# Patient Record
Sex: FEMALE | Race: WHITE | Employment: FULL TIME | URBAN - METROPOLITAN AREA
[De-identification: names, ages, dates, MRNs, and addresses within clinical notes are randomized per-mention and may not be internally consistent; named-entity substitution may affect disease eponyms.]

---

## 2017-01-13 RX ORDER — LEVOTHYROXINE SODIUM 50 MCG
TABLET ORAL
Qty: 90 TABLET | Refills: 0 | Status: SHIPPED | OUTPATIENT
Start: 2017-01-13 | End: 2017-04-18 | Stop reason: SDUPTHER

## 2017-01-13 RX ORDER — VALACYCLOVIR HYDROCHLORIDE 1 G/1
TABLET, FILM COATED ORAL
Qty: 90 TABLET | Refills: 0 | Status: SHIPPED | OUTPATIENT
Start: 2017-01-13 | End: 2017-03-27 | Stop reason: SDUPTHER

## 2017-01-13 RX ORDER — ZOLPIDEM TARTRATE 5 MG/1
TABLET ORAL
Qty: 90 TABLET | Refills: 0 | Status: SHIPPED | OUTPATIENT
Start: 2017-01-13 | End: 2017-04-09 | Stop reason: SDUPTHER

## 2017-01-16 ENCOUNTER — TELEPHONE (OUTPATIENT)
Dept: INTERNAL MEDICINE CLINIC | Age: 67
End: 2017-01-16

## 2017-04-10 RX ORDER — ZOLPIDEM TARTRATE 5 MG/1
TABLET ORAL
Qty: 90 TABLET | Refills: 0 | Status: SHIPPED | OUTPATIENT
Start: 2017-04-10 | End: 2017-07-08 | Stop reason: SDUPTHER

## 2017-04-17 ENCOUNTER — TELEPHONE (OUTPATIENT)
Dept: INTERNAL MEDICINE CLINIC | Age: 67
End: 2017-04-17

## 2017-04-17 DIAGNOSIS — E66.9 OBESITY, CLASS II, BMI 35-39.9: ICD-10-CM

## 2017-04-17 DIAGNOSIS — E03.9 ACQUIRED HYPOTHYROIDISM: Primary | ICD-10-CM

## 2017-04-17 DIAGNOSIS — E78.5 HYPERLIPIDEMIA, UNSPECIFIED HYPERLIPIDEMIA TYPE: ICD-10-CM

## 2017-04-18 RX ORDER — LEVOTHYROXINE SODIUM 0.05 MG/1
TABLET ORAL
Qty: 90 TABLET | Refills: 0 | Status: SHIPPED | OUTPATIENT
Start: 2017-04-18 | End: 2017-07-16 | Stop reason: SDUPTHER

## 2017-04-19 ENCOUNTER — TELEPHONE (OUTPATIENT)
Dept: INTERNAL MEDICINE CLINIC | Age: 67
End: 2017-04-19

## 2017-04-19 DIAGNOSIS — E03.9 ACQUIRED HYPOTHYROIDISM: ICD-10-CM

## 2017-04-19 DIAGNOSIS — E66.9 OBESITY, CLASS II, BMI 35-39.9: ICD-10-CM

## 2017-04-19 DIAGNOSIS — E78.5 HYPERLIPIDEMIA, UNSPECIFIED HYPERLIPIDEMIA TYPE: ICD-10-CM

## 2017-04-19 LAB
ANION GAP SERPL CALCULATED.3IONS-SCNC: 15 MMOL/L (ref 3–16)
BUN BLDV-MCNC: 14 MG/DL (ref 7–20)
CALCIUM SERPL-MCNC: 9.5 MG/DL (ref 8.3–10.6)
CHLORIDE BLD-SCNC: 103 MMOL/L (ref 99–110)
CHOLESTEROL, TOTAL: 192 MG/DL (ref 0–199)
CO2: 27 MMOL/L (ref 21–32)
CREAT SERPL-MCNC: 0.6 MG/DL (ref 0.6–1.2)
GFR AFRICAN AMERICAN: >60
GFR NON-AFRICAN AMERICAN: >60
GLUCOSE BLD-MCNC: 93 MG/DL (ref 70–99)
HDLC SERPL-MCNC: 46 MG/DL (ref 40–60)
LDL CHOLESTEROL CALCULATED: 119 MG/DL
POTASSIUM SERPL-SCNC: 4.5 MMOL/L (ref 3.5–5.1)
SODIUM BLD-SCNC: 145 MMOL/L (ref 136–145)
TRIGL SERPL-MCNC: 136 MG/DL (ref 0–150)
TSH REFLEX: 1.89 UIU/ML (ref 0.27–4.2)
VLDLC SERPL CALC-MCNC: 27 MG/DL

## 2017-04-20 LAB — VITAMIN D 25-HYDROXY: >120 NG/ML

## 2017-04-21 ENCOUNTER — OFFICE VISIT (OUTPATIENT)
Dept: INTERNAL MEDICINE CLINIC | Age: 67
End: 2017-04-21

## 2017-04-21 VITALS
SYSTOLIC BLOOD PRESSURE: 130 MMHG | DIASTOLIC BLOOD PRESSURE: 74 MMHG | OXYGEN SATURATION: 97 % | WEIGHT: 176 LBS | HEIGHT: 62 IN | BODY MASS INDEX: 32.39 KG/M2 | HEART RATE: 71 BPM

## 2017-04-21 DIAGNOSIS — E03.9 ACQUIRED HYPOTHYROIDISM: ICD-10-CM

## 2017-04-21 DIAGNOSIS — G47.00 INSOMNIA, UNSPECIFIED TYPE: Primary | ICD-10-CM

## 2017-04-21 DIAGNOSIS — E67.3 HYPERVITAMINOSIS D: ICD-10-CM

## 2017-04-21 DIAGNOSIS — E66.9 OBESITY, CLASS II, BMI 35-39.9: ICD-10-CM

## 2017-04-21 DIAGNOSIS — Z13.31 POSITIVE DEPRESSION SCREENING: ICD-10-CM

## 2017-04-21 DIAGNOSIS — F32.A DEPRESSION, UNSPECIFIED DEPRESSION TYPE: ICD-10-CM

## 2017-04-21 PROCEDURE — 99214 OFFICE O/P EST MOD 30 MIN: CPT | Performed by: INTERNAL MEDICINE

## 2017-04-21 PROCEDURE — G8431 POS CLIN DEPRES SCRN F/U DOC: HCPCS | Performed by: INTERNAL MEDICINE

## 2017-04-21 RX ORDER — BUPROPION HYDROCHLORIDE 150 MG/1
TABLET ORAL
Qty: 30 TABLET | Refills: 0 | Status: SHIPPED | OUTPATIENT
Start: 2017-04-21 | End: 2017-04-25 | Stop reason: SDUPTHER

## 2017-04-21 ASSESSMENT — PATIENT HEALTH QUESTIONNAIRE - PHQ9
6. FEELING BAD ABOUT YOURSELF - OR THAT YOU ARE A FAILURE OR HAVE LET YOURSELF OR YOUR FAMILY DOWN: 2
5. POOR APPETITE OR OVEREATING: 3
10. IF YOU CHECKED OFF ANY PROBLEMS, HOW DIFFICULT HAVE THESE PROBLEMS MADE IT FOR YOU TO DO YOUR WORK, TAKE CARE OF THINGS AT HOME, OR GET ALONG WITH OTHER PEOPLE: 0
8. MOVING OR SPEAKING SO SLOWLY THAT OTHER PEOPLE COULD HAVE NOTICED. OR THE OPPOSITE, BEING SO FIGETY OR RESTLESS THAT YOU HAVE BEEN MOVING AROUND A LOT MORE THAN USUAL: 3
4. FEELING TIRED OR HAVING LITTLE ENERGY: 3
3. TROUBLE FALLING OR STAYING ASLEEP: 1
2. FEELING DOWN, DEPRESSED OR HOPELESS: 1
1. LITTLE INTEREST OR PLEASURE IN DOING THINGS: 3
7. TROUBLE CONCENTRATING ON THINGS, SUCH AS READING THE NEWSPAPER OR WATCHING TELEVISION: 2
9. THOUGHTS THAT YOU WOULD BE BETTER OFF DEAD, OR OF HURTING YOURSELF: 0
SUM OF ALL RESPONSES TO PHQ9 QUESTIONS 1 & 2: 4
SUM OF ALL RESPONSES TO PHQ QUESTIONS 1-9: 18

## 2017-04-25 RX ORDER — BUPROPION HYDROCHLORIDE 300 MG/1
TABLET ORAL
Qty: 30 TABLET | Refills: 5 | Status: SHIPPED | OUTPATIENT
Start: 2017-04-25 | End: 2017-07-25 | Stop reason: SDUPTHER

## 2017-06-23 RX ORDER — VALACYCLOVIR HYDROCHLORIDE 1 G/1
TABLET, FILM COATED ORAL
Qty: 90 TABLET | Refills: 0 | Status: SHIPPED | OUTPATIENT
Start: 2017-06-23 | End: 2017-10-03 | Stop reason: SDUPTHER

## 2017-07-10 RX ORDER — ZOLPIDEM TARTRATE 5 MG/1
TABLET ORAL
Qty: 90 TABLET | Refills: 0 | Status: SHIPPED | OUTPATIENT
Start: 2017-07-10 | End: 2017-10-09 | Stop reason: SDUPTHER

## 2017-07-19 ENCOUNTER — TELEPHONE (OUTPATIENT)
Dept: INTERNAL MEDICINE CLINIC | Age: 67
End: 2017-07-19

## 2017-07-25 ENCOUNTER — OFFICE VISIT (OUTPATIENT)
Dept: INTERNAL MEDICINE CLINIC | Age: 67
End: 2017-07-25

## 2017-07-25 VITALS
WEIGHT: 172 LBS | SYSTOLIC BLOOD PRESSURE: 121 MMHG | DIASTOLIC BLOOD PRESSURE: 61 MMHG | OXYGEN SATURATION: 98 % | BODY MASS INDEX: 31.46 KG/M2 | HEART RATE: 70 BPM

## 2017-07-25 DIAGNOSIS — F41.1 GAD (GENERALIZED ANXIETY DISORDER): Primary | ICD-10-CM

## 2017-07-25 DIAGNOSIS — F32.A DEPRESSION, UNSPECIFIED DEPRESSION TYPE: ICD-10-CM

## 2017-07-25 DIAGNOSIS — E66.9 OBESITY, CLASS I, BMI 30-34.9: ICD-10-CM

## 2017-07-25 DIAGNOSIS — Z78.0 POSTMENOPAUSAL: ICD-10-CM

## 2017-07-25 PROCEDURE — 99213 OFFICE O/P EST LOW 20 MIN: CPT | Performed by: INTERNAL MEDICINE

## 2017-07-25 RX ORDER — BUPROPION HYDROCHLORIDE 300 MG/1
TABLET ORAL
Qty: 90 TABLET | Refills: 3 | Status: SHIPPED | OUTPATIENT
Start: 2017-07-25 | End: 2017-08-09 | Stop reason: SDUPTHER

## 2017-07-31 ENCOUNTER — PATIENT MESSAGE (OUTPATIENT)
Dept: INTERNAL MEDICINE CLINIC | Age: 67
End: 2017-07-31

## 2017-08-02 ENCOUNTER — PATIENT MESSAGE (OUTPATIENT)
Dept: INTERNAL MEDICINE CLINIC | Age: 67
End: 2017-08-02

## 2017-08-08 ENCOUNTER — PATIENT MESSAGE (OUTPATIENT)
Dept: INTERNAL MEDICINE CLINIC | Age: 67
End: 2017-08-08

## 2017-08-09 ENCOUNTER — TELEPHONE (OUTPATIENT)
Dept: INTERNAL MEDICINE CLINIC | Age: 67
End: 2017-08-09

## 2017-08-09 RX ORDER — BUPROPION HYDROCHLORIDE 300 MG/1
TABLET ORAL
Qty: 15 TABLET | Refills: 0 | Status: SHIPPED | OUTPATIENT
Start: 2017-08-09 | End: 2017-10-30

## 2017-08-10 ENCOUNTER — HOSPITAL ENCOUNTER (OUTPATIENT)
Dept: MAMMOGRAPHY | Age: 67
Discharge: HOME | End: 2017-08-10
Attending: INTERNAL MEDICINE | Admitting: INTERNAL MEDICINE

## 2017-08-10 DIAGNOSIS — Z78.0 ASYMPTOMATIC MENOPAUSAL STATE: ICD-10-CM

## 2017-08-10 DIAGNOSIS — Z78.0 POST-MENOPAUSAL: ICD-10-CM

## 2017-08-14 RX ORDER — SIMVASTATIN 20 MG
TABLET ORAL
Qty: 90 TABLET | Refills: 1 | Status: SHIPPED | OUTPATIENT
Start: 2017-08-14 | End: 2018-01-26 | Stop reason: SDUPTHER

## 2017-09-21 ENCOUNTER — NURSE ONLY (OUTPATIENT)
Dept: INTERNAL MEDICINE CLINIC | Age: 67
End: 2017-09-21

## 2017-09-21 DIAGNOSIS — Z23 NEED FOR INFLUENZA VACCINATION: Primary | ICD-10-CM

## 2017-09-21 PROCEDURE — 90471 IMMUNIZATION ADMIN: CPT | Performed by: INTERNAL MEDICINE

## 2017-09-21 PROCEDURE — 90662 IIV NO PRSV INCREASED AG IM: CPT | Performed by: INTERNAL MEDICINE

## 2017-10-03 RX ORDER — VALACYCLOVIR HYDROCHLORIDE 1 G/1
TABLET, FILM COATED ORAL
Qty: 90 TABLET | Refills: 0 | Status: SHIPPED | OUTPATIENT
Start: 2017-10-03 | End: 2017-12-05 | Stop reason: SDUPTHER

## 2017-10-09 RX ORDER — ZOLPIDEM TARTRATE 5 MG/1
TABLET ORAL
Qty: 90 TABLET | Refills: 0 | Status: SHIPPED | OUTPATIENT
Start: 2017-10-09 | End: 2018-01-03 | Stop reason: SDUPTHER

## 2017-10-09 NOTE — TELEPHONE ENCOUNTER
Last visit: 7/25/2017  Last filled: 7/10/2017 for 90 days   Next visit: 1/26/2018  OARRS:  04/09/2017

## 2017-10-20 RX ORDER — LEVOTHYROXINE SODIUM 50 MCG
TABLET ORAL
Qty: 90 TABLET | Refills: 0 | Status: SHIPPED | OUTPATIENT
Start: 2017-10-20 | End: 2018-01-08 | Stop reason: SDUPTHER

## 2017-10-31 RX ORDER — BUPROPION HYDROCHLORIDE 300 MG/1
TABLET ORAL
Qty: 30 TABLET | Refills: 5 | Status: SHIPPED | OUTPATIENT
Start: 2017-10-31 | End: 2018-01-26 | Stop reason: SDUPTHER

## 2017-11-01 ENCOUNTER — NURSE ONLY (OUTPATIENT)
Dept: INTERNAL MEDICINE CLINIC | Age: 67
End: 2017-11-01

## 2017-11-01 PROCEDURE — 90471 IMMUNIZATION ADMIN: CPT | Performed by: INTERNAL MEDICINE

## 2017-11-01 PROCEDURE — 90732 PPSV23 VACC 2 YRS+ SUBQ/IM: CPT | Performed by: INTERNAL MEDICINE

## 2017-11-07 ENCOUNTER — OFFICE VISIT (OUTPATIENT)
Dept: INTERNAL MEDICINE CLINIC | Age: 67
End: 2017-11-07

## 2017-11-07 VITALS
DIASTOLIC BLOOD PRESSURE: 78 MMHG | TEMPERATURE: 98.5 F | OXYGEN SATURATION: 97 % | SYSTOLIC BLOOD PRESSURE: 140 MMHG | HEART RATE: 70 BPM

## 2017-11-07 DIAGNOSIS — R39.15 URINARY URGENCY: Primary | ICD-10-CM

## 2017-11-07 DIAGNOSIS — L28.2 PRURITIC RASH: ICD-10-CM

## 2017-11-07 DIAGNOSIS — Z86.19 H/O HERPES GENITALIS: ICD-10-CM

## 2017-11-07 LAB
BILIRUBIN, POC: NORMAL
BLOOD URINE, POC: NORMAL
CLARITY, POC: NORMAL
COLOR, POC: NORMAL
GLUCOSE URINE, POC: NORMAL
KETONES, POC: NORMAL
LEUKOCYTE EST, POC: NORMAL
NITRITE, POC: NORMAL
PH, POC: 6
PROTEIN, POC: NORMAL
SPECIFIC GRAVITY, POC: 1.01
UROBILINOGEN, POC: 0.2

## 2017-11-07 PROCEDURE — 99214 OFFICE O/P EST MOD 30 MIN: CPT | Performed by: INTERNAL MEDICINE

## 2017-11-07 PROCEDURE — 81002 URINALYSIS NONAUTO W/O SCOPE: CPT | Performed by: INTERNAL MEDICINE

## 2017-11-07 NOTE — PATIENT INSTRUCTIONS
Increase valtrex to twice daily for 3 days. Push water. Using should be nearly clear when you urinate. Avoid necklaces and new skin care products for now.

## 2017-11-07 NOTE — PROGRESS NOTES
TAKE 1 TABLET BY MOUTH DAILY 30 tablet 5    SYNTHROID 50 MCG tablet TAKE ONE TABLET BY MOUTH DAILY 90 tablet 0    zolpidem (AMBIEN) 5 MG tablet TAKE ONE TABLET BY MOUTH ONE TIME A DAY 90 tablet 0    valACYclovir (VALTREX) 1 g tablet TAKE 1 TABLET BY MOUTH DAILY 90 tablet 0    simvastatin (ZOCOR) 20 MG tablet TAKE ONE TABLET BY MOUTH NIGHTLY 90 tablet 1    Fexofenadine HCl (ALLEGRA PO) Take by mouth      triamcinolone (NASACORT ALLERGY 24HR) 55 MCG/ACT nasal inhaler 2 SPRAYS IN EACH NOSTRIL one time a day 1 Inhaler 2    conjugated estrogens (PREMARIN) 0.625 MG/GM vaginal cream Place 0.5 g vaginally Twice a Week 1 Tube 5    bisacodyl (DULCOLAX) 5 MG EC tablet Take 10 mg by mouth daily as needed for Constipation.  Omega-3 Fatty Acids (FISH OIL BURP-LESS) 1200 MG CAPS Take 2 tablets by mouth daily.  ibuprofen (ADVIL;MOTRIN) 600 MG tablet Take 600 mg by mouth every 6 hours as needed for Pain. OBJECTIVE:   Vitals:    11/07/17 0955   BP: (!) 140/78   Pulse:    Temp:    SpO2:      Physical Exam   Cardiovascular: Normal rate and regular rhythm. Pulmonary/Chest: Breath sounds normal.   Abdominal: There is no tenderness. No flank pain   Skin: Rash (few scattered 3-4 mm erythematous papules on chest) noted.        Results for POC orders placed in visit on 11/07/17   POCT Urinalysis no Micro   Result Value Ref Range    Color, UA      Clarity, UA      Glucose, UA POC neg     Bilirubin, UA neg     Ketones, UA neg     Spec Grav, UA 1.015     Blood, UA POC trace     pH, UA 6.0     Protein, UA POC neg     Urobilinogen, UA 0.2     Leukocytes, UA neg     Nitrite, UA neg

## 2017-11-08 RX ORDER — CONJUGATED ESTROGENS 0.62 MG/G
CREAM VAGINAL
Qty: 30 G | Refills: 5 | Status: ON HOLD | OUTPATIENT
Start: 2017-11-08 | End: 2022-09-12 | Stop reason: HOSPADM

## 2017-11-09 LAB — URINE CULTURE, ROUTINE: NORMAL

## 2017-11-13 ENCOUNTER — OFFICE VISIT (OUTPATIENT)
Dept: GYNECOLOGY | Age: 67
End: 2017-11-13

## 2017-11-13 VITALS
OXYGEN SATURATION: 96 % | DIASTOLIC BLOOD PRESSURE: 60 MMHG | WEIGHT: 184 LBS | HEART RATE: 71 BPM | SYSTOLIC BLOOD PRESSURE: 122 MMHG | BODY MASS INDEX: 33.65 KG/M2

## 2017-11-13 DIAGNOSIS — N94.9 VAGINAL BURNING: Primary | ICD-10-CM

## 2017-11-13 DIAGNOSIS — Z76.89 ENCOUNTER TO ESTABLISH CARE: ICD-10-CM

## 2017-11-13 DIAGNOSIS — N89.8 VAGINAL DISCHARGE: ICD-10-CM

## 2017-11-13 DIAGNOSIS — Z12.4 CERVICAL CANCER SCREENING: ICD-10-CM

## 2017-11-13 LAB
BACTERIA WET PREP: NORMAL
CLUE CELLS: NORMAL
EPITHELIAL CELLS WET PREP: NORMAL
RBC WET PREP: NORMAL
SOURCE WET PREP: NORMAL
TRICHOMONAS PREP: NORMAL
WBC WET PREP: NORMAL
YEAST WET PREP: NORMAL

## 2017-11-13 PROCEDURE — 99203 OFFICE O/P NEW LOW 30 MIN: CPT | Performed by: NURSE PRACTITIONER

## 2017-11-13 RX ORDER — FLUCONAZOLE 150 MG/1
150 TABLET ORAL
Qty: 2 TABLET | Refills: 0 | Status: SHIPPED | OUTPATIENT
Start: 2017-11-13 | End: 2017-11-17

## 2017-11-13 NOTE — PROGRESS NOTES
[facility]- Gynecology  Progress Note  Margarita Starr CNP       Sergio Mazariegos   YOB: 1950    Date of Visit:  11/13/2017    Chief Complaint Vaginal burning and urgency    Allergies   Allergen Reactions    Latex Rash    Penicillins Rash     Outpatient Prescriptions Marked as Taking for the 11/13/17 encounter (Office Visit) with Dm Espitia CNP   Medication Sig Dispense Refill    Multiple Vitamins-Minerals (MULTIVITAMIN WOMEN PO) Take by mouth daily      fluconazole (DIFLUCAN) 150 MG tablet Take 1 tablet by mouth every 72 hours for 2 doses 2 tablet 0    PREMARIN 0.625 MG/GM vaginal cream PLACE 0.5G VAGINALLY TWICE A WEEK . 30 g 5    buPROPion (WELLBUTRIN XL) 300 MG extended release tablet TAKE 1 TABLET BY MOUTH DAILY 30 tablet 5    SYNTHROID 50 MCG tablet TAKE ONE TABLET BY MOUTH DAILY 90 tablet 0    zolpidem (AMBIEN) 5 MG tablet TAKE ONE TABLET BY MOUTH ONE TIME A DAY 90 tablet 0    valACYclovir (VALTREX) 1 g tablet TAKE 1 TABLET BY MOUTH DAILY 90 tablet 0    simvastatin (ZOCOR) 20 MG tablet TAKE ONE TABLET BY MOUTH NIGHTLY 90 tablet 1    Fexofenadine HCl (ALLEGRA PO) Take by mouth      triamcinolone (NASACORT ALLERGY 24HR) 55 MCG/ACT nasal inhaler 2 SPRAYS IN EACH NOSTRIL one time a day 1 Inhaler 2    bisacodyl (DULCOLAX) 5 MG EC tablet Take 10 mg by mouth daily as needed for Constipation.  Omega-3 Fatty Acids (FISH OIL BURP-LESS) 1200 MG CAPS Take 2 tablets by mouth daily.  ibuprofen (ADVIL;MOTRIN) 600 MG tablet Take 600 mg by mouth every 6 hours as needed for Pain. Vitals:    11/13/17 0803   BP: 122/60   Pulse: 71   SpO2: 96%   Weight: 184 lb (83.5 kg)     Body mass index is 33.65 kg/m².      Wt Readings from Last 3 Encounters:   11/13/17 184 lb (83.5 kg)   07/25/17 172 lb (78 kg)   04/21/17 176 lb (79.8 kg)     BP Readings from Last 3 Encounters:   11/13/17 122/60   11/07/17 (!) 140/78   07/25/17 121/61      DONATO Hill is a 78year old female presenting today as a new patient for vaginal burning and urinary urgency for 1 month. She was seen by her PCP 1 week ago and was evaluated for UTI- culture was negative. Patient reports that she has H/O HSV and will often have similar symptoms prior to an outbreak- she increased her Valtrex for 3 days; no relief in symptoms and denies outbreak. Patient also reports urinary incontinence- combination of urge and stress that has been a chronic issue that has been worsening over the past month. She denies dysuria, frequency, hematuria, fever, nausea/vomiting, diarrhea, constipation and vaginal symptoms. Symptoms have been stable with time. Sexually active: No.  Relevant medical history: none. Treatment to date: none. Patient is not currently sexually active; denies concerns for STDs. Reports that due to vaginal dryness/ atrophy she had been using Premarin cream twice weekly. She reports that symptoms had improved and she stopped using it about 1 month ago prior to symptom onset. She reports that she has started using it again in the past 2 weeks without resolution. Review of Systems  As documented in HPI    Physical Assessment    Physical Exam   Constitutional: She is oriented to person, place, and time. She appears well-nourished. No distress. Cardiovascular: Normal rate and regular rhythm. Exam reveals no friction rub. No murmur heard. Pulmonary/Chest: Effort normal and breath sounds normal. No respiratory distress. She has no wheezes. Abdominal: Soft. Bowel sounds are normal. She exhibits no distension. Genitourinary: Pelvic exam was performed with patient supine. There is no rash, tenderness, lesion or injury on the right labia. There is no rash, tenderness, lesion or injury on the left labia. No erythema, tenderness or bleeding in the vagina. No foreign body in the vagina. No signs of injury around the vagina. Vaginal discharge (small amount of thick, white, adherent ) found.    Genitourinary Comments: normal external genitalia, vulva, vagina, urethral meatus normal and bladder not palpable. Neurological: She is alert and oriented to person, place, and time. Skin: Skin is warm and dry. No rash noted. No erythema. Psychiatric: She has a normal mood and affect. Her behavior is normal.   Vitals reviewed. Preventive Care and Risk Factor Assessment:  Hx abnormal PAP: yes - several years ago. Self-breast exams: yes  Hx of abnormal mammogram: no  Colonoscopy: yes- 8/2014 normal  FH of breast cancer: no  FH of GYN cancer: no   Previous DEXA scan: yes- 8/10/2017, normal  Exercise: walking  Social History   Substance Use Topics    Smoking status: Never Smoker    Smokeless tobacco: Never Used      Comment: Never smoked, never will!  Alcohol use No      History   Sexual Activity    Sexual activity: Not Currently    Partners: Male     Hx of STD: yes - HSV     Health Maintenance   Topic Date Due    Breast cancer screen  03/31/2018    Lipid screen  04/19/2022    DTaP/Tdap/Td vaccine (2 - Td) 02/14/2024    Colon cancer screen colonoscopy  08/08/2024    Zostavax vaccine  Completed    DEXA (modify frequency per FRAX score)  Completed    Flu vaccine  Completed    Pneumococcal low/med risk  Completed    Hepatitis C screen  Completed        Assessment/Plan:    1. Encounter to establish care  [facility] was [facility] today to establish care for c/o vaginal burning. 2. Vaginal burning  Patient with a 2 month h/o vaginal burning that started after stopping premarin but has not improved with restarting. Patient does have H/O HSV without recent outbreak- did try increasing Valtrex for 3 days without resolution of symptoms- no lesions visualized on exam. There is a small amount of thick, white, adherent discharge noted on exam. Consistent with yeast. Cultures pending to rule out concurrent BV.    - Wet prep, genital  - Culture, Genital    3.  Vaginal discharge  Small amount of thick, white, adherent discharge noted on exam. Consistent with yeast. Empirical treatment started. - fluconazole (DIFLUCAN) 150 MG tablet; Take 1 tablet by mouth every 72 hours for 2 doses  Dispense: 2 tablet; Refill: 0    4. Cervical cancer screening  Patient has not had a PAP in several years due to hysterectomy. Reports having h/o an abnormal PAP prior to hysterectomy. PAP today with co HPV test.    - PAP SMEAR  - Human papillomavirus (HPV) DNA probe thin prep high risk     Return in about 1 year (around 11/13/2018), or if symptoms worsen or fail to improve.

## 2017-11-13 NOTE — PATIENT INSTRUCTIONS
Patient Education        Bacterial Vaginosis: Care Instructions  Your Care Instructions    Bacterial vaginosis is a type of vaginal infection. It is caused by excess growth of certain bacteria that are normally found in the vagina. Symptoms can include itching, swelling, pain when you urinate or have sex, and a gray or yellow discharge with a \"fishy\" odor. It is not considered an infection that is spread through sexual contact. Although symptoms can be annoying and uncomfortable, bacterial vaginosis does not usually cause other health problems. However, if you have it while you are pregnant, it can cause complications. While the infection may go away on its own, most doctors use antibiotics to treat it. You may have been prescribed pills or vaginal cream. With treatment, bacterial vaginosis usually clears up in 5 to 7 days. Follow-up care is a key part of your treatment and safety. Be sure to make and go to all appointments, and call your doctor if you are having problems. It's also a good idea to know your test results and keep a list of the medicines you take. How can you care for yourself at home? · Take your antibiotics as directed. Do not stop taking them just because you feel better. You need to take the full course of antibiotics. · Do not eat or drink anything that contains alcohol if you are taking metronidazole (Flagyl). · Keep using your medicine if you start your period. Use pads instead of tampons while using a vaginal cream or suppository. Tampons can absorb the medicine. · Wear loose cotton clothing. Do not wear nylon and other materials that hold body heat and moisture close to the skin. · Do not scratch. Relieve itching with a cold pack or a cool bath. · Do not wash your vaginal area more than once a day. Use plain water or a mild, unscented soap. Do not douche. When should you call for help?   Watch closely for changes in your health, and be sure to contact your doctor if:  · You have unexpected vaginal bleeding. · You have a fever. · You have new or increased pain in your vagina or pelvis. · You are not getting better after 1 week. · Your symptoms return after you finish the course of your medicine. Where can you learn more? Go to https://chpedavid.healthPIQUR [employer]rapeutics. org and sign in to your [employer] account. Enter E634 in the [employer] box to learn more about \"Bacterial Vaginosis: Care Instructions. \"     If you do not have an account, please click on the \"Sign Up Now\" link. Current as of: October 13, 2016  Content Version: 11.3  © 9971-6041 [employer]. Care instructions adapted under license by [facility] ([facility]). If you have questions about a medical condition or this instruction, always ask your healthcare professional. [address] any warranty or liability for your use of this information. Patient Education        Vaginal Yeast Infection: Care Instructions  Your Care Instructions  A vaginal yeast infection is caused by too many yeast cells in the vagina. This is common in women of all ages. Itching, vaginal discharge and irritation, and other symptoms can bother you. But yeast infections don't often cause other health problems. Some medicines can increase your risk of getting a yeast infection. These include antibiotics, birth control pills, hormones, and steroids. You may also be more likely to get a yeast infection if you are pregnant, have diabetes, douche, or wear tight clothes. With treatment, most yeast infections get better in 2 to 3 days. Follow-up care is a key part of your treatment and safety. Be sure to make and go to all appointments, and call your doctor if you are having problems. It's also a good idea to know your test results and keep a list of the medicines you take. How can you care for yourself at home? · Take your medicines exactly as prescribed.  Call your doctor if you think you are having a problem with your medicine. · Ask your doctor about over-the-counter (OTC) medicines for yeast infections. They may cost less than prescription medicines. If you use an OTC treatment, read and follow all instructions on the label. · Do not use tampons while using a vaginal cream or suppository. The tampons can absorb the medicine. Use pads instead. · Wear loose cotton clothing. Do not wear nylon or other fabric that holds body heat and moisture close to the skin. · Try sleeping without underwear. · Do not scratch. Relieve itching with a cold pack or a cool bath. · Do not wash your vaginal area more than once a day. Use plain water or a mild, unscented soap. Air-dry the vaginal area. · Change out of wet swimsuits after swimming. · Do not have sex until you have finished your treatment. · Do not douche. When should you call for help? Call your doctor now or seek immediate medical care if:  · You have unexpected vaginal bleeding. · You have new or increased pain in your vagina or pelvis. Watch closely for changes in your health, and be sure to contact your doctor if:  · You have a fever. · You are not getting better after 2 days. · Your symptoms come back after you finish your medicines. Where can you learn more? Go to https://f4samuraipeMarkkiteb.Scyron. org and sign in to your [employer] account. Enter L842 in the [facility] box to learn more about \"Vaginal Yeast Infection: Care Instructions. \"     If you do not have an account, please click on the \"Sign Up Now\" link. Current as of: October 13, 2016  Content Version: 11.3  © 8649-8276 [employer]. Care instructions adapted under license by [address] If you have questions about a medical condition or this instruction, always ask your healthcare professional. [place] [address] any warranty or liability for your use of this information.

## 2017-11-15 ENCOUNTER — PATIENT MESSAGE (OUTPATIENT)
Dept: GYNECOLOGY | Age: 67
End: 2017-11-15

## 2017-11-15 LAB
GENITAL CULTURE, ROUTINE: NORMAL
HPV COMMENT: ABNORMAL
HPV TYPE 16: NOT DETECTED
HPV TYPE 18: NOT DETECTED
HPVOH (OTHER TYPES): DETECTED

## 2017-11-16 NOTE — TELEPHONE ENCOUNTER
From: Fatuma Steward CNP  To: Mitchell Brown  Sent: 11/15/2017 9:49 AM EST  Subject: lab results    Barrera Bonalicia,    The genital culture is negative as well. Did symptoms improve after taking the Diflucan? Thank you.     Gudelia Gregorio CNP

## 2017-12-05 RX ORDER — VALACYCLOVIR HYDROCHLORIDE 1 G/1
1000 TABLET, FILM COATED ORAL DAILY
Qty: 90 TABLET | Refills: 0 | Status: SHIPPED | OUTPATIENT
Start: 2017-12-05 | End: 2018-07-26

## 2017-12-05 RX ORDER — VALACYCLOVIR HYDROCHLORIDE 1 G/1
1000 TABLET, FILM COATED ORAL DAILY
Qty: 90 TABLET | Refills: 0 | Status: SHIPPED | OUTPATIENT
Start: 2017-12-05 | End: 2017-12-05 | Stop reason: SDUPTHER

## 2017-12-22 ENCOUNTER — TELEPHONE (OUTPATIENT)
Dept: GYNECOLOGY | Age: 67
End: 2017-12-22

## 2017-12-22 NOTE — TELEPHONE ENCOUNTER
Called patient left message to change appt to [facility] office/or reschedule appointment another day.

## 2018-01-02 ENCOUNTER — PATIENT MESSAGE (OUTPATIENT)
Dept: INTERNAL MEDICINE CLINIC | Age: 68
End: 2018-01-02

## 2018-01-02 DIAGNOSIS — F51.04 CHRONIC INSOMNIA: Primary | ICD-10-CM

## 2018-01-03 RX ORDER — ZOLPIDEM TARTRATE 5 MG/1
TABLET ORAL
Qty: 90 TABLET | Refills: 0 | Status: SHIPPED | OUTPATIENT
Start: 2018-01-03 | End: 2018-04-05 | Stop reason: SDUPTHER

## 2018-01-08 RX ORDER — LEVOTHYROXINE SODIUM 0.05 MG/1
50 TABLET ORAL DAILY
Qty: 90 TABLET | Refills: 0 | Status: SHIPPED | OUTPATIENT
Start: 2018-01-08 | End: 2018-04-05 | Stop reason: SDUPTHER

## 2018-01-08 NOTE — TELEPHONE ENCOUNTER
From: Lalo Benito  Sent: 1/6/2018 2:18 PM EST  Subject: Medication Renewal Request    Grace Sanchez would like a refill of the following medications:  SYNTHROID 50 MCG tablet Shahla Moody MD]    Preferred pharmacy: Other - [facility]    Comment:  Hello - My new pharmacy is [facility]. Thank you for your assistance.  Sophia Zimmer

## 2018-01-24 LAB — GLUCOSE FASTING: 102 MG/DL

## 2018-01-25 PROBLEM — F32.5 MAJOR DEPRESSIVE DISORDER IN REMISSION (HCC): Status: ACTIVE | Noted: 2017-04-21

## 2018-01-26 ENCOUNTER — OFFICE VISIT (OUTPATIENT)
Dept: INTERNAL MEDICINE CLINIC | Age: 68
End: 2018-01-26

## 2018-01-26 VITALS
DIASTOLIC BLOOD PRESSURE: 64 MMHG | BODY MASS INDEX: 33.86 KG/M2 | SYSTOLIC BLOOD PRESSURE: 120 MMHG | HEART RATE: 68 BPM | HEIGHT: 62 IN | WEIGHT: 184 LBS

## 2018-01-26 DIAGNOSIS — E03.9 ACQUIRED HYPOTHYROIDISM: Primary | ICD-10-CM

## 2018-01-26 DIAGNOSIS — R73.9 HYPERGLYCEMIA: ICD-10-CM

## 2018-01-26 DIAGNOSIS — G47.00 INSOMNIA, UNSPECIFIED TYPE: ICD-10-CM

## 2018-01-26 DIAGNOSIS — F33.40 RECURRENT MAJOR DEPRESSIVE DISORDER, IN REMISSION (HCC): ICD-10-CM

## 2018-01-26 PROCEDURE — 99214 OFFICE O/P EST MOD 30 MIN: CPT | Performed by: INTERNAL MEDICINE

## 2018-01-26 RX ORDER — SIMVASTATIN 20 MG
20 TABLET ORAL NIGHTLY
Qty: 90 TABLET | Refills: 1 | Status: SHIPPED | OUTPATIENT
Start: 2018-01-26 | End: 2018-07-27 | Stop reason: SDUPTHER

## 2018-01-26 RX ORDER — MELATONIN
1 DAILY
Qty: 30 TABLET | Refills: 11 | COMMUNITY
Start: 2018-01-26

## 2018-01-26 RX ORDER — BUPROPION HYDROCHLORIDE 300 MG/1
300 TABLET ORAL EVERY MORNING
Qty: 30 TABLET | Refills: 5 | Status: SHIPPED | OUTPATIENT
Start: 2018-01-26 | End: 2018-07-23 | Stop reason: SDUPTHER

## 2018-01-26 NOTE — PROGRESS NOTES
Diagnosis Date    Environmental allergies     Heartburn     Herpes simplex type 2 infection     Hyperlipidemia     Hypothyroid     Dx about 15 years ago, but htne was off med for  long time    Insomnia     Menopause     approx age 37     Prior to Visit Medications    Medication Sig Taking? Authorizing Provider   Probiotic Product (PROBIOTIC FORMULA PO) Take by mouth Yes Historical Provider, MD   MILK THISTLE PO Take by mouth Yes Historical Provider, MD   simvastatin (ZOCOR) 20 MG tablet Take 1 tablet by mouth nightly Yes Sari Mcdonnell MD   Cholecalciferol (VITAMIN D3) 1000 units TABS Take 1 tablet by mouth daily Yes Sari Mcdonnell MD   levothyroxine (SYNTHROID) 50 MCG tablet Take 1 tablet by mouth daily Yes Sari Mcdonnell MD   zolpidem (AMBIEN) 5 MG tablet TAKE ONE TABLET BY MOUTH ONE TIME A DAY. Yes Sari Mcdonnell MD   valACYclovir (VALTREX) 1 g tablet Take 1 tablet by mouth daily Yes Sari Mcdonnell MD   Multiple Vitamins-Minerals (MULTIVITAMIN WOMEN PO) Take by mouth daily Yes Historical Provider, MD   PREMARIN 0.625 MG/GM vaginal cream PLACE 0.5G VAGINALLY TWICE A WEEK . Yes Sari Mcdonnell MD   buPROPion (WELLBUTRIN XL) 300 MG extended release tablet TAKE 1 TABLET BY MOUTH DAILY Yes Sari Mcdonnell MD   Fexofenadine HCl (ALLEGRA PO) Take by mouth Yes Historical Provider, MD   triamcinolone (NASACORT ALLERGY 24HR) 55 MCG/ACT nasal inhaler 2 SPRAYS IN EACH NOSTRIL one time a day Yes Sari Mcdonnell MD   bisacodyl (DULCOLAX) 5 MG EC tablet Take 10 mg by mouth daily as needed for Constipation. Yes Historical Provider, MD   Omega-3 Fatty Acids (FISH OIL BURP-LESS) 1200 MG CAPS Take 2 tablets by mouth daily. Yes Historical Provider, MD   ibuprofen (ADVIL;MOTRIN) 600 MG tablet Take 600 mg by mouth every 6 hours as needed for Pain.  Yes Historical Provider, MD   mometasone (NASONEX) 50 MCG/ACT nasal spray USE 2 SPRAYS BY NASAL ROUTE DAILY  Sari Mcdonnell MD       OBJECTIVE:  BP Readings from Last 3 Encounters:   01/26/18 120/64

## 2018-01-26 NOTE — TELEPHONE ENCOUNTER
From: Enzo Curry  Sent: 1/26/2018 1:55 PM EST  Subject: Medication Renewal Request    Jyotsna Mateusfabian. Kye Arellano would like a refill of the following medications:  buPROPion (WELLBUTRIN XL) 300 MG extended release tablet Selena Vega MD]    Preferred pharmacy: [facility] [address] [place] [address] [phone] - F [phone]    Comment:  I'm sorry, I forgot to ask Dr. Lianet Rendon to send this one to [facility]. Thanks for your help!  Nataliya Prado

## 2018-02-14 ENCOUNTER — TELEPHONE (OUTPATIENT)
Dept: GYNECOLOGY | Age: 68
End: 2018-02-14

## 2018-02-14 NOTE — TELEPHONE ENCOUNTER
I would at least tell her she needs to get a pap in one year. That is my most conservative recommendation. Make sure you tell her this.

## 2018-02-14 NOTE — TELEPHONE ENCOUNTER
called patient in regards to her cancelling her repeat pap for 2/8/17 to see if I could get it rescheduled, and patient adamantly refused to schedule the repeat pap feels she doesn't need this, had hpv 15 years ago, will schedule when she feels she needs to after seeing Dr. Isabell Lin.

## 2018-03-15 RX ORDER — VALACYCLOVIR HYDROCHLORIDE 1 G/1
TABLET, FILM COATED ORAL
Qty: 90 TABLET | Refills: 0 | Status: SHIPPED | OUTPATIENT
Start: 2018-03-15 | End: 2018-06-04 | Stop reason: SDUPTHER

## 2018-04-04 ENCOUNTER — PATIENT MESSAGE (OUTPATIENT)
Dept: INTERNAL MEDICINE CLINIC | Age: 68
End: 2018-04-04

## 2018-04-04 DIAGNOSIS — F51.04 CHRONIC INSOMNIA: ICD-10-CM

## 2018-04-05 RX ORDER — ZOLPIDEM TARTRATE 5 MG/1
TABLET ORAL
Qty: 90 TABLET | Refills: 0 | Status: SHIPPED | OUTPATIENT
Start: 2018-04-05 | End: 2018-06-28 | Stop reason: SDUPTHER

## 2018-04-05 RX ORDER — LEVOTHYROXINE SODIUM 0.05 MG/1
50 TABLET ORAL DAILY
Qty: 90 TABLET | Refills: 0 | Status: SHIPPED | OUTPATIENT
Start: 2018-04-05 | End: 2018-07-13 | Stop reason: SDUPTHER

## 2018-04-09 ENCOUNTER — TELEPHONE (OUTPATIENT)
Dept: ORTHOPEDIC SURGERY | Age: 68
End: 2018-04-09

## 2018-04-10 ENCOUNTER — TELEPHONE (OUTPATIENT)
Dept: INTERNAL MEDICINE CLINIC | Age: 68
End: 2018-04-10

## 2018-04-11 ENCOUNTER — TELEPHONE (OUTPATIENT)
Dept: INTERNAL MEDICINE CLINIC | Age: 68
End: 2018-04-11

## 2018-06-28 DIAGNOSIS — F51.04 CHRONIC INSOMNIA: ICD-10-CM

## 2018-06-28 RX ORDER — ZOLPIDEM TARTRATE 5 MG/1
TABLET ORAL
Qty: 90 TABLET | Refills: 0 | Status: SHIPPED | OUTPATIENT
Start: 2018-06-28 | End: 2018-09-26

## 2018-07-16 RX ORDER — LEVOTHYROXINE SODIUM 0.05 MG/1
TABLET ORAL
Qty: 90 TABLET | Refills: 0 | Status: SHIPPED | OUTPATIENT
Start: 2018-07-16 | End: 2018-09-14 | Stop reason: SDUPTHER

## 2018-07-24 RX ORDER — BUPROPION HYDROCHLORIDE 300 MG/1
TABLET ORAL
Qty: 90 TABLET | Refills: 1 | Status: SHIPPED | OUTPATIENT
Start: 2018-07-24 | End: 2019-01-01 | Stop reason: SDUPTHER

## 2018-07-26 NOTE — PROGRESS NOTES
[address] Internal Medicine  Patient Encounter   Aaron Alicea MD    2018    Lee Novoarles  :1950    Assessment/Plan:   Recurrent major depressive disorder, in remission [facility])  Doing well, discussed possible decrease her taper off but due to lifelong intermittent symptoms, we've decided to have her continue. GERD. Recent increase in symptoms. Discussed style measures to control. Advised Zantac before dinner nightly and then consider increasing to twice a day if still with symptoms. Nexium. If unable to control with ranitidine    Insomnia, unspecified type  Choirs nightly medication, discussed alternatives to the Ambien. We'll do a trial of trazodone  mg. Discussed risks and benefits of the medication, including most common side effects. Obesity, Class I, BMI 30-34.9  Weight to slightly increased. Discussed lifestyle again. Herpes simplex type 2 infection  Chronic suppressive therapy, still with some outbreaks but none in 3 months. Starts to break through. Again, let me know and we can change to Famvir    Hyperglycemia  Very mild, asymptomatic. A1c still in normal range. Discussed lifestyle. Discussed medications with patient who voiced understanding of their use and indications. All questions answered. Return in about 6 months (around 2019). Medical assistant triage:  Chief Complaint   Patient presents with    6 Month Follow-Up     mammogram scheduled for tomorrow. HPI:   Patient presents for follow-up of   1. Recurrent major depressive disorder, in remission ([facility] [address]    2. Insomnia, unspecified type    3. Obesity, Class I, BMI 30-34.9    4. Herpes simplex type 2 infection    5. Need for prophylactic vaccination and inoculation against varicella      Mammogram schedule.d Recent issue with acid reflux. Takes nexium and will work for a few days. No trouble swallowing now but did have. Coffee and spicy trigger but still drinking.      Insomnia: Never tried trazodone. Wakes up several times nightly to urinate because has increased water intake. Sleeps about 6 hours. Has hard time getting to sleep earlier. Uses the ambien nightly. Depression: Feels like depression is well controlled. Sometimes wonders if still needs but not wanting to \"rock the boat\" due to a lifetime of symptoms. HSV-2, on chronic suppression. Still has breakthrough outbreaks about 3 months ago. Wonders if immune. Hyperglycemia: Still exercising regularly. Gained a little again. Denies polyuria or polydipsia. ROS   As per HPI. Past Medical History:   Diagnosis Date    Environmental allergies     Heartburn     Herpes simplex type 2 infection     Hyperlipidemia     Hypothyroid     Dx about 15 years ago, but htne was off med for  long time    Insomnia     Menopause     approx age 37     Prior to Visit Medications    Medication Sig Taking? Authorizing Provider   simvastatin (ZOCOR) 20 MG tablet Take 1 tablet by mouth nightly Yes Dhiraj Jane MD   traZODone (DESYREL) 50 MG tablet Take 1-2 tablets by mouth nightly as needed for Sleep Yes Dhiraj Jane MD   zoster recombinant adjuvanted vaccine (SHINGRIX) 50 MCG SUSR injection 50 MCG IM then repeat 2-6 months.  Yes Dhiraj Jane MD   buPROPion (WELLBUTRIN XL) 300 MG extended release tablet TAKE 1 TABLET EVERY MORNING Yes Dhiraj Jane MD   levothyroxine (SYNTHROID) 50 MCG tablet TAKE 1 TABLET DAILY Yes Dhiraj Jane MD   zolpidem (AMBIEN) 5 MG tablet TAKE 1 TABLET ONCE DAILY Yes Dhiraj Jane MD   valACYclovir (VALTREX) 1 g tablet TAKE 1 TABLET DAILY Yes Dhiraj Jane MD   Probiotic Product (PROBIOTIC FORMULA PO) Take by mouth Yes Historical Provider, MD   MILK THISTLE PO Take by mouth Yes Historical Provider, MD   Cholecalciferol (VITAMIN D3) 1000 units TABS Take 1 tablet by mouth daily Yes Dhiraj Jane MD   Multiple Vitamins-Minerals (MULTIVITAMIN WOMEN PO) Take by mouth daily Yes Historical Provider, MD   PREMARIN 0.625 MG/GM vaginal cream PLACE 0.5G VAGINALLY TWICE A WEEK . Yes Nirali Fierro MD   Fexofenadine HCl (ALLEGRA PO) Take by mouth Yes Historical Provider, MD   triamcinolone (NASACORT ALLERGY 24HR) 55 MCG/ACT nasal inhaler 2 SPRAYS IN EACH NOSTRIL one time a day Yes Nirali Fierro MD   bisacodyl (DULCOLAX) 5 MG EC tablet Take 10 mg by mouth daily as needed for Constipation. Yes Historical Provider, MD   Omega-3 Fatty Acids (FISH OIL BURP-LESS) 1200 MG CAPS Take 2 tablets by mouth daily. Yes Historical Provider, MD   ibuprofen (ADVIL;MOTRIN) 600 MG tablet Take 600 mg by mouth every 6 hours as needed for Pain. Yes Historical Provider, MD   mometasone (NASONEX) 50 MCG/ACT nasal spray USE 2 SPRAYS BY NASAL ROUTE DAILY  Nirali Fierro MD       OBJECTIVE:  BP Readings from Last 3 Encounters:   07/27/18 118/72   01/26/18 120/64   11/13/17 122/60      Wt Readings from Last 3 Encounters:   07/27/18 186 lb (84.4 kg)   01/26/18 184 lb (83.5 kg)   11/13/17 184 lb (83.5 kg)     Vitals:    07/27/18 0758   BP: 118/72   Pulse: 72   SpO2: 98%   Weight: 186 lb (84.4 kg)     Body mass index is 34.02 kg/m². Physical Exam   Constitutional: No distress. Cardiovascular: Normal rate and regular rhythm. Pulmonary/Chest: Effort normal and breath sounds normal.   Musculoskeletal: She exhibits no edema. Psychiatric: She has a normal mood and affect.

## 2018-07-27 ENCOUNTER — OFFICE VISIT (OUTPATIENT)
Dept: INTERNAL MEDICINE CLINIC | Age: 68
End: 2018-07-27

## 2018-07-27 VITALS
DIASTOLIC BLOOD PRESSURE: 72 MMHG | BODY MASS INDEX: 34.02 KG/M2 | OXYGEN SATURATION: 98 % | HEART RATE: 72 BPM | SYSTOLIC BLOOD PRESSURE: 118 MMHG | WEIGHT: 186 LBS

## 2018-07-27 DIAGNOSIS — F33.40 RECURRENT MAJOR DEPRESSIVE DISORDER, IN REMISSION (HCC): Primary | ICD-10-CM

## 2018-07-27 DIAGNOSIS — Z23 NEED FOR PROPHYLACTIC VACCINATION AND INOCULATION AGAINST VARICELLA: ICD-10-CM

## 2018-07-27 DIAGNOSIS — K21.9 GASTROESOPHAGEAL REFLUX DISEASE, ESOPHAGITIS PRESENCE NOT SPECIFIED: ICD-10-CM

## 2018-07-27 DIAGNOSIS — G47.00 INSOMNIA, UNSPECIFIED TYPE: ICD-10-CM

## 2018-07-27 DIAGNOSIS — E66.9 OBESITY, CLASS I, BMI 30-34.9: ICD-10-CM

## 2018-07-27 DIAGNOSIS — B00.9 HERPES SIMPLEX TYPE 2 INFECTION: ICD-10-CM

## 2018-07-27 PROCEDURE — 99214 OFFICE O/P EST MOD 30 MIN: CPT | Performed by: INTERNAL MEDICINE

## 2018-07-27 PROCEDURE — 3288F FALL RISK ASSESSMENT DOCD: CPT | Performed by: INTERNAL MEDICINE

## 2018-07-27 RX ORDER — SIMVASTATIN 20 MG
20 TABLET ORAL NIGHTLY
Qty: 90 TABLET | Refills: 1 | Status: SHIPPED | OUTPATIENT
Start: 2018-07-27 | End: 2019-05-28

## 2018-07-27 RX ORDER — TRAZODONE HYDROCHLORIDE 50 MG/1
50-100 TABLET ORAL NIGHTLY PRN
Qty: 60 TABLET | Refills: 2 | Status: SHIPPED | OUTPATIENT
Start: 2018-07-27 | End: 2018-12-29 | Stop reason: SDUPTHER

## 2018-07-28 ENCOUNTER — HOSPITAL ENCOUNTER (OUTPATIENT)
Dept: WOMENS IMAGING | Age: 68
Discharge: HOME | End: 2018-07-28
Payer: COMMERCIAL

## 2018-07-28 DIAGNOSIS — Z12.39 BREAST CANCER SCREENING: ICD-10-CM

## 2018-07-28 PROCEDURE — 77067 SCR MAMMO BI INCL CAD: CPT

## 2018-08-29 RX ORDER — VALACYCLOVIR HYDROCHLORIDE 1 G/1
TABLET, FILM COATED ORAL
Qty: 90 TABLET | Refills: 1 | Status: SHIPPED | OUTPATIENT
Start: 2018-08-29 | End: 2018-12-29 | Stop reason: SDUPTHER

## 2018-09-17 RX ORDER — LEVOTHYROXINE SODIUM 0.05 MG/1
TABLET ORAL
Qty: 90 TABLET | Refills: 0 | Status: SHIPPED | OUTPATIENT
Start: 2018-09-17 | End: 2018-12-29 | Stop reason: SDUPTHER

## 2018-10-22 NOTE — PROGRESS NOTES
you have a living will and a durable power of  for healthcare, please bring in a copy. As part of our patient safety program to minimize surgical site infections, we ask you to do the following:    · Please notify your surgeon if you develop any illness between         now and the  day of your surgery. · This includes a cough, cold, fever, sore throat, nausea,         or vomiting, and diarrhea, etc.  ·  Please notify your surgeon if you experience dizziness, shortness         of breath or blurred vision between now and the time of your surgery. You may shower the night before surgery or the morning of   your surgery with an antibacterial soap. You will need to bring a photo ID and insurance card    [facility] has an onsite pharmacy, would you like to utilize our pharmacy     If you will be staying overnight and use a C-pap machine, please bring   your C-pap to hospital     Our goal is to provide you with excellent care, therefore, visitors will be limited to two(2) in the room at a time so that we may focus on providing this care for you. Please contact pre-admission testing if you have any further questions. [facility] phone number:  [phone]  Please note these are generalized instructions for all surgical cases, you may be provided with more specific instructions according to your surgery.

## 2018-10-24 ENCOUNTER — ANESTHESIA EVENT (OUTPATIENT)
Dept: ENDOSCOPY | Age: 68
End: 2018-10-24
Payer: COMMERCIAL

## 2018-10-25 ENCOUNTER — HOSPITAL ENCOUNTER (OUTPATIENT)
Age: 68
Setting detail: OUTPATIENT SURGERY
Discharge: HOME | End: 2018-10-25
Attending: INTERNAL MEDICINE | Admitting: INTERNAL MEDICINE
Payer: COMMERCIAL

## 2018-10-25 ENCOUNTER — ANESTHESIA (OUTPATIENT)
Dept: ENDOSCOPY | Age: 68
End: 2018-10-25
Payer: COMMERCIAL

## 2018-10-25 VITALS
WEIGHT: 189.5 LBS | RESPIRATION RATE: 18 BRPM | BODY MASS INDEX: 34.87 KG/M2 | HEIGHT: 62 IN | HEART RATE: 70 BPM | DIASTOLIC BLOOD PRESSURE: 114 MMHG | TEMPERATURE: 97.4 F | OXYGEN SATURATION: 100 % | SYSTOLIC BLOOD PRESSURE: 154 MMHG

## 2018-10-25 VITALS — OXYGEN SATURATION: 98 % | SYSTOLIC BLOOD PRESSURE: 158 MMHG | DIASTOLIC BLOOD PRESSURE: 83 MMHG

## 2018-10-25 DIAGNOSIS — K21.9 GASTROESOPHAGEAL REFLUX DISEASE, ESOPHAGITIS PRESENCE NOT SPECIFIED: ICD-10-CM

## 2018-10-25 DIAGNOSIS — R13.10 DYSPHAGIA, UNSPECIFIED TYPE: ICD-10-CM

## 2018-10-25 PROBLEM — K21.00 GASTROESOPHAGEAL REFLUX DISEASE WITH ESOPHAGITIS: Status: ACTIVE | Noted: 2018-07-27

## 2018-10-25 PROCEDURE — 3700000000 HC ANESTHESIA ATTENDED CARE: Performed by: INTERNAL MEDICINE

## 2018-10-25 PROCEDURE — 6360000002 HC RX W HCPCS: Performed by: NURSE ANESTHETIST, CERTIFIED REGISTERED

## 2018-10-25 PROCEDURE — 88305 TISSUE EXAM BY PATHOLOGIST: CPT

## 2018-10-25 PROCEDURE — 2709999900 HC NON-CHARGEABLE SUPPLY: Performed by: INTERNAL MEDICINE

## 2018-10-25 PROCEDURE — 2580000003 HC RX 258: Performed by: ANESTHESIOLOGY

## 2018-10-25 PROCEDURE — 3700000001 HC ADD 15 MINUTES (ANESTHESIA): Performed by: INTERNAL MEDICINE

## 2018-10-25 PROCEDURE — 7100000011 HC PHASE II RECOVERY - ADDTL 15 MIN: Performed by: INTERNAL MEDICINE

## 2018-10-25 PROCEDURE — 7100000010 HC PHASE II RECOVERY - FIRST 15 MIN: Performed by: INTERNAL MEDICINE

## 2018-10-25 PROCEDURE — 3609012400 HC EGD TRANSORAL BIOPSY SINGLE/MULTIPLE: Performed by: INTERNAL MEDICINE

## 2018-10-25 PROCEDURE — 2500000003 HC RX 250 WO HCPCS: Performed by: NURSE ANESTHETIST, CERTIFIED REGISTERED

## 2018-10-25 PROCEDURE — 3609015300 HC ESOPHAGEAL DILATION MALONEY: Performed by: INTERNAL MEDICINE

## 2018-10-25 RX ORDER — PROPOFOL 10 MG/ML
INJECTION, EMULSION INTRAVENOUS PRN
Status: DISCONTINUED | OUTPATIENT
Start: 2018-10-25 | End: 2018-10-25 | Stop reason: SDUPTHER

## 2018-10-25 RX ORDER — SODIUM CHLORIDE 0.9 % (FLUSH) 0.9 %
10 SYRINGE (ML) INJECTION EVERY 12 HOURS SCHEDULED
Status: DISCONTINUED | OUTPATIENT
Start: 2018-10-25 | End: 2018-10-25 | Stop reason: HOSPADM

## 2018-10-25 RX ORDER — LIDOCAINE HYDROCHLORIDE 20 MG/ML
INJECTION, SOLUTION INFILTRATION; PERINEURAL PRN
Status: DISCONTINUED | OUTPATIENT
Start: 2018-10-25 | End: 2018-10-25 | Stop reason: SDUPTHER

## 2018-10-25 RX ORDER — PROMETHAZINE HYDROCHLORIDE 25 MG/ML
6.25 INJECTION, SOLUTION INTRAMUSCULAR; INTRAVENOUS
Status: DISCONTINUED | OUTPATIENT
Start: 2018-10-25 | End: 2018-10-25 | Stop reason: HOSPADM

## 2018-10-25 RX ORDER — SODIUM CHLORIDE 0.9 % (FLUSH) 0.9 %
10 SYRINGE (ML) INJECTION PRN
Status: DISCONTINUED | OUTPATIENT
Start: 2018-10-25 | End: 2018-10-25 | Stop reason: HOSPADM

## 2018-10-25 RX ORDER — LABETALOL HYDROCHLORIDE 5 MG/ML
5 INJECTION, SOLUTION INTRAVENOUS EVERY 10 MIN PRN
Status: DISCONTINUED | OUTPATIENT
Start: 2018-10-25 | End: 2018-10-25 | Stop reason: HOSPADM

## 2018-10-25 RX ORDER — SODIUM CHLORIDE 9 MG/ML
INJECTION, SOLUTION INTRAVENOUS CONTINUOUS
Status: DISCONTINUED | OUTPATIENT
Start: 2018-10-25 | End: 2018-10-25 | Stop reason: HOSPADM

## 2018-10-25 RX ORDER — OMEPRAZOLE 20 MG/1
20 CAPSULE, DELAYED RELEASE ORAL DAILY
COMMUNITY
End: 2019-08-27 | Stop reason: SDUPTHER

## 2018-10-25 RX ORDER — ONDANSETRON 2 MG/ML
4 INJECTION INTRAMUSCULAR; INTRAVENOUS
Status: DISCONTINUED | OUTPATIENT
Start: 2018-10-25 | End: 2018-10-25 | Stop reason: HOSPADM

## 2018-10-25 RX ADMIN — LIDOCAINE HYDROCHLORIDE 50 MG: 20 INJECTION, SOLUTION INFILTRATION; PERINEURAL at 11:19

## 2018-10-25 RX ADMIN — PROPOFOL 20 MG: 10 INJECTION, EMULSION INTRAVENOUS at 11:23

## 2018-10-25 RX ADMIN — PROPOFOL 40 MG: 10 INJECTION, EMULSION INTRAVENOUS at 11:21

## 2018-10-25 RX ADMIN — LIDOCAINE HYDROCHLORIDE 20 MG: 20 INJECTION, SOLUTION INFILTRATION; PERINEURAL at 11:21

## 2018-10-25 RX ADMIN — PROPOFOL 100 MG: 10 INJECTION, EMULSION INTRAVENOUS at 11:19

## 2018-10-25 RX ADMIN — LIDOCAINE HYDROCHLORIDE 10 MG: 20 INJECTION, SOLUTION INFILTRATION; PERINEURAL at 11:23

## 2018-10-25 RX ADMIN — SODIUM CHLORIDE: 0.9 INJECTION, SOLUTION INTRAVENOUS at 10:33

## 2018-10-25 ASSESSMENT — PAIN SCALES - GENERAL
PAINLEVEL_OUTOF10: 0

## 2018-10-25 ASSESSMENT — PAIN - FUNCTIONAL ASSESSMENT: PAIN_FUNCTIONAL_ASSESSMENT: 0-10

## 2018-10-25 NOTE — ANESTHESIA PRE PROCEDURE
[facility] Department of Anesthesiology  Pre-Anesthesia Evaluation/Consultation       Name:  Ezio Ca  : 1950  Age:  80 y.o. MRN:  [id number]  Date: 10/25/2018           Surgeon: Surgeon(s):  Eder Nathan MD    Procedure: Procedure(s):  EGD DIAGNOSTIC ONLY     Allergies   Allergen Reactions    Latex Rash    Penicillins Rash     Patient Active Problem List   Diagnosis    Environmental allergies    Mixed hyperlipidemia    Heartburn    Acquired hypothyroidism    Insomnia    ALBER (generalized anxiety disorder)    Herpes simplex type 2 infection    Obesity, Class I, BMI 30-34.9    Vaginal atrophy    Major depressive disorder in remission ([facility] [address]    Hypervitaminosis D    Gastroesophageal reflux disease     Past Medical History:   Diagnosis Date    Environmental allergies     Heartburn     Herpes simplex type 2 infection     Hyperlipidemia     Hypothyroid     Dx about 15 years ago, but htne was off med for  long time    Insomnia     Menopause     approx age 37     Past Surgical History:   Procedure Laterality Date    HYSTERECTOMY, TOTAL ABDOMINAL  1990    fibroid     Social History   Substance Use Topics    Smoking status: Never Smoker    Smokeless tobacco: Never Used      Comment: Never smoked, never will!  Alcohol use No     Medications  No current facility-administered medications on file prior to encounter.       Current Outpatient Prescriptions on File Prior to Encounter   Medication Sig Dispense Refill    levothyroxine (SYNTHROID) 50 MCG tablet TAKE 1 TABLET DAILY 90 tablet 0    valACYclovir (VALTREX) 1 g tablet TAKE 1 TABLET DAILY 90 tablet 1    simvastatin (ZOCOR) 20 MG tablet Take 1 tablet by mouth nightly (Patient taking differently: Take 10 mg by mouth nightly ) 90 tablet 1    traZODone (DESYREL) 50 MG tablet Take 1-2 tablets by mouth nightly as needed for Sleep 60 tablet 2    buPROPion (WELLBUTRIN XL) 300 MG extended release tablet TAKE 1 TABLET EVERY MORNING 90 tablet 1    Cholecalciferol (VITAMIN D3) 1000 units TABS Take 1 tablet by mouth daily 30 tablet 11    PREMARIN 0.625 MG/GM vaginal cream PLACE 0.5G VAGINALLY TWICE A WEEK . 30 g 5    Fexofenadine HCl (ALLEGRA PO) Take by mouth      triamcinolone (NASACORT ALLERGY 24HR) 55 MCG/ACT nasal inhaler 2 SPRAYS IN EACH NOSTRIL one time a day 1 Inhaler 2    bisacodyl (DULCOLAX) 5 MG EC tablet Take 10 mg by mouth daily as needed for Constipation.  Omega-3 Fatty Acids (FISH OIL BURP-LESS) 1200 MG CAPS Take 2 tablets by mouth daily.  ibuprofen (ADVIL;MOTRIN) 600 MG tablet Take 600 mg by mouth every 6 hours as needed for Pain.  zoster recombinant adjuvanted vaccine (SHINGRIX) 50 MCG SUSR injection 50 MCG IM then repeat 2-6 months.  0.5 mL 1    [DISCONTINUED] mometasone (NASONEX) 50 MCG/ACT nasal spray USE 2 SPRAYS BY NASAL ROUTE DAILY 3 each 1     Current Facility-Administered Medications   Medication Dose Route Frequency Provider Last Rate Last Dose    0.9 % sodium chloride infusion   Intravenous Continuous Torin Hernandez MD        sodium chloride flush 0.9 % injection 10 mL  10 mL Intravenous 2 times per day Torin Hernandez MD        sodium chloride flush 0.9 % injection 10 mL  10 mL Intravenous PRN Torin Hernandez MD         Vital Signs (Current)   Vitals:    10/25/18 1017   BP: (!) 145/67   Pulse: 73   Resp: 18   Temp: 97.9 °F (36.6 °C)   SpO2: 99%     Vital Signs Statistics (for past 48 hrs)     Temp  Av.9 °F (36.6 °C)  Min: 97.9 °F (36.6 °C)   Min taken time: 10/25/18 1017  Max: 97.9 °F (36.6 °C)   Max taken time: 10/25/18 1017  Pulse  Av  Min: 73   Min taken time: 10/25/18 1017  Max: 73   Max taken time: 10/25/18 1017  Resp  Av  Min: 18   Min taken time: 10/25/18 1017  Max: 18   Max taken time: 10/25/18 1017  BP  Min: 145/67   Min taken time: 10/25/18 1017  Max: 145/67   Max taken time: 10/25/18 1017  SpO2  Av %  Min: Pulmonary:Negative Pulmonary ROS and normal exam                               Cardiovascular:  Exercise tolerance: good (>4 METS),           Rhythm: regular  Rate: normal           Beta Blocker:  Not on Beta Blocker         Neuro/Psych:   (+) psychiatric history:            GI/Hepatic/Renal:   (+) GERD:,           Endo/Other:    (+) hypothyroidism::., .                 Abdominal:   (+) obese,         Vascular: negative vascular ROS. Anesthesia Plan      MAC     ASA 2       Induction: intravenous. Anesthetic plan and risks discussed with patient. Plan discussed with CRNA. This pre-anesthesia assessment may be used as a history and physical.    DOS STAFF ADDENDUM:    Pt seen and examined, chart reviewed (including anesthesia, drug and allergy history). No interval changes to history and physical examination. Anesthetic plan, risks, benefits, alternatives, and personnel involved discussed with patient. Patient verbalized an understanding and agrees to proceed.       Diana Brown MD  October 25, 2018  10:24 AM

## 2018-10-25 NOTE — H&P
Pain.      zoster recombinant adjuvanted vaccine (SHINGRIX) 50 MCG SUSR injection 50 MCG IM then repeat 2-6 months. 0.5 mL 1    [DISCONTINUED] mometasone (NASONEX) 50 MCG/ACT nasal spray USE 2 SPRAYS BY NASAL ROUTE DAILY 3 each 1     Allergies:  Latex and Penicillins  Social History     Social History    Marital status:      Spouse name: ,  works    Number of children: 4    Years of education: N/A     Occupational History          Social History Main Topics    Smoking status: Never Smoker    Smokeless tobacco: Never Used      Comment: Never smoked, never will!  Alcohol use No    Drug use: Unknown    Sexual activity: Not Currently     Partners: Male     Other Topics Concern    Not on file     Social History Narrative    Exercise:    2-3x/week, recumbent bike or walking for 20-30 min     Family History   Problem Relation Age of Onset    Lung Cancer Mother 54        g    Lung Cancer Father 76        heavy smoker    Depression Father     Lung Cancer Other     High Cholesterol Brother     Diabetes Brother     Anxiety Disorder Brother          PHYSICAL EXAM:      BP (!) 145/67   Pulse 73   Temp 97.9 °F (36.6 °C) (Temporal)   Resp 18   Ht 5' 2\" (1.575 m)   Wt 189 lb 8 oz (86 kg)   SpO2 99%   BMI 34.66 kg/m²  I        Heart:normal    Lungs: normal    Abdomen: normal      ASA Grade:  See anesthesia note      ASSESSMENT AND PLAN:    1. Procedure options, risks and benefits reviewed with patient and expresses understanding.

## 2018-10-25 NOTE — PROCEDURES
[facility]  Endoscopy Note    Patient: Alvino Pappas  : 1950  Acct#: [de-identified]    Procedure: Esophagogastroduodenoscopy with biopsy, esophageal dilation    Date:  10/25/2018     Surgeon:  Ewa Yoder MD    Referring Physician:  Padmini Narvaez    Preoperative Diagnosis:  dysphagia    Postoperative Diagnosis:  Esophagitis and gastritis and esophageal dilation with a 50 Bah[place]ian bougie. Anesthesia: see anesthesia note. Indications: This is a 80y.o. year old female who presents today with dysphagia. Description of Procedure:  Informed consent was obtained from the patient after explanation of indications, benefits and possible risks and complications of the procedure. The patient was then taken to the endoscopy suite, placed in the left lateral decubitus position and the above IV sedation was administrered. The Olympus videoendoscope was placed in the patient's mouth and under direct visualization passed into the esophagus. Visualization of the esophagus demonstrated esophagitis. The distal esophagus was biopsied. The esophagus was dilated with a 50 [place]ian bougie. The scope was then advanced into the stomach. Visualization of the gastric body and antrum demonstrated gastritis. The antrum was biopsied. .  A retroflexed exam of the gastric cardia and fundus demonstrated normal..  The pylorus was patent and the scope was advanced into the duodenum. Visualization of the duodenal bulb demonstrated normal..  The second portion of the duodenum demonstrated normal..    The scope was then withdrawn back into the stomach, it was decompressed, and the scope was completely withdrawn. The patient tolerated the procedure well and was taken to the post anesthesia care unit in good condition. Estimated Blood loss:  Minimal.    Impression: Esophagitis and stricture dilated with a 50 [place]ian bougie. Gastritis. Recommendations:Continue PPI. Await pathology.     Ewa Yoder MD  [place]

## 2018-11-21 ENCOUNTER — NURSE ONLY (OUTPATIENT)
Dept: INTERNAL MEDICINE CLINIC | Age: 68
End: 2018-11-21
Payer: COMMERCIAL

## 2018-11-21 DIAGNOSIS — Z23 NEED FOR VACCINATION FOR H FLU TYPE B: Primary | ICD-10-CM

## 2018-11-21 PROCEDURE — 90662 IIV NO PRSV INCREASED AG IM: CPT | Performed by: INTERNAL MEDICINE

## 2018-11-21 PROCEDURE — G0008 ADMIN INFLUENZA VIRUS VAC: HCPCS | Performed by: INTERNAL MEDICINE

## 2018-11-21 NOTE — PROGRESS NOTES
Vaccine Information Sheet, \"Influenza - Inactivated\"  given to [employer], or parent/legal guardian of  [employer] and verbalized understanding. Patient responses:    Have you ever had a reaction to a flu vaccine? No  Are you able to eat eggs without adverse effects? Yes  Do you have any current illness? No  Have you ever had [place] Syndrome? No    Flu vaccine given per order. Please see immunization tab.

## 2019-01-01 RX ORDER — BUPROPION HYDROCHLORIDE 300 MG/1
TABLET ORAL
Qty: 90 TABLET | Refills: 1 | Status: SHIPPED | OUTPATIENT
Start: 2019-01-01 | End: 2019-07-09 | Stop reason: SDUPTHER

## 2019-01-01 RX ORDER — LEVOTHYROXINE SODIUM 0.05 MG/1
TABLET ORAL
Qty: 90 TABLET | Refills: 0 | Status: SHIPPED | OUTPATIENT
Start: 2019-01-01 | End: 2019-03-25 | Stop reason: SDUPTHER

## 2019-01-01 RX ORDER — TRAZODONE HYDROCHLORIDE 50 MG/1
TABLET ORAL
Qty: 60 TABLET | Refills: 2 | Status: SHIPPED | OUTPATIENT
Start: 2019-01-01 | End: 2019-05-11 | Stop reason: SDUPTHER

## 2019-01-01 RX ORDER — VALACYCLOVIR HYDROCHLORIDE 1 G/1
TABLET, FILM COATED ORAL
Qty: 90 TABLET | Refills: 1 | Status: SHIPPED | OUTPATIENT
Start: 2019-01-01 | End: 2019-06-29 | Stop reason: SDUPTHER

## 2019-01-30 ENCOUNTER — OFFICE VISIT (OUTPATIENT)
Dept: INTERNAL MEDICINE CLINIC | Age: 69
End: 2019-01-30
Payer: COMMERCIAL

## 2019-01-30 VITALS
HEART RATE: 84 BPM | SYSTOLIC BLOOD PRESSURE: 124 MMHG | WEIGHT: 195 LBS | DIASTOLIC BLOOD PRESSURE: 76 MMHG | OXYGEN SATURATION: 95 % | BODY MASS INDEX: 35.67 KG/M2

## 2019-01-30 DIAGNOSIS — E78.2 MIXED HYPERLIPIDEMIA: ICD-10-CM

## 2019-01-30 DIAGNOSIS — E03.9 ACQUIRED HYPOTHYROIDISM: ICD-10-CM

## 2019-01-30 DIAGNOSIS — K21.00 GASTROESOPHAGEAL REFLUX DISEASE WITH ESOPHAGITIS: ICD-10-CM

## 2019-01-30 DIAGNOSIS — G47.00 INSOMNIA, UNSPECIFIED TYPE: Primary | ICD-10-CM

## 2019-01-30 DIAGNOSIS — Z23 NEED FOR PROPHYLACTIC VACCINATION AND INOCULATION AGAINST VARICELLA: ICD-10-CM

## 2019-01-30 DIAGNOSIS — R73.03 PREDIABETES: ICD-10-CM

## 2019-01-30 DIAGNOSIS — F33.40 RECURRENT MAJOR DEPRESSIVE DISORDER, IN REMISSION (HCC): ICD-10-CM

## 2019-01-30 PROCEDURE — 99214 OFFICE O/P EST MOD 30 MIN: CPT | Performed by: INTERNAL MEDICINE

## 2019-01-30 ASSESSMENT — ENCOUNTER SYMPTOMS
CHEST TIGHTNESS: 0
SHORTNESS OF BREATH: 0

## 2019-03-25 ENCOUNTER — TELEPHONE (OUTPATIENT)
Dept: INTERNAL MEDICINE CLINIC | Age: 69
End: 2019-03-25

## 2019-03-25 RX ORDER — LEVOTHYROXINE SODIUM 0.05 MG/1
TABLET ORAL
Qty: 90 TABLET | Refills: 0 | Status: SHIPPED | OUTPATIENT
Start: 2019-03-25 | End: 2019-06-29 | Stop reason: SDUPTHER

## 2019-05-13 RX ORDER — TRAZODONE HYDROCHLORIDE 50 MG/1
TABLET ORAL
Qty: 60 TABLET | Refills: 0 | Status: SHIPPED | OUTPATIENT
Start: 2019-05-13 | End: 2019-06-03 | Stop reason: SDUPTHER

## 2019-05-27 NOTE — PROGRESS NOTES
[facility] Internal Medicine  Preoperative Consultation      2019    Vashti Neri  :  1950    Chief Complaint   Patient presents with    Pre-op Exam     Patient is scheduled for cataract surgery on L eye 2019 and R eye 2019 at [facility] w/ Dr. Phoenix Tian [phone]        HPI:  This patient presents to the office today for a preoperative consultation at the request of Dr. Daniel Alvarez to assess stability of patient's medical condition(s) listed below. She will be having cataract surgery left eye  and then right eye 19. Progressive deterioration in vision. Feels blurry all of the time. Bilateral cataracts. Prediabetes: has gained 14 # in last 18 month. No increase in thirst or urination. Allergies: allegra alternating with allegra D every other day to offset cost. Had used nasacort but backed off when dry hacking cough started a few months ago. No dyspnea. Review of Systems   Constitutional: Negative for chills and fever. Respiratory: Positive for cough (dry hacking cough for several months, start like a tickle). Negative for shortness of breath. Cardiovascular: Negative for chest pain and palpitations. Skin: Negative for rash. Hematological: Does not bruise/bleed easily. Known anesthesia problems: None   Bleeding risk: No recent or remote history of abnormal bleeding  Personal or FH of DVT/PE: No    Recent steroid use: no  Exercise tolerance: 2 flights stairs with groceries or laundry without difficulty.     Past Medical History:   Diagnosis Date    Environmental allergies     Heartburn     Herpes simplex type 2 infection     Hyperlipidemia     Hypothyroid     Dx about 15 years ago, but htne was off med for  long time    Insomnia     Menopause     approx age 37     Past Surgical History:   Procedure Laterality Date    ESOPHAGEAL DILATATION N/A 10/25/2018    ESOPHAGEAL DILATION Sharri Marquez performed by Romie Nageotte, MD at [facility], TOTAL ABDOMINAL  1990    fibroid    UPPER GASTROINTESTINAL ENDOSCOPY N/A 10/25/2018    EGD BIOPSY performed by Cathy Huntley MD at [facility]     Family History   Problem Relation Age of Onset    Lung Cancer Mother 54        g    Lung Cancer Father 76        heavy smoker    Depression Father     Lung Cancer Other     High Cholesterol Brother     Diabetes Brother     Anxiety Disorder Brother      Social History     Tobacco Use    Smoking status: Never Smoker    Smokeless tobacco: Never Used    Tobacco comment: Never smoked, never will! Substance Use Topics    Alcohol use: No    Drug use: Not on file     Allergies   Allergen Reactions    Latex Rash    Penicillins Rash       Prior to Visit Medications    Medication Sig Taking? Authorizing Provider   simvastatin (ZOCOR) 20 MG tablet Take 1 tablet by mouth nightly Yes Jose Adame MD   Fexofenadine-Pseudoephedrine (ALLEGRA-D PO) Take by mouth every other day Alternates with allegra Yes Historical Provider, MD   traZODone (DESYREL) 50 MG tablet TAKE 1 TO 2 TABLETS NIGHTLYAS NEEDED FOR SLEEP Yes Jose Adame MD   levothyroxine (SYNTHROID) 50 MCG tablet TAKE 1 TABLET DAILY Yes Jose Adame MD   valACYclovir (VALTREX) 1 g tablet TAKE 1 TABLET DAILY Yes Jose Adame MD   buPROPion (WELLBUTRIN XL) 300 MG extended release tablet TAKE 1 TABLET EVERY MORNING Yes Jose Adame MD   omeprazole (PRILOSEC) 20 MG delayed release capsule Take 20 mg by mouth daily Yes Historical Provider, MD   Cholecalciferol (VITAMIN D3) 1000 units TABS Take 1 tablet by mouth daily Yes Jose Adame MD   PREMARIN 0.625 MG/GM vaginal cream PLACE 0.5G VAGINALLY TWICE A WEEK . Yes Jose Adame MD   Fexofenadine HCl (ALLEGRA PO) Take by mouth Yes Historical Provider, MD   bisacodyl (DULCOLAX) 5 MG EC tablet Take 10 mg by mouth daily as needed for Constipation.  Yes Historical Provider, MD   Omega-3 Fatty Acids (FISH OIL BURP-LESS) 1200 MG CAPS Take 1 tablet by mouth daily  Yes Historical Provider, MD   triamcinolone (NASACORT ALLERGY 24HR) 55 MCG/ACT nasal inhaler 2 SPRAYS IN EACH NOSTRIL one time a day  Jorge Kaminski MD   mometasone (NASONEX) 50 MCG/ACT nasal spray USE 2 SPRAYS BY NASAL ROUTE DAILY  Jorge Kaminski MD   ibuprofen (ADVIL;MOTRIN) 600 MG tablet Take 600 mg by mouth every 6 hours as needed for Pain. Historical Provider, MD       Physical Exam:  /74   Pulse 74   Wt 197 lb (89.4 kg)   SpO2 98%   BMI 36.03 kg/m²    Constitutional: Patient is oriented to person, place, and time. HEENT:   Normocephalic and atraumatic. SHEBA bilaterally. Extraocular movement are normal.    Oropharynx normal.   No  loose teeth. Neck: Supple. No mass or thyromegaly present. Cardiovascular: Normal rate, regular rhythm, normal heart sounds and intact distal pulses. No murmur. No carotid bruit. No edema. Pulmonary/Chest: No respiratory distress. Breath sounds normal with no wheezes, crackles or rhonchi. Abdominal: Soft, non-tender. There is no organomegaly. Neurological:  Cranial nerves grossly intact. Skin: No rash or erythema noted. Psychiatric: She has a normal mood and affect. Speech and behavior are normal.    EKG Interpretation:  Not indicated. Lab Review: A1c and blood sugar pending. ASSESSMENT/PLAN:   Cataract of both eyes, unspecified cataract type  Medically stable. Check blood sugar and A1c to be sure has not progressed to diabetes. Prediabetes  Difficulty with lifestyle modifications. Continue to discuss and encourage. Labs today to assure blood sugars not increased to point that could contribute to visual disturbance. -     Hemoglobin A1C; Future  -     Basic Metabolic Panel; Future    Cough, allergies and postnasal drainage. Continue allegra and resume nasacort. Instructed to contact office if not improving. Return in about 3 months (around 8/28/2019).

## 2019-05-28 ENCOUNTER — OFFICE VISIT (OUTPATIENT)
Dept: INTERNAL MEDICINE CLINIC | Age: 69
End: 2019-05-28
Payer: COMMERCIAL

## 2019-05-28 VITALS
OXYGEN SATURATION: 98 % | HEART RATE: 74 BPM | DIASTOLIC BLOOD PRESSURE: 74 MMHG | WEIGHT: 197 LBS | BODY MASS INDEX: 36.03 KG/M2 | SYSTOLIC BLOOD PRESSURE: 130 MMHG

## 2019-05-28 DIAGNOSIS — R05.9 COUGH: ICD-10-CM

## 2019-05-28 DIAGNOSIS — H26.9 CATARACT OF BOTH EYES, UNSPECIFIED CATARACT TYPE: Primary | ICD-10-CM

## 2019-05-28 DIAGNOSIS — R73.03 PREDIABETES: ICD-10-CM

## 2019-05-28 PROCEDURE — 99214 OFFICE O/P EST MOD 30 MIN: CPT | Performed by: INTERNAL MEDICINE

## 2019-05-28 RX ORDER — SIMVASTATIN 20 MG
20 TABLET ORAL NIGHTLY
Qty: 90 TABLET | Refills: 1 | Status: SHIPPED | OUTPATIENT
Start: 2019-05-28 | End: 2019-05-29 | Stop reason: SDUPTHER

## 2019-05-28 ASSESSMENT — ENCOUNTER SYMPTOMS
COUGH: 1
SHORTNESS OF BREATH: 0

## 2019-05-29 ENCOUNTER — PATIENT MESSAGE (OUTPATIENT)
Dept: INTERNAL MEDICINE CLINIC | Age: 69
End: 2019-05-29

## 2019-05-29 RX ORDER — SIMVASTATIN 20 MG
20 TABLET ORAL NIGHTLY
Qty: 90 TABLET | Refills: 1 | Status: SHIPPED | OUTPATIENT
Start: 2019-05-29 | End: 2020-01-06

## 2019-05-29 NOTE — TELEPHONE ENCOUNTER
From: Angeline Mazariegos  To: Danielle Garzon MD  Sent: 5/29/2019 1:28 PM EDT  Subject: Non-Urgent Medical Question    Hello . .. When I visited on 5/28/19, my prescription renewal for Simvastatin was sent to [facility], rather than [facility]. I received a notification from [facility] to  my Rx. Is there a way you could re-route the renewal to [facility]? Thanks for any assistance.  Robin Mosqueda

## 2019-06-03 RX ORDER — TRAZODONE HYDROCHLORIDE 50 MG/1
TABLET ORAL
Qty: 60 TABLET | Refills: 5 | Status: SHIPPED | OUTPATIENT
Start: 2019-06-03 | End: 2019-08-27

## 2019-06-04 NOTE — PROGRESS NOTES
Braulio Mandy    Age 80 y.o.    female    1950    MRN [id number]    Date___________   Arrival Time_____________  OR Time____________Duration____     Procedure(s):  PHACOEMULSIFICATION OF CATARACT LEFT EYE WITH INTRAOCULAR LENS IMPLANT    Surgeon  ________________________________  [facility]   [place]       Phone [phone] (home) [phone] (work)      [address]  Cell Work  ______________________________________________________________________________________________________________________________________________________________________________________________________________________________________________________________________________________________________________________________________________________________    PCP__________________________Phone__________________________________      H&P__________________Bringing      Chart              Epic    DOS           Called_______  EKG__________________Bringing      Chart              Epic    DOS           Called_______  LAB__________________ Bringing      Chart              Epic    DOS           Called_______  [facility] _______Bringing      Chart              Epic    DOS           Called_______      Cardiologist________________________ Phone___________________________      ? Catholic concerns / Waiver on Chart            [employer]________________  ? Pre-op Instructions Given [place]          _________________________________  ? Directions to Surgery Center                          _________________________________  ? Transportation Home_______________      _________________________________  ?  Crutches/Walker__________________        _________________________________      ________Pre-op Orders   _______Transcribed    _______Wt.  ________Pharmacy          _______SCD  ______VTE     ______Beta Blocker  ________Consent

## 2019-06-07 NOTE — PROGRESS NOTES
Obstructive Sleep Apnea (LISSA) Screening     Patient:  German Lincoln    YOB: 1950      Medical Record #:  [id number]                     Date:  6/7/2019     1. Are you a loud and/or regular snorer? []  Yes       [x] No    2. Have you been observed to gasp or stop breathing during sleep? []  Yes       [x] No    3. Do you feel tired or groggy upon awakening or do you awaken with a headache?           []  Yes       [] No    4. Are you often tired or fatigued during the wake time hours? []  Yes       [] No    5. Do you fall asleep sitting, reading, watching TV or driving? []  Yes       [] No    6. Do you often have problems with memory or concentration? []  Yes       [] No    **If patient's score is ? 3 they are considered high risk for LISSA. Notify the anesthesiologist of the high risk and document in focus note. Note:  If the patient's BMI is more than 35 kg m¯² , has neck circumference > 40 cm, and/or high blood pressure the risk is greater (© American Sleep Apnea Association, 2006).

## 2019-06-11 ENCOUNTER — ANESTHESIA EVENT (OUTPATIENT)
Dept: OPERATING ROOM | Age: 69
End: 2019-06-11
Payer: COMMERCIAL

## 2019-06-11 ASSESSMENT — LIFESTYLE VARIABLES: SMOKING_STATUS: 0

## 2019-06-11 NOTE — ANESTHESIA PRE PROCEDURE
Department of Anesthesiology  Preprocedure Note       Name:  Marylene Nettle   Age:  80 y.o.  :  1950                                          MRN:  [id number]         Date:  2019      Surgeon: Mirna Land):  Ra El MD    Procedure: PHACOEMULSIFICATION OF CATARACT LEFT EYE WITH INTRAOCULAR LENS IMPLANT (Left Eye)    Medications prior to admission:   Prior to Admission medications    Medication Sig Start Date End Date Taking? Authorizing Provider   traZODone (DESYREL) 50 MG tablet TAKE 1 TO 2 TABLETS NIGHTLYAS NEEDED FOR SLEEP 6/3/19  Yes Lyssa Rodriguez MD   simvastatin (ZOCOR) 20 MG tablet Take 1 tablet by mouth nightly 19  Yes Lyssa Rodriguez MD   levothyroxine (SYNTHROID) 50 MCG tablet TAKE 1 TABLET DAILY 3/25/19  Yes Lyssa Rodriguez MD   valACYclovir (VALTREX) 1 g tablet TAKE 1 TABLET DAILY 19  Yes Lyssa Rodriguez MD   buPROPion (WELLBUTRIN XL) 300 MG extended release tablet TAKE 1 TABLET EVERY MORNING 19  Yes Lyssa Rodriguez MD   omeprazole (PRILOSEC) 20 MG delayed release capsule Take 20 mg by mouth daily   Yes Historical Provider, MD   Cholecalciferol (VITAMIN D3) 1000 units TABS Take 1 tablet by mouth daily 18  Yes Lyssa Rodriguez MD   Fexofenadine HCl (ALLEGRA PO) Take by mouth daily Alternates every other day with Allegra D   Yes Historical Provider, MD   bisacodyl (DULCOLAX) 5 MG EC tablet Take 10 mg by mouth daily as needed for Constipation. Yes Historical Provider, MD   Omega-3 Fatty Acids (FISH OIL BURP-LESS) 1200 MG CAPS Take 1 tablet by mouth daily    Yes Historical Provider, MD   ibuprofen (ADVIL;MOTRIN) 600 MG tablet Take 600 mg by mouth every 6 hours as needed for Pain.    Yes Historical Provider, MD   Fexofenadine-Pseudoephedrine (ALLEGRA-D PO) Take by mouth every other day Alternates with allegra    Historical Provider, MD   PREMARIN 0.625 MG/GM vaginal cream PLACE 0.5G VAGINALLY TWICE A WEEK . 17   Lyssa Rodriguez MD   triamcinolone (NASACORT ALLERGY 24HR) 55 MCG/ACT nasal inhaler 2 SPRAYS IN EACH NOSTRIL one time a day 10/31/16   Alecia Obrien MD   mometasone (NASONEX) 50 MCG/ACT nasal spray USE 2 SPRAYS BY NASAL ROUTE DAILY 4/6/16 1/30/19  Alecia Obrien MD       Current medications:    Current Facility-Administered Medications   Medication Dose Route Frequency Provider Last Rate Last Dose    bupivacaine 0.75%, phenylephrine 10%, tropicamide 1%, cyclopentolate 1%, moxifloxacin 0.5%, ketorolac 0.5% in lidocaine 2% jelly ophthalmic solution  0.3 mL Left Eye Q10 Min Shirin Wick MD   0.3 mL at 06/12/19 0850    sodium chloride flush 0.9 % injection 10 mL  10 mL Intravenous 2 times per day Jackson Herron MD        sodium chloride flush 0.9 % injection 10 mL  10 mL Intravenous PRN Jackson Herron MD        famotidine (PEPCID) injection 20 mg  20 mg Intravenous Once Jackson Herron MD        lactated ringers infusion   Intravenous Continuous Jackson Herron MD           Allergies:     Allergies   Allergen Reactions    Latex Rash    Penicillins Rash       Problem List:    Patient Active Problem List   Diagnosis Code    Environmental allergies Z91.09    Mixed hyperlipidemia E78.2    Acquired hypothyroidism E03.9    Insomnia G47.00    ALBER (generalized anxiety disorder) F41.1    Herpes simplex type 2 infection B00.9    Obesity, Class I, BMI 30-34.9 E66.9    Vaginal atrophy N95.2    Major depressive disorder in remission ([facility] [address] F32.5    Hypervitaminosis D E67.3    Gastroesophageal reflux disease with esophagitis K21.0    Prediabetes R73.03       Past Medical History:        Diagnosis Date    Acid reflux     Environmental allergies     Herpes simplex type 2 infection     Hyperlipidemia     Hypothyroid     Dx about 15 years ago, but htne was off med for  long time    Insomnia     Menopause     approx age 37       Past Surgical History:        Procedure Laterality Date    ESOPHAGEAL DILATATION N/A 10/25/2018    ESOPHAGEAL DILATION MARTA performed by Jere March MD at [address] [facility], [facility]    fibroid    UPPER GASTROINTESTINAL ENDOSCOPY N/A 10/25/2018    EGD BIOPSY performed by Jere March MD at [address] [place] [address] History:    Social History     Tobacco Use    Smoking status: Never Smoker    Smokeless tobacco: Never Used    Tobacco comment: Never smoked, never will! Substance Use Topics    Alcohol use: No                                Counseling given: Not Answered  Comment: Never smoked, never will! Vital Signs (Current):   Vitals:    06/07/19 1254 06/12/19 0845   BP:  (!) 152/69   Pulse:  71   Resp:  16   Temp:  97.1 °F (36.2 °C)   TempSrc:  Temporal   SpO2:  97%   Weight: 190 lb (86.2 kg) 190 lb (86.2 kg)   Height: 5' 2\" (1.575 m) 5' 2\" (1.575 m)                                              BP Readings from Last 3 Encounters:   06/12/19 (!) 152/69   05/28/19 130/74   01/30/19 124/76       NPO Status:  [place]+, SEE MAR                                                                               BMI:   Wt Readings from Last 3 Encounters:   06/12/19 190 lb (86.2 kg)   05/28/19 197 lb (89.4 kg)   01/30/19 195 lb (88.5 kg)     Body mass index is 34.75 kg/m².     CBC:   Lab Results   Component Value Date    WBC 6.3 04/01/2016    RBC 4.47 04/01/2016    HGB 14.3 04/01/2016    HCT 42.9 04/01/2016    MCV 95.9 04/01/2016    RDW 14.1 04/01/2016     04/01/2016       CMP:   Lab Results   Component Value Date     05/28/2019    K 4.2 05/28/2019     05/28/2019    CO2 24 05/28/2019    BUN 13 05/28/2019    CREATININE 0.9 05/28/2019    GFRAA >60 05/28/2019    AGRATIO 1.7 10/19/2016    LABGLOM >60 05/28/2019    GLUCOSE 134 05/28/2019    PROT 6.9 10/19/2016    CALCIUM 9.6 05/28/2019    BILITOT 0.4 10/19/2016    ALKPHOS 57 10/19/2016    AST 16 10/19/2016    ALT 17 10/19/2016       POC Tests: No results for input(s): POCGLU, POCNA, POCK, POCCL, POCBUN, POCHEMO, POCHCT in the last 72 hours.    Coags: No results found for: PROTIME, INR, APTT    HCG (If Applicable): No results found for: PREGTESTUR, PREGSERUM, HCG, HCGQUANT     ABGs: No results found for: PHART, PO2ART, PDD6WZC, UYK3VAQ, BEART, Y8LVSHTO     Type & Screen (If Applicable):  No results found for: [facility]    Anesthesia Evaluation  Patient summary reviewed no history of anesthetic complications:   Airway: Mallampati: II  TM distance: <3 FB   Neck ROM: full  Mouth opening: > = 3 FB Dental:          Pulmonary: breath sounds clear to auscultation      (-) COPD, asthma, sleep apnea and not a current smoker          Patient did not smoke on day of surgery. Cardiovascular:    (+) hyperlipidemia    (-) pacemaker, hypertension, past MI, CAD, CABG/stent, dysrhythmias,  angina and  CHF      Rhythm: regular  Rate: normal           Beta Blocker:  Not on Beta Blocker         Neuro/Psych:   (+) depression/anxiety    (-) seizures, TIA and CVA           GI/Hepatic/Renal:   (+) GERD:,      (-) liver disease and no renal disease       Endo/Other:    (+) hypothyroidism::., no malignancy/cancer. (-) diabetes mellitus, arthritis, no malignancy/cancer               Abdominal:   (+) obese,     Abdomen: soft. Vascular: negative vascular ROS. Anesthesia Plan      TIVA     ASA 2     (BLK)  Induction: intravenous. MIPS: Prophylactic antiemetics administered. Anesthetic plan and risks discussed with patient. Plan discussed with CRNA. This pre-anesthesia assessment may be used as a history and physical.    DOS STAFF ADDENDUM:    Pt seen and examined, chart reviewed (including anesthesia, drug and allergy history). No interval changes to history and physical examination. Anesthetic plan, risks, benefits, alternatives, and personnel involved discussed with patient. Patient verbalized an understanding and agrees to proceed.       Tasneem Banerjee MD  June 12, 2019  8:58 KIRT Choe MD   6/12/2019

## 2019-06-12 ENCOUNTER — ANESTHESIA (OUTPATIENT)
Dept: OPERATING ROOM | Age: 69
End: 2019-06-12
Payer: COMMERCIAL

## 2019-06-12 ENCOUNTER — HOSPITAL ENCOUNTER (OUTPATIENT)
Age: 69
Setting detail: OUTPATIENT SURGERY
Discharge: HOME | End: 2019-06-12
Attending: OPHTHALMOLOGY | Admitting: OPHTHALMOLOGY
Payer: COMMERCIAL

## 2019-06-12 VITALS
RESPIRATION RATE: 14 BRPM | SYSTOLIC BLOOD PRESSURE: 158 MMHG | HEART RATE: 56 BPM | TEMPERATURE: 97.1 F | OXYGEN SATURATION: 100 % | BODY MASS INDEX: 34.96 KG/M2 | HEIGHT: 62 IN | WEIGHT: 190 LBS | DIASTOLIC BLOOD PRESSURE: 90 MMHG

## 2019-06-12 VITALS — DIASTOLIC BLOOD PRESSURE: 87 MMHG | SYSTOLIC BLOOD PRESSURE: 172 MMHG | OXYGEN SATURATION: 100 %

## 2019-06-12 PROCEDURE — 2500000003 HC RX 250 WO HCPCS: Performed by: NURSE ANESTHETIST, CERTIFIED REGISTERED

## 2019-06-12 PROCEDURE — 3700000001 HC ADD 15 MINUTES (ANESTHESIA): Performed by: OPHTHALMOLOGY

## 2019-06-12 PROCEDURE — V2632 POST CHMBR INTRAOCULAR LENS: HCPCS | Performed by: OPHTHALMOLOGY

## 2019-06-12 PROCEDURE — 7100000011 HC PHASE II RECOVERY - ADDTL 15 MIN: Performed by: OPHTHALMOLOGY

## 2019-06-12 PROCEDURE — 2580000003 HC RX 258: Performed by: OPHTHALMOLOGY

## 2019-06-12 PROCEDURE — 6360000002 HC RX W HCPCS: Performed by: NURSE ANESTHETIST, CERTIFIED REGISTERED

## 2019-06-12 PROCEDURE — 2709999900 HC NON-CHARGEABLE SUPPLY: Performed by: OPHTHALMOLOGY

## 2019-06-12 PROCEDURE — 6370000000 HC RX 637 (ALT 250 FOR IP): Performed by: OPHTHALMOLOGY

## 2019-06-12 PROCEDURE — 2580000003 HC RX 258: Performed by: ANESTHESIOLOGY

## 2019-06-12 PROCEDURE — 3700000000 HC ANESTHESIA ATTENDED CARE: Performed by: OPHTHALMOLOGY

## 2019-06-12 PROCEDURE — 3600000013 HC SURGERY LEVEL 3 ADDTL 15MIN: Performed by: OPHTHALMOLOGY

## 2019-06-12 PROCEDURE — 6360000002 HC RX W HCPCS: Performed by: OPHTHALMOLOGY

## 2019-06-12 PROCEDURE — 2500000003 HC RX 250 WO HCPCS: Performed by: OPHTHALMOLOGY

## 2019-06-12 PROCEDURE — 3600000003 HC SURGERY LEVEL 3 BASE: Performed by: OPHTHALMOLOGY

## 2019-06-12 PROCEDURE — 2500000003 HC RX 250 WO HCPCS: Performed by: ANESTHESIOLOGY

## 2019-06-12 PROCEDURE — 7100000010 HC PHASE II RECOVERY - FIRST 15 MIN: Performed by: OPHTHALMOLOGY

## 2019-06-12 DEVICE — ACRYSOF(R) IQ ASPHERIC IOL SP ACRYLIC FOLDABLELENS WULTRASERT(TM) DELIVERY SYSTEM UV WBLUE LIGHT FILTER. 13.0MM LENGTH 6.0MM ANTERIORASYMMETRIC BICONVEX OPTIC PLANAR HAPTICS.
Type: IMPLANTABLE DEVICE | Site: ANTERIOR CHAMBER | Status: FUNCTIONAL
Brand: ACRYSOF ULTRASERT

## 2019-06-12 RX ORDER — SODIUM CHLORIDE 9 MG/ML
INJECTION, SOLUTION INTRAVENOUS CONTINUOUS
Status: DISCONTINUED | OUTPATIENT
Start: 2019-06-12 | End: 2019-06-12 | Stop reason: ALTCHOICE

## 2019-06-12 RX ORDER — PROPOFOL 10 MG/ML
INJECTION, EMULSION INTRAVENOUS PRN
Status: DISCONTINUED | OUTPATIENT
Start: 2019-06-12 | End: 2019-06-12 | Stop reason: SDUPTHER

## 2019-06-12 RX ORDER — MORPHINE SULFATE 2 MG/ML
1 INJECTION, SOLUTION INTRAMUSCULAR; INTRAVENOUS EVERY 5 MIN PRN
Status: DISCONTINUED | OUTPATIENT
Start: 2019-06-12 | End: 2019-06-12 | Stop reason: HOSPADM

## 2019-06-12 RX ORDER — MEPERIDINE HYDROCHLORIDE 50 MG/ML
12.5 INJECTION INTRAMUSCULAR; INTRAVENOUS; SUBCUTANEOUS EVERY 5 MIN PRN
Status: DISCONTINUED | OUTPATIENT
Start: 2019-06-12 | End: 2019-06-12 | Stop reason: HOSPADM

## 2019-06-12 RX ORDER — FENTANYL CITRATE 50 UG/ML
50 INJECTION, SOLUTION INTRAMUSCULAR; INTRAVENOUS EVERY 5 MIN PRN
Status: DISCONTINUED | OUTPATIENT
Start: 2019-06-12 | End: 2019-06-12 | Stop reason: HOSPADM

## 2019-06-12 RX ORDER — LIDOCAINE HYDROCHLORIDE 20 MG/ML
INJECTION, SOLUTION INFILTRATION; PERINEURAL PRN
Status: DISCONTINUED | OUTPATIENT
Start: 2019-06-12 | End: 2019-06-12 | Stop reason: SDUPTHER

## 2019-06-12 RX ORDER — MOXIFLOXACIN 5 MG/ML
SOLUTION/ DROPS OPHTHALMIC PRN
Status: DISCONTINUED | OUTPATIENT
Start: 2019-06-12 | End: 2019-06-12 | Stop reason: ALTCHOICE

## 2019-06-12 RX ORDER — ONDANSETRON 2 MG/ML
4 INJECTION INTRAMUSCULAR; INTRAVENOUS
Status: DISCONTINUED | OUTPATIENT
Start: 2019-06-12 | End: 2019-06-12 | Stop reason: HOSPADM

## 2019-06-12 RX ORDER — SODIUM CHLORIDE 0.9 % (FLUSH) 0.9 %
10 SYRINGE (ML) INJECTION EVERY 12 HOURS SCHEDULED
Status: DISCONTINUED | OUTPATIENT
Start: 2019-06-12 | End: 2019-06-12 | Stop reason: HOSPADM

## 2019-06-12 RX ORDER — SODIUM CHLORIDE 0.9 % (FLUSH) 0.9 %
10 SYRINGE (ML) INJECTION PRN
Status: DISCONTINUED | OUTPATIENT
Start: 2019-06-12 | End: 2019-06-12 | Stop reason: HOSPADM

## 2019-06-12 RX ORDER — SODIUM CHLORIDE, SODIUM LACTATE, POTASSIUM CHLORIDE, CALCIUM CHLORIDE 600; 310; 30; 20 MG/100ML; MG/100ML; MG/100ML; MG/100ML
INJECTION, SOLUTION INTRAVENOUS CONTINUOUS
Status: DISCONTINUED | OUTPATIENT
Start: 2019-06-12 | End: 2019-06-12 | Stop reason: HOSPADM

## 2019-06-12 RX ORDER — OXYCODONE HYDROCHLORIDE AND ACETAMINOPHEN 5; 325 MG/1; MG/1
2 TABLET ORAL PRN
Status: DISCONTINUED | OUTPATIENT
Start: 2019-06-12 | End: 2019-06-12 | Stop reason: HOSPADM

## 2019-06-12 RX ORDER — TIMOLOL MALEATE 5 MG/ML
SOLUTION/ DROPS OPHTHALMIC PRN
Status: DISCONTINUED | OUTPATIENT
Start: 2019-06-12 | End: 2019-06-12 | Stop reason: ALTCHOICE

## 2019-06-12 RX ORDER — MAGNESIUM HYDROXIDE 1200 MG/15ML
LIQUID ORAL PRN
Status: DISCONTINUED | OUTPATIENT
Start: 2019-06-12 | End: 2019-06-12 | Stop reason: ALTCHOICE

## 2019-06-12 RX ORDER — MORPHINE SULFATE 2 MG/ML
2 INJECTION, SOLUTION INTRAMUSCULAR; INTRAVENOUS EVERY 5 MIN PRN
Status: DISCONTINUED | OUTPATIENT
Start: 2019-06-12 | End: 2019-06-12 | Stop reason: HOSPADM

## 2019-06-12 RX ORDER — OXYCODONE HYDROCHLORIDE AND ACETAMINOPHEN 5; 325 MG/1; MG/1
1 TABLET ORAL PRN
Status: DISCONTINUED | OUTPATIENT
Start: 2019-06-12 | End: 2019-06-12 | Stop reason: HOSPADM

## 2019-06-12 RX ORDER — FENTANYL CITRATE 50 UG/ML
25 INJECTION, SOLUTION INTRAMUSCULAR; INTRAVENOUS EVERY 5 MIN PRN
Status: DISCONTINUED | OUTPATIENT
Start: 2019-06-12 | End: 2019-06-12 | Stop reason: HOSPADM

## 2019-06-12 RX ORDER — TETRACAINE HYDROCHLORIDE 5 MG/ML
SOLUTION OPHTHALMIC PRN
Status: DISCONTINUED | OUTPATIENT
Start: 2019-06-12 | End: 2019-06-12 | Stop reason: ALTCHOICE

## 2019-06-12 RX ADMIN — Medication 0.3 ML: at 08:50

## 2019-06-12 RX ADMIN — FAMOTIDINE 20 MG: 10 INJECTION, SOLUTION INTRAVENOUS at 09:23

## 2019-06-12 RX ADMIN — LIDOCAINE HYDROCHLORIDE 40 MG: 20 INJECTION, SOLUTION INFILTRATION; PERINEURAL at 09:56

## 2019-06-12 RX ADMIN — Medication 0.3 ML: at 09:00

## 2019-06-12 RX ADMIN — Medication 0.3 ML: at 09:10

## 2019-06-12 RX ADMIN — SODIUM CHLORIDE, POTASSIUM CHLORIDE, SODIUM LACTATE AND CALCIUM CHLORIDE: 600; 310; 30; 20 INJECTION, SOLUTION INTRAVENOUS at 09:56

## 2019-06-12 RX ADMIN — PROPOFOL 100 MG: 10 INJECTION, EMULSION INTRAVENOUS at 09:56

## 2019-06-12 ASSESSMENT — PULMONARY FUNCTION TESTS
PIF_VALUE: 1
PIF_VALUE: 0
PIF_VALUE: 1
PIF_VALUE: 1
PIF_VALUE: 0
PIF_VALUE: 1

## 2019-06-12 ASSESSMENT — PAIN DESCRIPTION - DESCRIPTORS: DESCRIPTORS: DULL

## 2019-06-12 ASSESSMENT — PAIN SCALES - GENERAL: PAINLEVEL_OUTOF10: 0

## 2019-06-12 ASSESSMENT — PAIN - FUNCTIONAL ASSESSMENT: PAIN_FUNCTIONAL_ASSESSMENT: 0-10

## 2019-06-12 NOTE — ANESTHESIA POSTPROCEDURE EVALUATION
Department of Anesthesiology  Postprocedure Note    Patient: Ceasar Coffey  MRN: [id number]  YOB: 1950  Date of evaluation: 6/12/2019  Time:  12:00 PM     Procedure Summary     Date:  06/12/19 Room / Location:  [facility] OR 03 / [facility] OR    Anesthesia Start:  8253 Anesthesia Stop:  9892    Procedure:  PHACOEMULSIFICATION OF CATARACT LEFT EYE WITH INTRAOCULAR LENS IMPLANT (Left Eye) Diagnosis:       Age-related nuclear cataract of left eye      (Age-related nuclear cataract of left eye [H25.12])    Surgeon:  Dolly Maloney MD Responsible Provider:  Pricilla Edwards MD    Anesthesia Type:  TIVA ASA Status:  2          Anesthesia Type: TIVA    Evelyn Phase I: Evelyn Score: 10    Evelyn Phase II: Evelyn Score: 10    Last vitals: Reviewed and per EMR flowsheets.    Vitals:    06/07/19 1254 06/12/19 0845 06/12/19 1033   BP:  (!) 152/69 (!) 158/90   Pulse:  71 56   Resp:  16 14   Temp:  97.1 °F (36.2 °C)    TempSrc:  Temporal    SpO2:  97% 100%   Weight: 190 lb (86.2 kg) 190 lb (86.2 kg)    Height: 5' 2\" (1.575 m) 5' 2\" (1.575 m)        Anesthesia Post Evaluation    Patient location during evaluation: bedside  Patient participation: complete - patient participated  Level of consciousness: awake and alert  Airway patency: patent  Nausea & Vomiting: no nausea  Complications: no  Cardiovascular status: hemodynamically stable  Respiratory status: acceptable  Hydration status: euvolemic

## 2019-06-12 NOTE — H&P
I have examined the patient and reviewed the history and physical and find no relevant changes. I have reviewed with the patient and/or family the risks, benefits, and alternatives to the procedure and they have agreed to proceed.     Loren Farrell.

## 2019-06-12 NOTE — PROGRESS NOTES
Discharge instructions reviewed with patient and responsible adult. Discharge instructions signed and copy given with no additional questions. Patient to be discharged home with belongings. Eye card given.

## 2019-06-12 NOTE — OP NOTE
[facility]                                OPERATIVE REPORT    PATIENT NAME: Kelli Pimentel                :        1950  MED REC NO:   [id number]                          ROOM:  ACCOUNT NO:   [de-identified]                           ADMIT DATE: 2019  PROVIDER:     Luiz Olguin MD    DATE OF PROCEDURE:  2019    PREOPERATIVE DIAGNOSIS:  Cataract, left eye. POSTOPERATIVE DIAGNOSIS:  Cataract, left eye. OPERATION:  Phacoemulsification of the cataract of the left eye with a  posterior chamber lens implant. ANESTHESIA:  General / Monitored Anesthesia Care / Retrobulbar. A 2 mL  retrobulbar block and 10 mL modified Brooke block using a 1:1 mixture  of 0.75% Marcaine, 4% Xylocaine with epinephrine, and hyaluronidase. SURGICAL INDICATIONS:  The patient has had a painless progressive loss  of vision due to the cataractous degeneration of the lens and for that  reason, surgery is indicated. The patient is having visual problems  with current lifestyle. A new eyeglasses prescription was unable to  adequately improve functional vision. DETAILS OF PROCEDURE:  The patient was taken to the operating room and  was prepped and draped in the usual manner after obtaining satisfactory  local anesthesia. Attention was turned towards the operative eye and a lid speculum was  inserted. A 2.5 mm keratome was used to make a limbal incision at 180  degrees. Viscoat was then placed in the eye to fill the anterior  chamber and a side port incision was made with a Superblade through the  clear cornea. The incision was located about 2 oclock to the left of  the original incision. A bent needle was then used to make a cut in the  anterior capsule and start a capsulorrhexis tear. This was then grasped  with Utrata forceps and a 360 degree tear was completed.   Delta  dissection was then done with BSS to separate the capsule and the  cataract. The cataract was seen to be spinning easily within the  capsular bag and at this point, the phacoemulsification hand piece was  called for. Using a divide and conquer technique, the phacoemulsifier cut the lens  into four pieces approximately following the pattern of a cross. The  grooves were cut deeply and then the lens was cracked along these axes. The lens quarters were then rotated into the mid pupillary space and  phacoemulsified. The IA hand piece removed all the cortical material.   A Mariano Wong was used to polish the posterior capsule. Viscoat and  Provisc were used to fill the capsular bag. The incision was opened  slightly with a crescent knife and an Juancho acrylic foldable lens of the  appropriate power was called for. The lens was loaded into the injector  and injected into the eye. The lead haptic was injected into the  capsular bag with the trailing haptic left in the pupillary space. Using a Sinskey hook, the lens was gently nudged into the capsular bag  and rotated into position. After noting that the alignment and  centration was adequate, the IA hand piece was called for and used to  remove all the viscoelastic material.  Miostat was then injected through  the side port incision to bring the pupil down. The wound was checked  to make sure it was water tight. At this time, 2-3 drops of timolol and  2-3 drops of Vigamox were placed on the eye. The eye was taped closed,  patched and shielded. The patient went to the recovery room in good  condition. ADDENDUM:  The phaco time was 10.20 CDE with 200 mL of fluid. The  patient had a near-clear, no-stitch surgery, axis 180. Tolerated the  procedure well and went to the recovery room in good condition.         Anna Baldwin MD    D: 06/12/2019 10:46:14       T: 06/12/2019 12:32:19     [email or web address]  Job#: [id number]     Doc#: [id number]    CC:

## 2019-06-12 NOTE — BRIEF OP NOTE
Brief Postoperative Note  ______________________________________________________________    Patient: Alicia Gaitan  YOB: 1950  MRN: [id number]  Date of Procedure: 6/12/2019    Pre-Op Diagnosis: Age-related nuclear cataract of left eye [H25.12]    Post-Op Diagnosis: Same       Procedure(s):  PHACOEMULSIFICATION OF CATARACT LEFT EYE WITH INTRAOCULAR LENS IMPLANT    Anesthesia: TIVA    Surgeon(s):  Courtney Ayala MD    Assistant: none    Estimated Blood Loss (mL): less than 50     Complications: None    Specimens:   * No specimens in log *    Implants:  Implant Name Type Inv.  Item Serial No.  Lot No. LRB No. Used   LENS IOL [id number] [facility] - [id number] Eye LENS IOL [id number] [facility] [id number] SAVANNA  Left 1         Drains: * No LDAs found *    Findings: none    Francis Acosta MD  Date: 6/12/2019  Time: 10:39 AM

## 2019-06-18 NOTE — PROGRESS NOTES
Verlin Kaska    Age 80 y.o.    female    1950    MRN [id number]    Date___________   Arrival Time_____________  OR Time____________Duration____     Procedure(s):  PHACOEMULSIFICATION OF CATARACT RIGHT EYE WITH INTRAOCULAR LENS IMPLANT    Surgeon  ________________________________  [facility]   [facility]   [place]       Phone [phone] (home) [phone] (work)      [address]  Cell Work  ______________________________________________________________________________________________________________________________________________________________________________________________________________________________________________________________________________________________________________________________________________________________    PCP__________________________Phone__________________________________      H&P__________________Bringing      Chart              Epic    DOS           Called_______  EKG__________________Bringing      Chart              Epic    DOS           Called_______  LAB__________________ Bringing      Chart              Epic    DOS           Called_______  CardiacClearance _______Bringing      Chart              Epic    DOS           Called_______      Cardiologist________________________ Phone___________________________      ? Cheondoism concerns / Waiver on Chart            PAT Communications________________  ? Pre-op Instructions Given [place]          _________________________________  ? Directions to Surgery Center                          _________________________________  ? Transportation Home_______________      _________________________________  ?  Crutches/Walker__________________        _________________________________      ________Pre-op Orders   _______Transcribed    _______Wt.  ________Pharmacy          _______SCD  ______VTE     ______Beta Blocker  ________Consent

## 2019-06-21 NOTE — PROGRESS NOTES
Obstructive Sleep Apnea (LISSA) Screening     Patient:  Lianet Rehman    YOB: 1950      Medical Record #:  [id number]                     Date:  6/21/2019     1. Are you a loud and/or regular snorer? []  Yes       [x] No    2. Have you been observed to gasp or stop breathing during sleep? []  Yes       [x] No    3. Do you feel tired or groggy upon awakening or do you awaken with a headache?           []  Yes       [] No    4. Are you often tired or fatigued during the wake time hours? []  Yes       [] No    5. Do you fall asleep sitting, reading, watching TV or driving? []  Yes       [] No    6. Do you often have problems with memory or concentration? []  Yes       [] No    **If patient's score is ? 3 they are considered high risk for LISSA. Notify the anesthesiologist of the high risk and document in focus note. Note:  If the patient's BMI is more than 35 kg m¯² , has neck circumference > 40 cm, and/or high blood pressure the risk is greater (© American Sleep Apnea Association, 2006).

## 2019-06-25 ENCOUNTER — ANESTHESIA EVENT (OUTPATIENT)
Dept: OPERATING ROOM | Age: 69
End: 2019-06-25
Payer: COMMERCIAL

## 2019-06-26 ENCOUNTER — HOSPITAL ENCOUNTER (OUTPATIENT)
Age: 69
Setting detail: OUTPATIENT SURGERY
Discharge: HOME | End: 2019-06-26
Attending: OPHTHALMOLOGY | Admitting: OPHTHALMOLOGY
Payer: COMMERCIAL

## 2019-06-26 ENCOUNTER — ANESTHESIA (OUTPATIENT)
Dept: OPERATING ROOM | Age: 69
End: 2019-06-26
Payer: COMMERCIAL

## 2019-06-26 VITALS
RESPIRATION RATE: 16 BRPM | SYSTOLIC BLOOD PRESSURE: 130 MMHG | OXYGEN SATURATION: 100 % | TEMPERATURE: 96.8 F | BODY MASS INDEX: 34.96 KG/M2 | WEIGHT: 190 LBS | HEIGHT: 62 IN | DIASTOLIC BLOOD PRESSURE: 80 MMHG | HEART RATE: 66 BPM

## 2019-06-26 VITALS — DIASTOLIC BLOOD PRESSURE: 82 MMHG | SYSTOLIC BLOOD PRESSURE: 140 MMHG | OXYGEN SATURATION: 100 %

## 2019-06-26 PROCEDURE — V2632 POST CHMBR INTRAOCULAR LENS: HCPCS | Performed by: OPHTHALMOLOGY

## 2019-06-26 PROCEDURE — 3600000013 HC SURGERY LEVEL 3 ADDTL 15MIN: Performed by: OPHTHALMOLOGY

## 2019-06-26 PROCEDURE — 3600000003 HC SURGERY LEVEL 3 BASE: Performed by: OPHTHALMOLOGY

## 2019-06-26 PROCEDURE — 2580000003 HC RX 258: Performed by: ANESTHESIOLOGY

## 2019-06-26 PROCEDURE — 7100000010 HC PHASE II RECOVERY - FIRST 15 MIN: Performed by: OPHTHALMOLOGY

## 2019-06-26 PROCEDURE — 3700000001 HC ADD 15 MINUTES (ANESTHESIA): Performed by: OPHTHALMOLOGY

## 2019-06-26 PROCEDURE — 6360000002 HC RX W HCPCS: Performed by: NURSE ANESTHETIST, CERTIFIED REGISTERED

## 2019-06-26 PROCEDURE — 2580000003 HC RX 258: Performed by: OPHTHALMOLOGY

## 2019-06-26 PROCEDURE — 2709999900 HC NON-CHARGEABLE SUPPLY: Performed by: OPHTHALMOLOGY

## 2019-06-26 PROCEDURE — 7100000011 HC PHASE II RECOVERY - ADDTL 15 MIN: Performed by: OPHTHALMOLOGY

## 2019-06-26 PROCEDURE — 2500000003 HC RX 250 WO HCPCS: Performed by: NURSE ANESTHETIST, CERTIFIED REGISTERED

## 2019-06-26 PROCEDURE — 3700000000 HC ANESTHESIA ATTENDED CARE: Performed by: OPHTHALMOLOGY

## 2019-06-26 PROCEDURE — 6370000000 HC RX 637 (ALT 250 FOR IP): Performed by: OPHTHALMOLOGY

## 2019-06-26 DEVICE — ACRYSOF(R) IQ ASPHERIC IOL SP ACRYLIC FOLDABLELENS WULTRASERT(TM) DELIVERY SYSTEM UV WBLUE LIGHT FILTER. 13.0MM LENGTH 6.0MM ANTERIORASYMMETRIC BICONVEX OPTIC PLANAR HAPTICS.
Type: IMPLANTABLE DEVICE | Site: EYE | Status: FUNCTIONAL
Brand: ACRYSOF ULTRASERT

## 2019-06-26 RX ORDER — SODIUM CHLORIDE 0.9 % (FLUSH) 0.9 %
10 SYRINGE (ML) INJECTION EVERY 12 HOURS SCHEDULED
Status: DISCONTINUED | OUTPATIENT
Start: 2019-06-26 | End: 2019-06-26 | Stop reason: HOSPADM

## 2019-06-26 RX ORDER — MAGNESIUM HYDROXIDE 1200 MG/15ML
LIQUID ORAL PRN
Status: DISCONTINUED | OUTPATIENT
Start: 2019-06-26 | End: 2019-06-26 | Stop reason: ALTCHOICE

## 2019-06-26 RX ORDER — SODIUM CHLORIDE, SODIUM LACTATE, POTASSIUM CHLORIDE, CALCIUM CHLORIDE 600; 310; 30; 20 MG/100ML; MG/100ML; MG/100ML; MG/100ML
INJECTION, SOLUTION INTRAVENOUS CONTINUOUS
Status: DISCONTINUED | OUTPATIENT
Start: 2019-06-26 | End: 2019-06-26 | Stop reason: HOSPADM

## 2019-06-26 RX ORDER — LIDOCAINE HYDROCHLORIDE 20 MG/ML
INJECTION, SOLUTION INFILTRATION; PERINEURAL PRN
Status: DISCONTINUED | OUTPATIENT
Start: 2019-06-26 | End: 2019-06-26 | Stop reason: SDUPTHER

## 2019-06-26 RX ORDER — SODIUM CHLORIDE 0.9 % (FLUSH) 0.9 %
10 SYRINGE (ML) INJECTION PRN
Status: DISCONTINUED | OUTPATIENT
Start: 2019-06-26 | End: 2019-06-26 | Stop reason: HOSPADM

## 2019-06-26 RX ORDER — PROPOFOL 10 MG/ML
INJECTION, EMULSION INTRAVENOUS PRN
Status: DISCONTINUED | OUTPATIENT
Start: 2019-06-26 | End: 2019-06-26 | Stop reason: SDUPTHER

## 2019-06-26 RX ORDER — LIDOCAINE HYDROCHLORIDE 10 MG/ML
0.3 INJECTION, SOLUTION EPIDURAL; INFILTRATION; INTRACAUDAL; PERINEURAL
Status: DISCONTINUED | OUTPATIENT
Start: 2019-06-26 | End: 2019-06-26 | Stop reason: HOSPADM

## 2019-06-26 RX ADMIN — Medication 0.3 ML: at 09:22

## 2019-06-26 RX ADMIN — PROPOFOL 100 MG: 10 INJECTION, EMULSION INTRAVENOUS at 10:25

## 2019-06-26 RX ADMIN — Medication 0.3 ML: at 09:32

## 2019-06-26 RX ADMIN — Medication 0.3 ML: at 09:11

## 2019-06-26 RX ADMIN — SODIUM CHLORIDE, POTASSIUM CHLORIDE, SODIUM LACTATE AND CALCIUM CHLORIDE: 600; 310; 30; 20 INJECTION, SOLUTION INTRAVENOUS at 09:26

## 2019-06-26 RX ADMIN — LIDOCAINE HYDROCHLORIDE 40 MG: 20 INJECTION, SOLUTION INFILTRATION; PERINEURAL at 10:25

## 2019-06-26 ASSESSMENT — PULMONARY FUNCTION TESTS
PIF_VALUE: 1
PIF_VALUE: 0

## 2019-06-26 ASSESSMENT — PAIN - FUNCTIONAL ASSESSMENT: PAIN_FUNCTIONAL_ASSESSMENT: 0-10

## 2019-06-26 NOTE — OP NOTE
BSS to separate the capsule and the  cataract. The cataract was seen to be spinning easily within the  capsular bag and at this point, the phacoemulsification hand piece was  called for. Using a divide and conquer technique, the phacoemulsifier cut the lens  into four pieces approximately following the pattern of a cross. The  grooves were cut deeply and then the lens was cracked along these axes. The lens quarters were then rotated into the mid pupillary space and  phacoemulsified. The IA hand piece removed all the cortical material.   A Kofi Tyler was used to polish the posterior capsule. Viscoat and  Provisc were used to fill the capsular bag. The incision was opened  slightly with a crescent knife and an Juancho acrylic foldable lens of the  appropriate power was called for. The lens was loaded into the injector  and injected into the eye. The lead haptic was injected into the  capsular bag with the trailing haptic left in the pupillary space. Using a Sinskey hook, the lens was gently nudged into the capsular bag  and rotated into position. After noting that the alignment and  centration was adequate, the IA hand piece was called for and used to  remove all the viscoelastic material.  Miostat was then injected through  the side port incision to bring the pupil down. The wound was checked  to make sure it was water tight. At this time, 2-3 drops of timolol and  2-3 drops of Vigamox were placed on the eye. The eye was taped closed,  patched and shielded. The patient went to the recovery room in good  condition. ADDENDUM:  The phaco time was 6.73 CDE with 200 mL of fluid. The  patient had a near-clear, no-stitch surgery, axis 180. Tolerated the  procedure well and went to the recovery room in good condition.         Harper Johnson MD    D: 06/26/2019 30:15:26       T: 06/26/2019 13:39:54     [email or web address]  Job#: [id number]     Doc#: [id number]    CC:

## 2019-06-26 NOTE — ANESTHESIA POSTPROCEDURE EVALUATION
Department of Anesthesiology  Postprocedure Note    Patient: Rodrigo Perea  MRN: [id number]  YOB: 1950  Date of evaluation: 6/26/2019  Time:  11:52 AM     Procedure Summary     Date:  06/26/19 Room / Location:  [facility] OR 03 / [facility] OR    Anesthesia Start:  1025 Anesthesia Stop:  6258    Procedure:  PHACOEMULSIFICATION OF CATARACT RIGHT EYE WITH INTRAOCULAR LENS IMPLANT (Right Eye) Diagnosis:       Age-related nuclear cataract of right eye      (Age-related nuclear cataract of right eye [H25.11])    Surgeon:  Lucia Bone MD Responsible Provider:  Rickie Horne MD    Anesthesia Type:  general ASA Status:  3          Anesthesia Type: general    Evelyn Phase I: Evelyn Score: 10    Evelyn Phase II: Evelyn Score: 10    Last vitals: Reviewed and per EMR flowsheets.        Anesthesia Post Evaluation    Patient location during evaluation: PACU  Patient participation: complete - patient participated  Level of consciousness: awake and alert  Pain score: 0  Airway patency: patent  Nausea & Vomiting: no nausea and no vomiting  Complications: no  Cardiovascular status: blood pressure returned to baseline  Respiratory status: acceptable  Hydration status: stable

## 2019-06-26 NOTE — ANESTHESIA PRE PROCEDURE
Department of Anesthesiology  Preprocedure Note       Name:  Mary Lou Roca   Age:  80 y.o.  :  1950                                          MRN:  [id number]         Date:  2019      Surgeon: Lavonne Tinsley):  Shirin Wick MD    Procedure: PHACOEMULSIFICATION OF CATARACT RIGHT EYE WITH INTRAOCULAR LENS IMPLANT (Right Eye)    Medications prior to admission:   Prior to Admission medications    Medication Sig Start Date End Date Taking? Authorizing Provider   traZODone (DESYREL) 50 MG tablet TAKE 1 TO 2 TABLETS NIGHTLYAS NEEDED FOR SLEEP 6/3/19  Yes Alecia Obrien MD   simvastatin (ZOCOR) 20 MG tablet Take 1 tablet by mouth nightly 19  Yes Alecia Obrien MD   Fexofenadine-Pseudoephedrine (ALLEGRA-D PO) Take by mouth every other day Alternates with allegra   Yes Historical Provider, MD   levothyroxine (SYNTHROID) 50 MCG tablet TAKE 1 TABLET DAILY 3/25/19  Yes Alecia Obrien MD   valACYclovir (VALTREX) 1 g tablet TAKE 1 TABLET DAILY 19  Yes Alecia Obrien MD   buPROPion (WELLBUTRIN XL) 300 MG extended release tablet TAKE 1 TABLET EVERY MORNING 19  Yes Alecia Obrien MD   omeprazole (PRILOSEC) 20 MG delayed release capsule Take 20 mg by mouth daily   Yes Historical Provider, MD   Cholecalciferol (VITAMIN D3) 1000 units TABS Take 1 tablet by mouth daily 18  Yes Alecia Obrien MD   PREMARIN 0.625 MG/GM vaginal cream PLACE 0.5G VAGINALLY TWICE A WEEK . 17  Yes Alecia Obrien MD   Fexofenadine HCl (ALLEGRA PO) Take by mouth daily Alternates every other day with Allegra D   Yes Historical Provider, MD   triamcinolone (NASACORT ALLERGY 24HR) 55 MCG/ACT nasal inhaler 2 SPRAYS IN EACH NOSTRIL one time a day 10/31/16  Yes Alecia Obrien MD   bisacodyl (DULCOLAX) 5 MG EC tablet Take 10 mg by mouth daily as needed for Constipation.    Yes Historical Provider, MD   Omega-3 Fatty Acids (FISH OIL BURP-LESS) 1200 MG CAPS Take 1 tablet by mouth daily    Yes Historical Provider, MD   mometasone (NASONEX) 50 MCG/ACT nasal spray USE 2 SPRAYS BY NASAL ROUTE DAILY 4/6/16 1/30/19  Kamlesh Forman MD   ibuprofen (ADVIL;MOTRIN) 600 MG tablet Take 600 mg by mouth every 6 hours as needed for Pain. Historical Provider, MD       Current medications:    Current Facility-Administered Medications   Medication Dose Route Frequency Provider Last Rate Last Dose    lactated ringers infusion   Intravenous Continuous ELIEL Lott  mL/hr at 06/26/19 0926      sodium chloride flush 0.9 % injection 10 mL  10 mL Intravenous 2 times per day ELIEL Lott MD        sodium chloride flush 0.9 % injection 10 mL  10 mL Intravenous PRN ELIEL Lott MD        lidocaine PF 1 % injection 0.3 mL  0.3 mL Intradermal Once PRN ELIEL Lott MD        OPHTHALMIC SOLUTION COMPOUND ophtahlmic solution                Allergies:     Allergies   Allergen Reactions    Latex Rash    Penicillins Rash       Problem List:    Patient Active Problem List   Diagnosis Code    Environmental allergies Z91.09    Mixed hyperlipidemia E78.2    Acquired hypothyroidism E03.9    Insomnia G47.00    ALBER (generalized anxiety disorder) F41.1    Herpes simplex type 2 infection B00.9    Obesity, Class I, BMI 30-34.9 E66.9    Vaginal atrophy N95.2    Major depressive disorder in remission ([facility] [address] F32.5    Hypervitaminosis D E67.3    Gastroesophageal reflux disease with esophagitis K21.0    Prediabetes R73.03       Past Medical History:        Diagnosis Date    Acid reflux     Environmental allergies     Herpes simplex type 2 infection     Hyperlipidemia     Hypothyroid     Dx about 15 years ago, but htne was off med for  long time    Insomnia     Menopause     approx age 37       Past Surgical History:        Procedure Laterality Date    ESOPHAGEAL DILATATION N/A 10/25/2018    ESOPHAGEAL DILATION MARTA performed by Mando Bhatt MD at [address] CATARACT EXTRACTION Left 6/12/2019    PHACOEMULSIFICATION OF CATARACT LEFT EYE WITH INTRAOCULAR LENS IMPLANT performed by Bi Shane MD at [address] N/A 10/25/2018    EGD BIOPSY performed by Mj Hernandez MD at [address] [facility] History:    Social History     Tobacco Use    Smoking status: Never Smoker    Smokeless tobacco: Never Used    Tobacco comment: Never smoked, never will! Substance Use Topics    Alcohol use: No                                Counseling given: Not Answered  Comment: Never smoked, never will! Vital Signs (Current):   Vitals:    06/21/19 0930 06/26/19 0912   BP:  (!) 163/69   Pulse:  69   Resp:  16   Temp:  98.1 °F (36.7 °C)   TempSrc:  Temporal   SpO2:  99%   Weight: 190 lb (86.2 kg) 190 lb (86.2 kg)   Height: 5' 2\" (1.575 m) 5' 2\" (1.575 m)                                              BP Readings from Last 3 Encounters:   06/26/19 (!) 163/69   06/12/19 (!) 172/87   06/12/19 (!) 158/90       NPO Status: Time of last liquid consumption: 2345                        Time of last solid consumption: 2200                        Date of last liquid consumption: 06/25/19                        Date of last solid food consumption: 06/25/19    BMI:   Wt Readings from Last 3 Encounters:   06/26/19 190 lb (86.2 kg)   06/12/19 190 lb (86.2 kg)   05/28/19 197 lb (89.4 kg)     Body mass index is 34.75 kg/m².     CBC:   Lab Results   Component Value Date    WBC 6.3 04/01/2016    RBC 4.47 04/01/2016    HGB 14.3 04/01/2016    HCT 42.9 04/01/2016    MCV 95.9 04/01/2016    RDW 14.1 04/01/2016     04/01/2016       CMP:   Lab Results   Component Value Date     05/28/2019    K 4.2 05/28/2019     05/28/2019    CO2 24 05/28/2019    BUN 13 05/28/2019    CREATININE 0.9 05/28/2019    GFRAA >60 05/28/2019    AGRATIO 1.7 10/19/2016    LABGLOM >60 05/28/2019    GLUCOSE 134 05/28/2019    PROT 6.9 10/19/2016    CALCIUM 9.6 05/28/2019 BILITOT 0.4 10/19/2016    ALKPHOS 57 10/19/2016    AST 16 10/19/2016    ALT 17 10/19/2016       POC Tests: No results for input(s): POCGLU, POCNA, POCK, POCCL, POCBUN, POCHEMO, POCHCT in the last 72 hours. Coags: No results found for: PROTIME, INR, APTT    HCG (If Applicable): No results found for: PREGTESTUR, PREGSERUM, HCG, HCGQUANT     ABGs: No results found for: PHART, PO2ART, NAD3UEP, DMW2YFP, BEART, W8MQDWEN     Type & Screen (If Applicable):  No results found for: LABABO, [address]    Anesthesia Evaluation  Patient summary reviewed and Nursing notes reviewed  Airway: Mallampati: III  TM distance: <3 FB   Neck ROM: full  Mouth opening: > = 3 FB Dental: normal exam         Pulmonary:Negative Pulmonary ROS and normal exam  breath sounds clear to auscultation                             Cardiovascular:    (+) hyperlipidemia        Rhythm: regular  Rate: normal                    Neuro/Psych:   Negative Neuro/Psych ROS              GI/Hepatic/Renal:   (+) GERD: well controlled, morbid obesity          Endo/Other:    (+) hypothyroidism::., .                 Abdominal:   (+) obese,         Vascular: negative vascular ROS. Anesthesia Plan      general     ASA 3       Induction: intravenous. MIPS: Postoperative opioids intended and Prophylactic antiemetics administered. Anesthetic plan and risks discussed with patient. Plan discussed with CRNA.                   Crissy Bonds MD   6/26/2019

## 2019-07-01 RX ORDER — SIMVASTATIN 20 MG
TABLET ORAL
Qty: 90 TABLET | Refills: 1 | OUTPATIENT
Start: 2019-07-01

## 2019-07-02 RX ORDER — VALACYCLOVIR HYDROCHLORIDE 1 G/1
TABLET, FILM COATED ORAL
Qty: 90 TABLET | Refills: 1 | Status: SHIPPED | OUTPATIENT
Start: 2019-07-02 | End: 2019-08-19 | Stop reason: SDUPTHER

## 2019-07-02 RX ORDER — LEVOTHYROXINE SODIUM 0.05 MG/1
TABLET ORAL
Qty: 90 TABLET | Refills: 1 | Status: SHIPPED | OUTPATIENT
Start: 2019-07-02 | End: 2019-07-08

## 2019-07-08 ENCOUNTER — PATIENT MESSAGE (OUTPATIENT)
Dept: INTERNAL MEDICINE CLINIC | Age: 69
End: 2019-07-08

## 2019-07-08 RX ORDER — LEVOTHYROXINE SODIUM 0.05 MG/1
TABLET ORAL
Qty: 90 TABLET | Refills: 0 | Status: SHIPPED | OUTPATIENT
Start: 2019-07-08 | End: 2019-08-27 | Stop reason: SDUPTHER

## 2019-07-08 NOTE — TELEPHONE ENCOUNTER
From: Hans Mazariegos  To: Jessica Ca MD  Sent: 7/8/2019 12:40 PM EDT  Subject: Prescription Question    The letter I received today, is dated July 1. I'm not sure why they sent a letter rather than contact me by text or email. The 3 refills you sent on July 2 & May 29 should be fine. The only 1 I requested yesterday (online) is levothyroxin. If they need a refill order, you will probably hear from them today. Thank you, Charlie Swan.  ----- Message -----  From: Tyler Mooney  Sent: 7/8/2019 12:08 PM EDT  To: Hans Mazariegos  Subject: RE: Prescription Question  Dr. Fausto Krishna sent in for valtrex and levothyroxine on 7/2/2019. Did they say they didn't get those scripts ? Your last refill for simvastatin was sent 5/29/2019 with 1 refill.     ----- Message -----   From: Marcin Junior   Sent: 7/8/2019 12:00 PM EDT   To: Jessica Ca MD  Subject: Prescription Question    Hi Dr. Darren Osler,    [facility] needs refill orders for Simvastatin, valacyclovir and levothyroxin. I know Dr. Fausto Juárez sent refills during my last visit with her, but I think it's possible they were not sent to [address] [place] [address] . .. ? ? ? When [facility] contacts you for these refills, please work with them. I am just about out of levothyroxin & I just requested a refill. Thank you & have a great day.  Marbella Verma

## 2019-07-09 RX ORDER — BUPROPION HYDROCHLORIDE 300 MG/1
TABLET ORAL
Qty: 90 TABLET | Refills: 1 | Status: SHIPPED | OUTPATIENT
Start: 2019-07-09 | End: 2019-12-31

## 2019-08-12 RX ORDER — VALACYCLOVIR HYDROCHLORIDE 1 G/1
TABLET, FILM COATED ORAL
Qty: 90 TABLET | Refills: 1 | OUTPATIENT
Start: 2019-08-12

## 2019-08-19 RX ORDER — VALACYCLOVIR HYDROCHLORIDE 1 G/1
TABLET, FILM COATED ORAL
Qty: 90 TABLET | Refills: 1 | Status: SHIPPED | OUTPATIENT
Start: 2019-08-19 | End: 2020-02-03

## 2019-08-23 ENCOUNTER — PATIENT MESSAGE (OUTPATIENT)
Dept: INTERNAL MEDICINE CLINIC | Age: 69
End: 2019-08-23

## 2019-08-23 DIAGNOSIS — R73.03 PREDIABETES: Primary | ICD-10-CM

## 2019-08-23 DIAGNOSIS — E78.2 MIXED HYPERLIPIDEMIA: ICD-10-CM

## 2019-08-23 DIAGNOSIS — E03.9 ACQUIRED HYPOTHYROIDISM: ICD-10-CM

## 2019-08-26 ASSESSMENT — ENCOUNTER SYMPTOMS
SHORTNESS OF BREATH: 0
CHEST TIGHTNESS: 0

## 2019-08-27 ENCOUNTER — OFFICE VISIT (OUTPATIENT)
Dept: INTERNAL MEDICINE CLINIC | Age: 69
End: 2019-08-27
Payer: COMMERCIAL

## 2019-08-27 VITALS
WEIGHT: 191 LBS | BODY MASS INDEX: 34.93 KG/M2 | DIASTOLIC BLOOD PRESSURE: 74 MMHG | OXYGEN SATURATION: 97 % | SYSTOLIC BLOOD PRESSURE: 116 MMHG | HEART RATE: 72 BPM

## 2019-08-27 DIAGNOSIS — E78.2 MIXED HYPERLIPIDEMIA: ICD-10-CM

## 2019-08-27 DIAGNOSIS — F33.40 RECURRENT MAJOR DEPRESSIVE DISORDER, IN REMISSION (HCC): ICD-10-CM

## 2019-08-27 DIAGNOSIS — R05.9 COUGH: ICD-10-CM

## 2019-08-27 DIAGNOSIS — R73.03 PREDIABETES: Primary | ICD-10-CM

## 2019-08-27 DIAGNOSIS — G47.00 INSOMNIA, UNSPECIFIED TYPE: ICD-10-CM

## 2019-08-27 PROCEDURE — 3288F FALL RISK ASSESSMENT DOCD: CPT | Performed by: INTERNAL MEDICINE

## 2019-08-27 PROCEDURE — 99214 OFFICE O/P EST MOD 30 MIN: CPT | Performed by: INTERNAL MEDICINE

## 2019-08-27 RX ORDER — OMEPRAZOLE 40 MG/1
40 CAPSULE, DELAYED RELEASE ORAL DAILY
Qty: 90 CAPSULE | Refills: 1 | Status: SHIPPED | OUTPATIENT
Start: 2019-08-27 | End: 2020-03-24

## 2019-08-27 RX ORDER — LEVOTHYROXINE SODIUM 0.05 MG/1
TABLET ORAL
Qty: 90 TABLET | Refills: 0 | Status: SHIPPED | OUTPATIENT
Start: 2019-08-27 | End: 2020-01-06

## 2019-08-27 RX ORDER — TRAZODONE HYDROCHLORIDE 50 MG/1
50 TABLET ORAL NIGHTLY
Qty: 90 TABLET | Refills: 1
Start: 2019-08-27 | End: 2020-05-13

## 2019-09-17 ENCOUNTER — HOSPITAL ENCOUNTER (OUTPATIENT)
Dept: WOMENS IMAGING | Age: 69
Discharge: HOME | End: 2019-09-17
Payer: COMMERCIAL

## 2019-09-17 DIAGNOSIS — Z12.31 VISIT FOR SCREENING MAMMOGRAM: ICD-10-CM

## 2019-09-17 PROCEDURE — 77067 SCR MAMMO BI INCL CAD: CPT

## 2019-12-03 ENCOUNTER — TELEPHONE (OUTPATIENT)
Dept: INTERNAL MEDICINE CLINIC | Age: 69
End: 2019-12-03

## 2019-12-04 ENCOUNTER — NURSE ONLY (OUTPATIENT)
Dept: INTERNAL MEDICINE CLINIC | Age: 69
End: 2019-12-04
Payer: COMMERCIAL

## 2019-12-04 DIAGNOSIS — Z23 NEED FOR INFLUENZA VACCINATION: Primary | ICD-10-CM

## 2019-12-04 PROCEDURE — 90653 IIV ADJUVANT VACCINE IM: CPT | Performed by: INTERNAL MEDICINE

## 2019-12-04 PROCEDURE — G0008 ADMIN INFLUENZA VIRUS VAC: HCPCS | Performed by: INTERNAL MEDICINE

## 2019-12-31 RX ORDER — BUPROPION HYDROCHLORIDE 300 MG/1
TABLET ORAL
Qty: 90 TABLET | Refills: 1 | Status: SHIPPED | OUTPATIENT
Start: 2019-12-31 | End: 2020-06-22

## 2020-01-06 RX ORDER — LEVOTHYROXINE SODIUM 0.05 MG/1
TABLET ORAL
Qty: 90 TABLET | Refills: 1 | Status: SHIPPED | OUTPATIENT
Start: 2020-01-06 | End: 2020-06-24

## 2020-01-06 RX ORDER — SIMVASTATIN 20 MG
TABLET ORAL
Qty: 90 TABLET | Refills: 1 | Status: SHIPPED | OUTPATIENT
Start: 2020-01-06 | End: 2020-06-24

## 2020-02-03 RX ORDER — VALACYCLOVIR HYDROCHLORIDE 1 G/1
TABLET, FILM COATED ORAL
Qty: 90 TABLET | Refills: 1 | Status: SHIPPED | OUTPATIENT
Start: 2020-02-03 | End: 2020-06-24

## 2020-02-05 ENCOUNTER — HOSPITAL ENCOUNTER (OUTPATIENT)
Dept: OPERATING ROOM | Age: 70
Setting detail: OUTPATIENT SURGERY
Discharge: HOME | End: 2020-02-05
Payer: COMMERCIAL

## 2020-02-05 VITALS
DIASTOLIC BLOOD PRESSURE: 93 MMHG | SYSTOLIC BLOOD PRESSURE: 178 MMHG | HEART RATE: 65 BPM | OXYGEN SATURATION: 95 % | RESPIRATION RATE: 12 BRPM

## 2020-02-05 PROCEDURE — 66821 AFTER CATARACT LASER SURGERY: CPT

## 2020-02-05 PROCEDURE — 6370000000 HC RX 637 (ALT 250 FOR IP): Performed by: OPHTHALMOLOGY

## 2020-02-05 PROCEDURE — 2500000003 HC RX 250 WO HCPCS: Performed by: OPHTHALMOLOGY

## 2020-02-05 RX ORDER — PROPARACAINE HYDROCHLORIDE 5 MG/ML
1 SOLUTION/ DROPS OPHTHALMIC ONCE
Status: COMPLETED | OUTPATIENT
Start: 2020-02-05 | End: 2020-02-05

## 2020-02-05 RX ORDER — PHENYLEPHRINE HCL 2.5 %
1 DROPS OPHTHALMIC (EYE) EVERY 5 MIN PRN
Status: COMPLETED | OUTPATIENT
Start: 2020-02-05 | End: 2020-02-05

## 2020-02-05 RX ORDER — PREDNISOLONE ACETATE 10 MG/ML
1 SUSPENSION/ DROPS OPHTHALMIC ONCE
Status: COMPLETED | OUTPATIENT
Start: 2020-02-05 | End: 2020-02-05

## 2020-02-05 RX ORDER — TROPICAMIDE 10 MG/ML
1 SOLUTION/ DROPS OPHTHALMIC EVERY 5 MIN PRN
Status: COMPLETED | OUTPATIENT
Start: 2020-02-05 | End: 2020-02-05

## 2020-02-05 RX ADMIN — PHENYLEPHRINE HYDROCHLORIDE 1 DROP: 25 SOLUTION/ DROPS OPHTHALMIC at 14:57

## 2020-02-05 RX ADMIN — APRACLONIDINE HYDROCHLORIDE 1 DROP: 10 SOLUTION/ DROPS OPHTHALMIC at 15:16

## 2020-02-05 RX ADMIN — TROPICAMIDE 1 DROP: 10 SOLUTION/ DROPS OPHTHALMIC at 14:52

## 2020-02-05 RX ADMIN — APRACLONIDINE HYDROCHLORIDE 1 DROP: 10 SOLUTION/ DROPS OPHTHALMIC at 14:53

## 2020-02-05 RX ADMIN — PROPARACAINE HYDROCHLORIDE 1 DROP: 5 SOLUTION/ DROPS OPHTHALMIC at 15:13

## 2020-02-05 RX ADMIN — PHENYLEPHRINE HYDROCHLORIDE 1 DROP: 25 SOLUTION/ DROPS OPHTHALMIC at 14:52

## 2020-02-05 RX ADMIN — PREDNISOLONE ACETATE 1 DROP: 10 SUSPENSION/ DROPS OPHTHALMIC at 15:16

## 2020-02-05 RX ADMIN — TROPICAMIDE 1 DROP: 10 SOLUTION/ DROPS OPHTHALMIC at 14:57

## 2020-02-05 ASSESSMENT — PAIN - FUNCTIONAL ASSESSMENT: PAIN_FUNCTIONAL_ASSESSMENT: 0-10

## 2020-02-12 ENCOUNTER — HOSPITAL ENCOUNTER (OUTPATIENT)
Dept: OPERATING ROOM | Age: 70
Setting detail: OUTPATIENT SURGERY
Discharge: HOME | End: 2020-02-12
Payer: COMMERCIAL

## 2020-02-12 VITALS — DIASTOLIC BLOOD PRESSURE: 88 MMHG | HEART RATE: 71 BPM | SYSTOLIC BLOOD PRESSURE: 165 MMHG

## 2020-02-12 PROCEDURE — 66821 AFTER CATARACT LASER SURGERY: CPT

## 2020-02-12 PROCEDURE — 2500000003 HC RX 250 WO HCPCS: Performed by: OPHTHALMOLOGY

## 2020-02-12 PROCEDURE — 6370000000 HC RX 637 (ALT 250 FOR IP): Performed by: OPHTHALMOLOGY

## 2020-02-12 RX ORDER — PROPARACAINE HYDROCHLORIDE 5 MG/ML
1 SOLUTION/ DROPS OPHTHALMIC ONCE
Status: COMPLETED | OUTPATIENT
Start: 2020-02-12 | End: 2020-02-12

## 2020-02-12 RX ORDER — PHENYLEPHRINE HCL 2.5 %
1 DROPS OPHTHALMIC (EYE) EVERY 5 MIN PRN
Status: COMPLETED | OUTPATIENT
Start: 2020-02-12 | End: 2020-02-12

## 2020-02-12 RX ORDER — PREDNISOLONE ACETATE 10 MG/ML
1 SUSPENSION/ DROPS OPHTHALMIC ONCE
Status: COMPLETED | OUTPATIENT
Start: 2020-02-12 | End: 2020-02-12

## 2020-02-12 RX ORDER — TROPICAMIDE 10 MG/ML
1 SOLUTION/ DROPS OPHTHALMIC EVERY 5 MIN PRN
Status: COMPLETED | OUTPATIENT
Start: 2020-02-12 | End: 2020-02-12

## 2020-02-12 RX ADMIN — PROPARACAINE HYDROCHLORIDE 1 DROP: 5 SOLUTION/ DROPS OPHTHALMIC at 15:57

## 2020-02-12 RX ADMIN — PREDNISOLONE ACETATE 1 DROP: 10 SUSPENSION/ DROPS OPHTHALMIC at 15:59

## 2020-02-12 RX ADMIN — APRACLONIDINE HYDROCHLORIDE 1 DROP: 10 SOLUTION/ DROPS OPHTHALMIC at 14:50

## 2020-02-12 RX ADMIN — APRACLONIDINE HYDROCHLORIDE 1 DROP: 10 SOLUTION/ DROPS OPHTHALMIC at 15:58

## 2020-02-12 RX ADMIN — TROPICAMIDE 1 DROP: 10 SOLUTION/ DROPS OPHTHALMIC at 14:56

## 2020-02-12 RX ADMIN — PHENYLEPHRINE HYDROCHLORIDE 1 DROP: 25 SOLUTION/ DROPS OPHTHALMIC at 14:50

## 2020-02-12 RX ADMIN — PHENYLEPHRINE HYDROCHLORIDE 1 DROP: 25 SOLUTION/ DROPS OPHTHALMIC at 14:56

## 2020-02-12 RX ADMIN — TROPICAMIDE 1 DROP: 10 SOLUTION/ DROPS OPHTHALMIC at 14:50

## 2020-02-13 NOTE — OP NOTE
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 Chico Caballero M.D. 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 Chico Caballero M.D.

## 2020-02-28 ENCOUNTER — OFFICE VISIT (OUTPATIENT)
Dept: INTERNAL MEDICINE CLINIC | Age: 70
End: 2020-02-28
Payer: COMMERCIAL

## 2020-02-28 VITALS
HEIGHT: 60 IN | HEART RATE: 74 BPM | SYSTOLIC BLOOD PRESSURE: 162 MMHG | DIASTOLIC BLOOD PRESSURE: 88 MMHG | BODY MASS INDEX: 37.61 KG/M2 | OXYGEN SATURATION: 98 %

## 2020-02-28 PROCEDURE — 99213 OFFICE O/P EST LOW 20 MIN: CPT | Performed by: INTERNAL MEDICINE

## 2020-02-28 PROCEDURE — G0438 PPPS, INITIAL VISIT: HCPCS | Performed by: INTERNAL MEDICINE

## 2020-02-28 RX ORDER — FLUOXETINE 10 MG/1
10 CAPSULE ORAL DAILY
Qty: 30 CAPSULE | Refills: 1 | Status: SHIPPED | OUTPATIENT
Start: 2020-02-28 | End: 2020-04-24

## 2020-02-28 ASSESSMENT — PATIENT HEALTH QUESTIONNAIRE - PHQ9
SUM OF ALL RESPONSES TO PHQ QUESTIONS 1-9: 0
SUM OF ALL RESPONSES TO PHQ QUESTIONS 1-9: 0

## 2020-02-28 ASSESSMENT — LIFESTYLE VARIABLES: HOW OFTEN DO YOU HAVE A DRINK CONTAINING ALCOHOL: 0

## 2020-02-28 NOTE — PROGRESS NOTES
[facility]  Internal Medicine Progress Note  Hayden Uribe MD  2020    Leandro Mazariegos  :1950      HPI:   77 y.o. female here today for Medicare AWV. Patient also has the following new concern to discuss. Left thumb bothering for quite a while. 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 Has stopped practicing/playing and it has helped. Urinary incontinence, that she thinks might be related to weight. Has both stress incontinence and urge incontinence, especially at night. Does Kegels. Mood not good. More irritable and grumpy. Doesn't even like to drive any more because sun in eyes, traffic lights. Still taking wellbutrin  mg. Doesn't like change- both work and [employer] have changed. Review of Systems As per HPI. Prior to Visit Medications    Medication Sig Taking? Authorizing Provider   Loratadine (CLARITIN PO) Take by mouth Yes Historical Provider, MD   FLUoxetine (PROZAC) 10 MG capsule Take 1 capsule by mouth daily Yes Hayden Uribe MD   valACYclovir (VALTREX) 1 g tablet TAKE 1 TABLET DAILY Yes Hayden Uribe MD   simvastatin (ZOCOR) 20 MG tablet TAKE 1 TABLET NIGHTLY Yes Hayden Uribe MD   levothyroxine (SYNTHROID) 50 MCG tablet TAKE 1 TABLET DAILY Yes Hayden Uribe MD   buPROPion (WELLBUTRIN XL) 300 MG extended release tablet TAKE 1 TABLET EVERY MORNING Yes Hayden Uribe MD   omeprazole (PRILOSEC) 40 MG delayed release capsule Take 1 capsule by mouth daily Yes Hayden Uribe MD   traZODone (DESYREL) 50 MG tablet Take 1 tablet by mouth nightly Yes Hayden Uribe MD   Cholecalciferol (VITAMIN D3) 1000 units TABS Take 1 tablet by mouth daily Yes Hayden Uribe MD   PREMARIN 0.625 MG/GM vaginal cream PLACE 0.5G VAGINALLY TWICE A WEEK .  Yes Hayden Uribe MD   triamcinolone (NASACORT ALLERGY 24HR) 55 MCG/ACT nasal inhaler 2 SPRAYS IN EACH NOSTRIL one time a day Yes Hayden Uribe MD   bisacodyl (DULCOLAX) 5 MG EC tablet Take 10 mg by mouth daily as needed for Constipation. Yes Historical Provider, MD   Omega-3 Fatty Acids (FISH OIL BURP-LESS) 1200 MG CAPS Take 1 tablet by mouth daily  Yes Historical Provider, MD   ibuprofen (ADVIL;MOTRIN) 600 MG tablet Take 600 mg by mouth every 6 hours as needed for Pain. Yes Historical Provider, MD   mometasone (NASONEX) 50 MCG/ACT nasal spray USE 2 SPRAYS BY NASAL ROUTE DAILY  Chela Avila MD     Social History     Tobacco Use   Smoking Status Never Smoker   Smokeless Tobacco Never Used   Tobacco Comment    Never smoked, never will! OBJECTIVE:  Wt Readings from Last 3 Encounters:   08/27/19 191 lb (86.6 kg)   06/26/19 190 lb (86.2 kg)   06/12/19 190 lb (86.2 kg)     BP Readings from Last 3 Encounters:   02/28/20 (!) 162/88   02/12/20 (!) 165/88   02/05/20 (!) 178/93     Vitals:    02/28/20 0812   BP: (!) 162/88   Pulse: 74   SpO2: 98%   Weight: Comment: pt refused   Height: 4' 11.75\" (1.518 m)     Body mass index is 37.61 kg/m². Physical Exam  Constitutional:       Appearance: Normal appearance. Neck:      Vascular: No carotid bruit. Cardiovascular:      Rate and Rhythm: Normal rate and regular rhythm. Pulmonary:      Effort: Pulmonary effort is normal.      Breath sounds: Normal breath sounds. Musculoskeletal:      Right lower leg: No edema. Left lower leg: No edema. Comments: Tender in thenar eminence and at 1st carpal-metacarpal joint   Psychiatric:      Comments: irritable         ASSESSMENT/PLAN:   See separate note from today for Medicare AWV. Recurrent major depressive disorder, in remission [facility])  More symptomatic recently. Continue Wellbutrin  mg. Add fluoxetine 10 mg daily. Discussed risks and benefits of the medication, including most common side effects. Severe obesity (BMI 35.0-35.9 with comorbidity) (HCC)  Weight stable. Discussed lifestyle measures to help with weight loss    Urinary incontinence, mixed  Symptoms mild at this time. Discussed weight loss.   Also provided her information so she can consider pelvic floor rehab. She is uncertain if she will pursue this. -     Amb External Referral To Physical Therapy    Elevated blood pressure reading in office without diagnosis of hypertension  DASH diet. Work on activity. Will recheck in approximately a month. 2 other recently elevated readings were done at the time she was having procedure. Other orders  -     FLUoxetine (PROZAC) 10 MG capsule; Take 1 capsule by mouth daily    Discussed medications with patient who voiced understanding of their use and indications. All questions answered. Return in about 1 month (around 3/28/2020) for 15 min, depression, HTN. This note was generated completely or in part utilizing Dragon dictation speech recognition software. Occasionally, words are mistranscribed and despite editing, the text may contain inaccuracies due to incorrect word recognition.   If further clarification is needed please contact the office at [phone]

## 2020-02-28 NOTE — PATIENT INSTRUCTIONS
Patient Education        DASH Diet: Care Instructions  Your Care Instructions    The DASH diet is an eating plan that can help lower your blood pressure. DASH stands for Dietary Approaches to Stop Hypertension. Hypertension is high blood pressure. The DASH diet focuses on eating foods that are high in calcium, potassium, and magnesium. These nutrients can lower blood pressure. The foods that are highest in these nutrients are fruits, vegetables, low-fat dairy products, nuts, seeds, and legumes. But taking calcium, potassium, and magnesium supplements instead of eating foods that are high in those nutrients does not have the same effect. The DASH diet also includes whole grains, fish, and poultry. The DASH diet is one of several lifestyle changes your doctor may recommend to lower your high blood pressure. Your doctor may also want you to decrease the amount of sodium in your diet. Lowering sodium while following the DASH diet can lower blood pressure even further than just the DASH diet alone. Follow-up care is a key part of your treatment and safety. Be sure to make and go to all appointments, and call your doctor if you are having problems. It's also a good idea to know your test results and keep a list of the medicines you take. How can you care for yourself at home? Following the DASH diet  · Eat 4 to 5 servings of fruit each day. A serving is 1 medium-sized piece of fruit, ½ cup chopped or canned fruit, 1/4 cup dried fruit, or 4 ounces (½ cup) of fruit juice. Choose fruit more often than fruit juice. · Eat 4 to 5 servings of vegetables each day. A serving is 1 cup of lettuce or raw leafy vegetables, ½ cup of chopped or cooked vegetables, or 4 ounces (½ cup) of vegetable juice. Choose vegetables more often than vegetable juice. · Get 2 to 3 servings of low-fat and fat-free dairy each day. A serving is 8 ounces of milk, 1 cup of yogurt, or 1 ½ ounces of cheese. · Eat 6 to 8 servings of grains each day.  A serving is 1 slice of bread, 1 ounce of dry cereal, or ½ cup of cooked rice, pasta, or cooked cereal. Try to choose whole-grain products as much as possible. · Limit lean meat, poultry, and fish to 2 servings each day. A serving is 3 ounces, about the size of a deck of cards. · Eat 4 to 5 servings of nuts, seeds, and legumes (cooked dried beans, lentils, and split peas) each week. A serving is 1/3 cup of nuts, 2 tablespoons of seeds, or ½ cup of cooked beans or peas. · Limit fats and oils to 2 to 3 servings each day. A serving is 1 teaspoon of vegetable oil or 2 tablespoons of salad dressing. · Limit sweets and added sugars to 5 servings or less a week. A serving is 1 tablespoon jelly or jam, ½ cup sorbet, or 1 cup of lemonade. · Eat less than 2,300 milligrams (mg) of sodium a day. If you limit your sodium to 1,500 mg a day, you can lower your blood pressure even more. Tips for success  · Start small. Do not try to make dramatic changes to your diet all at once. You might feel that you are missing out on your favorite foods and then be more likely to not follow the plan. Make small changes, and stick with them. Once those changes become habit, add a few more changes. · Try some of the following:  ? Make it a goal to eat a fruit or vegetable at every meal and at snacks. This will make it easy to get the recommended amount of fruits and vegetables each day. ? Try yogurt topped with fruit and nuts for a snack or healthy dessert. ? Add lettuce, tomato, cucumber, and onion to sandwiches. ? Combine a ready-made pizza crust with low-fat mozzarella cheese and lots of vegetable toppings. Try using tomatoes, squash, spinach, broccoli, carrots, cauliflower, and onions. ? Have a variety of cut-up vegetables with a low-fat dip as an appetizer instead of chips and dip. ? Sprinkle sunflower seeds or chopped almonds over salads. Or try adding chopped walnuts or almonds to cooked vegetables.   ? Try some vegetarian meals using beans and peas. Add garbanzo or kidney beans to salads. Make burritos and tacos with mashed darling beans or black beans. Where can you learn more? Go to https://binta.[employer]VIS[employer]. org and sign in to your [employer] account. Enter R309 in the [place] box to learn more about \"DASH Diet: Care Instructions. \"     If you do not have an account, please click on the \"Sign Up Now\" link. Current as of: April 9, 2019  Content Version: 12.3  © 7589-6661 [email or web address]. Care instructions adapted under license by [facility] ([facility]). If you have questions about a medical condition or this instruction, always ask your healthcare professional. [address] any warranty or liability for your use of this information. Personalized Preventive Plan for Kathy Cardoza - 2/28/2020  Medicare offers a range of preventive health benefits. Some of the tests and screenings are paid in full while other may be subject to a deductible, co-insurance, and/or copay. Some of these benefits include a comprehensive review of your medical history including lifestyle, illnesses that may run in your family, and various assessments and screenings as appropriate. After reviewing your medical record and screening and assessments performed today your provider may have ordered immunizations, labs, imaging, and/or referrals for you. A list of these orders (if applicable) as well as your Preventive Care list are included within your After Visit Summary for your review. Other Preventive Recommendations:    · A preventive eye exam performed by an eye specialist is recommended every 1-2 years to screen for glaucoma; cataracts, macular degeneration, and other eye disorders. · A preventive dental visit is recommended every 6 months. · Try to get at least 150 minutes of exercise per week or 10,000 steps per day on a pedometer .   · Order or download the FREE \"Exercise & Physical

## 2020-03-24 RX ORDER — OMEPRAZOLE 40 MG/1
CAPSULE, DELAYED RELEASE ORAL
Qty: 90 CAPSULE | Refills: 0 | Status: SHIPPED | OUTPATIENT
Start: 2020-03-24 | End: 2020-06-24

## 2020-04-24 RX ORDER — FLUOXETINE 10 MG/1
10 CAPSULE ORAL DAILY
Qty: 30 CAPSULE | Refills: 0 | Status: SHIPPED | OUTPATIENT
Start: 2020-04-24 | End: 2020-05-08 | Stop reason: SDUPTHER

## 2020-04-24 NOTE — TELEPHONE ENCOUNTER
Last appointment: 2/28/2020  Next appointment: 5/8/2020  Last refill: 02/28/2020 # 30 with one refill

## 2020-05-07 SDOH — ECONOMIC STABILITY: INCOME INSECURITY: HOW HARD IS IT FOR YOU TO PAY FOR THE VERY BASICS LIKE FOOD, HOUSING, MEDICAL CARE, AND HEATING?: NOT HARD AT ALL

## 2020-05-07 SDOH — ECONOMIC STABILITY: FOOD INSECURITY: WITHIN THE PAST 12 MONTHS, YOU WORRIED THAT YOUR FOOD WOULD RUN OUT BEFORE YOU GOT MONEY TO BUY MORE.: NEVER TRUE

## 2020-05-07 SDOH — ECONOMIC STABILITY: FOOD INSECURITY: WITHIN THE PAST 12 MONTHS, THE FOOD YOU BOUGHT JUST DIDN'T LAST AND YOU DIDN'T HAVE MONEY TO GET MORE.: NEVER TRUE

## 2020-05-08 ENCOUNTER — VIRTUAL VISIT (OUTPATIENT)
Dept: INTERNAL MEDICINE CLINIC | Age: 70
End: 2020-05-08
Payer: COMMERCIAL

## 2020-05-08 PROCEDURE — 99213 OFFICE O/P EST LOW 20 MIN: CPT | Performed by: INTERNAL MEDICINE

## 2020-05-08 RX ORDER — FLUOXETINE 10 MG/1
10 CAPSULE ORAL DAILY
Qty: 90 CAPSULE | Refills: 1 | Status: SHIPPED | OUTPATIENT
Start: 2020-05-08 | End: 2020-11-22

## 2020-05-08 NOTE — PROGRESS NOTES
2020    TELEHEALTH EVALUATION -- Audio/Visual (During COZXV-07 public health emergency)    HPI:    Bryanna Frazier (:  1950) has requested an audio/video evaluation for the following concern(s):   Follow-up (depression)     Depression: added fluoxetine to WB XL end of February. Since then, Shelter-in place due to COVID 19 pandemic. Doing really well. Feels more upbeat. Think might be partly related to being outside. She acknowledges she is in bbetter spirits but having hard time attributing to starting the fluoxetine. Blood pressure was a little high at last appointment. Discussed DASH/lifestyle modifications. Does not have a cuff at home. Not feeling anxious and that tends to relate. Pain in left thumb is improving. 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 No pain shooting up the arm at all now. Also hurts to [employer]. Wears brace at night. Review of Systems    Prior to Visit Medications    Medication Sig Taking? Authorizing Provider   FLUoxetine (PROZAC) 10 MG capsule Take 1 capsule by mouth daily Yes Yen Kam MD   omeprazole (PRILOSEC) 40 MG delayed release capsule TAKE 1 CAPSULE DAILY  Yen Kam MD   Loratadine (CLARITIN PO) Take by mouth  Historical Provider, MD   valACYclovir (VALTREX) 1 g tablet TAKE 1 TABLET DAILY  Yen Kam MD   simvastatin (ZOCOR) 20 MG tablet TAKE 1 TABLET NIGHTLY  Yen Kam MD   levothyroxine (SYNTHROID) 50 MCG tablet TAKE 1 TABLET DAILY  Yen Kam MD   buPROPion (WELLBUTRIN XL) 300 MG extended release tablet TAKE 1 TABLET EVERY MORNING  Yen Kam MD   traZODone (DESYREL) 50 MG tablet Take 1 tablet by mouth nightly  Yen Kam MD   Cholecalciferol (VITAMIN D3) 1000 units TABS Take 1 tablet by mouth daily  Yen Kam MD   PREMARIN 0.625 MG/GM vaginal cream PLACE 0.5G VAGINALLY TWICE A WEEK .   Yen Kam MD   triamcinolone (NASACORT ALLERGY 24HR) 55 MCG/ACT nasal inhaler 2 SPRAYS IN EACH NOSTRIL one time a day  Yen Kam MD

## 2020-05-13 RX ORDER — TRAZODONE HYDROCHLORIDE 50 MG/1
TABLET ORAL
Qty: 60 TABLET | Refills: 5 | Status: SHIPPED | OUTPATIENT
Start: 2020-05-13 | End: 2021-03-14

## 2020-06-22 RX ORDER — BUPROPION HYDROCHLORIDE 300 MG/1
TABLET ORAL
Qty: 90 TABLET | Refills: 1 | Status: SHIPPED | OUTPATIENT
Start: 2020-06-22 | End: 2020-12-17

## 2020-06-22 NOTE — TELEPHONE ENCOUNTER
Last appointment: 2/28/2020  Next appointment: 11/11/2020  Last refill: 12/31/2019 # 90 with one refill

## 2020-06-24 RX ORDER — VALACYCLOVIR HYDROCHLORIDE 1 G/1
TABLET, FILM COATED ORAL
Qty: 90 TABLET | Refills: 1 | Status: SHIPPED | OUTPATIENT
Start: 2020-06-24 | End: 2020-11-22

## 2020-06-24 RX ORDER — OMEPRAZOLE 40 MG/1
CAPSULE, DELAYED RELEASE ORAL
Qty: 90 CAPSULE | Refills: 0 | Status: SHIPPED | OUTPATIENT
Start: 2020-06-24 | End: 2020-08-16

## 2020-06-24 RX ORDER — SIMVASTATIN 20 MG
TABLET ORAL
Qty: 90 TABLET | Refills: 1 | Status: SHIPPED | OUTPATIENT
Start: 2020-06-24 | End: 2020-11-22

## 2020-06-24 RX ORDER — LEVOTHYROXINE SODIUM 0.05 MG/1
TABLET ORAL
Qty: 90 TABLET | Refills: 1 | Status: SHIPPED | OUTPATIENT
Start: 2020-06-24 | End: 2020-11-22

## 2020-08-16 RX ORDER — OMEPRAZOLE 40 MG/1
CAPSULE, DELAYED RELEASE ORAL
Qty: 90 CAPSULE | Refills: 0 | Status: SHIPPED | OUTPATIENT
Start: 2020-08-16 | End: 2020-11-23

## 2020-11-09 ENCOUNTER — TELEPHONE (OUTPATIENT)
Dept: INTERNAL MEDICINE CLINIC | Age: 70
End: 2020-11-09

## 2020-11-09 DIAGNOSIS — R73.03 PREDIABETES: ICD-10-CM

## 2020-11-09 DIAGNOSIS — E78.2 MIXED HYPERLIPIDEMIA: ICD-10-CM

## 2020-11-09 DIAGNOSIS — E03.9 ACQUIRED HYPOTHYROIDISM: ICD-10-CM

## 2020-11-09 LAB
ANION GAP SERPL CALCULATED.3IONS-SCNC: 12 MMOL/L (ref 3–16)
BUN BLDV-MCNC: 11 MG/DL (ref 7–20)
CALCIUM SERPL-MCNC: 9.1 MG/DL (ref 8.3–10.6)
CHLORIDE BLD-SCNC: 100 MMOL/L (ref 99–110)
CHOLESTEROL, TOTAL: 185 MG/DL (ref 0–199)
CO2: 27 MMOL/L (ref 21–32)
CREAT SERPL-MCNC: 0.7 MG/DL (ref 0.6–1.2)
GFR AFRICAN AMERICAN: >60
GFR NON-AFRICAN AMERICAN: >60
GLUCOSE BLD-MCNC: 109 MG/DL (ref 70–99)
HDLC SERPL-MCNC: 39 MG/DL (ref 40–60)
LDL CHOLESTEROL CALCULATED: 109 MG/DL
POTASSIUM SERPL-SCNC: 4.6 MMOL/L (ref 3.5–5.1)
SODIUM BLD-SCNC: 139 MMOL/L (ref 136–145)
TRIGL SERPL-MCNC: 187 MG/DL (ref 0–150)
TSH REFLEX: 2.12 UIU/ML (ref 0.27–4.2)
VLDLC SERPL CALC-MCNC: 37 MG/DL

## 2020-11-09 NOTE — TELEPHONE ENCOUNTER
Spoke with [facility] lab and they daniel 2 SST and a Lavender on patient,  They are requesting blood work orders.   Thank you

## 2020-11-09 NOTE — TELEPHONE ENCOUNTER
Left pt message to call back. Im not sure I understand original message. Is she needing labs ordered for upcoming appointment? Please clarify when she returns call.

## 2020-11-09 NOTE — TELEPHONE ENCOUNTER
----- Message from [facility] sent at 11/9/2020 10:11 AM EST -----  Subject: Message to Provider    QUESTIONS  Information for Provider? Pt had lab work done at [facility] lab on   [address] with no orders but wanted to let you know for upcoming appt.   ---------------------------------------------------------------------------  --------------  [address]  What is the best way for the office to contact you? OK to leave message on   voicemail  Preferred Call Back Phone Number? [phone]  ---------------------------------------------------------------------------  --------------  SCRIPT ANSWERS  Relationship to Patient?  Self

## 2020-11-09 NOTE — TELEPHONE ENCOUNTER
Yes, she is needing lab orders for the upcoming appt. She has had the Lab Dra[facility] at the [facility] office. They are waiting on the orders.

## 2020-11-10 LAB
ESTIMATED AVERAGE GLUCOSE: 122.6 MG/DL
HBA1C MFR BLD: 5.9 %

## 2020-11-11 ENCOUNTER — OFFICE VISIT (OUTPATIENT)
Dept: INTERNAL MEDICINE CLINIC | Age: 70
End: 2020-11-11
Payer: COMMERCIAL

## 2020-11-11 VITALS
SYSTOLIC BLOOD PRESSURE: 130 MMHG | TEMPERATURE: 97.6 F | WEIGHT: 198.4 LBS | DIASTOLIC BLOOD PRESSURE: 72 MMHG | RESPIRATION RATE: 14 BRPM | OXYGEN SATURATION: 97 % | HEART RATE: 72 BPM | BODY MASS INDEX: 38.95 KG/M2 | HEIGHT: 60 IN

## 2020-11-11 PROCEDURE — G0008 ADMIN INFLUENZA VIRUS VAC: HCPCS | Performed by: INTERNAL MEDICINE

## 2020-11-11 PROCEDURE — 90694 VACC AIIV4 NO PRSRV 0.5ML IM: CPT | Performed by: INTERNAL MEDICINE

## 2020-11-11 PROCEDURE — 99214 OFFICE O/P EST MOD 30 MIN: CPT | Performed by: INTERNAL MEDICINE

## 2020-11-11 RX ORDER — ATORVASTATIN CALCIUM 20 MG/1
20 TABLET, FILM COATED ORAL DAILY
Qty: 90 TABLET | Refills: 1 | Status: SHIPPED | OUTPATIENT
Start: 2020-11-11 | End: 2021-04-13

## 2020-11-11 NOTE — PROGRESS NOTES
[facility]  Internal Medicine Progress Note  Yanni Weiss MD  2020    Jenna Mazariegos  :1950         ASSESSMENT/PLAN:   Mixed hyperlipidemia  Would like to get LDL lower. Double up on simvastatin 20 mg (total 40) until gone. Change to atorvastatin 20 mg for next prescription. ALBER (generalized anxiety disorder)  Recurrent major depressive disorder, in remission (HCC)  Stable, no change to medication. Acquired hypothyroidism  Stable, continue same medication    Prediabetes  Asymptomatic. Continue lifestyle modification. Work on activity    [address] [place] Dillon MD, Sleep Medicine, [facility]    Ptosis of right eyelid  Subtle, without change in pupillary reflex. Also complains of general sluggishness although this is not a new complaint. Concerned about hospital myasthenia gravis. We will see if lab can add on the acetylcholine receptor antibody  -     ACETYLCHOLINE RECEPTOR    Hair thinning  Mild, crown area. Thyroid level normal.  Advised patient try Rogaine  Other orders  -     INFLUENZA, QUADV, ADJUVANTED, 65 YRS =, IM, PF, PREFILL SYR, 0.5ML (FLUAD)      Discussed medications with patient who voiced understanding of their use and indications. All questions answered. Return in about 6 months (around 2021). This note was generated completely or in part utilizing Dragon dictation speech recognition software. Occasionally, words are mistranscribed and despite editing, the text may contain inaccuracies due to incorrect word recognition. If further clarification is needed please contact the office at [phone] assistant triage:   Chief Complaint   Patient presents with    Follow-up       HPI:   72 y.o. female here today for evaluation of the following medical conditions/concerns:    Can't keep eyes open. Sometimes feels eye closing while working . Sometime awake but other times feels like going to sleep.  Falling asleep at stoplights. Sleeps 5-6 hours nightly. Just doesn't go to early enough. Also has trouble falling asleep some nights. Trazodone helps, with no am grogginess. Doesn't think snore. Thyroid-no problems with medication. Had labs done on Monday. Hyperlipidemia-no problems with medicine. Taking vitamin D thousand daily. No myalgias. Prediabetes-tries to stay active. Doing a lot of yard work. Other days just active in the home. Does not track steps  Other concern is hair thinning in back, on crown. Hard to cover area now. Review of Systems As per HPI. Prior to Visit Medications    Medication Sig Taking? Authorizing Provider   omeprazole (PRILOSEC) 40 MG delayed release capsule TAKE 1 CAPSULE DAILY Yes Emerita Jerry MD   levothyroxine (SYNTHROID) 50 MCG tablet TAKE 1 TABLET DAILY Yes Emerita Jerry MD   simvastatin (ZOCOR) 20 MG tablet TAKE 1 TABLET NIGHTLY Yes Emerita Jerry MD   valACYclovir (VALTREX) 1 g tablet TAKE 1 TABLET DAILY Yes Emerita Jerry MD   buPROPion (WELLBUTRIN XL) 300 MG extended release tablet TAKE 1 Ratna Heckle Yes Emerita Jerry MD   traZODone (DESYREL) 50 MG tablet TAKE 1 TO 2 TABLETS NIGHTLYAS NEEDED FOR SLEEP Yes Emerita Jerry MD   FLUoxetine (PROZAC) 10 MG capsule Take 1 capsule by mouth daily Yes Emerita Jerry MD   Loratadine (CLARITIN PO) Take by mouth Yes Historical Provider, MD   Cholecalciferol (VITAMIN D3) 1000 units TABS Take 1 tablet by mouth daily Yes Emerita Jerry MD   PREMARIN 0.625 MG/GM vaginal cream PLACE 0.5G VAGINALLY TWICE A WEEK . Yes Emerita Jerry MD   triamcinolone (NASACORT ALLERGY 24HR) 55 MCG/ACT nasal inhaler 2 SPRAYS IN EACH NOSTRIL one time a day Yes Emerita Jerry MD   bisacodyl (DULCOLAX) 5 MG EC tablet Take 10 mg by mouth daily as needed for Constipation.  Yes Historical Provider, MD   Omega-3 Fatty Acids (FISH OIL BURP-LESS) 1200 MG CAPS Take 1 tablet by mouth daily  Yes Historical Provider, MD   ibuprofen (ADVIL;MOTRIN) 600 MG tablet Take 600 mg by mouth every 6 hours as needed for Pain. Yes Historical Provider, MD   mometasone (NASONEX) 50 MCG/ACT nasal spray USE 2 SPRAYS BY NASAL ROUTE DAILY  Rodrigo He MD     Social History     Tobacco Use   Smoking Status Never Smoker   Smokeless Tobacco Never Used   Tobacco Comment    Never smoked, never will! OBJECTIVE:  Wt Readings from Last 3 Encounters:   11/11/20 198 lb 6.4 oz (90 kg)   08/27/19 191 lb (86.6 kg)   06/26/19 190 lb (86.2 kg)     BP Readings from Last 3 Encounters:   11/11/20 130/72   02/28/20 (!) 162/88   02/12/20 (!) 165/88     Vitals:    11/11/20 0757   BP: 130/72   Pulse: 72   Resp: 14   Temp: 97.6 °F (36.4 °C)   TempSrc: Temporal   SpO2: 97%   Weight: 198 lb 6.4 oz (90 kg)   Height: 4' 11.75\" (1.518 m)     Body mass index is 39.07 kg/m². Physical Exam  Constitutional:       General: She is not in acute distress. Eyes:      Pupils: Pupils are equal, round, and reactive to light. Comments: Subtle ptosis of right eyelid   Cardiovascular:      Rate and Rhythm: Normal rate and regular rhythm. Heart sounds: Normal heart sounds. Pulmonary:      Effort: Pulmonary effort is normal.      Breath sounds: Normal breath sounds. Musculoskeletal:      Right lower leg: No edema. Left lower leg: No edema. Lymphadenopathy:      Cervical: No cervical adenopathy. Skin:     Comments: Mild generalized thinning of hair.   No bald patches   Psychiatric:         Behavior: Behavior normal.           The 10-year ASCVD risk score (Jasmeet Tafoya, et al., 2013) is: 9.3%    Values used to calculate the score:      Age: 72 years      Sex: Female      Is Non- : No      Diabetic: No      Tobacco smoker: No      Systolic Blood Pressure: 883 mmHg      Is BP treated: No      HDL Cholesterol: 39 mg/dL      Total Cholesterol: 185 mg/dL

## 2020-11-11 NOTE — PROGRESS NOTES
Vaccine Information Sheet, \"Influenza - Inactivated\"  given to [employer], or parent/legal guardian of  [employer] and verbalized understanding. Patient responses:    Have you ever had a reaction to a flu vaccine? No  Do you have any current illness? No  Have you ever had Guillian Froid Syndrome? No  Do you have a serious allergy to any of the follow: Neomycin, Polymyxin, Thimerosal, eggs or egg products? No    Flu vaccine given per order. Please see immunization tab. Risks and benefits explained. Current VIS given.       Immunizations Administered     Name Date Dose Route    Influenza, Quadv, adjuvanted, 65 yrs +, IM, PF (Fluad) 11/11/2020 0.5 mL Intramuscular    Site: Deltoid- Left    Lot: [id number]    NDC: 95092-719-46

## 2020-11-12 LAB
ACETYLCHOLINE BINDING ANTIBODY: 0.1 NMOL/L (ref 0–0.4)
ACETYLCHOLINE BLOCKING AB: 0 % (ref 0–26)

## 2020-11-22 RX ORDER — LEVOTHYROXINE SODIUM 0.05 MG/1
TABLET ORAL
Qty: 90 TABLET | Refills: 1 | Status: SHIPPED | OUTPATIENT
Start: 2020-11-22 | End: 2021-04-13

## 2020-11-22 RX ORDER — SIMVASTATIN 20 MG
TABLET ORAL
Qty: 90 TABLET | Refills: 1 | Status: SHIPPED | OUTPATIENT
Start: 2020-11-22 | End: 2022-07-28 | Stop reason: CLARIF

## 2020-11-22 RX ORDER — VALACYCLOVIR HYDROCHLORIDE 1 G/1
TABLET, FILM COATED ORAL
Qty: 90 TABLET | Refills: 1 | Status: SHIPPED | OUTPATIENT
Start: 2020-11-22 | End: 2021-05-07 | Stop reason: SDUPTHER

## 2020-11-22 RX ORDER — FLUOXETINE 10 MG/1
CAPSULE ORAL
Qty: 90 CAPSULE | Refills: 1 | Status: SHIPPED | OUTPATIENT
Start: 2020-11-22 | End: 2021-04-13

## 2020-11-23 RX ORDER — OMEPRAZOLE 40 MG/1
CAPSULE, DELAYED RELEASE ORAL
Qty: 90 CAPSULE | Refills: 1 | Status: SHIPPED | OUTPATIENT
Start: 2020-11-23 | End: 2021-04-13

## 2020-12-05 ENCOUNTER — HOSPITAL ENCOUNTER (OUTPATIENT)
Dept: WOMENS IMAGING | Age: 70
Discharge: HOME | End: 2020-12-05
Payer: COMMERCIAL

## 2020-12-05 PROCEDURE — 77063 BREAST TOMOSYNTHESIS BI: CPT

## 2020-12-09 ENCOUNTER — OFFICE VISIT (OUTPATIENT)
Dept: PRIMARY CARE CLINIC | Age: 70
End: 2020-12-09
Payer: COMMERCIAL

## 2020-12-09 PROCEDURE — 99211 OFF/OP EST MAY X REQ PHY/QHP: CPT | Performed by: NURSE PRACTITIONER

## 2020-12-09 NOTE — PROGRESS NOTES
Candi Roman received a viral test for COVID-19. They were educated on isolation and quarantine as appropriate. For any symptoms, they were directed to seek care from their PCP, given contact information to establish with a doctor, directed to an urgent care or the emergency room.

## 2020-12-10 LAB — SARS-COV-2, NAA: NOT DETECTED

## 2020-12-17 RX ORDER — BUPROPION HYDROCHLORIDE 300 MG/1
TABLET ORAL
Qty: 90 TABLET | Refills: 1 | Status: SHIPPED | OUTPATIENT
Start: 2020-12-17 | End: 2021-06-04

## 2021-03-14 RX ORDER — TRAZODONE HYDROCHLORIDE 50 MG/1
TABLET ORAL
Qty: 60 TABLET | Refills: 5 | Status: SHIPPED | OUTPATIENT
Start: 2021-03-14 | End: 2021-09-24

## 2021-04-13 ENCOUNTER — PATIENT MESSAGE (OUTPATIENT)
Dept: INTERNAL MEDICINE CLINIC | Age: 71
End: 2021-04-13

## 2021-04-13 RX ORDER — CYCLOSPORINE 0.5 MG/ML
1 EMULSION OPHTHALMIC 2 TIMES DAILY
COMMUNITY
End: 2021-04-15

## 2021-04-13 RX ORDER — FLUOXETINE 10 MG/1
CAPSULE ORAL
Qty: 90 CAPSULE | Refills: 1 | Status: SHIPPED | OUTPATIENT
Start: 2021-04-13 | End: 2021-10-03

## 2021-04-13 RX ORDER — LEVOTHYROXINE SODIUM 0.05 MG/1
TABLET ORAL
Qty: 90 TABLET | Refills: 1 | Status: SHIPPED | OUTPATIENT
Start: 2021-04-13 | End: 2021-10-03

## 2021-04-13 RX ORDER — ATORVASTATIN CALCIUM 20 MG/1
TABLET, FILM COATED ORAL
Qty: 90 TABLET | Refills: 1 | Status: SHIPPED | OUTPATIENT
Start: 2021-04-13 | End: 2021-10-03

## 2021-04-13 RX ORDER — OMEPRAZOLE 40 MG/1
CAPSULE, DELAYED RELEASE ORAL
Qty: 90 CAPSULE | Refills: 1 | Status: SHIPPED | OUTPATIENT
Start: 2021-04-13 | End: 2021-10-03

## 2021-04-13 NOTE — TELEPHONE ENCOUNTER
From: Latonia Mazariegos  To: Lizbet Lucas MD  Sent: 4/13/2021 1:12 PM EDT  Subject: Prescription Question    Hi Dr. Alfonzo Boucher,  I have an appointment with you on May 7th. Before then, most of my routine Rxs will need renewals. I have one Rx (Restasis) prescribed by Dr. Caroline Walker, my long-time ophthalmologist, who recently retired. I am in process of scheduling an appointment /[facility]. My supply of Restasis will run out in about a week. To tide me over, would you be willing to prescribe Restasis or another Rx to treat my Dry Eye issues? There is usually cost-savings, if a 90-day supply is prescribed. If doing this would put you outside your comfort zone, I fully understand if you would rather not do it. I hope you & your staff & family are doing and staying well. I am happy to report, I am fully vaccinated! See you in a few weeks.

## 2021-04-15 RX ORDER — CYCLOSPORINE 0.5 MG/ML
1 EMULSION OPHTHALMIC 2 TIMES DAILY
Qty: 90 VIAL | Refills: 0 | Status: SHIPPED | OUTPATIENT
Start: 2021-04-15 | End: 2021-09-08

## 2021-04-30 ENCOUNTER — PATIENT MESSAGE (OUTPATIENT)
Dept: INTERNAL MEDICINE CLINIC | Age: 71
End: 2021-04-30

## 2021-04-30 DIAGNOSIS — E03.9 ACQUIRED HYPOTHYROIDISM: ICD-10-CM

## 2021-04-30 DIAGNOSIS — E67.3 HYPERVITAMINOSIS D: ICD-10-CM

## 2021-04-30 DIAGNOSIS — R73.03 PREDIABETES: Primary | ICD-10-CM

## 2021-04-30 DIAGNOSIS — K21.00 GASTROESOPHAGEAL REFLUX DISEASE WITH ESOPHAGITIS, UNSPECIFIED WHETHER HEMORRHAGE: ICD-10-CM

## 2021-05-03 NOTE — TELEPHONE ENCOUNTER
From: Jessika Mazariegos  To: Chelsey Smallwood MD  Sent: 4/30/2021 11:36 PM EDT  Subject: Non-Urgent Medical Question    Hello . .. do I need to have labs before my appointment on May 7th? Thank you!

## 2021-05-07 ENCOUNTER — OFFICE VISIT (OUTPATIENT)
Dept: INTERNAL MEDICINE CLINIC | Age: 71
End: 2021-05-07
Payer: COMMERCIAL

## 2021-05-07 VITALS
HEART RATE: 68 BPM | BODY MASS INDEX: 40.07 KG/M2 | DIASTOLIC BLOOD PRESSURE: 70 MMHG | OXYGEN SATURATION: 98 % | SYSTOLIC BLOOD PRESSURE: 138 MMHG | HEIGHT: 59 IN

## 2021-05-07 DIAGNOSIS — G47.00 INSOMNIA, UNSPECIFIED TYPE: Primary | ICD-10-CM

## 2021-05-07 DIAGNOSIS — E78.2 MIXED HYPERLIPIDEMIA: ICD-10-CM

## 2021-05-07 DIAGNOSIS — R20.0 NUMBNESS AND TINGLING IN BOTH HANDS: ICD-10-CM

## 2021-05-07 DIAGNOSIS — B00.9 HERPES SIMPLEX TYPE 2 INFECTION: ICD-10-CM

## 2021-05-07 DIAGNOSIS — E66.01 CLASS 2 SEVERE OBESITY WITH SERIOUS COMORBIDITY AND BODY MASS INDEX (BMI) OF 39.0 TO 39.9 IN ADULT, UNSPECIFIED OBESITY TYPE (HCC): ICD-10-CM

## 2021-05-07 DIAGNOSIS — F33.40 RECURRENT MAJOR DEPRESSIVE DISORDER, IN REMISSION (HCC): ICD-10-CM

## 2021-05-07 DIAGNOSIS — R20.2 NUMBNESS AND TINGLING IN BOTH HANDS: ICD-10-CM

## 2021-05-07 PROBLEM — G56.03 BILATERAL CARPAL TUNNEL SYNDROME: Status: ACTIVE | Noted: 2021-05-07

## 2021-05-07 PROCEDURE — 99214 OFFICE O/P EST MOD 30 MIN: CPT | Performed by: INTERNAL MEDICINE

## 2021-05-07 RX ORDER — VALACYCLOVIR HYDROCHLORIDE 1 G/1
TABLET, FILM COATED ORAL
Qty: 90 TABLET | Refills: 1 | Status: SHIPPED | OUTPATIENT
Start: 2021-05-07 | End: 2022-01-23

## 2021-05-07 RX ORDER — AZELASTINE 1 MG/ML
2 SPRAY, METERED NASAL 2 TIMES DAILY
Qty: 2 BOTTLE | Refills: 5 | Status: SHIPPED | OUTPATIENT
Start: 2021-05-07 | End: 2022-05-27

## 2021-05-07 RX ORDER — LANOLIN ALCOHOL/MO/W.PET/CERES
1000 CREAM (GRAM) TOPICAL DAILY
Qty: 30 TABLET | Refills: 3 | COMMUNITY
Start: 2021-05-07

## 2021-05-07 NOTE — ASSESSMENT & PLAN NOTE
Still problematic. A lot of daytime sleepiness but feels confirdent no sleep apnea and not snoring. Review sleep hygiene. Encourage increase trazodone from 1/12 to 2 tabs. Also will address hand tingling with EMG, as I am highly suspicious for carpal tunnel. Alleviating this pain should help with sleep as well.

## 2021-05-07 NOTE — ASSESSMENT & PLAN NOTE
Suspect carpal tunnel. Highly symptomatic, and disrupting sleep.   EMG and then hand referral if positive

## 2021-05-07 NOTE — PROGRESS NOTES
2021  Petra Mazariegos (:  1950)       ASSESSMENT/PLAN:   Insomnia, unspecified type  Assessment & Plan:  Still problematic. A lot of daytime sleepiness but feels confirdent no sleep apnea and not snoring. Review sleep hygiene. Encourage increase trazodone from 1/12 to 2 tabs. Also will address hand tingling with EMG, as I am highly suspicious for carpal tunnel. Alleviating this pain should help with sleep as well. Numbness and tingling in both hands  Assessment & Plan:  Suspect carpal tunnel. Highly symptomatic, and disrupting sleep. EMG and then hand referral if positive   Class 2 severe obesity with serious comorbidity and body mass index (BMI) of 39.0 to 39.9 in adult, unspecified obesity type (HCC)  Recurrent major depressive disorder, in remission [facility])  Assessment & Plan:  Currently in remission. Continue Wellbutrin xl and fluoxetine. Mixed hyperlipidemia  Assessment & Plan:  Good control with simvastatin. Continue at same dose. Herpes simplex type 2 infection  Assessment & Plan: On chronic suppressive therapy, with 1 mild breakthrough episode in 6 months. Continue same. Return in about 3 months (around 2021) for AWV/chronic. SUBJECTIVE/OBJECTIVE:  72 y.o. female established patient here for:   Chief Complaint   Patient presents with    Follow-up     Same 2 concerns hands numb and tingly every night. Happens when driving. bladder leakage. Thinks that being hard to keep eyes open is only happening on the days that she doesn't take the Claritin D and instead uses plain claritin. Having a lot of nasal congestion that keeps awake at night. Using the nasacort daily. Year round symptoms. Allergic to cat.not wiling to keep out of bedroom. \"saved my life\". Sleeps only about 4 1/2 hours. On side. Feels certain she is not snoring.      Review of Systems  Outpatient Medications Marked as Taking for the 21 encounter (Office Visit) with Irwin Faust MD Medication Sig Dispense Refill    azelastine (ASTELIN) 0.1 % nasal spray 2 sprays by Nasal route 2 times daily Use in each nostril as directed 2 Bottle 5    vitamin B-12 (CYANOCOBALAMIN) 1000 MCG tablet Take 1 tablet by mouth daily 30 tablet 3    valACYclovir (VALTREX) 1 g tablet TAKE 1 TABLET DAILY 90 tablet 1    cycloSPORINE (RESTASIS) 0.05 % ophthalmic emulsion Place 1 drop into both eyes 2 times daily 90 vial 0    omeprazole (PRILOSEC) 40 MG delayed release capsule TAKE 1 CAPSULE DAILY 90 capsule 1    atorvastatin (LIPITOR) 20 MG tablet TAKE 1 TABLET DAILY 90 tablet 1    levothyroxine (SYNTHROID) 50 MCG tablet TAKE 1 TABLET DAILY 90 tablet 1    FLUoxetine (PROZAC) 10 MG capsule TAKE 1 CAPSULE DAILY 90 capsule 1    traZODone (DESYREL) 50 MG tablet TAKE 1 TO 2 TABLETS NIGHTLYAS NEEDED FOR SLEEP 60 tablet 5    buPROPion (WELLBUTRIN XL) 300 MG extended release tablet TAKE 1 TABLET EVERY MORNING 90 tablet 1    simvastatin (ZOCOR) 20 MG tablet TAKE 1 TABLET NIGHTLY 90 tablet 1    Loratadine (CLARITIN PO) Take by mouth      Cholecalciferol (VITAMIN D3) 1000 units TABS Take 1 tablet by mouth daily 30 tablet 11    PREMARIN 0.625 MG/GM vaginal cream PLACE 0.5G VAGINALLY TWICE A WEEK . 30 g 5    triamcinolone (NASACORT ALLERGY 24HR) 55 MCG/ACT nasal inhaler 2 SPRAYS IN EACH NOSTRIL one time a day 1 Inhaler 2    bisacodyl (DULCOLAX) 5 MG EC tablet Take 10 mg by mouth daily as needed for Constipation.  Omega-3 Fatty Acids (FISH OIL BURP-LESS) 1200 MG CAPS Take 1 tablet by mouth daily       ibuprofen (ADVIL;MOTRIN) 600 MG tablet Take 600 mg by mouth every 6 hours as needed for Pain. Physical Exam  Constitutional:       General: She is not in acute distress. Eyes:      Pupils: Pupils are equal, round, and reactive to light. Comments: Subtle ptosis of right eyelid   Cardiovascular:      Rate and Rhythm: Normal rate and regular rhythm. Heart sounds: Normal heart sounds.    Pulmonary:

## 2021-06-04 RX ORDER — BUPROPION HYDROCHLORIDE 300 MG/1
TABLET ORAL
Qty: 90 TABLET | Refills: 1 | Status: SHIPPED | OUTPATIENT
Start: 2021-06-04 | End: 2021-12-06

## 2021-09-07 ENCOUNTER — TELEPHONE (OUTPATIENT)
Dept: INTERNAL MEDICINE CLINIC | Age: 71
End: 2021-09-07

## 2021-09-07 NOTE — TELEPHONE ENCOUNTER
Patient called stating that she hs been feeling UTI zain for the last two weeks. Cloudy urine, no odor, no back pain, her urethra is the only thing hurting some sharp pains. Urgency to urinate she feel like she cannot control the bladder. She states that she took something for urinary pain relief it eased the pain for a short time.

## 2021-09-08 ENCOUNTER — OFFICE VISIT (OUTPATIENT)
Dept: INTERNAL MEDICINE CLINIC | Age: 71
End: 2021-09-08
Payer: COMMERCIAL

## 2021-09-08 VITALS
TEMPERATURE: 98.2 F | DIASTOLIC BLOOD PRESSURE: 66 MMHG | HEART RATE: 79 BPM | SYSTOLIC BLOOD PRESSURE: 132 MMHG | OXYGEN SATURATION: 98 %

## 2021-09-08 DIAGNOSIS — N30.90 CYSTITIS: ICD-10-CM

## 2021-09-08 DIAGNOSIS — R30.0 DYSURIA: Primary | ICD-10-CM

## 2021-09-08 LAB
APPEARANCE FLUID: NORMAL
BILIRUBIN, POC: NORMAL
BLOOD URINE, POC: NORMAL
CLARITY, POC: NORMAL
COLOR, POC: YELLOW
GLUCOSE URINE, POC: NEGATIVE
KETONES, POC: NEGATIVE
LEUKOCYTE EST, POC: NORMAL
NITRITE, POC: NEGATIVE
PH, POC: 5
PROTEIN, POC: NORMAL
SPECIFIC GRAVITY, POC: 1.02
UROBILINOGEN, POC: NORMAL

## 2021-09-08 PROCEDURE — 90694 VACC AIIV4 NO PRSRV 0.5ML IM: CPT | Performed by: INTERNAL MEDICINE

## 2021-09-08 PROCEDURE — 81002 URINALYSIS NONAUTO W/O SCOPE: CPT | Performed by: INTERNAL MEDICINE

## 2021-09-08 PROCEDURE — 99213 OFFICE O/P EST LOW 20 MIN: CPT | Performed by: INTERNAL MEDICINE

## 2021-09-08 PROCEDURE — G0008 ADMIN INFLUENZA VIRUS VAC: HCPCS | Performed by: INTERNAL MEDICINE

## 2021-09-08 RX ORDER — SULFAMETHOXAZOLE AND TRIMETHOPRIM 800; 160 MG/1; MG/1
1 TABLET ORAL 2 TIMES DAILY
Qty: 6 TABLET | Refills: 0 | Status: SHIPPED | OUTPATIENT
Start: 2021-09-08 | End: 2021-09-08

## 2021-09-08 RX ORDER — SULFAMETHOXAZOLE AND TRIMETHOPRIM 800; 160 MG/1; MG/1
1 TABLET ORAL 2 TIMES DAILY
Qty: 6 TABLET | Refills: 0 | Status: SHIPPED | OUTPATIENT
Start: 2021-09-08 | End: 2021-09-11

## 2021-09-08 SDOH — ECONOMIC STABILITY: FOOD INSECURITY: WITHIN THE PAST 12 MONTHS, THE FOOD YOU BOUGHT JUST DIDN'T LAST AND YOU DIDN'T HAVE MONEY TO GET MORE.: NEVER TRUE

## 2021-09-08 SDOH — ECONOMIC STABILITY: FOOD INSECURITY: WITHIN THE PAST 12 MONTHS, YOU WORRIED THAT YOUR FOOD WOULD RUN OUT BEFORE YOU GOT MONEY TO BUY MORE.: NEVER TRUE

## 2021-09-08 ASSESSMENT — SOCIAL DETERMINANTS OF HEALTH (SDOH): HOW HARD IS IT FOR YOU TO PAY FOR THE VERY BASICS LIKE FOOD, HOUSING, MEDICAL CARE, AND HEATING?: NOT HARD AT ALL

## 2021-09-08 NOTE — PROGRESS NOTES
9/8/2021  Patrica Mazariegos    Vitals:    09/08/21 1146   BP: 132/66   Pulse: 79   Temp: 98.2 °F (36.8 °C)   SpO2: 98%       ASSESSMENT/PLAN:   Cystitis  bactrim ds x 3 days, Push fluids. Urine culture sent. Patient was advised to call if symptoms do not respond to treatment within 1-2 days, or sooner if condition worsens. Chief Complaint   Patient presents with    Urinary Tract Infection       72 y.o. female who complains of several week(s) history of dysuria, frequency, urgency, cloudy urine and urinary incontinence. She denies abdominal/flank pain, nausea/vomiting and vaginal symptoms. Has felt like low grade temp. Symptoms have been worsening with time. Sexually active: No.    Relevant medical history: none. Treatment to date: urostat/pyridium, increased fluids.     Outpatient Medications Marked as Taking for the 9/8/21 encounter (Office Visit) with Curt Hurt MD   Medication Sig Dispense Refill    sulfamethoxazole-trimethoprim (BACTRIM DS) 800-160 MG per tablet Take 1 tablet by mouth 2 times daily for 3 days 6 tablet 0    buPROPion (WELLBUTRIN XL) 300 MG extended release tablet TAKE 1 TABLET EVERY MORNING 90 tablet 1    azelastine (ASTELIN) 0.1 % nasal spray 2 sprays by Nasal route 2 times daily Use in each nostril as directed 2 Bottle 5    vitamin B-12 (CYANOCOBALAMIN) 1000 MCG tablet Take 1 tablet by mouth daily 30 tablet 3    valACYclovir (VALTREX) 1 g tablet TAKE 1 TABLET DAILY 90 tablet 1    omeprazole (PRILOSEC) 40 MG delayed release capsule TAKE 1 CAPSULE DAILY 90 capsule 1    atorvastatin (LIPITOR) 20 MG tablet TAKE 1 TABLET DAILY 90 tablet 1    levothyroxine (SYNTHROID) 50 MCG tablet TAKE 1 TABLET DAILY 90 tablet 1    FLUoxetine (PROZAC) 10 MG capsule TAKE 1 CAPSULE DAILY 90 capsule 1    traZODone (DESYREL) 50 MG tablet TAKE 1 TO 2 TABLETS NIGHTLYAS NEEDED FOR SLEEP 60 tablet 5    simvastatin (ZOCOR) 20 MG tablet TAKE 1 TABLET NIGHTLY 90 tablet 1    Loratadine (CLARITIN PO) Take by mouth      Cholecalciferol (VITAMIN D3) 1000 units TABS Take 1 tablet by mouth daily 30 tablet 11    PREMARIN 0.625 MG/GM vaginal cream PLACE 0.5G VAGINALLY TWICE A WEEK . 30 g 5    triamcinolone (NASACORT ALLERGY 24HR) 55 MCG/ACT nasal inhaler 2 SPRAYS IN EACH NOSTRIL one time a day 1 Inhaler 2    bisacodyl (DULCOLAX) 5 MG EC tablet Take 10 mg by mouth daily as needed for Constipation.  Omega-3 Fatty Acids (FISH OIL BURP-LESS) 1200 MG CAPS Take 1 tablet by mouth daily       ibuprofen (ADVIL;MOTRIN) 600 MG tablet Take 600 mg by mouth every 6 hours as needed for Pain. OBJECTIVE:   Physical Exam  Constitutional:       General: She is not in acute distress. Abdominal:      Tenderness: There is no abdominal tenderness (mild suprapubic). There is no right CVA tenderness or left CVA tenderness. Neurological:      Mental Status: She is alert.          Results for POC orders placed in visit on 09/08/21   POCT Urinalysis no Micro   Result Value Ref Range    Color, UA Yellow     Clarity, UA Hazy     Glucose, UA POC Negative     Bilirubin, UA Small     Ketones, UA Negative     Spec Grav, UA 1.025     Blood, UA POC Trace     pH, UA 5     Protein, UA POC Trace     Urobilinogen, UA Normal     Leukocytes, UA Moderate     Nitrite, UA Negative     Appearance, Fluid Slightly Cloudy Clear, Slightly Cloudy          --Rodrigo He MD

## 2021-09-08 NOTE — PATIENT INSTRUCTIONS
Patient Education        Urinary Tract Infection (UTI) in Women: Care Instructions  Overview     A urinary tract infection, or UTI, is a general term for an infection anywhere between the kidneys and the urethra (where urine comes out). Most UTIs are bladder infections. They often cause pain or burning when you urinate. UTIs are caused by bacteria and can be cured with antibiotics. Be sure to complete your treatment so that the infection does not get worse. Follow-up care is a key part of your treatment and safety. Be sure to make and go to all appointments, and call your doctor if you are having problems. It's also a good idea to know your test results and keep a list of the medicines you take. How can you care for yourself at home? · Take your antibiotics as directed. Do not stop taking them just because you feel better. You need to take the full course of antibiotics. · Drink extra water and other fluids for the next day or two. This will help make the urine less concentrated and help wash out the bacteria that are causing the infection. (If you have kidney, heart, or liver disease and have to limit fluids, talk with your doctor before you increase the amount of fluids you drink.)  · Avoid drinks that are carbonated or have caffeine. They can irritate the bladder. · Urinate often. Try to empty your bladder each time. · To relieve pain, take a hot bath or lay a heating pad set on low over your lower belly or genital area. Never go to sleep with a heating pad in place. To prevent UTIs  · Drink plenty of water each day. This helps you urinate often, which clears bacteria from your system. (If you have kidney, heart, or liver disease and have to limit fluids, talk with your doctor before you increase the amount of fluids you drink.)  · Urinate when you need to. · If you are sexually active, urinate right after you have sex. · Change sanitary pads often.   · Avoid douches, bubble baths, feminine hygiene sprays, and other feminine hygiene products that have deodorants. · After going to the bathroom, wipe from front to back. When should you call for help? Call your doctor now or seek immediate medical care if:    · Symptoms such as fever, chills, nausea, or vomiting get worse or appear for the first time.     · You have new pain in your back just below your rib cage. This is called flank pain.     · There is new blood or pus in your urine.     · You have any problems with your antibiotic medicine. Watch closely for changes in your health, and be sure to contact your doctor if:    · You are not getting better after taking an antibiotic for 2 days.     · Your symptoms go away but then come back. Where can you learn more? Go to [email or web address] and sign in to your [employer] account. Enter Q446 in the [employer] box to learn more about \"Urinary Tract Infection (UTI) in Women: Care Instructions. \"     If you do not have an account, please click on the \"Sign Up Now\" link. Current as of: February 10, 2021               Content Version: 12.9  © 8648-8841 [employer], Inc[employer]. Care instructions adapted under license by [facility] ([facility]). If you have questions about a medical condition or this instruction, always ask your healthcare professional. [address] any warranty or liability for your use of this information.

## 2021-09-09 LAB — URINE CULTURE, ROUTINE: NORMAL

## 2021-09-24 RX ORDER — TRAZODONE HYDROCHLORIDE 50 MG/1
TABLET ORAL
Qty: 60 TABLET | Refills: 5 | Status: SHIPPED | OUTPATIENT
Start: 2021-09-24 | End: 2022-03-08

## 2021-10-03 RX ORDER — LEVOTHYROXINE SODIUM 0.05 MG/1
TABLET ORAL
Qty: 90 TABLET | Refills: 1 | Status: SHIPPED | OUTPATIENT
Start: 2021-10-03 | End: 2022-01-23

## 2021-10-03 RX ORDER — OMEPRAZOLE 40 MG/1
CAPSULE, DELAYED RELEASE ORAL
Qty: 90 CAPSULE | Refills: 1 | Status: SHIPPED | OUTPATIENT
Start: 2021-10-03 | End: 2022-04-05

## 2021-10-03 RX ORDER — ATORVASTATIN CALCIUM 20 MG/1
TABLET, FILM COATED ORAL
Qty: 90 TABLET | Refills: 1 | Status: SHIPPED | OUTPATIENT
Start: 2021-10-03 | End: 2022-04-25

## 2021-10-03 RX ORDER — FLUOXETINE 10 MG/1
CAPSULE ORAL
Qty: 90 CAPSULE | Refills: 1 | Status: SHIPPED | OUTPATIENT
Start: 2021-10-03 | End: 2021-12-13 | Stop reason: SDUPTHER

## 2021-12-01 ENCOUNTER — TELEPHONE (OUTPATIENT)
Dept: GYNECOLOGY | Age: 71
End: 2021-12-01

## 2021-12-06 RX ORDER — BUPROPION HYDROCHLORIDE 300 MG/1
TABLET ORAL
Qty: 90 TABLET | Refills: 1 | Status: SHIPPED | OUTPATIENT
Start: 2021-12-06 | End: 2022-05-19

## 2021-12-09 ENCOUNTER — OFFICE VISIT (OUTPATIENT)
Dept: GYNECOLOGY | Age: 71
End: 2021-12-09
Payer: COMMERCIAL

## 2021-12-09 VITALS
RESPIRATION RATE: 17 BRPM | HEART RATE: 69 BPM | DIASTOLIC BLOOD PRESSURE: 72 MMHG | HEIGHT: 62 IN | BODY MASS INDEX: 36.29 KG/M2 | OXYGEN SATURATION: 97 % | SYSTOLIC BLOOD PRESSURE: 138 MMHG

## 2021-12-09 DIAGNOSIS — Z01.419 WELL WOMAN EXAM WITH ROUTINE GYNECOLOGICAL EXAM: Primary | ICD-10-CM

## 2021-12-09 DIAGNOSIS — N39.46 MIXED INCONTINENCE URGE AND STRESS: ICD-10-CM

## 2021-12-09 DIAGNOSIS — R14.0 ABDOMINAL DISTENSION: ICD-10-CM

## 2021-12-09 PROCEDURE — G0101 CA SCREEN;PELVIC/BREAST EXAM: HCPCS | Performed by: OBSTETRICS & GYNECOLOGY

## 2021-12-11 ENCOUNTER — HOSPITAL ENCOUNTER (OUTPATIENT)
Dept: WOMENS IMAGING | Age: 71
Discharge: HOME | End: 2021-12-11
Payer: COMMERCIAL

## 2021-12-11 VITALS — HEIGHT: 61 IN | BODY MASS INDEX: 37.76 KG/M2 | WEIGHT: 200 LBS

## 2021-12-11 DIAGNOSIS — Z12.31 ENCOUNTER FOR SCREENING MAMMOGRAM FOR MALIGNANT NEOPLASM OF BREAST: ICD-10-CM

## 2021-12-11 LAB
ORGANISM: ABNORMAL
URINE CULTURE, ROUTINE: ABNORMAL

## 2021-12-11 PROCEDURE — 77067 SCR MAMMO BI INCL CAD: CPT

## 2021-12-11 ASSESSMENT — ENCOUNTER SYMPTOMS
ABDOMINAL DISTENTION: 1
RESPIRATORY NEGATIVE: 1
EYES NEGATIVE: 1

## 2021-12-11 NOTE — PROGRESS NOTES
Subjective:      Patient ID: Edith Espinosa is a 80 y.o. female. Patient is here for annual. Patient with urinary incontinence. Patient with stress and urge incontinence. Gynecologic Exam        Review of Systems   Constitutional: Negative. HENT: Negative. Eyes: Negative. Respiratory: Negative. Cardiovascular: Negative. Gastrointestinal: Positive for abdominal distention. Genitourinary: Positive for difficulty urinating. Musculoskeletal: Negative. Skin: Negative. Neurological: Negative. Psychiatric/Behavioral: Negative.       Date of Birth 1950  Past Medical History:   Diagnosis Date    Abnormal Pap smear of cervix     Acid reflux     Bacterial vaginosis     Dysmenorrhea     Environmental allergies     Fibrocystic breast     Fibroid     Herpes simplex type 2 infection     Hyperlipidemia     Hypothyroid     Dx about 15 years ago, but htne was off med for  long time    Insomnia     Menopause     approx age 37    Menopause ovarian failure     Menorrhagia     Urinary incontinence     Urogenital trichomoniasis      Past Surgical History:   Procedure Laterality Date    BREAST BIOPSY      CAPSULOTOMY Bilateral 02/2020    Yag    CATARACT REMOVAL WITH IMPLANT Right 06/26/2019    DILATION AND CURETTAGE OF UTERUS      ESOPHAGEAL DILATATION N/A 10/25/2018    ESOPHAGEAL DILATION LOZANO performed by Shruti Bustillos MD at [address] [place] [address]    fibroid-still with ovaries    INTRACAPSULAR CATARACT EXTRACTION Left 06/12/2019    PHACOEMULSIFICATION OF CATARACT LEFT EYE WITH INTRAOCULAR LENS IMPLANT performed by Jimmy Prince MD at [address] [place] [address] Right 06/26/2019    PHACOEMULSIFICATION OF CATARACT RIGHT EYE WITH INTRAOCULAR LENS IMPLANT performed by Jimmy Prince MD at [place] [address] ENDOSCOPY N/A 10/25/2018    EGD BIOPSY performed by Shruti Bustillos MD at [facility] OIL BURP-LESS) 1200 MG CAPS Take 1 tablet by mouth daily       ibuprofen (ADVIL;MOTRIN) 600 MG tablet Take 600 mg by mouth every 6 hours as needed for Pain. Family History   Problem Relation Age of Onset    Lung Cancer Mother 54        g    Lung Cancer Father 76        heavy smoker    Depression Father     Lung Cancer Other     High Cholesterol Brother     Diabetes Brother     Anxiety Disorder Brother      /72 (Site: Right Upper Arm, Position: Sitting, Cuff Size: Large Adult)   Pulse 69   Resp 17   Ht 5' 2\" (1.575 m)   SpO2 97%   BMI 36.29 kg/m²       Objective:   Physical Exam  Constitutional:       General: She is not in acute distress. Appearance: Normal appearance. She is well-developed and normal weight. She is not diaphoretic. HENT:      Head: Normocephalic and atraumatic. Nose: Nose normal.      Mouth/Throat:      Mouth: Mucous membranes are moist.      Pharynx: Oropharynx is clear. Eyes:      Pupils: Pupils are equal, round, and reactive to light. Neck:      Thyroid: No thyromegaly. Cardiovascular:      Rate and Rhythm: Normal rate and regular rhythm. Heart sounds: Normal heart sounds. No murmur heard. No friction rub. No gallop. Pulmonary:      Effort: Pulmonary effort is normal. No respiratory distress. Breath sounds: Normal breath sounds. No wheezing or rales. Chest:   Breasts:      Right: Normal. No swelling, bleeding, inverted nipple, mass, nipple discharge, skin change or tenderness. Left: Normal. No swelling, bleeding, inverted nipple, mass, nipple discharge, skin change or tenderness. Abdominal:      General: Abdomen is flat. Bowel sounds are normal. There is distension. Palpations: Abdomen is soft. There is no hepatomegaly or mass. Tenderness: There is no abdominal tenderness. There is no guarding or rebound. Hernia: No hernia is present. There is no hernia in the left inguinal area.    Genitourinary:     General: Normal vulva. Exam position: Lithotomy position. Pubic Area: No rash. Labia:         Right: No rash, tenderness, lesion or injury. Left: No rash, tenderness, lesion or injury. Urethra: No prolapse, urethral pain, urethral swelling or urethral lesion. Vagina: Normal. No signs of injury and foreign body. No vaginal discharge, erythema, tenderness or bleeding. Cervix: No cervical motion tenderness, discharge, friability, lesion, erythema, cervical bleeding or eversion. Uterus: Not deviated, not enlarged, not fixed, not tender and no uterine prolapse. Adnexa:         Right: No mass, tenderness or fullness. Left: No mass, tenderness or fullness. Rectum: Normal. Guaiac result negative. No mass, tenderness, anal fissure, external hemorrhoid or internal hemorrhoid. Normal anal tone. Comments: Normal urethral meatus, nl urethra, nl bladder. Musculoskeletal:         General: No tenderness. Normal range of motion. Cervical back: Normal range of motion and neck supple. No rigidity. Lymphadenopathy:      Cervical: No cervical adenopathy. Lower Body: No right inguinal adenopathy. No left inguinal adenopathy. Skin:     General: Skin is warm and dry. Findings: No erythema or rash. Neurological:      General: No focal deficit present. Mental Status: She is alert and oriented to person, place, and time. Deep Tendon Reflexes: Reflexes are normal and symmetric. Psychiatric:         Mood and Affect: Mood normal.         Behavior: Behavior normal.         Thought Content: Thought content normal.         Judgment: Judgment normal.         Assessment:      1. Annual  2. Menopause  3. Abdominal distension  3. Mixed incontinence      Plan:      1. Pap, calcium, exercise, mammogram, hemocult negative  2. Stable  3. CT scan abd/pv with contrast  4.  UC, try myrbetriq. Urge worse than stress.  Referral to Dr. Lindsay Zee Tyesha Hunt MD

## 2021-12-13 ENCOUNTER — OFFICE VISIT (OUTPATIENT)
Dept: INTERNAL MEDICINE CLINIC | Age: 71
End: 2021-12-13
Payer: COMMERCIAL

## 2021-12-13 VITALS
DIASTOLIC BLOOD PRESSURE: 72 MMHG | HEART RATE: 63 BPM | WEIGHT: 194.8 LBS | OXYGEN SATURATION: 98 % | SYSTOLIC BLOOD PRESSURE: 134 MMHG | BODY MASS INDEX: 36.81 KG/M2

## 2021-12-13 DIAGNOSIS — G47.10 HYPERSOMNIA: ICD-10-CM

## 2021-12-13 DIAGNOSIS — E78.2 MIXED HYPERLIPIDEMIA: ICD-10-CM

## 2021-12-13 DIAGNOSIS — E03.9 ACQUIRED HYPOTHYROIDISM: ICD-10-CM

## 2021-12-13 DIAGNOSIS — E66.01 CLASS 2 SEVERE OBESITY WITH SERIOUS COMORBIDITY AND BODY MASS INDEX (BMI) OF 36.0 TO 36.9 IN ADULT, UNSPECIFIED OBESITY TYPE (HCC): ICD-10-CM

## 2021-12-13 DIAGNOSIS — Z00.00 MEDICARE ANNUAL WELLNESS VISIT, SUBSEQUENT: Primary | ICD-10-CM

## 2021-12-13 DIAGNOSIS — R73.03 PREDIABETES: ICD-10-CM

## 2021-12-13 DIAGNOSIS — F33.40 RECURRENT MAJOR DEPRESSIVE DISORDER, IN REMISSION (HCC): ICD-10-CM

## 2021-12-13 DIAGNOSIS — R13.10 DYSPHAGIA, UNSPECIFIED TYPE: ICD-10-CM

## 2021-12-13 DIAGNOSIS — N30.00 ACUTE CYSTITIS WITHOUT HEMATURIA: ICD-10-CM

## 2021-12-13 PROCEDURE — 99214 OFFICE O/P EST MOD 30 MIN: CPT | Performed by: INTERNAL MEDICINE

## 2021-12-13 PROCEDURE — G0439 PPPS, SUBSEQ VISIT: HCPCS | Performed by: INTERNAL MEDICINE

## 2021-12-13 RX ORDER — NITROFURANTOIN 25; 75 MG/1; MG/1
100 CAPSULE ORAL 2 TIMES DAILY
Qty: 10 CAPSULE | Refills: 0 | Status: SHIPPED | OUTPATIENT
Start: 2021-12-13 | End: 2021-12-18

## 2021-12-13 RX ORDER — FLUOXETINE 10 MG/1
20 CAPSULE ORAL DAILY
Qty: 180 CAPSULE | Refills: 1
Start: 2021-12-13 | End: 2022-04-25

## 2021-12-13 ASSESSMENT — PATIENT HEALTH QUESTIONNAIRE - PHQ9
SUM OF ALL RESPONSES TO PHQ QUESTIONS 1-9: 12
5. POOR APPETITE OR OVEREATING: 1
SUM OF ALL RESPONSES TO PHQ QUESTIONS 1-9: 12
4. FEELING TIRED OR HAVING LITTLE ENERGY: 3
10. IF YOU CHECKED OFF ANY PROBLEMS, HOW DIFFICULT HAVE THESE PROBLEMS MADE IT FOR YOU TO DO YOUR WORK, TAKE CARE OF THINGS AT HOME, OR GET ALONG WITH OTHER PEOPLE: 2
8. MOVING OR SPEAKING SO SLOWLY THAT OTHER PEOPLE COULD HAVE NOTICED. OR THE OPPOSITE, BEING SO FIGETY OR RESTLESS THAT YOU HAVE BEEN MOVING AROUND A LOT MORE THAN USUAL: 0
7. TROUBLE CONCENTRATING ON THINGS, SUCH AS READING THE NEWSPAPER OR WATCHING TELEVISION: 1
DEPRESSION UNABLE TO ASSESS: FUNCTIONAL CAPACITY MOTIVATION LIMITS ACCURACY
9. THOUGHTS THAT YOU WOULD BE BETTER OFF DEAD, OR OF HURTING YOURSELF: 0
2. FEELING DOWN, DEPRESSED OR HOPELESS: 2
3. TROUBLE FALLING OR STAYING ASLEEP: 3
SUM OF ALL RESPONSES TO PHQ9 QUESTIONS 1 & 2: 4
6. FEELING BAD ABOUT YOURSELF - OR THAT YOU ARE A FAILURE OR HAVE LET YOURSELF OR YOUR FAMILY DOWN: 0
1. LITTLE INTEREST OR PLEASURE IN DOING THINGS: 2
SUM OF ALL RESPONSES TO PHQ QUESTIONS 1-9: 12

## 2021-12-13 ASSESSMENT — COLUMBIA-SUICIDE SEVERITY RATING SCALE - C-SSRS
6. HAVE YOU EVER DONE ANYTHING, STARTED TO DO ANYTHING, OR PREPARED TO DO ANYTHING TO END YOUR LIFE?: NO
1. WITHIN THE PAST MONTH, HAVE YOU WISHED YOU WERE DEAD OR WISHED YOU COULD GO TO SLEEP AND NOT WAKE UP?: NO
2. HAVE YOU ACTUALLY HAD ANY THOUGHTS OF KILLING YOURSELF?: NO

## 2021-12-13 ASSESSMENT — LIFESTYLE VARIABLES: HOW OFTEN DO YOU HAVE A DRINK CONTAINING ALCOHOL: 0

## 2021-12-13 NOTE — PATIENT INSTRUCTIONS
Please provide office with copy of your living will and medical power of . If you have electronic copy, it can be uploaded in [address] You can also bring originals to office and will will copy for you. Call to schedule with Dr. Kimmie Grissom for sleep evaluation  Call to see Dr. Renetta Miles about the swallowing. Increase the fluoxetine to 20 mg.   Start the antibiotic today. Eating Healthy Foods: Care Instructions  Your Care Instructions     Eating healthy foods can help lower your risk for disease. Healthy food gives you energy and keeps your heart strong, your brain active, your muscles working, and your bones strong. A healthy diet includes a variety of foods from the basic food groups: grains, vegetables, fruits, milk and milk products, and meat and beans. Some people may eat more of their favorite foods from only one food group and, as a result, miss getting the nutrients they need. So, it is important to pay attention not only to what you eat but also to what you are missing from your diet. You can eat a healthy, balanced diet by making a few small changes. Follow-up care is a key part of your treatment and safety. Be sure to make and go to all appointments, and call your doctor if you are having problems. It's also a good idea to know your test results and keep a list of the medicines you take. How can you care for yourself at home? Look at what you eat  · Keep a food diary for a week or two and record everything you eat or drink. Track the number of servings you eat from each food group. · For a balanced diet every day, eat a variety of:  ? 6 or more ounce-equivalents of grains, such as cereals, breads, crackers, rice, or pasta, every day. An ounce-equivalent is 1 slice of bread, 1 cup of ready-to-eat cereal, or ½ cup of cooked rice, cooked pasta, or cooked cereal.  ? 2½ cups of vegetables, especially:  § Dark-green vegetables such as broccoli and spinach.   § Orange vegetables such as carrots and sweet potatoes. § Dry beans (such as darling and kidney beans) and peas (such as lentils). ? 2 cups of fresh, frozen, or canned fruit. A small apple or 1 banana or orange equals 1 cup. ? 3 cups of nonfat or low-fat milk, yogurt, or other milk products. ? 5½ ounces of meat and beans, such as chicken, fish, lean meat, beans, nuts, and seeds. One egg, 1 tablespoon of peanut butter, ½ ounce nuts or seeds, or ¼ cup of cooked beans equals 1 ounce of meat. · Learn how to read food labels for serving sizes and ingredients. Fast-food and convenience-food meals often contain few or no fruits or vegetables. Make sure you eat some fruits and vegetables to make the meal more nutritious. · Look at your food diary. For each food group, add up what you have eaten and then divide the total by the number of days. This will give you an idea of how much you are eating from each food group. See if you can find some ways to change your diet to make it more healthy. Start small  · Do not try to make dramatic changes to your diet all at once. You might feel that you are missing out on your favorite foods and then be more likely to fail. · Start slowly, and gradually change your habits. Try some of the following:  ? Use whole wheat bread instead of white bread. ? Use nonfat or low-fat milk instead of whole milk. ? Eat brown rice instead of white rice, and eat whole wheat pasta instead of white-flour pasta. ? Try low-fat cheeses and low-fat yogurt. ? Add more fruits and vegetables to meals and have them for snacks. ? Add lettuce, tomato, cucumber, and onion to sandwiches. ? Add fruit to yogurt and cereal.  Enjoy food  · You can still eat your favorite foods. You just may need to eat less of them. If your favorite foods are high in fat, salt, and sugar, limit how often you eat them, but do not cut them out entirely. · Eat a wide variety of foods.   Make healthy choices when eating out  · The type of restaurant you choose can help you make healthy choices. Even fast-food chains are now offering more low-fat or healthier choices on the menu. · Choose smaller portions, or take half of your meal home. · When eating out, try:  ? A veggie pizza with a whole wheat crust or grilled chicken (instead of sausage or pepperoni). ? Pasta with roasted vegetables, grilled chicken, or marinara sauce instead of cream sauce. ? A vegetable wrap or grilled chicken wrap. ? Broiled or poached food instead of fried or breaded items. Make healthy choices easy  · Buy packaged, prewashed, ready-to-eat fresh vegetables and fruits, such as baby carrots, salad mixes, and chopped or shredded broccoli and cauliflower. · Buy packaged, presliced fruits, such as melon or pineapple. · Choose 100% fruit or vegetable juice instead of soda. Limit juice intake to 4 to 6 oz (½ to ¾ cup) a day. · Blend low-fat yogurt, fruit juice, and canned or frozen fruit to make a smoothie for breakfast or a snack. Where can you learn more? Go to https://[employer]. org and sign in to your [employer] account. Enter C834 in the [facility] box to learn more about \"Eating Healthy Foods: Care Instructions. \"     If you do not have an account, please click on the \"Sign Up Now\" link. Current as of: December 17, 2020               Content Version: 13.0  © 3448-7369 Healthwise, [facility]. Care instructions adapted under license by [facility] ([facility]). If you have questions about a medical condition or this instruction, always ask your healthcare professional. [place] [address] any warranty or liability for your use of this information. Personalized Preventive Plan for Miki Labor - 12/13/2021  Medicare offers a range of preventive health benefits. Some of the tests and screenings are paid in full while other may be subject to a deductible, co-insurance, and/or copay.     Some of these benefits include a comprehensive review of your medical history including lifestyle, illnesses that may run in your family, and various assessments and screenings as appropriate. After reviewing your medical record and screening and assessments performed today your provider may have ordered immunizations, labs, imaging, and/or referrals for you. A list of these orders (if applicable) as well as your Preventive Care list are included within your After Visit Summary for your review. Other Preventive Recommendations:    · A preventive eye exam performed by an eye specialist is recommended every 1-2 years to screen for glaucoma; cataracts, macular degeneration, and other eye disorders. · A preventive dental visit is recommended every 6 months. · Try to get at least 150 minutes of exercise per week or 10,000 steps per day on a pedometer . · Order or download the FREE \"Exercise & Physical Activity: Your Everyday Guide\" from [employer]. Call 5-883.853.3878 or search [employer] online. · You need 7834-9706 mg of calcium and 6574-6703 IU of vitamin D per day. It is possible to meet your calcium requirement with diet alone, but a vitamin D supplement is usually necessary to meet this goal.  · When exposed to the sun, use a sunscreen that protects against both UVA and UVB radiation with an SPF of 30 or greater. Reapply every 2 to 3 hours or after sweating, drying off with a towel, or swimming. · Always wear a seat belt when traveling in a car. Always wear a helmet when riding a bicycle or motorcycle.

## 2021-12-13 NOTE — ASSESSMENT & PLAN NOTE
Symptomatic. Concerned about underlying sleep apnea. Referred to Dr. Maria Esther Catalan for sleep evaluation. Continue Wellbutrin  mg daily and increase fluoxetine to 20 mg daily.

## 2021-12-16 DIAGNOSIS — N39.0 URINARY TRACT INFECTION WITHOUT HEMATURIA, SITE UNSPECIFIED: Primary | ICD-10-CM

## 2021-12-16 RX ORDER — CIPROFLOXACIN 500 MG/1
500 TABLET, FILM COATED ORAL 2 TIMES DAILY
Qty: 14 TABLET | Refills: 0 | OUTPATIENT
Start: 2021-12-16 | End: 2021-12-23

## 2022-01-23 RX ORDER — VALACYCLOVIR HYDROCHLORIDE 1 G/1
TABLET, FILM COATED ORAL
Qty: 90 TABLET | Refills: 1 | Status: SHIPPED | OUTPATIENT
Start: 2022-01-23

## 2022-01-23 RX ORDER — LEVOTHYROXINE SODIUM 0.05 MG/1
TABLET ORAL
Qty: 90 TABLET | Refills: 1 | Status: SHIPPED | OUTPATIENT
Start: 2022-01-23 | End: 2022-09-19

## 2022-03-08 RX ORDER — TRAZODONE HYDROCHLORIDE 50 MG/1
TABLET ORAL
Qty: 60 TABLET | Refills: 5 | Status: SHIPPED | OUTPATIENT
Start: 2022-03-08 | End: 2022-09-19

## 2022-04-05 RX ORDER — OMEPRAZOLE 40 MG/1
CAPSULE, DELAYED RELEASE ORAL
Qty: 90 CAPSULE | Refills: 1 | Status: SHIPPED | OUTPATIENT
Start: 2022-04-05 | End: 2022-05-27 | Stop reason: SDUPTHER

## 2022-04-07 ENCOUNTER — TELEMEDICINE (OUTPATIENT)
Dept: PULMONOLOGY | Age: 72
End: 2022-04-07
Payer: COMMERCIAL

## 2022-04-07 DIAGNOSIS — G47.10 HYPERSOMNIA: Primary | ICD-10-CM

## 2022-04-07 DIAGNOSIS — K21.00 GASTROESOPHAGEAL REFLUX DISEASE WITH ESOPHAGITIS, UNSPECIFIED WHETHER HEMORRHAGE: Chronic | ICD-10-CM

## 2022-04-07 DIAGNOSIS — Z91.09 ENVIRONMENTAL ALLERGIES: Chronic | ICD-10-CM

## 2022-04-07 DIAGNOSIS — F41.1 GAD (GENERALIZED ANXIETY DISORDER): Chronic | ICD-10-CM

## 2022-04-07 DIAGNOSIS — E66.01 CLASS 2 SEVERE OBESITY WITH SERIOUS COMORBIDITY AND BODY MASS INDEX (BMI) OF 36.0 TO 36.9 IN ADULT, UNSPECIFIED OBESITY TYPE (HCC): Chronic | ICD-10-CM

## 2022-04-07 PROBLEM — R73.03 PREDIABETES: Chronic | Status: ACTIVE | Noted: 2019-01-30

## 2022-04-07 PROBLEM — F32.5 MAJOR DEPRESSIVE DISORDER IN REMISSION (HCC): Chronic | Status: ACTIVE | Noted: 2017-04-21

## 2022-04-07 PROCEDURE — 99204 OFFICE O/P NEW MOD 45 MIN: CPT | Performed by: INTERNAL MEDICINE

## 2022-04-07 ASSESSMENT — SLEEP AND FATIGUE QUESTIONNAIRES
HOW LIKELY ARE YOU TO NOD OFF OR FALL ASLEEP WHILE SITTING AND TALKING TO SOMEONE: 1
ESS TOTAL SCORE: 15
HOW LIKELY ARE YOU TO NOD OFF OR FALL ASLEEP WHILE SITTING INACTIVE IN A PUBLIC PLACE: 1
HOW LIKELY ARE YOU TO NOD OFF OR FALL ASLEEP WHILE SITTING AND READING: 2
HOW LIKELY ARE YOU TO NOD OFF OR FALL ASLEEP WHILE LYING DOWN TO REST IN THE AFTERNOON WHEN CIRCUMSTANCES PERMIT: 3
HOW LIKELY ARE YOU TO NOD OFF OR FALL ASLEEP WHILE WATCHING TV: 2
HOW LIKELY ARE YOU TO NOD OFF OR FALL ASLEEP WHEN YOU ARE A PASSENGER IN A CAR FOR AN HOUR WITHOUT A BREAK: 3
HOW LIKELY ARE YOU TO NOD OFF OR FALL ASLEEP WHILE SITTING QUIETLY AFTER LUNCH WITHOUT ALCOHOL: 2
HOW LIKELY ARE YOU TO NOD OFF OR FALL ASLEEP IN A CAR, WHILE STOPPED FOR A FEW MINUTES IN TRAFFIC: 1

## 2022-04-07 NOTE — LETTER
[facility]  [place] [address]  Phone: [phone]  Fax: [phone]           Dar Tan MD      April 7, 2022     Patient: Abhishek Howe   MR Number: [id number]   YOB: 1950   Date of Visit: 4/7/2022       Dear Dr. Elisa Wood: Thank you for referring Pro Freeman to me for evaluation/treatment. Below are the relevant portions of my assessment and plan of care. Visit Diagnoses and Associated Orders     Hypersomnia   (New Problem)  -  Primary    needs work-up    POLYSOMNOGRAPHY (PSG) - Diagnostic Testing [38287 Custom]   - Future Order         Gastroesophageal reflux disease with esophagitis, unspecified whether hemorrhage   (Stable)           ALBER (generalized anxiety disorder)   (Stable)           Environmental allergies   (Stable)           Class 2 severe obesity with serious comorbidity and body mass index (BMI) of 36.0 to 36.9 in adult, unspecified obesity type ([address]   (Not Stable)                 One or more undiagnosed new problem with uncertain prognosis till final diagnosis is made. Differential diagnosis includes but not limited to: LISSA, PLMD's, narcolepsy, parasomnias. Reviewed LISSA (which is the highest likelihood diagnosis): pathophysiology, diagnosis, complications and treatment. Instructed her not to drive if drowsy. Continue medications per her PCP and other physicians. Limit caffeine use after 3pm. Will do PSG to rule-out LISSA and other sleep disorders. 1 wk follow up after study to review her results. The chronic medical conditions listed are directly related to the primary diagnosis listed above. The management of the primary diagnosis affects the secondary diagnosis and vice versa. Reviewed referral note for the patient's primary care doctor dated 12/13/2021 showing possible risk factors for sleep apnea.   Reviewed the following labs from 12/13/2021: Hemoglobin A1c, CMP, TSH, CBCall were essentially normal except for mildly elevated glucose and hemoglobin A1c    This information was analyzed to assess complexity and medical decision making in regards to further testing and management. Continue meds for: GERD, ALBER, and AR. Pt would medically benefit from wt loss for LISSA (diet, exercise, surgical). Orders Placed This Encounter   Procedures    POLYSOMNOGRAPHY (PSG) - Diagnostic Testing       If you have questions, please do not hesitate to call me. I look forward to following Tesha Prado along with you.     Sincerely,        Paris Lawson MD    CC providers:  Charmaine Benz MD  [address] [place] [id number]  [address] [facility] [address]

## 2022-04-07 NOTE — PROGRESS NOTES
Oliver Kat MD, [facility], [facility]  Glynnadrianne LoaizaKaren CNP [address]  [address] [facility], [address] [phone]   [facility] [address] Zack Cee. [address] [place], [place] [address] [phone]     Video Visit- Consult    Blayne Turner, was evaluated through a synchronous (real-time) audio-video  encounter. The patient (or guardian if applicable) is aware that this is a billable  service, which includes applicable co-pays. This Virtual Visit was conducted with  patient's (and/or legal guardian's) consent. The visit was conducted pursuant to  the emergency declaration under the [address] [place] [address] waiver authority and the [employer] General Act. Patient identification was verified,  and a caregiver was present when appropriate. The patient was located in a  state where the provider was licensed to provide care. Assessment:      Visit Diagnoses and Associated Orders     Hypersomnia   (New Problem)  -  Primary    needs work-up    POLYSOMNOGRAPHY (PSG) - Diagnostic Testing [05779 Custom]   - Future Order         Gastroesophageal reflux disease with esophagitis, unspecified whether hemorrhage   (Stable)           ALBER (generalized anxiety disorder)   (Stable)           Environmental allergies   (Stable)           Class 2 severe obesity with serious comorbidity and body mass index (BMI) of 36.0 to 36.9 in adult, unspecified obesity type (HCC)   (Not Stable)                  Plan:      One or more undiagnosed new problem with uncertain prognosis till final diagnosis is made. Differential diagnosis includes but not limited to: LISSA, PLMD's, narcolepsy, parasomnias. Reviewed LISSA (which is the highest likelihood diagnosis): pathophysiology, diagnosis, complications and treatment. Instructed her not to drive if drowsy. Continue medications per her PCP and other physicians.  Limit caffeine use after 3pm. Will do PSG to rule-out LISSA and other sleep disorders. 1 wk follow up after study to review her results. The chronic medical conditions listed are directly related to the primary diagnosis listed above. The management of the primary diagnosis affects the secondary diagnosis and vice versa. Reviewed referral note for the patient's primary care doctor dated 12/13/2021 showing possible risk factors for sleep apnea. Reviewed the following labs from 12/13/2021: Hemoglobin A1c, CMP, TSH, CBCall were essentially normal except for mildly elevated glucose and hemoglobin A1c    This information was analyzed to assess complexity and medical decision making in regards to further testing and management. Continue meds for: GERD, ALBER, and AR. Pt would medically benefit from wt loss for LISSA (diet, exercise, surgical). Orders Placed This Encounter   Procedures    POLYSOMNOGRAPHY (PSG) - Diagnostic Testing          Subjective:     Patient ID: Lorna Lindsey is a 80 y.o. female. Chief Complaint   Patient presents with    Daytime Sleepiness    Snoring       HPI:      Lorna Lindsey is a 80 y.o. female referred by Aung Mccloud MD for a sleep evaluation. She complains of: excessive daytime sleepiness , non-restorative sleep, nocturia, AM headaches, drowsiness while driving, decreased concentration, short term memory issues and tossing and turning at night. She denies: cataplexy and hypnagogic hallucinations. Symptoms began several years ago, gradually worsening since that time    DOT/CDL - No  FAA/'s license -No    Previous Report(s) Reviewed: historical medical records, office notes, and referral letter(s). Pertinent data has been documented. [place] - Total score: 15    Caffeine Intake - None.     Social History     Socioeconomic History    Marital status:      Spouse name: ,  works    Number of children: 4    Years of education: Not on file    Highest education level: Not on file   Occupational History  Occupation:    Tobacco Use    Smoking status: Never Smoker    Smokeless tobacco: Never Used    Tobacco comment: Never smoked, never will! Substance and Sexual Activity    Alcohol use: No    Drug use: Never    Sexual activity: Not Currently     Partners: Male   Other Topics Concern    Not on file   Social History Narrative    Exercise:    2-3x/week, recumbent bike or walking for 20-30 min     Social Determinants of Health     Financial Resource Strain: Low Risk     Difficulty of Paying Living Expenses: Not hard at all   Food Insecurity: No Food Insecurity    Worried About Running Out of Food in the Last Year: Never true    [place] of Food in the Last Year: Never true   Transportation Needs:     Lack of Transportation (Medical): Not on file    Lack of Transportation (Non-Medical):  Not on file   Physical Activity:     Days of Exercise per Week: Not on file    Minutes of Exercise per Session: Not on file   Stress:     Feeling of Stress : Not on file   Social Connections:     Frequency of Communication with Friends and Family: Not on file    Frequency of Social Gatherings with Friends and Family: Not on file    Attends Rastafarian Services: Not on file    Active Member of [address] [place] [address] or Organizations: Not on file    Attends Club or Organization Meetings: Not on file    Marital Status: Not on file   Intimate Partner Violence:     Fear of Current or Ex-Partner: Not on file    Emotionally Abused: Not on file    Physically Abused: Not on file    Sexually Abused: Not on file   Housing Stability:     Unable to Pay for Housing in the Last Year: Not on file    Number of [place] in the Last Year: Not on file    Unstable Housing in the Last Year: Not on file        Current Outpatient Medications   Medication Sig Dispense Refill    omeprazole (PRILOSEC) 40 MG delayed release capsule TAKE 1 CAPSULE DAILY 90 capsule 1    traZODone (DESYREL) 50 MG tablet TAKE 1 TO 2 TABLETS NIGHTLYAS NEEDED FOR SLEEP 60 tablet 5    levothyroxine (SYNTHROID) 50 MCG tablet TAKE 1 TABLET DAILY 90 tablet 1    valACYclovir (VALTREX) 1 g tablet TAKE 1 TABLET DAILY 90 tablet 1    FLUoxetine (PROZAC) 10 MG capsule Take 2 capsules by mouth daily 180 capsule 1    mirabegron (MYRBETRIQ) 25 MG TB24 Take 1 tablet by mouth daily 30 tablet 0    buPROPion (WELLBUTRIN XL) 300 MG extended release tablet TAKE 1 TABLET EVERY MORNING 90 tablet 1    atorvastatin (LIPITOR) 20 MG tablet TAKE 1 TABLET DAILY 90 tablet 1    azelastine (ASTELIN) 0.1 % nasal spray 2 sprays by Nasal route 2 times daily Use in each nostril as directed 2 Bottle 5    vitamin B-12 (CYANOCOBALAMIN) 1000 MCG tablet Take 1 tablet by mouth daily 30 tablet 3    simvastatin (ZOCOR) 20 MG tablet TAKE 1 TABLET NIGHTLY 90 tablet 1    Loratadine (CLARITIN PO) Take by mouth      Cholecalciferol (VITAMIN D3) 1000 units TABS Take 1 tablet by mouth daily 30 tablet 11    PREMARIN 0.625 MG/GM vaginal cream PLACE 0.5G VAGINALLY TWICE A WEEK . 30 g 5    triamcinolone (NASACORT ALLERGY 24HR) 55 MCG/ACT nasal inhaler 2 SPRAYS IN EACH NOSTRIL one time a day 1 Inhaler 2    bisacodyl (DULCOLAX) 5 MG EC tablet Take 10 mg by mouth daily as needed for Constipation.  Omega-3 Fatty Acids (FISH OIL BURP-LESS) 1200 MG CAPS Take 1 tablet by mouth daily       ibuprofen (ADVIL;MOTRIN) 600 MG tablet Take 600 mg by mouth every 6 hours as needed for Pain. No current facility-administered medications for this visit.        Allergies as of 04/07/2022 - Fully Reviewed 04/07/2022   Allergen Reaction Noted    Latex Rash 08/08/2014    Penicillins Rash 01/08/2014       Patient Active Problem List   Diagnosis    Environmental allergies    Mixed hyperlipidemia    Acquired hypothyroidism    Insomnia    ALBER (generalized anxiety disorder)    Herpes simplex type 2 infection    Class 2 severe obesity with serious comorbidity and body mass index (BMI) of 36.0 to 36.9 in adult [facility])    Vaginal atrophy    Major depressive disorder in remission ([facility] [address]    Hypervitaminosis D    Gastroesophageal reflux disease with esophagitis    Prediabetes    Bilateral carpal tunnel syndrome    Numbness and tingling in both hands    Hypersomnia       Past Medical History:   Diagnosis Date    Abnormal Pap smear of cervix     Acid reflux     Bacterial vaginosis     Dysmenorrhea     Environmental allergies     Fibrocystic breast     Fibroid     Herpes simplex type 2 infection     Hyperlipidemia     Hypothyroid     Dx about 15 years ago, but htne was off med for  long time    Insomnia     Menopause     approx age 37    Menopause ovarian failure     Menorrhagia     Urinary incontinence     Urogenital trichomoniasis        Family History   Problem Relation Age of Onset    Lung Cancer Mother 54    Lung Cancer Father 76        heavy smoker    Depression Father     High Cholesterol Brother     Diabetes Brother     Anxiety Disorder Brother     Diabetes Brother     Lung Cancer Other        Objective:     Vitals:  Patient reported Height and Weight Calculated BMI   Patient-Reported Vitals 4/7/2022   Patient-Reported Weight 200 lb   Patient-Reported Height 5.1'   Patient-Reported Systolic 766   Patient-Reported Diastolic 70       24.1     Due to COVID-19 this was a virtual visit and physical exam was deferred.     Electronically signed by Tiffanie Cardoso MD on4/7/2022 at 3:38 PM

## 2022-04-11 ENCOUNTER — TELEPHONE (OUTPATIENT)
Dept: SLEEP CENTER | Age: 72
End: 2022-04-11

## 2022-04-25 RX ORDER — ATORVASTATIN CALCIUM 20 MG/1
TABLET, FILM COATED ORAL
Qty: 90 TABLET | Refills: 1 | Status: ON HOLD | OUTPATIENT
Start: 2022-04-25 | End: 2022-09-05 | Stop reason: SDUPTHER

## 2022-04-25 RX ORDER — FLUOXETINE 10 MG/1
CAPSULE ORAL
Qty: 90 CAPSULE | Refills: 1 | Status: SHIPPED | OUTPATIENT
Start: 2022-04-25 | End: 2022-10-11

## 2022-05-04 ENCOUNTER — HOSPITAL ENCOUNTER (OUTPATIENT)
Dept: SLEEP CENTER | Age: 72
Discharge: HOME | End: 2022-05-04
Payer: COMMERCIAL

## 2022-05-04 DIAGNOSIS — G47.10 HYPERSOMNIA: ICD-10-CM

## 2022-05-04 PROCEDURE — 95810 POLYSOM 6/> YRS 4/> PARAM: CPT

## 2022-05-06 PROCEDURE — 95810 POLYSOM 6/> YRS 4/> PARAM: CPT | Performed by: INTERNAL MEDICINE

## 2022-05-09 ENCOUNTER — TELEPHONE (OUTPATIENT)
Dept: PULMONOLOGY | Age: 72
End: 2022-05-09

## 2022-05-10 ENCOUNTER — TELEPHONE (OUTPATIENT)
Dept: PULMONOLOGY | Age: 72
End: 2022-05-10

## 2022-05-10 DIAGNOSIS — G47.33 OBSTRUCTIVE SLEEP APNEA (ADULT) (PEDIATRIC): Primary | ICD-10-CM

## 2022-05-10 NOTE — TELEPHONE ENCOUNTER
Spoke with pt to review PSG results. Pt to schedule a titration study. Pt asking for copy of study to be faxed to her and fax was sent.

## 2022-05-19 RX ORDER — BUPROPION HYDROCHLORIDE 300 MG/1
TABLET ORAL
Qty: 90 TABLET | Refills: 1 | Status: SHIPPED | OUTPATIENT
Start: 2022-05-19

## 2022-05-26 PROBLEM — E67.3 HYPERVITAMINOSIS D: Status: RESOLVED | Noted: 2017-04-21 | Resolved: 2022-05-26

## 2022-05-26 PROBLEM — G47.33 OBSTRUCTIVE SLEEP APNEA: Status: ACTIVE | Noted: 2021-12-13

## 2022-05-27 ENCOUNTER — OFFICE VISIT (OUTPATIENT)
Dept: INTERNAL MEDICINE CLINIC | Age: 72
End: 2022-05-27
Payer: COMMERCIAL

## 2022-05-27 VITALS
WEIGHT: 208 LBS | SYSTOLIC BLOOD PRESSURE: 120 MMHG | DIASTOLIC BLOOD PRESSURE: 72 MMHG | HEIGHT: 61 IN | BODY MASS INDEX: 39.27 KG/M2 | HEART RATE: 62 BPM

## 2022-05-27 DIAGNOSIS — N39.3 SUI (STRESS URINARY INCONTINENCE, FEMALE): ICD-10-CM

## 2022-05-27 DIAGNOSIS — Z91.81 AT HIGH RISK FOR FALLS: ICD-10-CM

## 2022-05-27 DIAGNOSIS — E66.01 CLASS 2 SEVERE OBESITY WITH SERIOUS COMORBIDITY AND BODY MASS INDEX (BMI) OF 36.0 TO 36.9 IN ADULT, UNSPECIFIED OBESITY TYPE (HCC): Chronic | ICD-10-CM

## 2022-05-27 DIAGNOSIS — G47.33 OBSTRUCTIVE SLEEP APNEA: ICD-10-CM

## 2022-05-27 DIAGNOSIS — K21.00 GASTROESOPHAGEAL REFLUX DISEASE WITH ESOPHAGITIS, UNSPECIFIED WHETHER HEMORRHAGE: Chronic | ICD-10-CM

## 2022-05-27 DIAGNOSIS — R07.9 CHEST PAIN, UNSPECIFIED TYPE: Primary | ICD-10-CM

## 2022-05-27 DIAGNOSIS — E03.9 ACQUIRED HYPOTHYROIDISM: ICD-10-CM

## 2022-05-27 DIAGNOSIS — F33.40 RECURRENT MAJOR DEPRESSIVE DISORDER, IN REMISSION (HCC): Chronic | ICD-10-CM

## 2022-05-27 PROCEDURE — 1123F ACP DISCUSS/DSCN MKR DOCD: CPT | Performed by: INTERNAL MEDICINE

## 2022-05-27 PROCEDURE — 93000 ELECTROCARDIOGRAM COMPLETE: CPT | Performed by: INTERNAL MEDICINE

## 2022-05-27 PROCEDURE — 99214 OFFICE O/P EST MOD 30 MIN: CPT | Performed by: INTERNAL MEDICINE

## 2022-05-27 RX ORDER — OMEPRAZOLE 40 MG/1
40 CAPSULE, DELAYED RELEASE ORAL 2 TIMES DAILY
Qty: 180 CAPSULE | Refills: 1 | Status: SHIPPED | OUTPATIENT
Start: 2022-05-27 | End: 2022-10-19

## 2022-05-27 ASSESSMENT — PATIENT HEALTH QUESTIONNAIRE - PHQ9
SUM OF ALL RESPONSES TO PHQ QUESTIONS 1-9: 8
3. TROUBLE FALLING OR STAYING ASLEEP: 2
8. MOVING OR SPEAKING SO SLOWLY THAT OTHER PEOPLE COULD HAVE NOTICED. OR THE OPPOSITE, BEING SO FIGETY OR RESTLESS THAT YOU HAVE BEEN MOVING AROUND A LOT MORE THAN USUAL: 0
SUM OF ALL RESPONSES TO PHQ QUESTIONS 1-9: 8
9. THOUGHTS THAT YOU WOULD BE BETTER OFF DEAD, OR OF HURTING YOURSELF: 0
5. POOR APPETITE OR OVEREATING: 3
4. FEELING TIRED OR HAVING LITTLE ENERGY: 3
SUM OF ALL RESPONSES TO PHQ QUESTIONS 1-9: 8
SUM OF ALL RESPONSES TO PHQ QUESTIONS 1-9: 8
6. FEELING BAD ABOUT YOURSELF - OR THAT YOU ARE A FAILURE OR HAVE LET YOURSELF OR YOUR FAMILY DOWN: 0
7. TROUBLE CONCENTRATING ON THINGS, SUCH AS READING THE NEWSPAPER OR WATCHING TELEVISION: 0
SUM OF ALL RESPONSES TO PHQ9 QUESTIONS 1 & 2: 0
1. LITTLE INTEREST OR PLEASURE IN DOING THINGS: 0
2. FEELING DOWN, DEPRESSED OR HOPELESS: 0
10. IF YOU CHECKED OFF ANY PROBLEMS, HOW DIFFICULT HAVE THESE PROBLEMS MADE IT FOR YOU TO DO YOUR WORK, TAKE CARE OF THINGS AT HOME, OR GET ALONG WITH OTHER PEOPLE: 1

## 2022-05-27 NOTE — ASSESSMENT & PLAN NOTE
Problem, uncertain prognosis but very concerning for cardiac disease. CBC, CMP and TSH assure that thyroid is in range. Patient advised to start 81 mg aspirin, chewable due to swallowing issues.   Stress test.  Has been instructed to stop mowing her lawn until we have completed this evaluation and then can determine if and when when it is safe for her to

## 2022-05-27 NOTE — ASSESSMENT & PLAN NOTE
Recent issues starting with dysphagia again. Switch to a soft diet. Advise she needs to call her GI immediately to get testing underway however stress test needs to be complete before she should have EGD.

## 2022-05-27 NOTE — ASSESSMENT & PLAN NOTE
Significant weight gain, she attributes partly to diet changes from swallowing issues. Daughters are helping her to move toward a healthier diet. Talking about keto diet but I have recommended more of a modified, removing only refined carbs.

## 2022-05-27 NOTE — ASSESSMENT & PLAN NOTE
Doing well with current meds.   Only symptom is fatigue which is more likely related to her sleep apnea

## 2022-05-27 NOTE — PROGRESS NOTES
Quinton Ace   :  1950  2022    ASSESSMENT/PLAN:   1. Chest pain, unspecified type  Assessment & Plan:  Problem, uncertain prognosis but very concerning for cardiac disease. CBC, CMP and TSH assure that thyroid is in range. Patient advised to start 81 mg aspirin, chewable due to swallowing issues. Stress test.  Has been instructed to stop mowing her lawn until we have completed this evaluation and then can determine if and when when it is safe for her to  Orders:  -     EKG 12 Lead  -     CBC; Future  -     Comprehensive Metabolic Panel; Future  -     Lipid Panel; Future  -     ECHO Exercise Stress Test; Future  2. Obstructive sleep apnea  Assessment & Plan:  Diagnosed with severe obstructive sleep apnea. Awaits titration study. 3. Recurrent major depressive disorder, in remission [facility])  Assessment & Plan:  Doing well with current meds. Only symptom is fatigue which is more likely related to her sleep apnea   4. Gastroesophageal reflux disease with esophagitis, unspecified whether hemorrhage  Assessment & Plan:  Recent issues starting with dysphagia again. Switch to a soft diet. Advise she needs to call her GI immediately to get testing underway however stress test needs to be complete before she should have EGD. 5. Acquired hypothyroidism  Assessment & Plan:  Significant weight gain otherwise asymptomatic. Check TSH today. Orders:  -     TSH with Reflex; Future  6. Class 2 severe obesity with serious comorbidity and body mass index (BMI) of 36.0 to 36.9 in adult, unspecified obesity type [facility])  Assessment & Plan:  Significant weight gain, she attributes partly to diet changes from swallowing issues. Daughters are helping her to move toward a healthier diet. Talking about keto diet but I have recommended more of a modified, removing only refined carbs.   7. At high risk for falls  On the basis of positive falls risk screening, assessment and plan is as follows: patient declines any further evaluation/treatment for increased falls risk. Instructed to call in the event of worsening symptoms. Return in about 3 months (around 2022). SUBJECTIVE     80 y.o. female established patient here for:   Chief Complaint   Patient presents with    Follow-up     go over results      Ex-  a month ago. Still close. Had fall doing landscaping about 2 months ago. Does not feel unsteady, was just careless. Diagnosed with LISSA. Waiting for titration study mid-. Ready to make changes to help with weight loss. Daughter are going to help her start on Keto diet next week. Mood good, but tired. Experiencing dysphagia again over the last 6 months. Does okay as long as she follows a soft diet. Knows that she needs to see her GI but has not scheduled yet. No pain. Thinks she is gaining weight due to the adaptation she has made in her diet. She also then mentions that she is experiencing mid chest pain when she mows the lawn. Aching sensation goes away when she stops. Comes and goes that she continues to [employer]. She also has the same thing happen on steps. No associated shortness of breath, dizziness, palpitations. Also has seen your oncology. Diagnosed with stress urinary incontinence and plans to have a sling done electively in the fall.     Review of Systems    Outpatient Medications Marked as Taking for the 22 encounter (Office Visit) with Stacie Hill MD   Medication Sig Dispense Refill    omeprazole (PRILOSEC) 40 MG delayed release capsule Take 1 capsule by mouth in the morning and at bedtime 180 capsule 1    buPROPion (WELLBUTRIN XL) 300 MG extended release tablet TAKE 1 TABLET EVERY MORNING 90 tablet 1    FLUoxetine (PROZAC) 10 MG capsule TAKE 1 CAPSULE DAILY 90 capsule 1    atorvastatin (LIPITOR) 20 MG tablet TAKE 1 TABLET DAILY 90 tablet 1    traZODone (DESYREL) 50 MG tablet TAKE 1 TO 2 TABLETS NIGHTLYAS NEEDED FOR SLEEP 60 tablet 5    levothyroxine (SYNTHROID) 50 MCG tablet TAKE 1 TABLET DAILY 90 tablet 1    valACYclovir (VALTREX) 1 g tablet TAKE 1 TABLET DAILY 90 tablet 1    vitamin B-12 (CYANOCOBALAMIN) 1000 MCG tablet Take 1 tablet by mouth daily 30 tablet 3    simvastatin (ZOCOR) 20 MG tablet TAKE 1 TABLET NIGHTLY 90 tablet 1    Loratadine (CLARITIN PO) Take by mouth      Cholecalciferol (VITAMIN D3) 1000 units TABS Take 1 tablet by mouth daily 30 tablet 11    PREMARIN 0.625 MG/GM vaginal cream PLACE 0.5G VAGINALLY TWICE A WEEK . 30 g 5    triamcinolone (NASACORT ALLERGY 24HR) 55 MCG/ACT nasal inhaler 2 SPRAYS IN EACH NOSTRIL one time a day 1 Inhaler 2    bisacodyl (DULCOLAX) 5 MG EC tablet Take 10 mg by mouth daily as needed for Constipation.  Omega-3 Fatty Acids (FISH OIL BURP-LESS) 1200 MG CAPS Take 1 tablet by mouth daily          OBJECTIVE:  Vitals:    05/27/22 0804   BP: 120/72   Site: Right Upper Arm   Position: Sitting   Pulse: 62   Weight: 208 lb (94.3 kg)   Height: 5' 1\" (1.549 m)     Physical Exam  Constitutional:       Appearance: Normal appearance. Eyes:      Comments: Subtle ptosis of right eyelid   Cardiovascular:      Rate and Rhythm: Normal rate and regular rhythm. Heart sounds: Normal heart sounds. Pulmonary:      Effort: Pulmonary effort is normal.      Breath sounds: Normal breath sounds. Chest:      Chest wall: No tenderness. Musculoskeletal:      Right lower leg: No edema. Left lower leg: No edema. Psychiatric:         Mood and Affect: Mood normal.         This note was generated completely or in part utilizing Dragon dictation speech recognition software. Occasionally, words are mistranscribed and despite editing, the text may contain inaccuracies due to incorrect word recognition.   If further clarification is needed please contact the office at [phone]  --Benita Bonilla MD

## 2022-06-15 ENCOUNTER — HOSPITAL ENCOUNTER (OUTPATIENT)
Dept: SLEEP CENTER | Age: 72
Discharge: HOME | End: 2022-06-15
Payer: COMMERCIAL

## 2022-06-15 DIAGNOSIS — G47.33 OBSTRUCTIVE SLEEP APNEA (ADULT) (PEDIATRIC): ICD-10-CM

## 2022-06-15 PROCEDURE — 95811 POLYSOM 6/>YRS CPAP 4/> PARM: CPT

## 2022-06-17 PROCEDURE — 95811 POLYSOM 6/>YRS CPAP 4/> PARM: CPT | Performed by: INTERNAL MEDICINE

## 2022-06-28 ENCOUNTER — TELEPHONE (OUTPATIENT)
Dept: PULMONOLOGY | Age: 72
End: 2022-06-28

## 2022-07-01 ENCOUNTER — TELEPHONE (OUTPATIENT)
Dept: PULMONOLOGY | Age: 72
End: 2022-07-01

## 2022-07-01 NOTE — PROGRESS NOTES
Sven Srivastava         : 1950 [address] [facility]    Diagnosis: [x] LISSA (G47.33) [] CSA (G47.31) [] Apnea (G47.30)   Length of Need: [x] 18 Months [] 99 Months [] Other:    Machine (SRINIVAS!): [] Respironics Dream Station   2   Auto [x] ResMed AirSense     Auto S11 [] Other:     [x]  CPAP () [] Bilevel ()   Mode: [x] Auto [] Spontaneous    Mode: [] Auto [] Spontaneous      P min 10 cmH2O  P max 20 cmH2O      Comfort Settings:   - Ramp Pressure: 5 cmH2O                                        - Ramp time: 15 min                                     -  Flex/EPR - 3 full time                                    - For ResMed Bilevel (TiMax-4 sec   TiMin- 0.2 sec)     Humidifier: [x] Heated ()        [x] Water chamber replacement ()/ 1 per 6 months        Mask:   [x] Nasal () /1 per 3 months [] Full Face () /1 per 3 months   [x] Patient choice -Size and fit mask [] Patient Choice - Size and fit mask   [] Dispense:  [] Dispense:    [x] Headgear () / 1 per 3 months [] Headgear () / 1 per 3 months   [x] Replacement Nasal Cushion ()/2 per month [] Interface Replacement ()/1 per month   [] Replacement Nasal Pillows ()/2 per month         Tubing: [x] Heated ()/1 per 3 months    [] Standard ()/1 per 3 months [] Other:           Filters: [x] Non-disposable ()/1 per 6 months     [x] Ultra-Fine, Disposable ()/2 per month        Miscellaneous: [] Chin Strap ()/ 1 per 6 months [] O2 bleed-in:       LPM   [] Oximetry on CPAP/Bilevel []  Other:    [x] Modem: ()         Start Order Date: 22    MEDICAL JUSTIFICATION:  I, the undersigned, certify that the above prescribed supplies are medically necessary for this patients wellbeing. In my opinion, the supplies are both reasonable and necessary in reference to accepted standards of medicalpractice in treatment of this patients condition.     June Garcia MD      NPI: [id number]       Order Signed Date: 22    Electronically signed by Marck Hidalgo MD on 2022 at 11:53 AM    [address]   1950  [address] [facility] [address]  [phone] (home) [phone] (work)  [phone] (mobile)      Insurance Info (confirm with patient if correct):  Payor/Plan Subscr  Sex Relation Sub.  Ins. ID Effective Group Num

## 2022-07-01 NOTE — TELEPHONE ENCOUNTER
Pt calling to review titration study. Order to be sent to [facility]. F/U scheduled.

## 2022-07-26 ENCOUNTER — HOSPITAL ENCOUNTER (OUTPATIENT)
Dept: NON INVASIVE DIAGNOSTICS | Age: 72
Discharge: HOME | End: 2022-07-26
Payer: COMMERCIAL

## 2022-07-26 ENCOUNTER — TELEPHONE (OUTPATIENT)
Dept: INTERNAL MEDICINE CLINIC | Age: 72
End: 2022-07-26

## 2022-07-26 DIAGNOSIS — R94.39 POSITIVE CARDIAC STRESS TEST: Primary | ICD-10-CM

## 2022-07-26 DIAGNOSIS — R07.9 CHEST PAIN, UNSPECIFIED TYPE: ICD-10-CM

## 2022-07-26 LAB
LV EF: 55 %
LVEF MODALITY: NORMAL

## 2022-07-26 PROCEDURE — 93320 DOPPLER ECHO COMPLETE: CPT

## 2022-07-26 PROCEDURE — 93350 STRESS TTE ONLY: CPT

## 2022-07-26 PROCEDURE — 93017 CV STRESS TEST TRACING ONLY: CPT

## 2022-07-26 RX ORDER — ASPIRIN 81 MG/1
81 TABLET, CHEWABLE ORAL DAILY
Qty: 30 TABLET | Refills: 11 | Status: ON HOLD | COMMUNITY
Start: 2022-07-26 | End: 2022-09-05 | Stop reason: HOSPADM

## 2022-07-28 ENCOUNTER — OFFICE VISIT (OUTPATIENT)
Dept: CARDIOLOGY CLINIC | Age: 72
End: 2022-07-28
Payer: COMMERCIAL

## 2022-07-28 VITALS
SYSTOLIC BLOOD PRESSURE: 134 MMHG | DIASTOLIC BLOOD PRESSURE: 70 MMHG | WEIGHT: 201 LBS | HEART RATE: 64 BPM | BODY MASS INDEX: 37.98 KG/M2

## 2022-07-28 DIAGNOSIS — R94.39 ABNORMAL STRESS ECG: ICD-10-CM

## 2022-07-28 DIAGNOSIS — R07.9 CHEST PAIN, UNSPECIFIED TYPE: Primary | ICD-10-CM

## 2022-07-28 PROCEDURE — 1123F ACP DISCUSS/DSCN MKR DOCD: CPT | Performed by: INTERNAL MEDICINE

## 2022-07-28 PROCEDURE — 99204 OFFICE O/P NEW MOD 45 MIN: CPT | Performed by: INTERNAL MEDICINE

## 2022-07-28 ASSESSMENT — ENCOUNTER SYMPTOMS
CHOKING: 0
CHEST TIGHTNESS: 0
SHORTNESS OF BREATH: 0
COUGH: 0

## 2022-07-28 NOTE — PROGRESS NOTES
Subjective:      Patient ID: Marjan Juárez is a 80 y.o. female. HPI  Referred for cp/abn stress ekg. Has been getting chest pain when mows the lawn. Again this year. Fairly consistant. If stops goes away quickly. Moderate in severity. Does get at times but elevating head helps. Has esophageal strictures. Has helped night time sx. No pnd/orthopnea. No edema. No tachy/syncope.   No previous cardiac     Past Medical History:   Diagnosis Date    Abnormal Pap smear of cervix     Acid reflux     Bacterial vaginosis     Dysmenorrhea     Environmental allergies     Fibrocystic breast     Fibroid     Herpes simplex type 2 infection     Hyperlipidemia     Hypothyroid     Dx about 15 years ago, but htne was off med for  long time    Insomnia     Menopause     approx age 37    Menopause ovarian failure     Menorrhagia     Urinary incontinence     Urogenital trichomoniasis      Past Surgical History:   Procedure Laterality Date    BREAST BIOPSY      CAPSULOTOMY Bilateral 02/2020    Yag    CATARACT REMOVAL WITH IMPLANT Right 06/26/2019    DILATION AND CURETTAGE OF UTERUS      ESOPHAGEAL DILATATION N/A 10/25/2018    ESOPHAGEAL DILATION Arelis Remedies performed by Lara Oshea MD at [address], [address] [facility] [facility]  1990    fibroid-still with ovaries    INTRACAPSULAR CATARACT EXTRACTION Left 06/12/2019    PHACOEMULSIFICATION OF CATARACT LEFT EYE WITH INTRAOCULAR LENS IMPLANT performed by Oseas Suarez MD at [facility] [address] Right 06/26/2019    PHACOEMULSIFICATION OF CATARACT RIGHT EYE WITH INTRAOCULAR LENS IMPLANT performed by Oseas Suarez MD at [address] [facility] N/A 10/25/2018    EGD BIOPSY performed by Lara Oshea MD at [address] [facility] [address] History     Socioeconomic History    Marital status:      Spouse name: ,  works    Number of children: 4    Years of education: Not on file Highest education level: Not on file   Occupational History    Occupation:    Tobacco Use    Smoking status: Never    Smokeless tobacco: Never    Tobacco comments:     Never smoked, never will! Substance and Sexual Activity    Alcohol use: No    Drug use: Never    Sexual activity: Not Currently     Partners: Male   Other Topics Concern    Not on file   Social History Narrative    Exercise:    2-3x/week, recumbent bike or walking for 20-30 min     Social Determinants of Health     Financial Resource Strain: Low Risk     Difficulty of Paying Living Expenses: Not hard at all   Food Insecurity: No Food Insecurity    Worried About Running Out of Food in the Last Year: Never true    Ran Out of Food in the Last Year: Never true   Transportation Needs: Not on file   Physical Activity: Not on file   Stress: Not on file   Social Connections: Not on file   Intimate Partner Violence: Not on file   Housing Stability: Not on file     FH reviewed, negative for cardiac issues,  Bro with CAD    Vitals:    07/28/22 1150   BP: 134/70   Pulse: 64     Wt 201. Review of Systems   Constitutional:  Negative for activity change, appetite change and fatigue. Respiratory:  Negative for cough, choking, chest tightness and shortness of breath. Cardiovascular:  Positive for chest pain. Negative for palpitations and leg swelling. Denies PND or orthopnea. No tachycardia or syncope. Neurological:  Negative for dizziness, syncope and light-headedness. Psychiatric/Behavioral:  Negative for agitation, behavioral problems and confusion. All other systems reviewed and are negative. Objective:   Physical Exam  Constitutional:       General: She is not in acute distress. Appearance: Normal appearance. She is well-developed. HENT:      Head: Normocephalic and atraumatic. Right Ear: External ear normal.      Left Ear: External ear normal.      Nose: Nose normal.   Neck:      Vascular: No JVD. Cardiovascular:      Rate and Rhythm: Normal rate and regular rhythm. Heart sounds: Normal heart sounds. No murmur heard. No gallop. Pulmonary:      Effort: Pulmonary effort is normal. No respiratory distress. Breath sounds: Normal breath sounds. No wheezing or rales. Abdominal:      General: Bowel sounds are normal. There is no distension. Palpations: Abdomen is soft. Tenderness: There is no abdominal tenderness. Musculoskeletal:         General: Normal range of motion. Cervical back: Normal range of motion. Skin:     General: Skin is warm and dry. Neurological:      General: No focal deficit present. Mental Status: She is alert and oriented to person, place, and time. Psychiatric:         Behavior: Behavior normal.       Assessment:       Diagnosis Orders   1. Chest pain, unspecified type        2. Abnormal stress ECG                Plan:      Chest pain. EKG shows NSR, WNL. Stress echo with abn ST response to exercise with no WMA by echo. In view of sx and abn st response have recommended cath. Risk and bene explained.          Pj Guillen MD

## 2022-07-29 ENCOUNTER — TELEPHONE (OUTPATIENT)
Dept: CARDIOLOGY CLINIC | Age: 72
End: 2022-07-29

## 2022-07-29 NOTE — TELEPHONE ENCOUNTER
[facility]  scheduled  Pt informed. Left Heart Catheterization    A left heart catheterization is a procedure that provides your cardiologist with detailed information regarding how your heart functions. A small catheter (long, fine tube) is inserted into an artery (a vessel that carries blood and oxygen) that leads to your heart. While watching with x-ray equipment, small amounts of dye are injected which enables visualization of the heart arteries and chambers. The pictures that your cardiologist receives from the cardiac catheterization enable him or her to decide on the best treatment for you. Date of the procedure:   8/10/2022    Time of arrival:  8:30 a.m     Cardiologist performing the procedure:       Instructions for your left heart catheterization:    1. Bring a list of your medications to the hospital.    2.  Please notify us before the procedure if you are allergic to anything; especially x-ray contrast dye, iodine, nickel, or any type of jewelry. This is very important! 3. Do not eat or drink anything at all after midnight (or 8 hours) prior to the procedure. 4.  Take all morning medications EXCEPT any diuretics (water pills) the day of the procedure with a small sip of water. 5.  If you are on Coumadin, Warfarin, or Vickii Pounds, please notify us so that we can make adjustments to your medication. 6.  If you are taking Xarelto, Eliquis, or Pradaxa, please stop staking these medications two days prior to the procedure (including the day of the procedure). 7.  If you are diabetic, check your blood sugar in the morning. If your blood sugar is 120 or less, do not take insulin. If your blood sugar is more than 120, take half the dose of your normal insulin. Do not take Metformin the night before your procedure or morning of the procedure. 8.  You MUST have someone to drive you home--no driving for 24 hours after your procedure.   If an intervention is performed, you might stay overnight in the hospital.    9.  Discharge instructions will be given to you at the time of your procedure. 10.  For any questions or if you cannot keep this appointment for any reason, please call [phone].

## 2022-08-02 ENCOUNTER — PREP FOR PROCEDURE (OUTPATIENT)
Dept: CARDIOLOGY CLINIC | Age: 72
End: 2022-08-02

## 2022-08-02 RX ORDER — SODIUM CHLORIDE 0.9 % (FLUSH) 0.9 %
5-40 SYRINGE (ML) INJECTION EVERY 12 HOURS SCHEDULED
Status: CANCELLED | OUTPATIENT
Start: 2022-08-02

## 2022-08-02 RX ORDER — SODIUM CHLORIDE 0.9 % (FLUSH) 0.9 %
5-40 SYRINGE (ML) INJECTION PRN
Status: CANCELLED | OUTPATIENT
Start: 2022-08-02

## 2022-08-02 RX ORDER — SODIUM CHLORIDE 9 MG/ML
INJECTION, SOLUTION INTRAVENOUS PRN
Status: CANCELLED | OUTPATIENT
Start: 2022-08-02

## 2022-08-02 RX ORDER — ASPIRIN 325 MG
325 TABLET ORAL ONCE
Status: CANCELLED | OUTPATIENT
Start: 2022-08-10

## 2022-08-10 ENCOUNTER — HOSPITAL ENCOUNTER (OUTPATIENT)
Dept: CARDIAC CATH/INVASIVE PROCEDURES | Age: 72
Discharge: HOME | End: 2022-08-10
Payer: COMMERCIAL

## 2022-08-10 VITALS — WEIGHT: 200 LBS | BODY MASS INDEX: 39.27 KG/M2 | TEMPERATURE: 98.3 F | HEIGHT: 60 IN

## 2022-08-10 DIAGNOSIS — R07.9 CHEST PAIN ON EXERTION: ICD-10-CM

## 2022-08-10 LAB
ANION GAP SERPL CALCULATED.3IONS-SCNC: 12 MMOL/L (ref 3–16)
BUN BLDV-MCNC: 12 MG/DL (ref 7–20)
CALCIUM SERPL-MCNC: 9.1 MG/DL (ref 8.3–10.6)
CHLORIDE BLD-SCNC: 102 MMOL/L (ref 99–110)
CO2: 24 MMOL/L (ref 21–32)
CREAT SERPL-MCNC: 0.8 MG/DL (ref 0.6–1.2)
EKG ATRIAL RATE: 70 BPM
EKG DIAGNOSIS: NORMAL
EKG P AXIS: 13 DEGREES
EKG P-R INTERVAL: 178 MS
EKG Q-T INTERVAL: 398 MS
EKG QRS DURATION: 92 MS
EKG QTC CALCULATION (BAZETT): 429 MS
EKG R AXIS: 20 DEGREES
EKG T AXIS: 29 DEGREES
EKG VENTRICULAR RATE: 70 BPM
GFR AFRICAN AMERICAN: >60
GFR NON-AFRICAN AMERICAN: >60
GLUCOSE BLD-MCNC: 138 MG/DL (ref 70–99)
HCT VFR BLD CALC: 36.3 % (ref 36–48)
HEMOGLOBIN: 11.9 G/DL (ref 12–16)
INR BLD: 1.12 (ref 0.87–1.14)
LEFT VENTRICULAR EJECTION FRACTION MODE: NORMAL
LV EF: 60 %
MCH RBC QN AUTO: 30.3 PG (ref 26–34)
MCHC RBC AUTO-ENTMCNC: 32.8 G/DL (ref 31–36)
MCV RBC AUTO: 92.1 FL (ref 80–100)
PDW BLD-RTO: 14.7 % (ref 12.4–15.4)
PLATELET # BLD: 234 K/UL (ref 135–450)
PMV BLD AUTO: 8.3 FL (ref 5–10.5)
POTASSIUM SERPL-SCNC: 4 MMOL/L (ref 3.5–5.1)
PROTHROMBIN TIME: 14.3 SEC (ref 11.7–14.5)
RBC # BLD: 3.94 M/UL (ref 4–5.2)
SODIUM BLD-SCNC: 138 MMOL/L (ref 136–145)
WBC # BLD: 9.3 K/UL (ref 4–11)

## 2022-08-10 PROCEDURE — C1769 GUIDE WIRE: HCPCS

## 2022-08-10 PROCEDURE — 93005 ELECTROCARDIOGRAM TRACING: CPT | Performed by: INTERNAL MEDICINE

## 2022-08-10 PROCEDURE — 6360000004 HC RX CONTRAST MEDICATION: Performed by: INTERNAL MEDICINE

## 2022-08-10 PROCEDURE — 80048 BASIC METABOLIC PNL TOTAL CA: CPT

## 2022-08-10 PROCEDURE — 93458 L HRT ARTERY/VENTRICLE ANGIO: CPT | Performed by: INTERNAL MEDICINE

## 2022-08-10 PROCEDURE — 2709999900 HC NON-CHARGEABLE SUPPLY

## 2022-08-10 PROCEDURE — 99152 MOD SED SAME PHYS/QHP 5/>YRS: CPT

## 2022-08-10 PROCEDURE — 6360000002 HC RX W HCPCS

## 2022-08-10 PROCEDURE — 85610 PROTHROMBIN TIME: CPT

## 2022-08-10 PROCEDURE — C1894 INTRO/SHEATH, NON-LASER: HCPCS

## 2022-08-10 PROCEDURE — 2500000003 HC RX 250 WO HCPCS

## 2022-08-10 PROCEDURE — 85027 COMPLETE CBC AUTOMATED: CPT

## 2022-08-10 PROCEDURE — 93458 L HRT ARTERY/VENTRICLE ANGIO: CPT

## 2022-08-10 RX ORDER — SODIUM CHLORIDE 9 MG/ML
INJECTION, SOLUTION INTRAVENOUS PRN
Status: DISCONTINUED | OUTPATIENT
Start: 2022-08-10 | End: 2022-08-13 | Stop reason: HOSPADM

## 2022-08-10 RX ORDER — SODIUM CHLORIDE 0.9 % (FLUSH) 0.9 %
5-40 SYRINGE (ML) INJECTION EVERY 12 HOURS SCHEDULED
Status: DISCONTINUED | OUTPATIENT
Start: 2022-08-10 | End: 2022-08-13 | Stop reason: HOSPADM

## 2022-08-10 RX ORDER — ACETAMINOPHEN 325 MG/1
650 TABLET ORAL EVERY 4 HOURS PRN
Status: DISCONTINUED | OUTPATIENT
Start: 2022-08-10 | End: 2022-08-13 | Stop reason: HOSPADM

## 2022-08-10 RX ORDER — SODIUM CHLORIDE 0.9 % (FLUSH) 0.9 %
5-40 SYRINGE (ML) INJECTION PRN
Status: DISCONTINUED | OUTPATIENT
Start: 2022-08-10 | End: 2022-08-13 | Stop reason: HOSPADM

## 2022-08-10 RX ORDER — SODIUM CHLORIDE 9 MG/ML
INJECTION, SOLUTION INTRAVENOUS CONTINUOUS
Status: ACTIVE | OUTPATIENT
Start: 2022-08-10 | End: 2022-08-10

## 2022-08-10 RX ORDER — ASPIRIN 325 MG
325 TABLET ORAL ONCE
Status: DISCONTINUED | OUTPATIENT
Start: 2022-08-10 | End: 2022-08-13 | Stop reason: HOSPADM

## 2022-08-10 RX ADMIN — IOHEXOL 100 ML: 350 INJECTION, SOLUTION INTRAVENOUS at 10:25

## 2022-08-10 NOTE — CONSULTS
DAILY 90 tablet 1    traZODone (DESYREL) 50 MG tablet TAKE 1 TO 2 TABLETS NIGHTLYAS NEEDED FOR SLEEP 60 tablet 5    levothyroxine (SYNTHROID) 50 MCG tablet TAKE 1 TABLET DAILY 90 tablet 1    valACYclovir (VALTREX) 1 g tablet TAKE 1 TABLET DAILY 90 tablet 1    vitamin B-12 (CYANOCOBALAMIN) 1000 MCG tablet Take 1 tablet by mouth daily 30 tablet 3    Loratadine (CLARITIN PO) Take by mouth      Cholecalciferol (VITAMIN D3) 1000 units TABS Take 1 tablet by mouth daily 30 tablet 11    PREMARIN 0.625 MG/GM vaginal cream PLACE 0.5G VAGINALLY TWICE A WEEK . 30 g 5    triamcinolone (NASACORT ALLERGY 24HR) 55 MCG/ACT nasal inhaler 2 SPRAYS IN EACH NOSTRIL one time a day 1 Inhaler 2    bisacodyl (DULCOLAX) 5 MG EC tablet Take 10 mg by mouth daily as needed for Constipation. Omega-3 Fatty Acids (FISH OIL BURP-LESS) 1200 MG CAPS Take 1 tablet by mouth daily       ibuprofen (ADVIL;MOTRIN) 600 MG tablet Take 600 mg by mouth every 6 hours as needed for Pain.        Current Facility-Administered Medications   Medication Dose Route Frequency Provider Last Rate Last Admin    sodium chloride flush 0.9 % injection 5-40 mL  5-40 mL IntraVENous 2 times per day Von Salazar MD        sodium chloride flush 0.9 % injection 5-40 mL  5-40 mL IntraVENous PRN Von Salazar MD        0.9 % sodium chloride infusion   IntraVENous PRN Von Salazar MD        aspirin tablet 325 mg  325 mg Oral Once Von Salazar MD           Past Medical History:    Past Medical History:   Diagnosis Date    Abnormal Pap smear of cervix     Acid reflux     Bacterial vaginosis     Dysmenorrhea     Environmental allergies     Fibrocystic breast     Fibroid     Herpes simplex type 2 infection     Hyperlipidemia     Hypothyroid     Dx about 15 years ago, but htne was off med for  long time    Insomnia     Menopause     approx age 37    Menopause ovarian failure     Menorrhagia     Urinary incontinence     Urogenital trichomoniasis        Surgical History:    Past Surgical History:   Procedure Laterality Date    BREAST BIOPSY      CAPSULOTOMY Bilateral 02/2020    Yag    CATARACT REMOVAL WITH IMPLANT Right 06/26/2019    DILATION AND CURETTAGE OF UTERUS      ESOPHAGEAL DILATATION N/A 10/25/2018    ESOPHAGEAL DILATION LOZANO performed by Mi Cassidy MD at [place] [address]., TOTAL ABDOMINAL (CERVIX REMOVED)  1990    fibroid-still with ovaries    INTRACAPSULAR CATARACT EXTRACTION Left 06/12/2019    PHACOEMULSIFICATION OF CATARACT LEFT EYE WITH INTRAOCULAR LENS IMPLANT performed by Mouna Guillermo MD at [place] [address] Right 06/26/2019    PHACOEMULSIFICATION OF CATARACT RIGHT EYE WITH INTRAOCULAR LENS IMPLANT performed by Mouna Guillermo MD at [address] [place] [address] 10/25/2018    EGD BIOPSY performed by Mi [facility]idy MD at [facility]:  Pre-Sedation Documentation and Exam:  I have personally completed a history, physical exam & review of systems for this patient (see notes). Prior History of Anesthesia Complications:   none    Modified Mallampati:  II (soft palate, uvula, fauces visible)    ASA Classification:  Class 3 - A patient with severe systemic disease that limits activity but is not incapacitating    Evelyn Scale: Activity:  2 - Able to move 4 extremities voluntarily on command  Respiration:  2 - Able to breathe deeply and cough freely  Circulation:  2 - BP+/- 20mmHg of normal  Consciousness:  2 - Fully awake  Oxygen Saturation (color):  2 - Able to maintain oxygen saturation >92% on room air    Sedation/Anesthesia Plan:  Guard the patient's safety and welfare. Minimize physical discomfort and pain. Minimize negative psychological responses to treatment by providing sedation and analgesia and maximize the potential amnesia. Patient to meet pre-procedure discharge plan.     Medication Planned:  midazolam intravenously and fentanyl intravenously    Patient is an appropriate candidate for plan of sedation:   yes      Electronically signed by Von Salazar MD on 8/10/2022 at 9:51 AM

## 2022-08-10 NOTE — PROCEDURES
[address]               [address]                            CARDIAC CATHETERIZATION    PATIENT NAME: Marie De La Paz                :        1950  MED REC NO:   [id number]                          ROOM:  ACCOUNT NO:   [de-identified]                           ADMIT DATE: 08/10/2022  PROVIDER:     Nic Samson MD    DATE OF PROCEDURE:  08/10/2022    PROCEDURE:  Left heart catheterization and coronary cineangiography. HISTORY:  The patient is a 56-year-old female with no prior cardiac  history, but history of hyperlipidemia who presents with complaints of  chest discomfort. She does state that she has been having chest  discomfort over the past two years, primarily in the summer when she  cuts the lawn. She has been getting fairly consistent chest discomfort  again when mowing the lawn. She will stop and it goes away promptly. It is moderate in severity. She has no real rest pain similar to this;  however, she does have reflux type symptoms. She notes no PND,  orthopnea, or peripheral edema. No palpitation. No syncope. She  underwent stress echocardiogram, which demonstrated ST changes with  exercise, but no exercise induced wall motion abnormalities. Because of  her symptoms as well as her abnormal stress EKG, it was felt that she  should undergo catheterization. TECHNICAL PROCEDURE:  The patient was brought to the cardiac  catheterization lab on 08/10/2022 where right femoral area was prepped  and draped in the usual sterile fashion. After anesthetizing the area  with 2% lidocaine, a 5-Djiboutian sheath was placed in the right femoral  artery using Seldinger technique. Subsequently, left heart  catheterization, left ventriculography, and selective coronary  cineangiography of both left and right coronaries were performed in  multiple projections.   This was performed using 5-Djiboutian pigtail, JL4,  and JR4 diagnostic catheters. The patient tolerated the procedure well. No complications were encountered. The right femoral arterial sheath  was removed, and hemostasis was obtained using manual pressure. RESULTS:  HEMODYNAMICS:  Left ventricular end-diastolic pressure equals 14. There was no significant gradient across the aortic valve by pullback  post cineangiography. LEFT VENTRICULOGRAM:  Left ventriculogram demonstrates uniform wall  motion. Estimated ejection fraction is 60%. LEFT MAIN:  Left main was a short vessel, free of significant  obstructive disease. LEFT ANTERIOR DESCENDING:  The LAD courses to and wraps around the apex. It gave off a moderate to large first diagonal branch followed by a  moderate size second diagonal branch. There was a 90% eccentric  stenosis at the level of the first diagonal branch. First diagonal  branch had proximal disease approaching 75% to 89%. LEFT CIRCUMFLEX:  Circumflex consists of a large marginal branch and a  large posterolateral branch. There is a 90% eccentric stenosis in the  proximal circumflex. RIGHT CORONARY ARTERY:  Right coronary is a dominant vessel. It gives  off a small PDA and two posterolateral branches. There is a 90%  stenosis after the proximal bend. Again, a 60% mid vessel stenosis. The distal vessel is free of significant obstructive disease. IMPRESSION:  1. Preserved LV function with estimated ejection fraction of 60%. 2.  Severe multivessel coronary artery disease as described above. 3.  Recommend surgical consultation.         Keisha Galvan MD    D: 08/10/2022 10:15:17       T: 08/10/2022 11:53:28     WANDER_LULU  Job#: [id number]     Doc#: [id number]    CC:

## 2022-08-10 NOTE — CONSULTS
Subjective:      Patient ID: Linsey Singh is a 80 y.o. female. HPI  Referred for cp/abn stress ekg. Has been getting chest pain when mows the lawn. Again this year. Fairly consistant. If stops goes away quickly. Moderate in severity. Does get at times but elevating head helps. Has esophageal strictures. Has helped night time sx. No pnd/orthopnea. No edema. No tachy/syncope.   No previous cardiac     Past Medical History        Past Medical History:   Diagnosis Date    Abnormal Pap smear of cervix      Acid reflux      Bacterial vaginosis      Dysmenorrhea      Environmental allergies      Fibrocystic breast      Fibroid      Herpes simplex type 2 infection      Hyperlipidemia      Hypothyroid       Dx about 15 years ago, but htne was off med for  long time    Insomnia      Menopause       approx age 37    Menopause ovarian failure      Menorrhagia      Urinary incontinence      Urogenital trichomoniasis           Past Surgical History         Past Surgical History:   Procedure Laterality Date    BREAST BIOPSY        CAPSULOTOMY Bilateral 02/2020     Yag    CATARACT REMOVAL WITH IMPLANT Right 06/26/2019    DILATION AND CURETTAGE OF UTERUS        ESOPHAGEAL DILATATION N/A 10/25/2018     ESOPHAGEAL DILATION Coleman Alter performed by Drake Walter MD at [address], [address] [facility] [place] [place])   1990     fibroid-still with ovaries    INTRACAPSULAR CATARACT EXTRACTION Left 06/12/2019     PHACOEMULSIFICATION OF CATARACT LEFT EYE WITH INTRAOCULAR LENS IMPLANT performed by Xander Elliott MD at [place] [address] Right 06/26/2019     PHACOEMULSIFICATION OF CATARACT RIGHT EYE WITH INTRAOCULAR LENS IMPLANT performed by Xander Elliott MD at [address] 10/25/2018     EGD BIOPSY performed by Drake Walter MD at [address] History               Socioeconomic History    Marital status:  Spouse name: ,  works    Number of children: 4    Years of education: Not on file    Highest education level: Not on file   Occupational History    Occupation:    Tobacco Use    Smoking status: Never    Smokeless tobacco: Never    Tobacco comments:       Never smoked, never will! Substance and Sexual Activity    Alcohol use: No    Drug use: Never    Sexual activity: Not Currently       Partners: Male   Other Topics Concern    Not on file   Social History Narrative     Exercise:     2-3x/week, recumbent bike or walking for 20-30 min      Social Determinants of Health          Financial Resource Strain: Low Risk    Difficulty of Paying Living Expenses: Not hard at all   Food Insecurity: No Food Insecurity    Worried About Running Out of Food in the Last Year: Never true    Ran Out of Food in the Last Year: Never true   Transportation Needs: Not on file   Physical Activity: Not on file   Stress: Not on file   Social Connections: Not on file   Intimate Partner Violence: Not on file   Housing Stability: Not on file         FH reviewed, negative for cardiac issues,  Bro with CAD         Vitals:     07/28/22 1150   BP: 134/70   Pulse: 64      Wt 201. Review of Systems  Constitutional:  Negative for activity change, appetite change and fatigue. Respiratory:  Negative for cough, choking, chest tightness and shortness of breath. Cardiovascular:  Positive for chest pain. Negative for palpitations and leg swelling. Denies PND or orthopnea. No tachycardia or syncope. Neurological:  Negative for dizziness, syncope and light-headedness. Psychiatric/Behavioral:  Negative for agitation, behavioral problems and confusion. All other systems reviewed and are negative. Objective:   Physical Exam  Constitutional:       General: She is not in acute distress. Appearance: Normal appearance. She is well-developed. HENT:     Head: Normocephalic and atraumatic.      Right Ear: External ear normal.     Left Ear: External ear normal.     Nose: Nose normal.  Neck:     Vascular: No JVD. Cardiovascular:     Rate and Rhythm: Normal rate and regular rhythm. Heart sounds: Normal heart sounds. No murmur heard. No gallop. Pulmonary:     Effort: Pulmonary effort is normal. No respiratory distress. Breath sounds: Normal breath sounds. No wheezing or rales. Abdominal:     General: Bowel sounds are normal. There is no distension. Palpations: Abdomen is soft. Tenderness: There is no abdominal tenderness. Musculoskeletal:         General: Normal range of motion. Cervical back: Normal range of motion. Skin:     General: Skin is warm and dry. Neurological:     General: No focal deficit present. Mental Status: She is alert and oriented to person, place, and time. Psychiatric:         Behavior: Behavior normal.        Assessment:     Diagnosis Orders   1. Chest pain, unspecified type         2. Abnormal stress ECG                              Plan:   Chest pain. EKG shows NSR, WNL. Stress echo with abn ST response to exercise with no WMA by echo. In view of sx and abn st response have recommended cath. Risk and bene explained.                    Kofi Espinosa MD

## 2022-08-10 NOTE — CONSULTS
Procedure: [facility]  Complication: none  EBL<5 cc  Preliminary; LV uniform. EF 60%. LM ok. LAD with 80-90% prox. Lg D1 with 80% prox. Cx with prox 90%. RCA with prox 90%-dom.     Rec CVTS consult, CABG

## 2022-08-10 NOTE — DISCHARGE INSTRUCTIONS
[facility] [employer]  Cardiovascular Special Procedures  Angiogram/Cardiac Catheterization  Patient Discharge Instructions           Day 1 (Procedure Day):  Rest for the remainder of the day. Minimal stair climbing, if you must use stairs, lead with your unaffected leg. Do not drive a car. Do not be alone. Avoid prolonged sitting, walk around occasionally until bedtime tonight. Leave dressing intact. Day 2:  You may climb stairs, begin slowly  You may drive a car, unless otherwise directed by physician. Remove dressing. You may bathe/shower, but wash gently around the puncture site and pat dry. Place new band-aid on site daily until skin heals. Things to Avoid:  You may not do any strenuous exercises for one week. (ex: golfing, bowling, tennis, vacuum, snow removal, jogging, and aerobic exercises). No hot tubs, baths, or pools for one week. Do not lift anything over 10 pounds for 10 days. Avoid positions that would put pressure on your groin. Like sitting upright in a straight back chair. No lotions, powders, or ointments near site for 5 days. Things to watch for:  You may have a small lump or a bruise. This is common and will go away. If the lump increases and site becomes painful, call physician immediately. If mild discomfort occurs at the puncture site you may place an ice pack on the site at 15 minute intervals. If the puncture site starts to bleed, immediately lie on a hard flat surface and apply pressure just above the puncture site. Have someone call 911 and maintain pressure until the EMS arrives. If you have loss of color, extreme coldness or numbness of the leg, call 911 immediately. Excessive pain of the groin, thigh or calf, call your physician immediately. Watch for signs and symptoms of an infection at the groin site (fever, local pain, redness, warmth or discharge from the puncture site), call your physician immediately if any develop.       Any Questions please call us at [phone] (between 7am- 5pm, Mon-Fri). After hours you should contact your physician. [facility]  Cardiovascular Special Procedures  General Discharge Instructions    PROCEDURE: Left Heart Catheterization    _X___ You may be drowsy or lightheaded after receiving sedation. DO NOT operate a vehicle (automobile, bicycle, motorcycle, machinery, or power tools), make any important decisions or sign any important/legal documents, or drink alcoholic beverages for the next 24 hours  __X__ We strongly suggest that a responsible adult be with you for the next 24 hours for your protection and safety  __X__ If the intravenous catheter site is painful, apply warm wet compresses on the site until the soreness is relieved and elevate the arm above the heart. Call your physician if no improvement in 2 to 3 days    DIETARY INSTRUCTIONS:    ____ Drink extra fluids over the next 24 hours (If not contraindicated by illness or by physician order)  __X__ Start with clear liquids and progress to normal diet as you feel like eating. If you experience nausea or repeated episodes of vomiting, which persist beyond 12-24 hours, notify your doctor        ____ Resume your previous diet      MEDICATION INSTRUCTIONS:    ____ See Medication Reconciliation Sheet      SPECIAL INSTRUCTIONS:  ________________________________________________________________________________________________________________________________________________________________________________________________________________________                                                                                                                                                                                                                                                                                                Please make sure that you follow-up with your doctors office for your results.         FOLLOW-UP APPOINTMENT    Follow up with NP in 1 week.  .  Follow up with Dr. Stuart Carcamo on 8/24/22 at 3:15PM.    Belongings returned to patient and/or family: YES. The Discharge Instructions have been explained to me. I understand and can verbalize these instructions.

## 2022-08-10 NOTE — CONSULTS
Consultation H&P    Date of Admission:  8/10/2022  8:38 AM  Date of Consultation:  8/10/2022    PCP:  Jordon Wiley MD      Cards: Pepper Connell MD    Chief Complaint: chest pain with activity    History of Present Illness: We are asked to see this patient in consultation by Dr. Jama Polk regarding CABG. Jackson Rubin is a 80 y.o. female who presented today for a cardiac cath after abnormal stress test. Patient states she has been having chest pain with activity, only with mowing the grass. States this has been going on for a few years. Relieved with rest, usually after a few minutes. Denies SOB, palpitations, dizziness, leg edema. PMHx of hyperlipidemia. Denies any cardiac history, stroke, blood clots, kidney disease/injury, lung disease, asthma, diabetes. Denies tobacco and alcohol use. Surgical hx includes hysterectomy. Denies any family history of cardiac issues. Patient lives at home alone. Still works full time, desk job. Patient is very independent and capable of ADLs - mows grass, cooks, cleans, drives, etc. Daughter lives 40 mins from her.       Past Medical History:  Past Medical History:   Diagnosis Date    Abnormal Pap smear of cervix     Acid reflux     Bacterial vaginosis     Dysmenorrhea     Environmental allergies     Fibrocystic breast     Fibroid     Herpes simplex type 2 infection     Hyperlipidemia     Hypothyroid     Dx about 15 years ago, but htne was off med for  long time    Insomnia     Menopause     approx age 37    Menopause ovarian failure     Menorrhagia     Urinary incontinence     Urogenital trichomoniasis        Past Surgical History:  Past Surgical History:   Procedure Laterality Date    BREAST BIOPSY      CAPSULOTOMY Bilateral 02/2020    Yag    CATARACT REMOVAL WITH IMPLANT Right 06/26/2019    DILATION AND CURETTAGE OF UTERUS      ESOPHAGEAL DILATATION N/A 10/25/2018    ESOPHAGEAL DILATION MARTA performed by Vandana Jaramillo MD at [place] [address] Virgil Guzman MD   mometasone (NASONEX) 50 MCG/ACT nasal spray USE 2 SPRAYS BY NASAL ROUTE DAILY 4/6/16 1/30/19  Virgil Guzman MD   bisacodyl (DULCOLAX) 5 MG EC tablet Take 10 mg by mouth daily as needed for Constipation. Historical Provider, MD   Omega-3 Fatty Acids (FISH OIL BURP-LESS) 1200 MG CAPS Take 1 tablet by mouth daily     Historical Provider, MD   ibuprofen (ADVIL;MOTRIN) 600 MG tablet Take 600 mg by mouth every 6 hours as needed for Pain. Historical Provider, MD        Facility Administered Medications:    sodium chloride flush  5-40 mL IntraVENous 2 times per day    aspirin  325 mg Oral Once       Allergies: Allergies   Allergen Reactions    Latex Rash    Penicillins Rash        Social History:    Working: works full time, desk job  Lifestyle:  lives at home alone, very independent, still drives  Social History     Socioeconomic History    Marital status:      Spouse name: ,  works    Number of children: 4    Years of education: Not on file    Highest education level: Not on file   Occupational History    Occupation:    Tobacco Use    Smoking status: Never    Smokeless tobacco: Never    Tobacco comments:     Never smoked, never will!    Substance and Sexual Activity    Alcohol use: No    Drug use: Never    Sexual activity: Not Currently     Partners: Male   Other Topics Concern    Not on file   Social History Narrative    Exercise:    2-3x/week, recumbent bike or walking for 20-30 min     Social Determinants of Health     Financial Resource Strain: Low Risk     Difficulty of Paying Living Expenses: Not hard at all   Food Insecurity: No Food Insecurity    Worried About Running Out of Food in the Last Year: Never true    Ran Out of Food in the Last Year: Never true   Transportation Needs: Not on file   Physical Activity: Not on file   Stress: Not on file   Social Connections: Not on file   Intimate Partner Violence: Not on file   Housing Stability: Not on file with rest after exam was completely finished. There was no evidence of significant aortic and mitral regurgitation or stenosis present. Conclusions:   Positive Stress ECG for ischemia. Negative Stress Echocardiographic Images for ischemia. Hypertensive BP response to exercise. Labs:   CBC:   Recent Labs     08/10/22  0902   WBC 9.3   HGB 11.9*   HCT 36.3   MCV 92.1        BMP:   Recent Labs     08/10/22  0902      K 4.0      CO2 24   BUN 12   CREATININE 0.8   CALCIUM 9.1     Cardiac Enzymes: No results for input(s): CKTOTAL, CKMB, CKMBINDEX, TROPONINI in the last 72 hours. PT/INR:   Recent Labs     08/10/22  0902   PROTIME 14.3   INR 1.12     APTT: No results for input(s): APTT in the last 72 hours.   Liver Profile:  Lab Results   Component Value Date/Time    AST 24 05/27/2022 09:00 AM    ALT 29 05/27/2022 09:00 AM    BILITOT 0.4 05/27/2022 09:00 AM    ALKPHOS 75 05/27/2022 09:00 AM    LABALBU 4.3 05/27/2022 09:00 AM     Lab Results   Component Value Date/Time    CHOL 157 05/27/2022 09:00 AM    HDL 38 05/27/2022 09:00 AM    TRIG 136 05/27/2022 09:00 AM     HgbA1c:  Lab Results   Component Value Date/Time    LABA1C 6.0 12/13/2021 10:25 AM     UA:   Lab Results   Component Value Date/Time    NITRITE Negative 09/08/2021 11:59 AM    COLORU Yellow 09/08/2021 11:59 AM    PHUR 5 09/08/2021 11:59 AM    CLARITYU Hazy 09/08/2021 11:59 AM    SPECGRAV 1.025 09/08/2021 11:59 AM    LEUKOCYTESUR Moderate 09/08/2021 11:59 AM    BILIRUBINUR Small 09/08/2021 11:59 AM    BLOODU Trace 09/08/2021 11:59 AM    GLUCOSEU Negative 09/08/2021 11:59 AM         History obtained: chart, patient, daughter      Risk factors: hyperlipidemia, age      Diagnosis: CAD      [facility] Adult Cardiac Surgery Database Version 4.20  RISK SCORES  Procedure: Isolated CAB  Risk of Mortality:  0.798%  Renal Failure:  0.590%  Permanent Stroke:  0.838%  Prolonged Ventilation:  2.937%  DSW Infection:  0.104%  Reoperation:  1.291%  Morbidity or Mortality:  5.024%  Short Length of Stay:  57.411%  Long Length of Stay:  1.926%      Assessment & Plan:    Consulted for CABG consideration  Denies any current CP, SOB  Put on schedule to see Dr Edd Márquez 8/24/22 at 3:15pm  Patient going home this afternoon    CV - multivessel CAD  - cardiac cath 8/10/22 LAD with 80-90% prox. Lg D1 with 80% prox. Cx with prox 90%.    RCA with prox 90%-dom  - statin, ASA  PULM - on RA  HEME -   PAIN - controlled - tylenol      SHANIQUA Mace  8/10/2022  10:38 AM

## 2022-08-24 ENCOUNTER — OFFICE VISIT (OUTPATIENT)
Dept: CARDIOTHORACIC SURGERY | Age: 72
End: 2022-08-24
Payer: COMMERCIAL

## 2022-08-24 VITALS
WEIGHT: 197 LBS | HEIGHT: 60 IN | SYSTOLIC BLOOD PRESSURE: 134 MMHG | DIASTOLIC BLOOD PRESSURE: 62 MMHG | HEART RATE: 79 BPM | TEMPERATURE: 98 F | OXYGEN SATURATION: 95 % | BODY MASS INDEX: 38.68 KG/M2

## 2022-08-24 DIAGNOSIS — Z01.818 PRE-OP TESTING: Primary | ICD-10-CM

## 2022-08-24 DIAGNOSIS — I25.118 CORONARY ARTERY DISEASE INVOLVING NATIVE CORONARY ARTERY OF NATIVE HEART WITH OTHER FORM OF ANGINA PECTORIS (HCC): Primary | ICD-10-CM

## 2022-08-24 DIAGNOSIS — E66.01 CLASS 2 SEVERE OBESITY DUE TO EXCESS CALORIES WITH SERIOUS COMORBIDITY IN ADULT, UNSPECIFIED BMI (HCC): ICD-10-CM

## 2022-08-24 PROCEDURE — 1123F ACP DISCUSS/DSCN MKR DOCD: CPT | Performed by: THORACIC SURGERY (CARDIOTHORACIC VASCULAR SURGERY)

## 2022-08-24 PROCEDURE — 99204 OFFICE O/P NEW MOD 45 MIN: CPT | Performed by: THORACIC SURGERY (CARDIOTHORACIC VASCULAR SURGERY)

## 2022-08-24 ASSESSMENT — ENCOUNTER SYMPTOMS
EYE PAIN: 1
GASTROINTESTINAL NEGATIVE: 1
CHEST TIGHTNESS: 1
SHORTNESS OF BREATH: 1
EYE DISCHARGE: 1
ALLERGIC/IMMUNOLOGIC NEGATIVE: 1
COUGH: 0

## 2022-08-24 NOTE — PROGRESS NOTES
Subjective:      Patient ID: Danny Hernández is a 80 y.o. female. Chief Complaint   Patient presents with    New Patient     Mrs. Miroslava Cardozo is being seen today at the request of Dr. Blacna Briceno for consideration of CABG. HPI Mrs. Miroslava Cardozo has had symptoms of exertional angina since last summer. Primary to video that elicits her symptoms is mowing the grass. This is gotten a bit worse this summer compared to last.  She related this information to her primary care physician who sent her for a stress test.  She made it about 2 minutes on the stress test and her blood pressure went up and her heart rate went up and she felt poorly. Follow-up cardiac catheterization found her to have significant three-vessel coronary disease with preserved left ventricular function. She comes to my office today to discuss surgical management of her coronary disease. Review of Systems   Constitutional:  Positive for activity change and fatigue. HENT: Negative. Eyes:  Positive for pain (Occ. burning due to dry eyes) and discharge (Watery eyes). Wears glasses   Respiratory:  Positive for chest tightness and shortness of breath (w/exertion). Negative for cough. Cardiovascular:  Positive for chest pain. Negative for palpitations and leg swelling. Gastrointestinal: Negative. Endocrine: Negative. Genitourinary:  Positive for difficulty urinating (Stress incontinence). Negative for dysuria and hematuria. Musculoskeletal: Negative. Skin: Negative. Allergic/Immunologic: Negative. Neurological:  Positive for dizziness and light-headedness. Negative for syncope. Hematological:  Bruises/bleeds easily. Psychiatric/Behavioral:  Positive for sleep disturbance. The patient is nervous/anxious.     Past Medical History:   Diagnosis Date    Abnormal Pap smear of cervix     Acid reflux     Bacterial vaginosis     Dysmenorrhea     Environmental allergies     Fibrocystic breast     Fibroid     Herpes simplex type 2 infection     Hyperlipidemia     Hypothyroid     Dx about 15 years ago, but htne was off med for  long time    Insomnia     Menopause     approx age 37    Menopause ovarian failure     Menorrhagia     Urinary incontinence     Urogenital trichomoniasis      Past Surgical History:   Procedure Laterality Date    BREAST BIOPSY      CAPSULOTOMY Bilateral 02/2020    Yag    CARDIAC CATHETERIZATION  08/10/2022    LAD with 80-90% prox. Lg D1 with 80% prox. Cx with prox 90%. RCA with prox 90%-dom    CATARACT REMOVAL WITH IMPLANT Right 06/26/2019    DILATION AND CURETTAGE OF UTERUS      ESOPHAGEAL DILATATION N/A 10/25/2018    ESOPHAGEAL DILATION LOZANO performed by Verna Botello MD at [address], TOTAL ABDOMINAL (CERVIX REMOVED)  1990    fibroid-still with ovaries    INTRACAPSULAR CATARACT EXTRACTION Left 06/12/2019    PHACOEMULSIFICATION OF CATARACT LEFT EYE WITH INTRAOCULAR LENS IMPLANT performed by Warner Angulo MD at [place] [address] Right 06/26/2019    PHACOEMULSIFICATION OF CATARACT RIGHT EYE WITH INTRAOCULAR LENS IMPLANT performed by Warner Angulo MD at [facility] [address] 10/25/2018    EGD BIOPSY performed by Verna Botello MD at [address]   Allergen Reactions    Latex Rash    Penicillins Rash     Current Outpatient Medications   Medication Sig Dispense Refill    aspirin 81 MG chewable tablet Take 1 tablet by mouth in the morning.  30 tablet 11    omeprazole (PRILOSEC) 40 MG delayed release capsule Take 1 capsule by mouth in the morning and at bedtime 180 capsule 1    buPROPion (WELLBUTRIN XL) 300 MG extended release tablet TAKE 1 TABLET EVERY MORNING 90 tablet 1    FLUoxetine (PROZAC) 10 MG capsule TAKE 1 CAPSULE DAILY 90 capsule 1    atorvastatin (LIPITOR) 20 MG tablet TAKE 1 TABLET DAILY 90 tablet 1    traZODone (DESYREL) 50 MG tablet TAKE 1 TO 2 TABLETS NIGHTLYAS NEEDED FOR SLEEP 60 tablet 5 levothyroxine (SYNTHROID) 50 MCG tablet TAKE 1 TABLET DAILY 90 tablet 1    valACYclovir (VALTREX) 1 g tablet TAKE 1 TABLET DAILY 90 tablet 1    vitamin B-12 (CYANOCOBALAMIN) 1000 MCG tablet Take 1 tablet by mouth daily 30 tablet 3    Cholecalciferol (VITAMIN D3) 1000 units TABS Take 1 tablet by mouth daily 30 tablet 11    PREMARIN 0.625 MG/GM vaginal cream PLACE 0.5G VAGINALLY TWICE A WEEK . 30 g 5    triamcinolone (NASACORT ALLERGY 24HR) 55 MCG/ACT nasal inhaler 2 SPRAYS IN EACH NOSTRIL one time a day 1 Inhaler 2    bisacodyl (DULCOLAX) 5 MG EC tablet Take 10 mg by mouth daily as needed for Constipation. Omega-3 Fatty Acids (FISH OIL BURP-LESS) 1200 MG CAPS Take 1 tablet by mouth daily       ibuprofen (ADVIL;MOTRIN) 600 MG tablet Take 600 mg by mouth every 6 hours as needed for Pain. Loratadine (CLARITIN PO) Take by mouth       No current facility-administered medications for this visit. Social History     Socioeconomic History    Marital status:      Spouse name: ,  works    Number of children: 4    Years of education: Not on file    Highest education level: Not on file   Occupational History    Occupation:    Tobacco Use    Smoking status: Never    Smokeless tobacco: Never    Tobacco comments:     Never smoked, never will!    Vaping Use    Vaping Use: Never used   Substance and Sexual Activity    Alcohol use: No    Drug use: Never    Sexual activity: Not Currently     Partners: Male   Other Topics Concern    Not on file   Social History Narrative    Exercise:    2-3x/week, recumbent bike or walking for 20-30 min     Social Determinants of Health     Financial Resource Strain: Low Risk     Difficulty of Paying Living Expenses: Not hard at all   Food Insecurity: No Food Insecurity    Worried About Running Out of Food in the Last Year: Never true    Ran Out of Food in the Last Year: Never true   Transportation Needs: Not on file   Physical Activity: Not on file Stress: Not on file   Social Connections: Not on file   Intimate Partner Violence: Not on file   Housing Stability: Not on file     Family History   Problem Relation Age of Onset    Lung Cancer Mother 54    Lung Cancer Father 76        heavy smoker    Depression Father     High Cholesterol Brother     Diabetes Brother     Anxiety Disorder Brother     Sleep Apnea Brother     Diabetes Brother     Sleep Apnea Brother     Lung Cancer Other       Objective:   Physical Exam  Constitutional:       General: She is not in acute distress. Appearance: Normal appearance. She is well-developed. She is obese. She is not diaphoretic. HENT:      Head: Normocephalic. Nose: Nose normal.   Eyes:      General: No scleral icterus. Conjunctiva/sclera: Conjunctivae normal.      Pupils: Pupils are equal, round, and reactive to light. Neck:      Thyroid: No thyromegaly. Vascular: No JVD. Trachea: No tracheal deviation. Cardiovascular:      Rate and Rhythm: Normal rate and regular rhythm. Pulses: Normal pulses. Heart sounds: Normal heart sounds. No murmur heard. No friction rub. No gallop. Pulmonary:      Effort: Pulmonary effort is normal. No respiratory distress. Breath sounds: Normal breath sounds. No stridor. No wheezing or rales. Abdominal:      General: Bowel sounds are normal. There is no distension. Palpations: Abdomen is soft. There is no mass. Tenderness: There is no abdominal tenderness. There is no guarding. Musculoskeletal:         General: No deformity. Normal range of motion. Cervical back: Normal range of motion and neck supple. Right lower leg: No edema. Left lower leg: No edema. Skin:     General: Skin is warm and dry. Capillary Refill: Capillary refill takes less than 2 seconds. Coloration: Skin is not jaundiced. Findings: No erythema or rash. Neurological:      General: No focal deficit present.       Mental Status: She is alert and oriented to person, place, and time. Cranial Nerves: No cranial nerve deficit. Coordination: Coordination normal.   Psychiatric:         Mood and Affect: Mood normal.         Behavior: Behavior normal.         Thought Content: Thought content normal.         Judgment: Judgment normal.     Vitals:    08/24/22 1503 08/24/22 1506   BP: 138/62 134/62   Site: Left Upper Arm Right Upper Arm   Position: Sitting Sitting   Pulse: 79    Temp: 98 °F (36.7 °C)    TempSrc: Infrared    SpO2: 95%    Weight: 197 lb (89.4 kg)    Height: 5' (1.524 m)       Personal review of her recent coronary angiogram shows significant proximal disease and all elements of her coronary tree. More specifically she will need a graft to her left anterior descending looks to be the first diagonal branch of the LAD as well as an obtuse marginal.  Finally some graft to the distal right coronary artery will also be necessary. Her left ventricular function is normal.    Assessment:      81-year-old female living independently with obstructive multivessel coronary disease in need of surgical revascularization. Plan:      I discussed coronary bypass surgery with Mrs. Mazariegos and her daughter in detail. All their questions were answered. At the conclusion of our discussion both indicated they were agreeable to proceed with getting her surgery scheduled. Lastly take of the necessary logistics for her. They both know they are welcome to call or return to our office at any time prior to her surgery should any new problems, questions or concerns arise for which we can be of help. Thank you very much for the opportunity to see Yoni Real in consultation today. It was a pleasure meeting her and her daughter.

## 2022-08-26 ENCOUNTER — HOSPITAL ENCOUNTER (OUTPATIENT)
Dept: VASCULAR LAB | Age: 72
Discharge: HOME | End: 2022-08-26
Payer: COMMERCIAL

## 2022-08-26 ENCOUNTER — TELEPHONE (OUTPATIENT)
Dept: CARDIOTHORACIC SURGERY | Age: 72
End: 2022-08-26

## 2022-08-26 ENCOUNTER — HOSPITAL ENCOUNTER (OUTPATIENT)
Dept: GENERAL RADIOLOGY | Age: 72
Discharge: HOME | End: 2022-08-26
Payer: COMMERCIAL

## 2022-08-26 ENCOUNTER — HOSPITAL ENCOUNTER (OUTPATIENT)
Age: 72
Discharge: HOME | End: 2022-08-26

## 2022-08-26 DIAGNOSIS — Z01.818 PRE-OP TESTING: ICD-10-CM

## 2022-08-26 PROCEDURE — 71046 X-RAY EXAM CHEST 2 VIEWS: CPT

## 2022-08-29 ENCOUNTER — APPOINTMENT (OUTPATIENT)
Dept: PREADMISSION TESTING | Age: 72
DRG: 236 | End: 2022-08-29
Payer: COMMERCIAL

## 2022-08-30 ENCOUNTER — ANESTHESIA EVENT (OUTPATIENT)
Dept: OPERATING ROOM | Age: 72
DRG: 236 | End: 2022-08-30
Payer: COMMERCIAL

## 2022-08-31 NOTE — PROGRESS NOTES
Place patient label inside box (if no patient label, complete below)  Name:  :  MR#:   Eddi Friday / PROCEDURE  I (we)Halle (Patient Name) authorize DR. Ciara Ochoa (Provider / Elton Jo) and/or such assistants as may be selected by him/her, to perform the following operation/procedure(s): CORONARY ARTERY BYPASS GRAFT X 4       Note: If unable to obtain consent prior to an emergent procedure, document the emergent reason in the medical record. This procedure has been explained to my (our) satisfaction and included in the explanation was: The intended benefit, nature, and extent of the procedure to be performed; The significant risks involved and the probability of success; Alternative procedures and methods of treatment; The dangers and probable consequences of such alternatives (including no procedure or treatment); The expected consequences of the procedure on my future health; Whether other qualified individuals would be performing important surgical tasks and/or whether  would be present to advise or support the procedure. I (we) understand that there are other risks of infection and other serious complications in the pre-operative/procedural and postoperative/procedural stages of my (our) care. I (we) have asked all of the questions which I (we) thought were important in deciding whether or not to undergo treatment or diagnosis. These questions have been answered to my (our) satisfaction. I (we) understand that no assurance can be given that the procedure will be a success, and no guarantee or warranty of success has been given to me (us). It has been explained to me (us) that during the course of the operation/procedure, unforeseen conditions may be revealed that necessitate extension of the original procedure(s) or different procedure(s) than those set forth in Paragraph 1.  I (we) authorize and request that the above-named physician, his/her assistants or his/her designees, perform procedures as necessary and desirable if deemed to be in my (our) best interest.     Revised 8/2/2021                                                                          Page 1 of 2       I acknowledge that health care personnel may be observing this procedure for the purpose of medical education or other specified purposes as may be necessary as requested and/or approved by my (our) physician. I (we) consent to the disposal by the hospital Pathologist of the removed tissue, parts or organs in accordance with hospital policy. I do ____ do not ____ consent to the use of a local infiltration pain blocking agent that will be used by my provider/surgical provider to help alleviate pain during my procedure. I do ____ do not ____ consent to an emergent blood transfusion in the case of a life-threatening situation that requires blood components to be administered. This consent is valid for 24 hours from the beginning of the procedure. This patient does ____ or does not ____ currently have a DNR status/order. If DNR order is in place, obtain Addendum to the Surgical Consent for ALL Patients with a DNR Order to address marie-operative status for limited intervention or DNR suspension.      I have read and fully understand the above Consent for Operation/Procedure and that all blanks were completed before I signed the consent.   _____________________________       _____________________      ____/____am/pm  Signature of Patient or legal representative      Printed Name / Relationship            Date / Time   ____________________________       _____________________      ____/____am/pm  Witness to Signature                                    Printed Name                    Date / Time    If patient is unable to sign or is a minor, complete the following)  Patient is a minor, ____ years of age, or unable to sign because: ______________________________________________________________________________________________    If a phone consent is obtained, consent will be documented by using two health care professionals, each affirming that the consenting party has no questions and gives consent for the procedure discussed with the physician/provider.   _____________________          ____________________       _____/_____am/pm   2nd witness to phone consent        Printed name           Date / Time    Informed Consent:  I have provided the explanation described above in section 1 to the patient and/or legal representative.  I have provided the patient and/or legal representative with an opportunity to ask any questions about the proposed operation/procedure.   ___________________________          ____________________         ____/____am/pm  Provider / Proceduralist                            Printed name            Date / Time  Revised 8/2/2021                                                                      Page 2 of 2

## 2022-09-01 ENCOUNTER — ANESTHESIA (OUTPATIENT)
Dept: OPERATING ROOM | Age: 72
DRG: 236 | End: 2022-09-01
Payer: COMMERCIAL

## 2022-09-01 ENCOUNTER — HOSPITAL ENCOUNTER (INPATIENT)
Age: 72
LOS: 4 days | Discharge: HOME | DRG: 236 | End: 2022-09-05
Attending: THORACIC SURGERY (CARDIOTHORACIC VASCULAR SURGERY) | Admitting: THORACIC SURGERY (CARDIOTHORACIC VASCULAR SURGERY)
Payer: COMMERCIAL

## 2022-09-01 ENCOUNTER — APPOINTMENT (OUTPATIENT)
Dept: GENERAL RADIOLOGY | Age: 72
DRG: 236 | End: 2022-09-01
Attending: THORACIC SURGERY (CARDIOTHORACIC VASCULAR SURGERY)
Payer: COMMERCIAL

## 2022-09-01 PROBLEM — I25.10 CAD IN NATIVE ARTERY: Status: ACTIVE | Noted: 2022-09-01

## 2022-09-01 LAB
ABO/RH: NORMAL
ACTIVATED CLOTTING TIME: 112 SEC (ref 99–130)
ACTIVATED CLOTTING TIME: 115 SEC (ref 99–130)
ACTIVATED CLOTTING TIME: 488 SEC (ref 99–130)
ACTIVATED CLOTTING TIME: 531 SEC (ref 99–130)
ACTIVATED CLOTTING TIME: 566 SEC (ref 99–130)
ACTIVATED CLOTTING TIME: 643 SEC (ref 99–130)
ANION GAP SERPL CALCULATED.3IONS-SCNC: 11 MMOL/L (ref 3–16)
ANTIBODY SCREEN: NORMAL
APTT: 35.8 SEC (ref 23–34.3)
BASE EXCESS ARTERIAL: -1 (ref -3–3)
BASE EXCESS ARTERIAL: -2 (ref -3–3)
BASE EXCESS ARTERIAL: 0 (ref -3–3)
BASE EXCESS ARTERIAL: 1 (ref -3–3)
BASE EXCESS VENOUS: -2 (ref -3–3)
BUN BLDV-MCNC: 13 MG/DL (ref 7–20)
CALCIUM IONIZED: 1.01 MMOL/L (ref 1.12–1.32)
CALCIUM IONIZED: 1.02 MMOL/L (ref 1.12–1.32)
CALCIUM IONIZED: 1.06 MMOL/L (ref 1.12–1.32)
CALCIUM IONIZED: 1.12 MMOL/L (ref 1.12–1.32)
CALCIUM IONIZED: 1.19 MMOL/L (ref 1.12–1.32)
CALCIUM IONIZED: 1.22 MMOL/L (ref 1.12–1.32)
CALCIUM IONIZED: 1.28 MMOL/L (ref 1.12–1.32)
CALCIUM SERPL-MCNC: 8.5 MG/DL (ref 8.3–10.6)
CHLORIDE BLD-SCNC: 105 MMOL/L (ref 99–110)
CHOLESTEROL, TOTAL: 144 MG/DL (ref 0–199)
CO2: 22 MMOL/L (ref 21–32)
CREAT SERPL-MCNC: 0.8 MG/DL (ref 0.6–1.2)
GFR AFRICAN AMERICAN: >60
GFR NON-AFRICAN AMERICAN: >60
GLUCOSE BLD-MCNC: 102 MG/DL (ref 70–99)
GLUCOSE BLD-MCNC: 115 MG/DL (ref 70–99)
GLUCOSE BLD-MCNC: 118 MG/DL (ref 70–99)
GLUCOSE BLD-MCNC: 128 MG/DL (ref 70–99)
GLUCOSE BLD-MCNC: 128 MG/DL (ref 70–99)
GLUCOSE BLD-MCNC: 129 MG/DL (ref 70–99)
GLUCOSE BLD-MCNC: 130 MG/DL (ref 70–99)
GLUCOSE BLD-MCNC: 135 MG/DL (ref 70–99)
GLUCOSE BLD-MCNC: 135 MG/DL (ref 70–99)
GLUCOSE BLD-MCNC: 139 MG/DL (ref 70–99)
GLUCOSE BLD-MCNC: 140 MG/DL (ref 70–99)
GLUCOSE BLD-MCNC: 142 MG/DL (ref 70–99)
GLUCOSE BLD-MCNC: 149 MG/DL (ref 70–99)
GLUCOSE BLD-MCNC: 150 MG/DL (ref 70–99)
GLUCOSE BLD-MCNC: 150 MG/DL (ref 70–99)
GLUCOSE BLD-MCNC: 151 MG/DL (ref 70–99)
GLUCOSE BLD-MCNC: 161 MG/DL (ref 70–99)
GLUCOSE BLD-MCNC: 168 MG/DL (ref 70–99)
GLUCOSE BLD-MCNC: 171 MG/DL (ref 70–99)
HCO3 ARTERIAL: 23 MMOL/L (ref 21–29)
HCO3 ARTERIAL: 23.2 MMOL/L (ref 21–29)
HCO3 ARTERIAL: 23.7 MMOL/L (ref 21–29)
HCO3 ARTERIAL: 24.1 MMOL/L (ref 21–29)
HCO3 ARTERIAL: 24.2 MMOL/L (ref 21–29)
HCO3 ARTERIAL: 24.4 MMOL/L (ref 21–29)
HCO3 ARTERIAL: 25.4 MMOL/L (ref 21–29)
HCO3 ARTERIAL: 26.9 MMOL/L (ref 21–29)
HCO3 ARTERIAL: 27.3 MMOL/L (ref 21–29)
HCO3 ARTERIAL: 28.5 MMOL/L (ref 21–29)
HCO3 VENOUS: 24.7 MMOL/L (ref 23–29)
HCT VFR BLD CALC: 32.7 % (ref 36–48)
HDLC SERPL-MCNC: 30 MG/DL (ref 40–60)
HEMOGLOBIN: 10.6 G/DL (ref 12–16)
HEMOGLOBIN: 10.7 G/DL (ref 12–16)
HEMOGLOBIN: 10.9 G/DL (ref 12–16)
HEMOGLOBIN: 8.2 G/DL (ref 12–16)
HEMOGLOBIN: 8.3 G/DL (ref 12–16)
HEMOGLOBIN: 8.6 G/DL (ref 12–16)
HEMOGLOBIN: 9.1 G/DL (ref 12–16)
INR BLD: 1.47 (ref 0.87–1.14)
LACTATE: 1.11 MMOL/L (ref 0.4–2)
LDL CHOLESTEROL CALCULATED: 79 MG/DL
MAGNESIUM: 2.9 MG/DL (ref 1.8–2.4)
MCH RBC QN AUTO: 29.8 PG (ref 26–34)
MCHC RBC AUTO-ENTMCNC: 32.7 G/DL (ref 31–36)
MCV RBC AUTO: 91.2 FL (ref 80–100)
O2 SAT, ARTERIAL: 100 % (ref 93–100)
O2 SAT, ARTERIAL: 97 % (ref 93–100)
O2 SAT, ARTERIAL: 98 % (ref 93–100)
O2 SAT, ARTERIAL: 99 % (ref 93–100)
O2 SAT, ARTERIAL: 99 % (ref 93–100)
O2 SAT, VEN: 71 %
PCO2 ARTERIAL: 35.4 MM HG (ref 35–45)
PCO2 ARTERIAL: 36.2 MM HG (ref 35–45)
PCO2 ARTERIAL: 37.5 MM HG (ref 35–45)
PCO2 ARTERIAL: 38.7 MM HG (ref 35–45)
PCO2 ARTERIAL: 39 MM HG (ref 35–45)
PCO2 ARTERIAL: 39.1 MM HG (ref 35–45)
PCO2 ARTERIAL: 39.7 MM HG (ref 35–45)
PCO2 ARTERIAL: 41 MM HG (ref 35–45)
PCO2 ARTERIAL: 41.6 MM HG (ref 35–45)
PCO2 ARTERIAL: 48.8 MM HG (ref 35–45)
PCO2 ARTERIAL: 58.7 MM HG (ref 35–45)
PCO2 ARTERIAL: 66 MM HG (ref 35–45)
PCO2, VEN: 54.1 MM HG (ref 40–50)
PDW BLD-RTO: 14.7 % (ref 12.4–15.4)
PERFORMED ON: ABNORMAL
PH ARTERIAL: 7.24 (ref 7.35–7.45)
PH ARTERIAL: 7.28 (ref 7.35–7.45)
PH ARTERIAL: 7.35 (ref 7.35–7.45)
PH ARTERIAL: 7.38 (ref 7.35–7.45)
PH ARTERIAL: 7.39 (ref 7.35–7.45)
PH ARTERIAL: 7.4 (ref 7.35–7.45)
PH ARTERIAL: 7.4 (ref 7.35–7.45)
PH ARTERIAL: 7.41 (ref 7.35–7.45)
PH ARTERIAL: 7.42 (ref 7.35–7.45)
PH ARTERIAL: 7.42 (ref 7.35–7.45)
PH VENOUS: 7.27 (ref 7.35–7.45)
PLATELET # BLD: 166 K/UL (ref 135–450)
PMV BLD AUTO: 8.7 FL (ref 5–10.5)
PO2 ARTERIAL: 102.7 MM HG (ref 75–108)
PO2 ARTERIAL: 118 MM HG (ref 75–108)
PO2 ARTERIAL: 165.8 MM HG (ref 75–108)
PO2 ARTERIAL: 191 MM HG (ref 75–108)
PO2 ARTERIAL: 241.2 MM HG (ref 75–108)
PO2 ARTERIAL: 281.8 MM HG (ref 75–108)
PO2 ARTERIAL: 309.9 MM HG (ref 75–108)
PO2 ARTERIAL: 326.3 MM HG (ref 75–108)
PO2 ARTERIAL: 332.5 MM HG (ref 75–108)
PO2 ARTERIAL: 360.5 MM HG (ref 75–108)
PO2 ARTERIAL: 484.5 MM HG (ref 75–108)
PO2 ARTERIAL: 89 MM HG (ref 75–108)
PO2, VEN: 43 MM HG
POC POTASSIUM: 3.6 MMOL/L (ref 3.5–5.1)
POC POTASSIUM: 3.7 MMOL/L (ref 3.5–5.1)
POC POTASSIUM: 3.9 MMOL/L (ref 3.5–5.1)
POC POTASSIUM: 4.2 MMOL/L (ref 3.5–5.1)
POC POTASSIUM: 4.3 MMOL/L (ref 3.5–5.1)
POC POTASSIUM: 4.4 MMOL/L (ref 3.5–5.1)
POC POTASSIUM: 4.6 MMOL/L (ref 3.5–5.1)
POC POTASSIUM: 5.5 MMOL/L (ref 3.5–5.1)
POC SAMPLE TYPE: ABNORMAL
POC SODIUM: 136 MMOL/L (ref 136–145)
POC SODIUM: 137 MMOL/L (ref 136–145)
POC SODIUM: 137 MMOL/L (ref 136–145)
POC SODIUM: 140 MMOL/L (ref 136–145)
POC SODIUM: 141 MMOL/L (ref 136–145)
POC SODIUM: 141 MMOL/L (ref 136–145)
POC SODIUM: 142 MMOL/L (ref 136–145)
POTASSIUM SERPL-SCNC: 3.7 MMOL/L (ref 3.5–5.1)
PROTHROMBIN TIME: 17.8 SEC (ref 11.7–14.5)
RBC # BLD: 3.59 M/UL (ref 4–5.2)
SODIUM BLD-SCNC: 138 MMOL/L (ref 136–145)
TCO2 ARTERIAL: 24 MMOL/L
TCO2 ARTERIAL: 24 MMOL/L
TCO2 ARTERIAL: 25 MMOL/L
TCO2 ARTERIAL: 26 MMOL/L
TCO2 ARTERIAL: 26 MMOL/L
TCO2 ARTERIAL: 27 MMOL/L
TCO2 ARTERIAL: 28 MMOL/L
TCO2 ARTERIAL: 29 MMOL/L
TCO2 ARTERIAL: 31 MMOL/L
TCO2 CALC VENOUS: 26 MMOL/L
TRIGL SERPL-MCNC: 173 MG/DL (ref 0–150)
VLDLC SERPL CALC-MCNC: 35 MG/DL
WBC # BLD: 13.9 K/UL (ref 4–11)

## 2022-09-01 PROCEDURE — 3700000000 HC ANESTHESIA ATTENDED CARE: Performed by: THORACIC SURGERY (CARDIOTHORACIC VASCULAR SURGERY)

## 2022-09-01 PROCEDURE — 85018 HEMOGLOBIN: CPT

## 2022-09-01 PROCEDURE — C1713 ANCHOR/SCREW BN/BN,TIS/BN: HCPCS | Performed by: THORACIC SURGERY (CARDIOTHORACIC VASCULAR SURGERY)

## 2022-09-01 PROCEDURE — 6360000002 HC RX W HCPCS: Performed by: ANESTHESIOLOGY

## 2022-09-01 PROCEDURE — 80061 LIPID PANEL: CPT

## 2022-09-01 PROCEDURE — 76942 ECHO GUIDE FOR BIOPSY: CPT | Performed by: ANESTHESIOLOGY

## 2022-09-01 PROCEDURE — 85610 PROTHROMBIN TIME: CPT

## 2022-09-01 PROCEDURE — 84295 ASSAY OF SERUM SODIUM: CPT

## 2022-09-01 PROCEDURE — 33508 ENDOSCOPIC VEIN HARVEST: CPT | Performed by: THORACIC SURGERY (CARDIOTHORACIC VASCULAR SURGERY)

## 2022-09-01 PROCEDURE — 82803 BLOOD GASES ANY COMBINATION: CPT

## 2022-09-01 PROCEDURE — 6370000000 HC RX 637 (ALT 250 FOR IP): Performed by: CLINICAL NURSE SPECIALIST

## 2022-09-01 PROCEDURE — 021209W BYPASS CORONARY ARTERY, THREE ARTERIES FROM AORTA WITH AUTOLOGOUS VENOUS TISSUE, OPEN APPROACH: ICD-10-PCS | Performed by: THORACIC SURGERY (CARDIOTHORACIC VASCULAR SURGERY)

## 2022-09-01 PROCEDURE — 2580000003 HC RX 258: Performed by: THORACIC SURGERY (CARDIOTHORACIC VASCULAR SURGERY)

## 2022-09-01 PROCEDURE — 3600000008 HC SURGERY OHS BASE: Performed by: THORACIC SURGERY (CARDIOTHORACIC VASCULAR SURGERY)

## 2022-09-01 PROCEDURE — 83735 ASSAY OF MAGNESIUM: CPT

## 2022-09-01 PROCEDURE — 2700000000 HC OXYGEN THERAPY PER DAY

## 2022-09-01 PROCEDURE — C1894 INTRO/SHEATH, NON-LASER: HCPCS | Performed by: THORACIC SURGERY (CARDIOTHORACIC VASCULAR SURGERY)

## 2022-09-01 PROCEDURE — 2580000003 HC RX 258: Performed by: ANESTHESIOLOGY

## 2022-09-01 PROCEDURE — 6370000000 HC RX 637 (ALT 250 FOR IP): Performed by: THORACIC SURGERY (CARDIOTHORACIC VASCULAR SURGERY)

## 2022-09-01 PROCEDURE — 2720000010 HC SURG SUPPLY STERILE: Performed by: THORACIC SURGERY (CARDIOTHORACIC VASCULAR SURGERY)

## 2022-09-01 PROCEDURE — 7100000011 HC PHASE II RECOVERY - ADDTL 15 MIN

## 2022-09-01 PROCEDURE — 33533 CABG ARTERIAL SINGLE: CPT | Performed by: THORACIC SURGERY (CARDIOTHORACIC VASCULAR SURGERY)

## 2022-09-01 PROCEDURE — P9041 ALBUMIN (HUMAN),5%, 50ML: HCPCS | Performed by: THORACIC SURGERY (CARDIOTHORACIC VASCULAR SURGERY)

## 2022-09-01 PROCEDURE — 33519 CABG ARTERY-VEIN THREE: CPT | Performed by: THORACIC SURGERY (CARDIOTHORACIC VASCULAR SURGERY)

## 2022-09-01 PROCEDURE — A4216 STERILE WATER/SALINE, 10 ML: HCPCS | Performed by: THORACIC SURGERY (CARDIOTHORACIC VASCULAR SURGERY)

## 2022-09-01 PROCEDURE — 71045 X-RAY EXAM CHEST 1 VIEW: CPT

## 2022-09-01 PROCEDURE — 33533 CABG ARTERIAL SINGLE: CPT | Performed by: PHYSICIAN ASSISTANT

## 2022-09-01 PROCEDURE — 06BP4ZZ EXCISION OF RIGHT SAPHENOUS VEIN, PERCUTANEOUS ENDOSCOPIC APPROACH: ICD-10-PCS | Performed by: THORACIC SURGERY (CARDIOTHORACIC VASCULAR SURGERY)

## 2022-09-01 PROCEDURE — 02100Z9 BYPASS CORONARY ARTERY, ONE ARTERY FROM LEFT INTERNAL MAMMARY, OPEN APPROACH: ICD-10-PCS | Performed by: THORACIC SURGERY (CARDIOTHORACIC VASCULAR SURGERY)

## 2022-09-01 PROCEDURE — 2000000000 HC ICU R&B

## 2022-09-01 PROCEDURE — 85027 COMPLETE CBC AUTOMATED: CPT

## 2022-09-01 PROCEDURE — 6360000002 HC RX W HCPCS: Performed by: THORACIC SURGERY (CARDIOTHORACIC VASCULAR SURGERY)

## 2022-09-01 PROCEDURE — P9045 ALBUMIN (HUMAN), 5%, 250 ML: HCPCS | Performed by: ANESTHESIOLOGY

## 2022-09-01 PROCEDURE — 33519 CABG ARTERY-VEIN THREE: CPT | Performed by: PHYSICIAN ASSISTANT

## 2022-09-01 PROCEDURE — 86901 BLOOD TYPING SEROLOGIC RH(D): CPT

## 2022-09-01 PROCEDURE — 85730 THROMBOPLASTIN TIME PARTIAL: CPT

## 2022-09-01 PROCEDURE — C9290 INJ, BUPIVACAINE LIPOSOME: HCPCS | Performed by: ANESTHESIOLOGY

## 2022-09-01 PROCEDURE — 84132 ASSAY OF SERUM POTASSIUM: CPT

## 2022-09-01 PROCEDURE — 7100000010 HC PHASE II RECOVERY - FIRST 15 MIN

## 2022-09-01 PROCEDURE — 80048 BASIC METABOLIC PNL TOTAL CA: CPT

## 2022-09-01 PROCEDURE — 2500000003 HC RX 250 WO HCPCS: Performed by: ANESTHESIOLOGY

## 2022-09-01 PROCEDURE — 85347 COAGULATION TIME ACTIVATED: CPT

## 2022-09-01 PROCEDURE — 82947 ASSAY GLUCOSE BLOOD QUANT: CPT

## 2022-09-01 PROCEDURE — 83605 ASSAY OF LACTIC ACID: CPT

## 2022-09-01 PROCEDURE — 82330 ASSAY OF CALCIUM: CPT

## 2022-09-01 PROCEDURE — 5A1221Z PERFORMANCE OF CARDIAC OUTPUT, CONTINUOUS: ICD-10-PCS | Performed by: THORACIC SURGERY (CARDIOTHORACIC VASCULAR SURGERY)

## 2022-09-01 PROCEDURE — B24BZZ4 ULTRASONOGRAPHY OF HEART WITH AORTA, TRANSESOPHAGEAL: ICD-10-PCS | Performed by: THORACIC SURGERY (CARDIOTHORACIC VASCULAR SURGERY)

## 2022-09-01 PROCEDURE — 2500000003 HC RX 250 WO HCPCS: Performed by: THORACIC SURGERY (CARDIOTHORACIC VASCULAR SURGERY)

## 2022-09-01 PROCEDURE — P9045 ALBUMIN (HUMAN), 5%, 250 ML: HCPCS | Performed by: THORACIC SURGERY (CARDIOTHORACIC VASCULAR SURGERY)

## 2022-09-01 PROCEDURE — A4217 STERILE WATER/SALINE, 500 ML: HCPCS | Performed by: THORACIC SURGERY (CARDIOTHORACIC VASCULAR SURGERY)

## 2022-09-01 PROCEDURE — 36415 COLL VENOUS BLD VENIPUNCTURE: CPT

## 2022-09-01 PROCEDURE — 94761 N-INVAS EAR/PLS OXIMETRY MLT: CPT

## 2022-09-01 PROCEDURE — 3700000001 HC ADD 15 MINUTES (ANESTHESIA): Performed by: THORACIC SURGERY (CARDIOTHORACIC VASCULAR SURGERY)

## 2022-09-01 PROCEDURE — 3600000018 HC SURGERY OHS ADDTL 15MIN: Performed by: THORACIC SURGERY (CARDIOTHORACIC VASCULAR SURGERY)

## 2022-09-01 PROCEDURE — 94002 VENT MGMT INPAT INIT DAY: CPT

## 2022-09-01 PROCEDURE — 86850 RBC ANTIBODY SCREEN: CPT

## 2022-09-01 PROCEDURE — 2709999900 HC NON-CHARGEABLE SUPPLY: Performed by: THORACIC SURGERY (CARDIOTHORACIC VASCULAR SURGERY)

## 2022-09-01 PROCEDURE — 86900 BLOOD TYPING SEROLOGIC ABO: CPT

## 2022-09-01 PROCEDURE — C9113 INJ PANTOPRAZOLE SODIUM, VIA: HCPCS | Performed by: THORACIC SURGERY (CARDIOTHORACIC VASCULAR SURGERY)

## 2022-09-01 PROCEDURE — 33508 ENDOSCOPIC VEIN HARVEST: CPT | Performed by: PHYSICIAN ASSISTANT

## 2022-09-01 DEVICE — IMPLANTABLE DEVICE: Type: IMPLANTABLE DEVICE | Status: FUNCTIONAL

## 2022-09-01 DEVICE — PLATE BNE 4 H BX SHP STERNALOCK BLU PRI CLSR SYS: Type: IMPLANTABLE DEVICE | Status: FUNCTIONAL

## 2022-09-01 RX ORDER — PROTAMINE SULFATE 10 MG/ML
INJECTION, SOLUTION INTRAVENOUS PRN
Status: DISCONTINUED | OUTPATIENT
Start: 2022-09-01 | End: 2022-09-01

## 2022-09-01 RX ORDER — FLUOXETINE 10 MG/1
10 CAPSULE ORAL DAILY
Status: DISCONTINUED | OUTPATIENT
Start: 2022-09-02 | End: 2022-09-05 | Stop reason: HOSPADM

## 2022-09-01 RX ORDER — CLOPIDOGREL BISULFATE 75 MG/1
75 TABLET ORAL DAILY
Status: DISCONTINUED | OUTPATIENT
Start: 2022-09-02 | End: 2022-09-05 | Stop reason: HOSPADM

## 2022-09-01 RX ORDER — SODIUM CHLORIDE, SODIUM LACTATE, POTASSIUM CHLORIDE, CALCIUM CHLORIDE 600; 310; 30; 20 MG/100ML; MG/100ML; MG/100ML; MG/100ML
INJECTION, SOLUTION INTRAVENOUS CONTINUOUS
Status: DISCONTINUED | OUTPATIENT
Start: 2022-09-01 | End: 2022-09-01

## 2022-09-01 RX ORDER — PROPOFOL 10 MG/ML
INJECTION, EMULSION INTRAVENOUS PRN
Status: DISCONTINUED | OUTPATIENT
Start: 2022-09-01 | End: 2022-09-01 | Stop reason: SDUPTHER

## 2022-09-01 RX ORDER — 0.9 % SODIUM CHLORIDE 0.9 %
1000 INTRAVENOUS SOLUTION INTRAVENOUS CONTINUOUS PRN
Status: DISCONTINUED | OUTPATIENT
Start: 2022-09-01 | End: 2022-09-05 | Stop reason: HOSPADM

## 2022-09-01 RX ORDER — ALBUTEROL SULFATE 2.5 MG/3ML
2.5 SOLUTION RESPIRATORY (INHALATION) EVERY 6 HOURS PRN
Status: DISCONTINUED | OUTPATIENT
Start: 2022-09-01 | End: 2022-09-05 | Stop reason: HOSPADM

## 2022-09-01 RX ORDER — GABAPENTIN 300 MG/1
600 CAPSULE ORAL ONCE
Status: DISCONTINUED | OUTPATIENT
Start: 2022-09-01 | End: 2022-09-01

## 2022-09-01 RX ORDER — LISINOPRIL 2.5 MG/1
2.5 TABLET ORAL
Status: DISCONTINUED | OUTPATIENT
Start: 2022-09-03 | End: 2022-09-05 | Stop reason: HOSPADM

## 2022-09-01 RX ORDER — PROTAMINE SULFATE 10 MG/ML
50 INJECTION, SOLUTION INTRAVENOUS
Status: ACTIVE | OUTPATIENT
Start: 2022-09-01 | End: 2022-09-01

## 2022-09-01 RX ORDER — CHLORHEXIDINE GLUCONATE 4 G/100ML
SOLUTION TOPICAL ONCE
Status: COMPLETED | OUTPATIENT
Start: 2022-09-01 | End: 2022-09-01

## 2022-09-01 RX ORDER — ACETAMINOPHEN 500 MG
1000 TABLET ORAL ONCE
Status: COMPLETED | OUTPATIENT
Start: 2022-09-01 | End: 2022-09-01

## 2022-09-01 RX ORDER — SODIUM CHLORIDE 0.9 % (FLUSH) 0.9 %
5-40 SYRINGE (ML) INJECTION PRN
Status: DISCONTINUED | OUTPATIENT
Start: 2022-09-01 | End: 2022-09-01 | Stop reason: HOSPADM

## 2022-09-01 RX ORDER — NITROGLYCERIN 20 MG/100ML
10 INJECTION INTRAVENOUS CONTINUOUS PRN
Status: DISCONTINUED | OUTPATIENT
Start: 2022-09-01 | End: 2022-09-02

## 2022-09-01 RX ORDER — SODIUM CHLORIDE 0.9 % (FLUSH) 0.9 %
5-40 SYRINGE (ML) INJECTION PRN
Status: DISCONTINUED | OUTPATIENT
Start: 2022-09-01 | End: 2022-09-05 | Stop reason: HOSPADM

## 2022-09-01 RX ORDER — HYDRALAZINE HYDROCHLORIDE 20 MG/ML
5 INJECTION INTRAMUSCULAR; INTRAVENOUS EVERY 5 MIN PRN
Status: DISCONTINUED | OUTPATIENT
Start: 2022-09-01 | End: 2022-09-05 | Stop reason: HOSPADM

## 2022-09-01 RX ORDER — LANOLIN ALCOHOL/MO/W.PET/CERES
400 CREAM (GRAM) TOPICAL 2 TIMES DAILY
Status: DISCONTINUED | OUTPATIENT
Start: 2022-09-02 | End: 2022-09-05 | Stop reason: HOSPADM

## 2022-09-01 RX ORDER — BUPIVACAINE HYDROCHLORIDE 5 MG/ML
INJECTION, SOLUTION EPIDURAL; INTRACAUDAL
Status: COMPLETED | OUTPATIENT
Start: 2022-09-01 | End: 2022-09-01

## 2022-09-01 RX ORDER — POLYETHYLENE GLYCOL 3350 17 G/17G
17 POWDER, FOR SOLUTION ORAL DAILY
Status: DISCONTINUED | OUTPATIENT
Start: 2022-09-02 | End: 2022-09-05 | Stop reason: HOSPADM

## 2022-09-01 RX ORDER — MAGNESIUM HYDROXIDE 1200 MG/15ML
LIQUID ORAL CONTINUOUS PRN
Status: COMPLETED | OUTPATIENT
Start: 2022-09-01 | End: 2022-09-01

## 2022-09-01 RX ORDER — ONDANSETRON 4 MG/1
4 TABLET, ORALLY DISINTEGRATING ORAL EVERY 8 HOURS PRN
Status: DISCONTINUED | OUTPATIENT
Start: 2022-09-01 | End: 2022-09-05 | Stop reason: HOSPADM

## 2022-09-01 RX ORDER — SODIUM CHLORIDE 9 MG/ML
INJECTION, SOLUTION INTRAVENOUS PRN
Status: DISCONTINUED | OUTPATIENT
Start: 2022-09-01 | End: 2022-09-05 | Stop reason: HOSPADM

## 2022-09-01 RX ORDER — SODIUM CHLORIDE 9 MG/ML
INJECTION, SOLUTION INTRAVENOUS PRN
Status: DISCONTINUED | OUTPATIENT
Start: 2022-09-01 | End: 2022-09-01 | Stop reason: HOSPADM

## 2022-09-01 RX ORDER — FUROSEMIDE 10 MG/ML
40 INJECTION INTRAMUSCULAR; INTRAVENOUS
Status: ACTIVE | OUTPATIENT
Start: 2022-09-01 | End: 2022-09-01

## 2022-09-01 RX ORDER — ALBUMIN, HUMAN INJ 5% 5 %
25 SOLUTION INTRAVENOUS PRN
Status: DISCONTINUED | OUTPATIENT
Start: 2022-09-01 | End: 2022-09-05 | Stop reason: HOSPADM

## 2022-09-01 RX ORDER — DEXTROSE MONOHYDRATE 100 MG/ML
INJECTION, SOLUTION INTRAVENOUS CONTINUOUS PRN
Status: DISCONTINUED | OUTPATIENT
Start: 2022-09-01 | End: 2022-09-05 | Stop reason: HOSPADM

## 2022-09-01 RX ORDER — PANTOPRAZOLE SODIUM 40 MG/1
40 TABLET, DELAYED RELEASE ORAL
Status: DISCONTINUED | OUTPATIENT
Start: 2022-09-03 | End: 2022-09-05 | Stop reason: HOSPADM

## 2022-09-01 RX ORDER — SUCCINYLCHOLINE CHLORIDE 20 MG/ML
INJECTION INTRAMUSCULAR; INTRAVENOUS PRN
Status: DISCONTINUED | OUTPATIENT
Start: 2022-09-01 | End: 2022-09-01 | Stop reason: SDUPTHER

## 2022-09-01 RX ORDER — GABAPENTIN 300 MG/1
900 CAPSULE ORAL ONCE
Status: COMPLETED | OUTPATIENT
Start: 2022-09-01 | End: 2022-09-01

## 2022-09-01 RX ORDER — INSULIN LISPRO 100 [IU]/ML
0-12 INJECTION, SOLUTION INTRAVENOUS; SUBCUTANEOUS
Status: DISCONTINUED | OUTPATIENT
Start: 2022-09-02 | End: 2022-09-05 | Stop reason: HOSPADM

## 2022-09-01 RX ORDER — METOPROLOL TARTRATE 5 MG/5ML
2.5 INJECTION INTRAVENOUS EVERY 10 MIN PRN
Status: DISCONTINUED | OUTPATIENT
Start: 2022-09-01 | End: 2022-09-05 | Stop reason: HOSPADM

## 2022-09-01 RX ORDER — SODIUM CHLORIDE, SODIUM LACTATE, POTASSIUM CHLORIDE, CALCIUM CHLORIDE 600; 310; 30; 20 MG/100ML; MG/100ML; MG/100ML; MG/100ML
INJECTION, SOLUTION INTRAVENOUS CONTINUOUS
Status: DISCONTINUED | OUTPATIENT
Start: 2022-09-01 | End: 2022-09-02

## 2022-09-01 RX ORDER — CHLORHEXIDINE GLUCONATE 0.12 MG/ML
15 RINSE ORAL ONCE
Status: COMPLETED | OUTPATIENT
Start: 2022-09-01 | End: 2022-09-01

## 2022-09-01 RX ORDER — DOPAMINE HYDROCHLORIDE 160 MG/100ML
INJECTION, SOLUTION INTRAVENOUS CONTINUOUS PRN
Status: DISCONTINUED | OUTPATIENT
Start: 2022-09-01 | End: 2022-09-01 | Stop reason: SDUPTHER

## 2022-09-01 RX ORDER — POTASSIUM CHLORIDE 29.8 MG/ML
20 INJECTION INTRAVENOUS PRN
Status: DISCONTINUED | OUTPATIENT
Start: 2022-09-01 | End: 2022-09-05 | Stop reason: HOSPADM

## 2022-09-01 RX ORDER — ATORVASTATIN CALCIUM 40 MG/1
40 TABLET, FILM COATED ORAL NIGHTLY
Status: DISCONTINUED | OUTPATIENT
Start: 2022-09-02 | End: 2022-09-05 | Stop reason: HOSPADM

## 2022-09-01 RX ORDER — KETOROLAC TROMETHAMINE 15 MG/ML
15 INJECTION, SOLUTION INTRAMUSCULAR; INTRAVENOUS EVERY 6 HOURS PRN
Status: DISCONTINUED | OUTPATIENT
Start: 2022-09-01 | End: 2022-09-05 | Stop reason: HOSPADM

## 2022-09-01 RX ORDER — ALBUMIN, HUMAN INJ 5% 5 %
SOLUTION INTRAVENOUS PRN
Status: DISCONTINUED | OUTPATIENT
Start: 2022-09-01 | End: 2022-09-01

## 2022-09-01 RX ORDER — MEPERIDINE HYDROCHLORIDE 25 MG/ML
25 INJECTION INTRAMUSCULAR; INTRAVENOUS; SUBCUTANEOUS
Status: ACTIVE | OUTPATIENT
Start: 2022-09-01 | End: 2022-09-01

## 2022-09-01 RX ORDER — MIDAZOLAM HYDROCHLORIDE 1 MG/ML
1 INJECTION INTRAMUSCULAR; INTRAVENOUS
Status: DISCONTINUED | OUTPATIENT
Start: 2022-09-01 | End: 2022-09-02

## 2022-09-01 RX ORDER — FENTANYL CITRATE 50 UG/ML
25 INJECTION, SOLUTION INTRAMUSCULAR; INTRAVENOUS
Status: ACTIVE | OUTPATIENT
Start: 2022-09-01 | End: 2022-09-02

## 2022-09-01 RX ORDER — GABAPENTIN 300 MG/1
300 CAPSULE ORAL 3 TIMES DAILY
Status: DISCONTINUED | OUTPATIENT
Start: 2022-09-01 | End: 2022-09-05 | Stop reason: HOSPADM

## 2022-09-01 RX ORDER — CHLORHEXIDINE GLUCONATE 0.12 MG/ML
15 RINSE ORAL 2 TIMES DAILY
Status: DISCONTINUED | OUTPATIENT
Start: 2022-09-01 | End: 2022-09-05 | Stop reason: HOSPADM

## 2022-09-01 RX ORDER — FENTANYL CITRATE 0.05 MG/ML
INJECTION, SOLUTION INTRAMUSCULAR; INTRAVENOUS PRN
Status: DISCONTINUED | OUTPATIENT
Start: 2022-09-01 | End: 2022-09-01 | Stop reason: SDUPTHER

## 2022-09-01 RX ORDER — SODIUM CHLORIDE 0.9 % (FLUSH) 0.9 %
5-40 SYRINGE (ML) INJECTION EVERY 12 HOURS SCHEDULED
Status: DISCONTINUED | OUTPATIENT
Start: 2022-09-01 | End: 2022-09-05 | Stop reason: HOSPADM

## 2022-09-01 RX ORDER — CEFAZOLIN SODIUM 2 G/50ML
SOLUTION INTRAVENOUS PRN
Status: DISCONTINUED | OUTPATIENT
Start: 2022-09-01 | End: 2022-09-01 | Stop reason: SDUPTHER

## 2022-09-01 RX ORDER — ROCURONIUM BROMIDE 10 MG/ML
INJECTION, SOLUTION INTRAVENOUS PRN
Status: DISCONTINUED | OUTPATIENT
Start: 2022-09-01 | End: 2022-09-01 | Stop reason: SDUPTHER

## 2022-09-01 RX ORDER — SENNA PLUS 8.6 MG/1
2 TABLET ORAL DAILY PRN
Status: DISCONTINUED | OUTPATIENT
Start: 2022-09-01 | End: 2022-09-05 | Stop reason: HOSPADM

## 2022-09-01 RX ORDER — POTASSIUM CHLORIDE 750 MG/1
10 TABLET, EXTENDED RELEASE ORAL
Status: DISCONTINUED | OUTPATIENT
Start: 2022-09-02 | End: 2022-09-05 | Stop reason: HOSPADM

## 2022-09-01 RX ORDER — LEVOTHYROXINE SODIUM 0.05 MG/1
50 TABLET ORAL DAILY
Status: DISCONTINUED | OUTPATIENT
Start: 2022-09-02 | End: 2022-09-05 | Stop reason: HOSPADM

## 2022-09-01 RX ORDER — HEPARIN SODIUM 1000 [USP'U]/ML
INJECTION, SOLUTION INTRAVENOUS; SUBCUTANEOUS PRN
Status: DISCONTINUED | OUTPATIENT
Start: 2022-09-01 | End: 2022-09-01 | Stop reason: SDUPTHER

## 2022-09-01 RX ORDER — ACETAMINOPHEN 500 MG
1000 TABLET ORAL EVERY 6 HOURS
Status: DISCONTINUED | OUTPATIENT
Start: 2022-09-01 | End: 2022-09-05 | Stop reason: HOSPADM

## 2022-09-01 RX ORDER — AMINOCAPROIC ACID 250 MG/ML
INJECTION, SOLUTION INTRAVENOUS PRN
Status: DISCONTINUED | OUTPATIENT
Start: 2022-09-01 | End: 2022-09-01 | Stop reason: SDUPTHER

## 2022-09-01 RX ORDER — KETAMINE HCL IN NACL, ISO-OSM 20 MG/2 ML
SYRINGE (ML) INJECTION PRN
Status: DISCONTINUED | OUTPATIENT
Start: 2022-09-01 | End: 2022-09-01 | Stop reason: SDUPTHER

## 2022-09-01 RX ORDER — MAGNESIUM SULFATE IN WATER 40 MG/ML
2000 INJECTION, SOLUTION INTRAVENOUS PRN
Status: DISCONTINUED | OUTPATIENT
Start: 2022-09-01 | End: 2022-09-05 | Stop reason: HOSPADM

## 2022-09-01 RX ORDER — FUROSEMIDE 10 MG/ML
40 INJECTION INTRAMUSCULAR; INTRAVENOUS 2 TIMES DAILY
Status: DISCONTINUED | OUTPATIENT
Start: 2022-09-02 | End: 2022-09-05 | Stop reason: HOSPADM

## 2022-09-01 RX ORDER — SODIUM CHLORIDE 0.9 % (FLUSH) 0.9 %
5-40 SYRINGE (ML) INJECTION EVERY 12 HOURS SCHEDULED
Status: DISCONTINUED | OUTPATIENT
Start: 2022-09-01 | End: 2022-09-01 | Stop reason: HOSPADM

## 2022-09-01 RX ORDER — ONDANSETRON 2 MG/ML
4 INJECTION INTRAMUSCULAR; INTRAVENOUS EVERY 6 HOURS PRN
Status: DISCONTINUED | OUTPATIENT
Start: 2022-09-01 | End: 2022-09-05 | Stop reason: HOSPADM

## 2022-09-01 RX ADMIN — FENTANYL CITRATE 250 MCG: 0.05 INJECTION, SOLUTION INTRAMUSCULAR; INTRAVENOUS at 10:15

## 2022-09-01 RX ADMIN — Medication 20 MG: at 09:06

## 2022-09-01 RX ADMIN — ALBUMIN (HUMAN) 25 G: 12.5 INJECTION, SOLUTION INTRAVENOUS at 16:52

## 2022-09-01 RX ADMIN — CEFAZOLIN 2000 MG: 2 INJECTION, POWDER, FOR SOLUTION INTRAMUSCULAR; INTRAVENOUS at 15:55

## 2022-09-01 RX ADMIN — ALBUMIN (HUMAN) 12.5 G: 12.5 SOLUTION INTRAVENOUS at 10:33

## 2022-09-01 RX ADMIN — SODIUM CHLORIDE, POTASSIUM CHLORIDE, SODIUM LACTATE AND CALCIUM CHLORIDE: 600; 310; 30; 20 INJECTION, SOLUTION INTRAVENOUS at 06:15

## 2022-09-01 RX ADMIN — CEFAZOLIN SODIUM 2000 MG: 2 SOLUTION INTRAVENOUS at 07:30

## 2022-09-01 RX ADMIN — AMINOCAPROIC ACID 5000 MG: 250 INJECTION, SOLUTION INTRAVENOUS at 07:20

## 2022-09-01 RX ADMIN — FENTANYL CITRATE 250 MCG: 0.05 INJECTION, SOLUTION INTRAMUSCULAR; INTRAVENOUS at 06:46

## 2022-09-01 RX ADMIN — SODIUM CHLORIDE, POTASSIUM CHLORIDE, SODIUM LACTATE AND CALCIUM CHLORIDE: 600; 310; 30; 20 INJECTION, SOLUTION INTRAVENOUS at 12:39

## 2022-09-01 RX ADMIN — SODIUM CHLORIDE: 9 INJECTION, SOLUTION INTRAVENOUS at 06:40

## 2022-09-01 RX ADMIN — SUCCINYLCHOLINE CHLORIDE 100 MG: 20 INJECTION, SOLUTION INTRAMUSCULAR; INTRAVENOUS; PARENTERAL at 06:47

## 2022-09-01 RX ADMIN — PROTAMINE SULFATE 450 MG: 10 INJECTION, SOLUTION INTRAVENOUS at 10:37

## 2022-09-01 RX ADMIN — METOPROLOL TARTRATE 12.5 MG: 25 TABLET, FILM COATED ORAL at 06:16

## 2022-09-01 RX ADMIN — ROCURONIUM BROMIDE 50 MG: 10 INJECTION INTRAVENOUS at 09:09

## 2022-09-01 RX ADMIN — Medication 15 ML: at 21:56

## 2022-09-01 RX ADMIN — DOPAMINE HYDROCHLORIDE 5 MCG/KG/MIN: 160 INJECTION, SOLUTION INTRAVENOUS at 10:25

## 2022-09-01 RX ADMIN — ONDANSETRON 4 MG: 2 INJECTION INTRAMUSCULAR; INTRAVENOUS at 20:48

## 2022-09-01 RX ADMIN — BUPIVACAINE HYDROCHLORIDE 20 ML: 5 INJECTION, SOLUTION EPIDURAL; INTRACAUDAL; PERINEURAL at 07:10

## 2022-09-01 RX ADMIN — POTASSIUM CHLORIDE 20 MEQ: 400 INJECTION, SOLUTION INTRAVENOUS at 13:29

## 2022-09-01 RX ADMIN — VANCOMYCIN HYDROCHLORIDE 1500 MG: 10 INJECTION, POWDER, LYOPHILIZED, FOR SOLUTION INTRAVENOUS at 19:44

## 2022-09-01 RX ADMIN — ROCURONIUM BROMIDE 50 MG: 10 INJECTION INTRAVENOUS at 07:38

## 2022-09-01 RX ADMIN — CEFAZOLIN 2000 MG: 2 INJECTION, POWDER, FOR SOLUTION INTRAMUSCULAR; INTRAVENOUS at 23:48

## 2022-09-01 RX ADMIN — BUPIVACAINE 20 ML: 13.3 INJECTION, SUSPENSION, LIPOSOMAL INFILTRATION at 07:10

## 2022-09-01 RX ADMIN — SODIUM CHLORIDE 2.62 UNITS/HR: 9 INJECTION, SOLUTION INTRAVENOUS at 13:00

## 2022-09-01 RX ADMIN — MUPIROCIN: 20 OINTMENT TOPICAL at 06:22

## 2022-09-01 RX ADMIN — NITROGLYCERIN 20 MCG/MIN: 20 INJECTION INTRAVENOUS at 12:12

## 2022-09-01 RX ADMIN — FENTANYL CITRATE 250 MCG: 0.05 INJECTION, SOLUTION INTRAMUSCULAR; INTRAVENOUS at 07:38

## 2022-09-01 RX ADMIN — ACETAMINOPHEN 1000 MG: 500 TABLET ORAL at 06:16

## 2022-09-01 RX ADMIN — CHLORHEXIDINE GLUCONATE: 213 SOLUTION TOPICAL at 05:50

## 2022-09-01 RX ADMIN — KETOROLAC TROMETHAMINE 15 MG: 15 INJECTION, SOLUTION INTRAMUSCULAR; INTRAVENOUS at 16:03

## 2022-09-01 RX ADMIN — SODIUM CHLORIDE 40 MG: 9 INJECTION, SOLUTION INTRAMUSCULAR; INTRAVENOUS; SUBCUTANEOUS at 13:11

## 2022-09-01 RX ADMIN — VANCOMYCIN HYDROCHLORIDE 1500 MG: 10 INJECTION, POWDER, LYOPHILIZED, FOR SOLUTION INTRAVENOUS at 07:25

## 2022-09-01 RX ADMIN — ROCURONIUM BROMIDE 50 MG: 10 INJECTION INTRAVENOUS at 06:55

## 2022-09-01 RX ADMIN — SODIUM CHLORIDE, PRESERVATIVE FREE 10 ML: 5 INJECTION INTRAVENOUS at 21:57

## 2022-09-01 RX ADMIN — ROCURONIUM BROMIDE 50 MG: 10 INJECTION INTRAVENOUS at 10:15

## 2022-09-01 RX ADMIN — POTASSIUM CHLORIDE 20 MEQ: 400 INJECTION, SOLUTION INTRAVENOUS at 22:02

## 2022-09-01 RX ADMIN — MUPIROCIN: 20 OINTMENT TOPICAL at 23:47

## 2022-09-01 RX ADMIN — FENTANYL CITRATE 250 MCG: 0.05 INJECTION, SOLUTION INTRAMUSCULAR; INTRAVENOUS at 09:09

## 2022-09-01 RX ADMIN — PROPOFOL 120 MG: 10 INJECTION, EMULSION INTRAVENOUS at 06:46

## 2022-09-01 RX ADMIN — ALBUMIN (HUMAN) 25 G: 12.5 INJECTION, SOLUTION INTRAVENOUS at 22:18

## 2022-09-01 RX ADMIN — MUPIROCIN: 20 OINTMENT TOPICAL at 12:14

## 2022-09-01 RX ADMIN — GABAPENTIN 900 MG: 300 CAPSULE ORAL at 06:16

## 2022-09-01 RX ADMIN — POTASSIUM CHLORIDE 20 MEQ: 400 INJECTION, SOLUTION INTRAVENOUS at 14:36

## 2022-09-01 RX ADMIN — HEPARIN SODIUM 39000 UNITS: 1000 INJECTION INTRAVENOUS; SUBCUTANEOUS at 08:20

## 2022-09-01 RX ADMIN — CHLORHEXIDINE GLUCONATE 15 ML: 1.2 RINSE ORAL at 06:00

## 2022-09-01 ASSESSMENT — PULMONARY FUNCTION TESTS
PIF_VALUE: 22
PIF_VALUE: 20
PIF_VALUE: 16
PIF_VALUE: 19
PIF_VALUE: 21
PIF_VALUE: 18
PIF_VALUE: 20
PIF_VALUE: 22
PIF_VALUE: 20
PIF_VALUE: 18
PIF_VALUE: 21
PIF_VALUE: 18
PIF_VALUE: 21
PIF_VALUE: 22
PIF_VALUE: 22
PIF_VALUE: 18
PIF_VALUE: 21
PIF_VALUE: 18
PIF_VALUE: 18
PIF_VALUE: 21
PIF_VALUE: 20
PIF_VALUE: 20
PIF_VALUE: 22
PIF_VALUE: 21
PIF_VALUE: 22
PIF_VALUE: 21
PIF_VALUE: 20
PIF_VALUE: 22
PIF_VALUE: 21

## 2022-09-01 ASSESSMENT — PAIN DESCRIPTION - DESCRIPTORS: DESCRIPTORS: PATIENT UNABLE TO DESCRIBE

## 2022-09-01 ASSESSMENT — PAIN SCALES - GENERAL
PAINLEVEL_OUTOF10: 9
PAINLEVEL_OUTOF10: 5
PAINLEVEL_OUTOF10: 0

## 2022-09-01 ASSESSMENT — PAIN DESCRIPTION - LOCATION: LOCATION: CHEST

## 2022-09-01 ASSESSMENT — PAIN - FUNCTIONAL ASSESSMENT: PAIN_FUNCTIONAL_ASSESSMENT: 0-10

## 2022-09-01 ASSESSMENT — PAIN DESCRIPTION - ORIENTATION: ORIENTATION: LEFT

## 2022-09-01 NOTE — DISCHARGE INSTRUCTIONS
[employer]  Discharge Instructions Following Cardiac Surgery    We thank you for choosing [facility]! We hope that you always had a positive experience and that we always provided the very best care during your stay. We hope that you will always choose [facility]. ACTIVITY/EXERCISE   ? Slowly increase your activity after you go home. Pace yourself, listen to your body. If it hurts, stop. ? The limit on how much you can lift, push or pull is 5 pounds. For the first 4 weeks after surgery, you must not lift anything over 5 pounds. (You cannot lift anything heavier than a gallon of milk). Beginning the 5th week after surgery, you may lift, push or pull up to 10 pounds. ? Begin your walking program on the first day you are home and record how many times per day and number of minutes on your log. You may climb stairs; there are no limits to stair climbing. ? Continue to do your cough and deep breathing exercises and the incentive spirometer 6 times a day as you did in the hospital and record in your daily log.   ? You can have sex when it is comfortable for you. The amount of stress on the heart during sex is about the same as climbing 2 flights of stairs. ? It will take 2 to 3 months to feel like you did before surgery in terms of energy and strength. ?  Do not use arms to get up from a seated position. Instead rock in chair to give       yourself momentum to sit up. .     APPETITE   ? Your appetite might be smaller. It should slowly improve. ? Small, frequent meals as well as cold foods may help. ? The dietitian will assist you with a low fat, low cholesterol, low salt diet. TEMPERATURE   ? Take your temperature at the same time each day. Take your temperature at 8 a.m.      (morning) and 8 p.m. (evening). Record your results in your daily log. WEIGHT   ?  Weigh yourself when you awaken in the morning and record in your log.    PAIN   ? You may have pain at the incision. Shoulder, neck, back and upper chest discomfort are common. Take your pain medications as ordered. BOWEL HABITS   ? Constipation is common due to medications and inactivity. ? Try drinking prune juice, eating a well balanced diet including fruits, vegetables and whole grains. ? If constipation persists, you may use any over-the-counter laxative, suppository or enema. DRIVING   ? No driving for the first 2 weeks after discharge from the hospital.  You may ride in a car.   ? Your surgeon will tell you when you can resume driving at your postoperative office visit. INCISIONS   ? Warning signs of infection: redness, warmth to touch, green colored pus, tender to touch. ? Clean your incisions with a soap that kills germs, such as Dial or Safeguard. ? Be sure to rinse the incision with water and pat dry. SMOKING   If you smoke, STOP. SLEEPING   ? If you have trouble sleeping during the night, take your pain medication at bedtime. SUPPORT STOCKINGS   ? Wear at home for 3 weeks. Take them off at night when you go to bed.   ? Hand or machine wash. Line dry. SWELLING OF LEGS   ? It is common to have leg swelling, especially if you have a leg incision. ? It is helpful to keep your legs elevated when you are sitting or lie down and elevate your legs on pillows. AWARENESS OF HEART BEATING   ? You may feel your heart is beating stronger or that your heart is beating in your neck and ears. DON'T WORRY - this is common especially at bedtime. CALL YOUR SURGEON IF YOU HAVE:   ? Rapid heart rate or fluttering in your chest   ? Fever over 101° F.   ? Weight gain or loss of 3 pounds overnight or 5      pounds in one week   ? Persistent nausea or vomiting   ? Excessive drainage or very red incision   ? Sudden, severe shortness of breath   ? Pain unrelieved by prescribed medication    OFFICE VISIT   ?  You should see your surgeon about 1 week after discharge from the hospital.   ? Call you surgeon's office to make an appointment ([phone]). ? Bring any questions you have so they may be addressed. ? BRING YOUR MEDICATION LIST.    ??????????????????????????????????????????????????????????????? Home Health Agency: ____________________      Phone: _______________    Follow up Appointments:  Call the following physicians as soon as possible for follow-up appointments:    Surgeon: Dr Rodriguez________   Phone: [phone]   in  1-2 Weeks  Cardiologist: Dr Nye____   Phone: [phone]) ________  in  2-3 Weeks  Doctor: Dr Combs___________  Phone: ____________    in 3-4 Weeks  Patient, family, or significant other verbalizes understanding of instructions including medications on attached page    If you have questions regarding these instructions, please call:  [phone]  (ICU Phone Number)    Smoking Cessation Education:   [x] Not applicable             [] Written information given to patient/family    Discharged to : [x] Home [] Other: __________________________                               [address] 8:00 AM 8:00 PM 8:00 AM MINUTES/TIMES 6 TIMES/DAY                                                                                                                                                                                                  PLEASE BRING THIS WITH YOU WHEN YOU  [facility] your your wound    After [address] has been applied    Your healthcare professional has chosen to use DERMABOND PRINEO system to close your wound. Marsveien 48 is the combination of a mesh and a liquid adhesive that allows the incision or wound to be held together during the healing process.     Mars[address] 48 should remain in place until your healthcare professional has determined that be removed, which is usually between 7-14 days. When healing is complete, your healthcare professional will carefully peel off the Marsveien 48. Prior to removal, do not scratch, rub or pick at the mesh. This may loosen the adhesive and mesh before the skin is completed. In the event that you notice the Marsveien 48 is beginning to loosen and may be coming off or comes off the skin/wound, contact your healthcare professional.    Ointments of liquids  Topical ointments, liquids or any other product (other than dry bandages) should not be applied to the incision while Mars[address] 48 is in place. These may loosen Mars[address] 48 from the skin before it has completely healed. From [address]:   [address] to [address] [place] [address]. Turn right onto [address] [place] [address]. Go past the [facility] (which will be  on your right) to [address] [place] [address] Road. Turn right onto [address] [place] [address] Road. Continue past the [facility] (still on your right) to [address]. Turn left onto [address] Turn right at the first drive off of [address] into the parking lot for The [address] [place] [address] medical [facility]. From [address]:   [address] to [address]. Turn left onto [facility]. Continue past the [facility] (which will be on your right when you cross [facility]). Turn left onto [address] Turn right at the first drive off of [address] into the parking lot for  The [address] [place] [address] medical offices. Please feel free to call    [phone] for further assistance.

## 2022-09-01 NOTE — BRIEF OP NOTE
Pre-op Dx:3V CAD; LISSA; HTN;obesity; Type 2 DM      Post-op Dx:same; acute blood loss anemia      Procedure:CABG x 4, LIMA to LAD, SVG to D1, OM2 and PDA; EVH R thigh; MARION; TCPB; sternal plate x 1 (Biomet)      Anesthesia:General endotracheal anesthesia      Surgeon/Assistants:Antwan      Specimens:none      EBL:autotransfusion      Complications: none      Findings:normal EF w/ LVH pre and post op; 1+ MR pre-op, zero MR post op

## 2022-09-01 NOTE — ANESTHESIA POSTPROCEDURE EVALUATION
Department of Anesthesiology  Postprocedure Note    Patient: Sung Prado  MRN: [id number]  [place]: 1950  Date of evaluation: 9/1/2022      Procedure Summary     Date: 09/01/22 Room / Location: [address] [place] [address]    Anesthesia Start: 2129 Anesthesia Stop: 0239    Procedure: CABG x 4, LIMA to LAD, SVG to D1, OM2 and PDA; EVH R thigh; MARION; TCPB; sternal plate x 1 (Biomet) Diagnosis:       Coronary artery disease, unspecified vessel or lesion type, unspecified whether angina present, unspecified whether native or transplanted heart      (Coronary artery disease, unspecified vessel or lesion type, unspecified whether angina present, unspecified whether native or transplanted heart [I25.10])    Surgeons: Delia Kimble MD Responsible Provider: Alexander Crockett MD    Anesthesia Type: general, regional ASA Status: 4          Anesthesia Type: No value filed.     Evelyn Phase I: Evelyn Score: 10    Evelyn Phase II:        Anesthesia Post Evaluation    Patient location during evaluation: ICU  Level of consciousness: sedated and ventilated  Airway patency: patent  Nausea & Vomiting: no vomiting  Complications: no  Cardiovascular status: blood pressure returned to baseline  Respiratory status: acceptable  Hydration status: euvolemic  Multimodal analgesia pain management approach

## 2022-09-01 NOTE — PLAN OF CARE
Problem: Discharge Planning  Goal: Discharge to home or other facility with appropriate resources  Outcome: Progressing  Flowsheets (Taken 9/1/2022 1200)  Discharge to home or other facility with appropriate resources:   Identify barriers to discharge with patient and caregiver   Arrange for needed discharge resources and transportation as appropriate   Identify discharge learning needs (meds, wound care, etc)     Problem: Pain  Goal: Verbalizes/displays adequate comfort level or baseline comfort level  Outcome: Progressing  Flowsheets (Taken 9/1/2022 1825)  Verbalizes/displays adequate comfort level or baseline comfort level:   Encourage patient to monitor pain and request assistance   Administer analgesics based on type and severity of pain and evaluate response   Consider cultural and social influences on pain and pain management   Assess pain using appropriate pain scale   Implement non-pharmacological measures as appropriate and evaluate response   Notify Licensed Independent Practitioner if interventions unsuccessful or patient reports new pain     Problem: Safety - Medical Restraint  Goal: Remains free of injury from restraints (Restraint for Interference with Medical Device)  Description: INTERVENTIONS:  1. Determine that other, less restrictive measures have been tried or would not be effective before applying the restraint  2. Evaluate the patient's condition at the time of restraint application  3. Inform patient/family regarding the reason for restraint  4.  Q2H: Monitor safety, psychosocial status, comfort, nutrition and hydration  Outcome: Completed     Problem: Cardiovascular - Adult  Goal: Maintains optimal cardiac output and hemodynamic stability  Outcome: Progressing  Flowsheets (Taken 9/1/2022 1825)  Maintains optimal cardiac output and hemodynamic stability:   Monitor blood pressure and heart rate   Monitor urine output and notify Licensed Independent Practitioner for values outside of normal range   Assess for signs of decreased cardiac output   Administer fluid and/or volume expanders as ordered   Administer vasoactive medications as ordered  Goal: Absence of cardiac dysrhythmias or at baseline  Outcome: Progressing  Flowsheets (Taken 9/1/2022 1825)  Absence of cardiac dysrhythmias or at baseline:   Monitor cardiac rate and rhythm   Administer antiarrhythmia medication and electrolyte replacement as ordered   Assess for signs of decreased cardiac output

## 2022-09-01 NOTE — PROGRESS NOTES
Pt admitted to ICU at 1145 on Amicar, cell saver, & insulin gtt 3 units/hr. Nitro started for , currently infusing at 20 mcg/min. Daughter spoke w/ Dr. Felix Brizuela and came to see patient. O chest tube output. Good UO. PA catheter recalibrated, CI  3.8, SvO2 74. Labs pending.

## 2022-09-01 NOTE — ANESTHESIA PRE PROCEDURE
Department of Anesthesiology  Preprocedure Note       Name:  Sam Levy   Age:  80 y.o.  :  1950                                          MRN:  [id number]         Date:  2022      Surgeon: Oscar Castillo):  Enedelia Nunez MD    Procedure: Procedure(s):  CORONARY ARTERY BYPASS GRAFT X 4    Medications prior to admission:   Prior to Admission medications    Medication Sig Start Date End Date Taking? Authorizing Provider   Fexofenadine-Pseudoephedrine (ALLEGRA-D 12 HOUR PO) Take by mouth daily    Historical Provider, MD   aspirin 81 MG chewable tablet Take 1 tablet by mouth in the morning.  22   Sherie Aburto MD   omeprazole (PRILOSEC) 40 MG delayed release capsule Take 1 capsule by mouth in the morning and at bedtime 22   Sherie Aburto MD   buPROPion (WELLBUTRIN XL) 300 MG extended release tablet TAKE 1 TABLET EVERY MORNING 22   Sherie Aburto MD   FLUoxetine (PROZAC) 10 MG capsule TAKE 1 CAPSULE DAILY 22   Sherie Aburto MD   atorvastatin (LIPITOR) 20 MG tablet TAKE 1 TABLET DAILY 22   Sherie Aburto MD   traZODone (DESYREL) 50 MG tablet TAKE 1 TO 2 TABLETS NIGHTLYAS NEEDED FOR SLEEP 3/8/22   Sherie Aburto MD   levothyroxine (SYNTHROID) 50 MCG tablet TAKE 1 TABLET DAILY 22   Sherie Aburto MD   valACYclovir (VALTREX) 1 g tablet TAKE 1 TABLET DAILY 22   Sherie Aburto MD   vitamin B-12 (CYANOCOBALAMIN) 1000 MCG tablet Take 1 tablet by mouth daily 21   Sherie Aburto MD   Cholecalciferol (VITAMIN D3) 1000 units TABS Take 1 tablet by mouth daily 18   Sherie Aburto MD   PREMARIN 0.625 MG/GM vaginal cream PLACE 0.5G VAGINALLY TWICE A WEEK . 17   Sherie Aburto MD   triamcinolone (NASACORT ALLERGY 24HR) 55 MCG/ACT nasal inhaler 2 SPRAYS IN EACH NOSTRIL one time a day 10/31/16   Sherie Aburto MD   mometasone (NASONEX) 50 MCG/ACT nasal spray USE 2 SPRAYS BY NASAL ROUTE DAILY 16  Sherie Aburto MD   bisacodyl (DULCOLAX) 5 MG EC tablet Take 10 mg by mouth daily as needed for Constipation. Historical Provider, MD   Omega-3 Fatty Acids (FISH OIL BURP-LESS) 1200 MG CAPS Take 1 tablet by mouth daily     Historical Provider, MD   ibuprofen (ADVIL;MOTRIN) 600 MG tablet Take 600 mg by mouth every 6 hours as needed for Pain. Historical Provider, MD       Current medications:    Current Facility-Administered Medications   Medication Dose Route Frequency Provider Last Rate Last Admin    lactated ringers infusion   IntraVENous Continuous Jaz Pruitt DO        sodium chloride flush 0.9 % injection 5-40 mL  5-40 mL IntraVENous 2 times per day Gay Brooks MD        sodium chloride flush 0.9 % injection 5-40 mL  5-40 mL IntraVENous PRN Gay Brooks MD        0.9 % sodium chloride infusion   IntraVENous PRN Gay Brooks MD        mupirocin (BACTROBAN) 2 % ointment   Nasal Once Gay Brooks MD        chlorhexidine (PERIDEX) 0.12 % solution 15 mL  15 mL Mouth/Throat Once Gay Brooks MD        chlorhexidine (HIBICLENS) 4 % liquid   Topical Once Gay Brooks MD        vancomycin (VANCOCIN) 1,500 mg in dextrose 5 % 250 mL IVPB  1,500 mg IntraVENous Once Gay Brooks MD        lactated ringers infusion   IntraVENous Continuous Gay Brooks MD           Allergies:     Allergies   Allergen Reactions    Latex Rash    Penicillins Rash       Problem List:    Patient Active Problem List   Diagnosis Code    Environmental allergies Z91.09    Mixed hyperlipidemia E78.2    Acquired hypothyroidism E03.9    Insomnia G47.00    Herpes simplex type 2 infection B00.9    Class 2 severe obesity with serious comorbidity and body mass index (BMI) of 36.0 to 36.9 in adult ([facility]) E66.01, Z68.36    Major depressive disorder in remission ([facility]) F32.5    Gastroesophageal reflux disease with esophagitis K21.00    Prediabetes R73.03    Numbness and tingling in both hands R20.0, R20.2    Obstructive sleep apnea G47.33    Abnormal stress test R94.39    MICHAEL (stress urinary incontinence, female) N39.3    Chest pain on exertion R07.9       Past Medical History:        Diagnosis Date    Abnormal Pap smear of cervix     Acid reflux     Bacterial vaginosis     CAD (coronary artery disease)     Dysmenorrhea     Environmental allergies     Fibrocystic breast     Fibroid     Herpes simplex type 2 infection     History of blood transfusion     Hyperlipidemia     Hypothyroid     Dx about 15 years ago, but htne was off med for  long time    Insomnia     Menopause     approx age 37    Menopause ovarian failure     Menorrhagia     Sleep apnea 08/2022    just diagnosed in April, waiting for CPAP    Urinary incontinence     Urogenital trichomoniasis        Past Surgical History:        Procedure Laterality Date    BREAST BIOPSY      CAPSULOTOMY Bilateral 02/2020    Yag    CARDIAC CATHETERIZATION  08/10/2022    LAD with 80-90% prox.  Lg D1 with 80% prox.   Cx with prox 90%.   RCA with prox 90%-dom    CATARACT REMOVAL WITH IMPLANT Right 06/26/2019    COLONOSCOPY      DILATION AND CURETTAGE OF UTERUS      ESOPHAGEAL DILATATION N/A 10/25/2018    ESOPHAGEAL DILATION Consuella Ban performed by Maggie Abernathy MD at [address] [place] [address], [facility] [address] (CERVIX REMOVED)  1990    fibroid-still with ovaries    INTRACAPSULAR CATARACT EXTRACTION Left 06/12/2019    PHACOEMULSIFICATION OF CATARACT LEFT EYE WITH INTRAOCULAR LENS IMPLANT performed by Chapito Pillai MD at [facility] Right 06/26/2019    PHACOEMULSIFICATION OF CATARACT RIGHT EYE WITH INTRAOCULAR LENS IMPLANT performed by Chapito Pillai MD at [facility] [address] 10/25/2018    EGD BIOPSY performed by Maggie Abernathy MD at [address] [place] [address] History:    Social History     Tobacco Use    Smoking status: Never    Smokeless tobacco: Never    Tobacco comments:     Never smoked, never will!    Substance Use Topics    Alcohol use: No                                Counseling given: Not Answered  Tobacco comments: Never smoked, never will! Vital Signs (Current):   Vitals:    09/01/22 0536 09/01/22 0616   BP: (!) 159/70 (!) 159/70   Pulse: 73 73   Resp: 14    Temp: 96.9 °F (36.1 °C)    TempSrc: Oral    SpO2: 96%    Weight: 194 lb 12.8 oz (88.4 kg)    Height: 5' (1.524 m)                                               BP Readings from Last 3 Encounters:   09/01/22 (!) 159/70   08/24/22 134/62   07/28/22 134/70       NPO Status:                                                                                 BMI:   Wt Readings from Last 3 Encounters:   09/01/22 194 lb 12.8 oz (88.4 kg)   08/26/22 197 lb (89.4 kg)   08/24/22 197 lb (89.4 kg)     Body mass index is 38.04 kg/m². CBC:   Lab Results   Component Value Date/Time    WBC 6.3 08/26/2022 09:08 AM    RBC 4.15 08/26/2022 09:08 AM    HGB 12.8 08/26/2022 09:08 AM    HCT 37.8 08/26/2022 09:08 AM    MCV 91.1 08/26/2022 09:08 AM    RDW 14.9 08/26/2022 09:08 AM     08/26/2022 09:08 AM       CMP:   Lab Results   Component Value Date/Time     08/26/2022 09:08 AM    K 4.1 08/26/2022 09:08 AM     08/26/2022 09:08 AM    CO2 27 08/26/2022 09:08 AM    BUN 12 08/26/2022 09:08 AM    CREATININE 0.9 08/26/2022 09:08 AM    GFRAA >60 08/26/2022 09:08 AM    AGRATIO 1.5 08/26/2022 09:08 AM    LABGLOM >60 08/26/2022 09:08 AM    GLUCOSE 171 08/26/2022 09:08 AM    PROT 7.1 08/26/2022 09:08 AM    CALCIUM 9.2 08/26/2022 09:08 AM    BILITOT 0.4 08/26/2022 09:08 AM    ALKPHOS 82 08/26/2022 09:08 AM    AST 23 08/26/2022 09:08 AM    ALT 31 08/26/2022 09:08 AM       POC Tests: No results for input(s): POCGLU, POCNA, POCK, POCCL, POCBUN, POCHEMO, POCHCT in the last 72 hours.     Coags:   Lab Results   Component Value Date/Time    PROTIME 14.0 08/26/2022 09:08 AM    INR 1.09 08/26/2022 09:08 AM    APTT 36.1 08/26/2022 09:08 AM       HCG (If Applicable): No results found for: PREGTESTUR, PREGSERUM, HCG, HCGQUANT     ABGs: No results found for: PHART, PO2ART, LEF8RMQ, QKD5SAE, BEART, F6RYJFDK     Type & Screen (If Applicable):  No results found for: LABABO, LABRH    Drug/Infectious Status (If Applicable):  No results found for: HIV, HEPCAB    COVID-19 Screening (If Applicable):   Lab Results   Component Value Date/Time    COVID19 NOT DETECTED 12/09/2020 02:24 PM           Anesthesia Evaluation    Airway: Mallampati: II  TM distance: >3 FB   Neck ROM: full  Mouth opening: > = 3 FB   Dental:    (+) upper dentures and partials      Pulmonary: breath sounds clear to auscultation  (+) sleep apnea:                             Cardiovascular:    (+) CAD:, hyperlipidemia      ECG reviewed  Rhythm: regular  Rate: normal  Echocardiogram reviewed         Beta Blocker:  Dose within 24 Hrs      ROS comment: Baseline resting echocardiogram shows normal global LV systolic function   with an ejection fraction of 55% and uniform myocardial segmental wall   motion. Following stress there was uniform augmentation of all myocardial   segments with appropriate hyperdynamic LV systolic response to stress with a   post ejection fraction of 65%. Neuro/Psych:   (+) depression/anxiety             GI/Hepatic/Renal:   (+) GERD:, morbid obesity          Endo/Other:    (+) Diabetes, hypothyroidism::., .                 Abdominal:             Vascular: Other Findings:           Anesthesia Plan      general and regional     ASA 4       Induction: intravenous. arterial line, central line, PA catheter, CVP and MARION  MIPS: Postoperative opioids intended and Prophylactic antiemetics administered. Anesthetic plan and risks discussed with patient. Use of blood products discussed with patient whom consented to blood products.                      Bruno Wilson MD   9/1/2022

## 2022-09-01 NOTE — H&P
[facility]    [id number]    [facility] Same Day Surgery Update H & P  Department of General Surgery   Surgical Service   Pre-operative History and Physical  Last H & P within the last 30 days. DIAGNOSIS:   Coronary artery disease, unspecified vessel or lesion type, unspecified whether angina present, unspecified whether native or transplanted heart [I25.10]    Procedure(s):  CORONARY ARTERY BYPASS GRAFT X 4    History obtained from: Patient interview and EHR      HISTORY OF PRESENT ILLNESS:   The patient is a 80 y.o. female with c/o exertional angina was found to have multivessel CAD. She presents today for the above procedure. Illness Screening: Patient denies fever, chills, worsening cough, or close contact with sick individuals. Past Medical History:        Diagnosis Date    Abnormal Pap smear of cervix     Acid reflux     Bacterial vaginosis     CAD (coronary artery disease)     Dysmenorrhea     Environmental allergies     Fibrocystic breast     Fibroid     Herpes simplex type 2 infection     History of blood transfusion     Hyperlipidemia     Hypothyroid     Dx about 15 years ago, but htne was off med for  long time    Insomnia     Menopause     approx age 37    Menopause ovarian failure     Menorrhagia     Sleep apnea 08/2022    just diagnosed in April, waiting for CPAP    Urinary incontinence     Urogenital trichomoniasis      Past Surgical History:        Procedure Laterality Date    BREAST BIOPSY      CAPSULOTOMY Bilateral 02/2020    Yag    CARDIAC CATHETERIZATION  08/10/2022    LAD with 80-90% prox. Lg D1 with 80% prox. Cx with prox 90%.    RCA with prox 90%-dom    CATARACT REMOVAL WITH IMPLANT Right 06/26/2019    COLONOSCOPY      DILATION AND CURETTAGE OF UTERUS      ESOPHAGEAL DILATATION N/A 10/25/2018    ESOPHAGEAL DILATION LOZANO performed by Blaise Alvarado MD at [address], TOTAL ABDOMINAL (CERVIX REMOVED)  1990    fibroid-still with ovaries INTRACAPSULAR CATARACT EXTRACTION Left 06/12/2019    PHACOEMULSIFICATION OF CATARACT LEFT EYE WITH INTRAOCULAR LENS IMPLANT performed by Radha Kenyon MD at [address] Right 06/26/2019    PHACOEMULSIFICATION OF CATARACT RIGHT EYE WITH INTRAOCULAR LENS IMPLANT performed by Radha Kenyon MD at [address] N/A 10/25/2018    EGD BIOPSY performed by Shruti Bustillos MD at [facility]       Medications Prior to Admission:      Prior to Admission medications    Medication Sig Start Date End Date Taking? Authorizing Provider   Fexofenadine-Pseudoephedrine (ALLEGRA-D 12 HOUR PO) Take by mouth daily    Historical Provider, MD   aspirin 81 MG chewable tablet Take 1 tablet by mouth in the morning.  7/26/22   Vianney Davidson MD   omeprazole (PRILOSEC) 40 MG delayed release capsule Take 1 capsule by mouth in the morning and at bedtime 5/27/22   Vianney Davidson MD   buPROPion (WELLBUTRIN XL) 300 MG extended release tablet TAKE 1 TABLET EVERY MORNING 5/19/22   Vianney Davidson MD   FLUoxetine (PROZAC) 10 MG capsule TAKE 1 CAPSULE DAILY 4/25/22   Vianney Davidosn MD   atorvastatin (LIPITOR) 20 MG tablet TAKE 1 TABLET DAILY 4/25/22   Vianney Davidson MD   traZODone (DESYREL) 50 MG tablet TAKE 1 TO 2 TABLETS NIGHTLYAS NEEDED FOR SLEEP 3/8/22   Vianney Davidson MD   levothyroxine (SYNTHROID) 50 MCG tablet TAKE 1 TABLET DAILY 1/23/22   Vianney Davidson MD   valACYclovir (VALTREX) 1 g tablet TAKE 1 TABLET DAILY 1/23/22   Vianney Davidson MD   vitamin B-12 (CYANOCOBALAMIN) 1000 MCG tablet Take 1 tablet by mouth daily 5/7/21   Vianney Davidson MD   Cholecalciferol (VITAMIN D3) 1000 units TABS Take 1 tablet by mouth daily 1/26/18   Vianney Davidson MD   PREMARIN 0.625 MG/GM vaginal cream PLACE 0.5G VAGINALLY TWICE A WEEK . 11/8/17   Vianney Davidson MD   triamcinolone (NASACORT ALLERGY 24HR) 55 MCG/ACT nasal inhaler 2 SPRAYS IN EACH NOSTRIL one time a day 10/31/16   Vianney Davidson MD   mometasone (NASONEX) 50 MCG/ACT nasal spray USE 2 SPRAYS BY NASAL ROUTE DAILY 4/6/16 1/30/19  Aldo Johnson MD   bisacodyl (DULCOLAX) 5 MG EC tablet Take 10 mg by mouth daily as needed for Constipation. Historical Provider, MD   Omega-3 Fatty Acids (FISH OIL BURP-LESS) 1200 MG CAPS Take 1 tablet by mouth daily     Historical Provider, MD   ibuprofen (ADVIL;MOTRIN) 600 MG tablet Take 600 mg by mouth every 6 hours as needed for Pain. Historical Provider, MD         Allergies:  Latex and Penicillins    PHYSICAL EXAM:      BP (!) 159/70 Comment: RIGHT  Pulse 73   Temp 96.9 °F (36.1 °C) (Oral)   Resp 14   Ht 5' (1.524 m)   Wt 194 lb 12.8 oz (88.4 kg)   SpO2 96%   BMI 38.04 kg/m²      Airway:  Airway patent with no audible stridor    Heart:  Regular rate and rhythm, No murmur noted    Lungs:  No increased work of breathing, good air exchange, clear to auscultation bilaterally, no crackles or wheezing    Abdomen:  Soft, non-distended, non-tender, no rebound tenderness or guarding, and no masses palpated    ASSESSMENT AND PLAN     Patient is a 80 y.o. female with above specified procedure planned. 1.  The patients history and physical was obtained and signed off by the pre-admission testing department. Patient seen and focused exam done today- no new changes since last physical exam on 8/24/22    2. Access to ancillary services are available per request of the provider.     CADEN Sheikh - CNP     9/1/2022

## 2022-09-01 NOTE — ANESTHESIA PROCEDURE NOTES
Peripheral Block    Patient location during procedure: OR  Reason for block: post-op pain management and at surgeon's request  Start time: 9/1/2022 7:10 AM  End time: 9/1/2022 7:15 AM  Staffing  Performed: anesthesiologist   Anesthesiologist: Natalie Gaona MD  Preanesthetic Checklist  Completed: patient identified, IV checked, site marked, risks and benefits discussed, surgical/procedural consents, equipment checked, pre-op evaluation, timeout performed, anesthesia consent given, oxygen available and monitors applied/VS acknowledged  Peripheral Block   Patient position: supine  Prep: ChloraPrep  Provider prep: mask and sterile gloves  Patient monitoring: cardiac monitor, continuous pulse ox, frequent blood pressure checks and IV access  Block type: PECS II  Laterality: bilateral  Injection technique: single-shot  Guidance: ultrasound guided  Local infiltration: lidocaine  Infiltration strength: 1 %  Local infiltration: lidocaine  Dose: 3 mL    Needle   Needle gauge: 21 G  Needle localization: ultrasound guidance  Needle length: 10 cm  Assessment   Injection assessment: negative aspiration for heme, no paresthesia on injection and local visualized surrounding nerve on ultrasound  Paresthesia pain: none  Slow fractionated injection: yes  Hemodynamics: stable  Real-time US image taken/store: yes  Outcomes: uncomplicated and patient tolerated procedure well    Additional Notes  Immediately prior to procedure a \"time out\" was called to verify the correct patient, allergies, laterality, procedure and equipment. Time out performed with  RN    Local Anesthetic:10 cc 0.5 % Bupivacaine + 10 cc Exparel   Amount: 20 ml to each side in 5 ml increments after negative aspiration each time. Pectoralis major, pectoralis minor and serratus muscle are identified; the tip of the needle and the spread of the local anesthetic between the pectoralis minor and serratus muscles are visualized.  The muscles appeared to be anatomically normal and there were no abnormal pathologically findings seen.      Medications Administered  bupivacaine (PF) 0.5 % - Perineural   20 mL - 9/1/2022 7:10:00 AM  bupivacaine liposome 1.3 % - Perineural   20 mL - 9/1/2022 7:10:00 AM

## 2022-09-01 NOTE — DISCHARGE INSTR - COC
Continuity of Care Form    Patient Name: Manohar Chamberlain   :  1950  MRN:  [id number]    Admit date:  2022  Discharge date:  ***    Code Status Order: Full Code   Advance Directives:   [address] [facility] Documentation       Date/Time Healthcare Directive Type of Healthcare Directive Copy in [address] Agent's Name Healthcare Agent's Phone Number    [phone] No, patient does not have an advance directive for healthcare treatment -- -- -- -- --            Admitting Physician:  Zaire Calle MD  PCP: Fatmata Ng MD    Discharging Nurse: [facility] Unit/Room#: OR/NONE  Discharging Unit Phone Number: ***    Emergency Contact:   Extended Emergency Contact Information  Primary Emergency Contact: Nikki Modi  Address: [address] [place] [address], [address] [place] [address] Phone: [phone]  Mobile Phone: [phone]  Relation: Child  Secondary Emergency Contact: Glo Guardado  [place] Phone: [phone]  Mobile Phone: [phone]  Relation: Child    Past Surgical History:  Past Surgical History:   Procedure Laterality Date    BREAST BIOPSY      CAPSULOTOMY Bilateral 2020    Yag    CARDIAC CATHETERIZATION  08/10/2022    LAD with 80-90% prox. Lg D1 with 80% prox. Cx with prox 90%.    RCA with prox 90%-dom    CATARACT REMOVAL WITH IMPLANT Right 2019    COLONOSCOPY      DILATION AND CURETTAGE OF UTERUS      ESOPHAGEAL DILATATION N/A 10/25/2018    ESOPHAGEAL DILATION LOZANO performed by Terry Paul MD at [address], TOTAL ABDOMINAL (CERVIX REMOVED)      fibroid-still with ovaries    INTRACAPSULAR CATARACT EXTRACTION Left 2019    PHACOEMULSIFICATION OF CATARACT LEFT EYE WITH INTRAOCULAR LENS IMPLANT performed by Sofiya Loya MD at [place] [address] Right 2019    PHACOEMULSIFICATION OF CATARACT RIGHT EYE WITH INTRAOCULAR LENS IMPLANT performed by Kayley Goldberg MD at [facility] N/A 10/25/2018    EGD BIOPSY performed by Melyssa Hatfield MD at [place] [address]       Immunization History:   Immunization History   Administered Date(s) Administered    COVID-19, PFIZER PURPLE top, DILUTE for use, (age 15 y+), 30mcg/0.3mL 02/19/2021, 03/12/2021, 11/07/2021    Influenza Virus Vaccine 09/24/2014, 10/15/2015    Influenza, AFLURIA (age 1 yrs+), FLUZONE, (age 10 mo+), MDV, 0.5mL 10/21/2016    Influenza, FLUAD, (age 72 y+), Adjuvanted, 0.5mL 11/11/2020, 09/08/2021    Influenza, High Dose (Fluzone 65 yrs and older) 09/21/2017, 11/21/2018    Influenza, Triv, inactivated, subunit, adjuvanted, IM (Fluad 65 yrs and older) 12/04/2019    Pneumococcal Conjugate 13-valent (Ugitqam63) 11/02/2016    Pneumococcal Polysaccharide (Tycpktvdu49) 11/01/2017    Tdap (Boostrix, Adacel) 02/14/2014    Zoster Live (Zostavax) 01/08/2014    Zoster Recombinant (Shingrix) 07/12/2020, 01/01/2021       Active Problems:  Patient Active Problem List   Diagnosis Code    Environmental allergies Z91.09    Mixed hyperlipidemia E78.2    Acquired hypothyroidism E03.9    Insomnia G47.00    Herpes simplex type 2 infection B00.9    Class 2 severe obesity with serious comorbidity and body mass index (BMI) of 36.0 to 36.9 in adult ([facility]) E66.01, Z68.36    Major depressive disorder in remission ([facility]) F32.5    Gastroesophageal reflux disease with esophagitis K21.00    Prediabetes R73.03    Numbness and tingling in both hands R20.0, R20.2    Obstructive sleep apnea G47.33    Abnormal stress test R94.39    MICHAEL (stress urinary incontinence, female) N39.3    Chest pain on exertion R07.9       Isolation/Infection:   Isolation            No Isolation          Patient Infection Status       None to display            Nurse Assessment:  Last Vital Signs: BP (!) 159/70   Pulse 73   Temp 96.9 °F (36.1 °C) (Oral)   Resp 14   Ht 5' (1.524 m)   Wt 194 lb 12.8 oz (88.4 kg) SpO2 96%   BMI 38.04 kg/m²     Last documented pain score (0-10 scale): Pain Level: 0  Last Weight:   Wt Readings from Last 1 Encounters:   22 194 lb 12.8 oz (88.4 kg)     Mental Status:  {IP PT MENTAL STATUS:}    IV Access:  { TAI IV ACCESS:627084281}    Nursing Mobility/ADLs:  Walking   {CHP DME MPCJ:358229240}  Transfer  {CHP DME LBIO:651572915}  Bathing  {CHP DME KTAA:210195681}  Dressing  {CHP DME ZVLA:825079155}  Toileting  {CHP DME XRYE:131885712}  Feeding  {CHP DME PHWJ:032252138}  Med Admin  {CHP DME YGJL:943721197}  Med Delivery   { TAI MED Delivery:633431749}    Wound Care Documentation and Therapy:        Elimination:  Continence: Bowel: {YES / BN:52723}  Bladder: {YES / DV:80362}  Urinary Catheter: {Urinary Catheter:929000480}   Colostomy/Ileostomy/Ileal Conduit: {YES / FJ:72804}       Date of Last BM: ***    Intake/Output Summary (Last 24 hours) at 2022 0736  Last data filed at 2022 0725  Gross per 24 hour   Intake 250 ml   Output --   Net 250 ml     No intake/output data recorded.     Safety Concerns:     [employer] Safety Concerns:653648913}    Impairments/Disabilities:      [employer] Impairments/Disabilities:284223318}    Nutrition Therapy:  Current Nutrition Therapy:   [employer] Diet List:769180757}    Routes of Feeding: {CHP DME Other Feedings:375649895}  Liquids: {Slp liquid thickness:73703}  Daily Fluid Restriction: {CHP DME Yes amt example:098483156}  Last Modified Barium Swallow with Video (Video Swallowing Test): {Done Not Done AXYY:271844892}    Treatments at the Time of Hospital Discharge:   Respiratory Treatments: ***  Oxygen Therapy:  {Therapy; copd oxygen:81829}  Ventilator:    {[facility] Vent OEOV:930784157}    Rehab Therapies: {THERAPEUTIC INTERVENTION:8053256517}  Weight Bearing Status/Restrictions: [address] FITZPATRICK Weight Bearin}  Other Medical Equipment (for information only, NOT a DME order):  {EQUIPMENT:930406548}  Other Treatments: ***    Patient's personal belongings (please select all that are sent with patient):  {CHP DME Belongings:666670042}    RN SIGNATURE:  {Esignature:030173561}    CASE MANAGEMENT/SOCIAL WORK SECTION    Inpatient Status Date: ***    Readmission Risk Assessment Score:  Readmission Risk              Risk of Unplanned Readmission:  0           Discharging to Facility/ Agency   Name:   Address:  Phone:  Fax:    Dialysis Facility (if applicable)   Name:  Address:  Dialysis Schedule:  Phone:  Fax:    / signature: {Esignature:956631106}    PHYSICIAN SECTION    Prognosis: {Prognosis:7797268509}    Condition at Discharge: [address] Patient Condition:077306380}    Rehab Potential (if transferring to Rehab): {Prognosis:9676974173}    Recommended Labs or Other Treatments After Discharge: MUST  [facility] [address] [facility]    Pre-op weight:   194 lbs    Incision/Wound Care:    Midsternal      Chest Tube Site     Legs         Clean with antibacterial soap and water daily. Monitor for signs of infection    Upper Extremity Restrictions (sternal precautions):  No lifting, pushing, pulling over 5 pounds for 8 weeks. Remove chest tubes sutures 10 days post-op, after checking with surgeon's office. Please remove IJ suture if it is still in.   1.  Wash EACH incision daily with separate wash cloth with antibacterial soap and water; pat dry. Do NOT apply anything to incisions - NO LOTIONS, HYDROGEN PEROXIDE, POWDER. 2.  If discharged with dressing on wound, remove dressing and wash daily with antibacterial soap and water. Leave open to air unless draining. 3. CHARLES hose on during the day and off at night. Keep legs elevated while sitting, especially if edematous. 4.  Incentive spiropmeter X 10, cough and deep breath every hour while awake. 5.  Pulse Ox checks with each visit for home care and with vital signs for ECF. CALL if pulse ox less than 90%.   6.  Keep activity log (ambulate as directed and bring log to follow up appointment.)  7. Daily weight and record  8. No tub bath. May shower  9. Up in chair for all meals  10. PT/OT - evaluate and treat  11. Follow up to see Dr Tash Chi in 7-10 days  12. Labs:    Call the surgeon at [phone] for any for any of the following reasons:  Changes in incision (increased redness, tenderness, warmth or drainage)  Call for pain not relieved with pain medication  Call for temp >101, HR <50 or >110 beats/min, SBP < 90 or >160. No bowel movement for 3 days  Daily weights: call if 2 pound weight gain in 24 hours or 5 pound weight gain in 1 week. Call for persistent diarrhea, nausea, or vomiting. Pain, tenderness, warmth, or redness in calf  Call for symptomatic fluttering in chest, irregular pulse, dyspnea  DEPRESSION: Call Primary Care Physician if feelings of depression and depression persists  DIABETICS: Call Primary Care Physician for blood sugars >130 consistently       Physician Certification: I certify the above information and transfer of Antonette Pope  is necessary for the continuing treatment of the diagnosis listed and that she requires {Admit to Appropriate Level of Care:58311} for {GREATER/LESS:751775361} 30 days.      Update Admission H&P: {CHP DME Changes in MSMYW:651739584}    PHYSICIAN SIGNATURE:  Electronically signed by Patricia Condon MD on 9/5/22 at 11:19 AM EDT

## 2022-09-01 NOTE — ANESTHESIA PROCEDURE NOTES
Procedure Performed: MARION       Start Time:        End Time:      Preanesthesia Checklist:  Patient identified, IV assessed, risks and benefits discussed, monitors and equipment assessed, procedure being performed at surgeon's request and anesthesia consent obtained. General Procedure Information  Diagnostic Indications for Echo:  hemodynamic monitoring  Physician Requesting Echo: Mark Mejia MD  Location performed:  OR  Intubated  Bite block not placed  Heart visualized  Probe Insertion:  Easy  Probe Type:  3D  Modalities:  2D only, 3D, color flow mapping, continuous wave Doppler, contrast, pulse wave Doppler and M-mode        Anesthesia Information  Performed Personally  Anesthesiologist:  Paul Man MD    Transesophageal Echocardiogram (MARION) ProbePlacement Procedure Note    NAME: Harsha Fox     MR#: [id number]    Surgical Procedure: CABG        Surgeon: Franck Mckeon             Date: 1 Sept 2022    Indication: marie-op monitoring and access    Procedure: After induction of General Endotracheal Anesthesia, a standard MARION probe was placed through the mouth and advanced into the esophagus and stomach. Multiple planes at multiple depths were viewed and the findings related to the surgeon. Ultrasound Findings:  Prepump: EF preserved 50% no RWMA noted. The LV and RV appear normal in size and function. There is trace to no TR, trace to mild central MR and no AI. The JOSIAH measurements are all WNL. Post: Post EF 50+ % hyperdynamic with hypovolemic LV noted. THere are no RWMA.  Trace to no MR post    Complications: none    Rita Lewis MD  7:32 AM

## 2022-09-01 NOTE — PROGRESS NOTES
Pt wakes to voice, indicated she was in pain 8/10 on the L side of her chest.  Toradol given; pt resting comfortably afterward. Attempted 2 SBTs; lasted 5 mins (RR 27, VTe 230s) the first trail & 15 mins the second attempt (apnea alarm which then triggered backup mode).

## 2022-09-02 LAB
ANION GAP SERPL CALCULATED.3IONS-SCNC: 7 MMOL/L (ref 3–16)
BASE EXCESS ARTERIAL: -2 (ref -3–3)
BASE EXCESS VENOUS: -3 (ref -3–3)
BUN BLDV-MCNC: 11 MG/DL (ref 7–20)
CALCIUM IONIZED: 1 MMOL/L (ref 1.12–1.32)
CALCIUM IONIZED: 1.04 MMOL/L (ref 1.12–1.32)
CALCIUM IONIZED: 1.31 MMOL/L (ref 1.12–1.32)
CALCIUM SERPL-MCNC: 8.3 MG/DL (ref 8.3–10.6)
CHLORIDE BLD-SCNC: 109 MMOL/L (ref 99–110)
CO2: 23 MMOL/L (ref 21–32)
CREAT SERPL-MCNC: 0.7 MG/DL (ref 0.6–1.2)
GFR AFRICAN AMERICAN: >60
GFR NON-AFRICAN AMERICAN: >60
GLUCOSE BLD-MCNC: 101 MG/DL (ref 70–99)
GLUCOSE BLD-MCNC: 102 MG/DL (ref 70–99)
GLUCOSE BLD-MCNC: 103 MG/DL (ref 70–99)
GLUCOSE BLD-MCNC: 110 MG/DL (ref 70–99)
GLUCOSE BLD-MCNC: 113 MG/DL (ref 70–99)
GLUCOSE BLD-MCNC: 115 MG/DL (ref 70–99)
GLUCOSE BLD-MCNC: 124 MG/DL (ref 70–99)
GLUCOSE BLD-MCNC: 126 MG/DL (ref 70–99)
GLUCOSE BLD-MCNC: 140 MG/DL (ref 70–99)
GLUCOSE BLD-MCNC: 142 MG/DL (ref 70–99)
GLUCOSE BLD-MCNC: 179 MG/DL (ref 70–99)
GLUCOSE BLD-MCNC: 225 MG/DL (ref 70–99)
GLUCOSE BLD-MCNC: 90 MG/DL (ref 70–99)
GLUCOSE BLD-MCNC: 91 MG/DL (ref 70–99)
HCO3 ARTERIAL: 22.2 MMOL/L (ref 21–29)
HCO3 ARTERIAL: 23.8 MMOL/L (ref 21–29)
HCO3 ARTERIAL: 24 MMOL/L (ref 21–29)
HCO3 ARTERIAL: 24 MMOL/L (ref 21–29)
HCO3 VENOUS: 22.7 MMOL/L (ref 23–29)
HCT VFR BLD CALC: 25.6 % (ref 36–48)
HEMOGLOBIN: 8.6 G/DL (ref 12–16)
INR BLD: 1.4 (ref 0.87–1.14)
MAGNESIUM: 2 MG/DL (ref 1.8–2.4)
MCH RBC QN AUTO: 30.8 PG (ref 26–34)
MCHC RBC AUTO-ENTMCNC: 33.4 G/DL (ref 31–36)
MCV RBC AUTO: 92.2 FL (ref 80–100)
O2 SAT, ARTERIAL: 97 % (ref 93–100)
O2 SAT, ARTERIAL: 98 % (ref 93–100)
O2 SAT, VEN: 52 %
PCO2 ARTERIAL: 34.4 MM HG (ref 35–45)
PCO2 ARTERIAL: 41.3 MM HG (ref 35–45)
PCO2 ARTERIAL: 43.2 MM HG (ref 35–45)
PCO2 ARTERIAL: 47.3 MM HG (ref 35–45)
PCO2, VEN: 43.7 MM HG (ref 40–50)
PDW BLD-RTO: 15.3 % (ref 12.4–15.4)
PERFORMED ON: ABNORMAL
PERFORMED ON: NORMAL
PH ARTERIAL: 7.31 (ref 7.35–7.45)
PH ARTERIAL: 7.35 (ref 7.35–7.45)
PH ARTERIAL: 7.37 (ref 7.35–7.45)
PH ARTERIAL: 7.42 (ref 7.35–7.45)
PH VENOUS: 7.32 (ref 7.35–7.45)
PLATELET # BLD: 109 K/UL (ref 135–450)
PMV BLD AUTO: 8.7 FL (ref 5–10.5)
PO2 ARTERIAL: 104.7 MM HG (ref 75–108)
PO2 ARTERIAL: 110.4 MM HG (ref 75–108)
PO2 ARTERIAL: 113.9 MM HG (ref 75–108)
PO2 ARTERIAL: 88.1 MM HG (ref 75–108)
PO2, VEN: 30 MM HG
POC POTASSIUM: 3.9 MMOL/L (ref 3.5–5.1)
POC POTASSIUM: 4.1 MMOL/L (ref 3.5–5.1)
POC SAMPLE TYPE: ABNORMAL
POC SAMPLE TYPE: NORMAL
POTASSIUM SERPL-SCNC: 4.5 MMOL/L (ref 3.5–5.1)
PROTHROMBIN TIME: 17.2 SEC (ref 11.7–14.5)
RBC # BLD: 2.78 M/UL (ref 4–5.2)
SODIUM BLD-SCNC: 139 MMOL/L (ref 136–145)
TCO2 ARTERIAL: 23 MMOL/L
TCO2 ARTERIAL: 25 MMOL/L
TCO2 ARTERIAL: 25 MMOL/L
TCO2 ARTERIAL: 26 MMOL/L
TCO2 CALC VENOUS: 24 MMOL/L
WBC # BLD: 7.3 K/UL (ref 4–11)

## 2022-09-02 PROCEDURE — 2500000003 HC RX 250 WO HCPCS: Performed by: THORACIC SURGERY (CARDIOTHORACIC VASCULAR SURGERY)

## 2022-09-02 PROCEDURE — 94003 VENT MGMT INPAT SUBQ DAY: CPT

## 2022-09-02 PROCEDURE — A4216 STERILE WATER/SALINE, 10 ML: HCPCS | Performed by: THORACIC SURGERY (CARDIOTHORACIC VASCULAR SURGERY)

## 2022-09-02 PROCEDURE — 82803 BLOOD GASES ANY COMBINATION: CPT

## 2022-09-02 PROCEDURE — 2060000000 HC ICU INTERMEDIATE R&B

## 2022-09-02 PROCEDURE — 6370000000 HC RX 637 (ALT 250 FOR IP): Performed by: THORACIC SURGERY (CARDIOTHORACIC VASCULAR SURGERY)

## 2022-09-02 PROCEDURE — 6360000002 HC RX W HCPCS: Performed by: THORACIC SURGERY (CARDIOTHORACIC VASCULAR SURGERY)

## 2022-09-02 PROCEDURE — 97166 OT EVAL MOD COMPLEX 45 MIN: CPT

## 2022-09-02 PROCEDURE — 84132 ASSAY OF SERUM POTASSIUM: CPT

## 2022-09-02 PROCEDURE — 94150 VITAL CAPACITY TEST: CPT

## 2022-09-02 PROCEDURE — 2700000000 HC OXYGEN THERAPY PER DAY

## 2022-09-02 PROCEDURE — 6370000000 HC RX 637 (ALT 250 FOR IP): Performed by: CLINICAL NURSE SPECIALIST

## 2022-09-02 PROCEDURE — 82947 ASSAY GLUCOSE BLOOD QUANT: CPT

## 2022-09-02 PROCEDURE — 83735 ASSAY OF MAGNESIUM: CPT

## 2022-09-02 PROCEDURE — 82330 ASSAY OF CALCIUM: CPT

## 2022-09-02 PROCEDURE — 36415 COLL VENOUS BLD VENIPUNCTURE: CPT

## 2022-09-02 PROCEDURE — 85027 COMPLETE CBC AUTOMATED: CPT

## 2022-09-02 PROCEDURE — P9041 ALBUMIN (HUMAN),5%, 50ML: HCPCS | Performed by: THORACIC SURGERY (CARDIOTHORACIC VASCULAR SURGERY)

## 2022-09-02 PROCEDURE — 97162 PT EVAL MOD COMPLEX 30 MIN: CPT

## 2022-09-02 PROCEDURE — 85610 PROTHROMBIN TIME: CPT

## 2022-09-02 PROCEDURE — 80048 BASIC METABOLIC PNL TOTAL CA: CPT

## 2022-09-02 PROCEDURE — 97535 SELF CARE MNGMENT TRAINING: CPT

## 2022-09-02 PROCEDURE — 97116 GAIT TRAINING THERAPY: CPT

## 2022-09-02 PROCEDURE — C9113 INJ PANTOPRAZOLE SODIUM, VIA: HCPCS | Performed by: THORACIC SURGERY (CARDIOTHORACIC VASCULAR SURGERY)

## 2022-09-02 PROCEDURE — 94761 N-INVAS EAR/PLS OXIMETRY MLT: CPT

## 2022-09-02 PROCEDURE — 2580000003 HC RX 258: Performed by: THORACIC SURGERY (CARDIOTHORACIC VASCULAR SURGERY)

## 2022-09-02 RX ORDER — DOCUSATE SODIUM 100 MG/1
200 CAPSULE, LIQUID FILLED ORAL EVERY MORNING
COMMUNITY
End: 2022-09-19

## 2022-09-02 RX ORDER — DOCUSATE SODIUM 100 MG/1
100 CAPSULE, LIQUID FILLED ORAL NIGHTLY
COMMUNITY
End: 2022-09-19

## 2022-09-02 RX ADMIN — ALBUMIN (HUMAN) 25 G: 12.5 INJECTION, SOLUTION INTRAVENOUS at 00:38

## 2022-09-02 RX ADMIN — SODIUM CHLORIDE 1000 ML: 0.9 INJECTION, SOLUTION INTRAVENOUS at 03:50

## 2022-09-02 RX ADMIN — METOPROLOL TARTRATE 12.5 MG: 25 TABLET, FILM COATED ORAL at 08:36

## 2022-09-02 RX ADMIN — MAGNESIUM SULFATE HEPTAHYDRATE 2000 MG: 40 INJECTION, SOLUTION INTRAVENOUS at 09:59

## 2022-09-02 RX ADMIN — GABAPENTIN 300 MG: 300 CAPSULE ORAL at 07:34

## 2022-09-02 RX ADMIN — GABAPENTIN 300 MG: 300 CAPSULE ORAL at 12:56

## 2022-09-02 RX ADMIN — POTASSIUM CHLORIDE 10 MEQ: 10 TABLET, EXTENDED RELEASE ORAL at 12:57

## 2022-09-02 RX ADMIN — POTASSIUM CHLORIDE 10 MEQ: 10 TABLET, EXTENDED RELEASE ORAL at 08:38

## 2022-09-02 RX ADMIN — ATORVASTATIN CALCIUM 40 MG: 40 TABLET, FILM COATED ORAL at 20:59

## 2022-09-02 RX ADMIN — Medication 400 MG: at 20:59

## 2022-09-02 RX ADMIN — CEFAZOLIN 2000 MG: 2 INJECTION, POWDER, FOR SOLUTION INTRAMUSCULAR; INTRAVENOUS at 09:09

## 2022-09-02 RX ADMIN — ACETAMINOPHEN 1000 MG: 500 TABLET ORAL at 17:59

## 2022-09-02 RX ADMIN — SODIUM CHLORIDE 0.05 MCG/KG/HR: 9 INJECTION, SOLUTION INTRAVENOUS at 03:38

## 2022-09-02 RX ADMIN — SODIUM CHLORIDE, PRESERVATIVE FREE 10 ML: 5 INJECTION INTRAVENOUS at 07:42

## 2022-09-02 RX ADMIN — ASPIRIN 325 MG: 325 TABLET, COATED ORAL at 08:38

## 2022-09-02 RX ADMIN — ACETAMINOPHEN 1000 MG: 500 TABLET ORAL at 07:34

## 2022-09-02 RX ADMIN — MUPIROCIN: 20 OINTMENT TOPICAL at 08:38

## 2022-09-02 RX ADMIN — POTASSIUM CHLORIDE 20 MEQ: 400 INJECTION, SOLUTION INTRAVENOUS at 01:50

## 2022-09-02 RX ADMIN — CEFAZOLIN 2000 MG: 2 INJECTION, POWDER, FOR SOLUTION INTRAMUSCULAR; INTRAVENOUS at 15:18

## 2022-09-02 RX ADMIN — LEVOTHYROXINE SODIUM 50 MCG: 50 TABLET ORAL at 07:35

## 2022-09-02 RX ADMIN — FUROSEMIDE 40 MG: 10 INJECTION, SOLUTION INTRAMUSCULAR; INTRAVENOUS at 17:24

## 2022-09-02 RX ADMIN — KETOROLAC TROMETHAMINE 15 MG: 15 INJECTION, SOLUTION INTRAMUSCULAR; INTRAVENOUS at 01:46

## 2022-09-02 RX ADMIN — FUROSEMIDE 40 MG: 10 INJECTION, SOLUTION INTRAMUSCULAR; INTRAVENOUS at 07:42

## 2022-09-02 RX ADMIN — INSULIN LISPRO 3 UNITS: 100 INJECTION, SOLUTION INTRAVENOUS; SUBCUTANEOUS at 15:08

## 2022-09-02 RX ADMIN — INSULIN GLARGINE 13 UNITS: 100 INJECTION, SOLUTION SUBCUTANEOUS at 22:11

## 2022-09-02 RX ADMIN — NITROGLYCERIN 100 MCG/MIN: 20 INJECTION INTRAVENOUS at 00:10

## 2022-09-02 RX ADMIN — MUPIROCIN: 20 OINTMENT TOPICAL at 20:59

## 2022-09-02 RX ADMIN — ACETAMINOPHEN 1000 MG: 500 TABLET ORAL at 12:56

## 2022-09-02 RX ADMIN — VANCOMYCIN HYDROCHLORIDE 1500 MG: 10 INJECTION, POWDER, LYOPHILIZED, FOR SOLUTION INTRAVENOUS at 18:52

## 2022-09-02 RX ADMIN — FLUOXETINE 10 MG: 10 CAPSULE ORAL at 08:38

## 2022-09-02 RX ADMIN — SODIUM CHLORIDE 40 MG: 9 INJECTION, SOLUTION INTRAMUSCULAR; INTRAVENOUS; SUBCUTANEOUS at 07:40

## 2022-09-02 RX ADMIN — VANCOMYCIN HYDROCHLORIDE 1500 MG: 10 INJECTION, POWDER, LYOPHILIZED, FOR SOLUTION INTRAVENOUS at 07:13

## 2022-09-02 RX ADMIN — Medication 15 ML: at 08:38

## 2022-09-02 RX ADMIN — CLOPIDOGREL BISULFATE 75 MG: 75 TABLET ORAL at 08:38

## 2022-09-02 RX ADMIN — POTASSIUM CHLORIDE 10 MEQ: 10 TABLET, EXTENDED RELEASE ORAL at 17:59

## 2022-09-02 RX ADMIN — Medication 400 MG: at 08:38

## 2022-09-02 RX ADMIN — KETOROLAC TROMETHAMINE 15 MG: 15 INJECTION, SOLUTION INTRAMUSCULAR; INTRAVENOUS at 14:23

## 2022-09-02 RX ADMIN — GABAPENTIN 300 MG: 300 CAPSULE ORAL at 20:59

## 2022-09-02 RX ADMIN — CALCIUM CHLORIDE 1000 MG: 100 INJECTION, SOLUTION INTRAVENOUS at 02:51

## 2022-09-02 RX ADMIN — SODIUM CHLORIDE, PRESERVATIVE FREE 10 ML: 5 INJECTION INTRAVENOUS at 20:58

## 2022-09-02 RX ADMIN — POLYETHYLENE GLYCOL 3350 17 G: 17 POWDER, FOR SOLUTION ORAL at 08:32

## 2022-09-02 ASSESSMENT — PULMONARY FUNCTION TESTS
PIF_VALUE: 22
PIF_VALUE: 18
PIF_VALUE: 19
PIF_VALUE: 18
PIF_VALUE: 23
PIF_VALUE: 23
PIF_VALUE: 22
PIF_VALUE: 18
PIF_VALUE: 18
PIF_VALUE: 23
PIF_VALUE: 22
PIF_VALUE: 18
PIF_VALUE: 18

## 2022-09-02 ASSESSMENT — PAIN SCALES - GENERAL
PAINLEVEL_OUTOF10: 3
PAINLEVEL_OUTOF10: 6
PAINLEVEL_OUTOF10: 6
PAINLEVEL_OUTOF10: 1
PAINLEVEL_OUTOF10: 6
PAINLEVEL_OUTOF10: 3
PAINLEVEL_OUTOF10: 5
PAINLEVEL_OUTOF10: 2
PAINLEVEL_OUTOF10: 4

## 2022-09-02 ASSESSMENT — PAIN DESCRIPTION - ONSET
ONSET: ON-GOING
ONSET: ON-GOING

## 2022-09-02 ASSESSMENT — PAIN - FUNCTIONAL ASSESSMENT
PAIN_FUNCTIONAL_ASSESSMENT: ACTIVITIES ARE NOT PREVENTED
PAIN_FUNCTIONAL_ASSESSMENT: PREVENTS OR INTERFERES WITH MANY ACTIVE NOT PASSIVE ACTIVITIES
PAIN_FUNCTIONAL_ASSESSMENT: PREVENTS OR INTERFERES SOME ACTIVE ACTIVITIES AND ADLS
PAIN_FUNCTIONAL_ASSESSMENT: PREVENTS OR INTERFERES SOME ACTIVE ACTIVITIES AND ADLS

## 2022-09-02 ASSESSMENT — PAIN DESCRIPTION - LOCATION
LOCATION: CHEST
LOCATION: CHEST

## 2022-09-02 ASSESSMENT — PAIN DESCRIPTION - ORIENTATION
ORIENTATION: LEFT;MID
ORIENTATION: LEFT

## 2022-09-02 ASSESSMENT — PAIN DESCRIPTION - DESCRIPTORS
DESCRIPTORS: JABBING
DESCRIPTORS: JABBING

## 2022-09-02 ASSESSMENT — PAIN DESCRIPTION - FREQUENCY
FREQUENCY: INTERMITTENT
FREQUENCY: INTERMITTENT

## 2022-09-02 ASSESSMENT — PAIN DESCRIPTION - PAIN TYPE
TYPE: SURGICAL PAIN
TYPE: SURGICAL PAIN

## 2022-09-02 NOTE — PLAN OF CARE
Problem: Discharge Planning  Goal: Discharge to home or other facility with appropriate resources  Outcome: Progressing  Flowsheets (Taken 9/2/2022 0800)  Discharge to home or other facility with appropriate resources:   Identify barriers to discharge with patient and caregiver   Arrange for needed discharge resources and transportation as appropriate   Identify discharge learning needs (meds, wound care, etc)     Problem: Pain  Goal: Verbalizes/displays adequate comfort level or baseline comfort level  Outcome: Progressing  Flowsheets (Taken 9/1/2022 1825)  Verbalizes/displays adequate comfort level or baseline comfort level:   Encourage patient to monitor pain and request assistance   Administer analgesics based on type and severity of pain and evaluate response   Consider cultural and social influences on pain and pain management   Assess pain using appropriate pain scale   Implement non-pharmacological measures as appropriate and evaluate response   Notify Licensed Independent Practitioner if interventions unsuccessful or patient reports new pain     Problem: Cardiovascular - Adult  Goal: Maintains optimal cardiac output and hemodynamic stability  Outcome: Progressing  Flowsheets (Taken 9/2/2022 0800)  Maintains optimal cardiac output and hemodynamic stability:   Monitor blood pressure and heart rate   Monitor urine output and notify Licensed Independent Practitioner for values outside of normal range   Assess for signs of decreased cardiac output   Administer fluid and/or volume expanders as ordered   Administer vasoactive medications as ordered  Goal: Absence of cardiac dysrhythmias or at baseline  Outcome: Progressing  Flowsheets (Taken 9/2/2022 0800)  Absence of cardiac dysrhythmias or at baseline:   Monitor cardiac rate and rhythm   Assess for signs of decreased cardiac output   Administer antiarrhythmia medication and electrolyte replacement as ordered  Note: Bigeminy resolved after additional 2g Mg. Problem: Safety - Adult  Goal: Free from fall injury  Outcome: Progressing  Flowsheets (Taken 9/2/2022 1920)  Free From Fall Injury: Instruct family/caregiver on patient safety  Note: Pt is at risk for falls; see Sprague fall score. Bed is locked & in lowest position, side rails up x3. Fall risk bracelet applied, non-skid socks on. Call light and possessions within reach. Patient/family educated in fall risk protocol. Patient instructed to call prior to getting up. Hourly rounds done to anticipate needs. Bed and chair alarms used this shift.

## 2022-09-02 NOTE — PROGRESS NOTES
Transition criteria met:    CI 2.4. Off nitro since 04:00. CT output minimal. Electrolytes within normal limits. Cuff pressure 118/64 (80). Urine output has dipped off, but will receive lasix soon. PAD 18. CVP 16. At this point in time NSR, but historically throughout night had spurts of ventricular bigeminy.

## 2022-09-02 NOTE — PROGRESS NOTES
Pt has walked in [place] x3, steady on feet. Up to chair for meals. Achieves IS volumes around 500 mL.

## 2022-09-02 NOTE — PLAN OF CARE
Problem: Pain  Goal: Verbalizes/displays adequate comfort level or baseline comfort level  9/2/2022 0050 by Mateus Mac RN  Outcome: Progressing

## 2022-09-02 NOTE — PROGRESS NOTES
Progress Note  Hospital Day: 2  POD#  1  S/P Coronary artery bypass x 4 + sternal plate (3498)    Chief Complaint: Postop follow up    Ms. Erica Barrett is awake, alert and oriented sitting up in the chair. Pain controlled     24 hour Interval History:    - uncomplicationed operation. Slow to wake up. Transitioned this am    Today's plan: DC Precedex. Chest tube to waterseal    VITAL SIGNS   Temperature:  Current - Temp: 97.5 °F (36.4 °C); Max - Temp  Av.4 °F (37.4 °C)  Min: 97.5 °F (36.4 °C)  Max: 100.2 °F (37.9 °C)    Respiratory Rate : Resp  Av.6  Min: 12  Max: 29    Pulse Range: Pulse  Av.1  Min: 66  Max: 88    Blood Pressure Range:  Systolic (28QFF), KKW:152 , Min:90 , QSF:122   ; Diastolic (28QFZ), VZP:60, Min:54, Max:89    Pulse ox Range: SpO2  Av.9 %  Min: 94 %  Max: 100 %  O2 Flow Rate (L/min): 2 L/min    CVP (Mean): 93 mmHg  PAP (Systolic/Diastolic): 69/16  PAP (Mean): 93 mmHg  SVR (Using ABP Mean): 1227.91 dyne*sec/cm5  SVO2 (%): 68 %    Admission Weight: Weight: 194 lb 12.8 oz (88.4 kg)  up 8 lbs from preop  Current Weight: Patient Vitals for the past 96 hrs (Last 3 readings):   Weight   22 0700 202 lb 8 oz (91.9 kg)   22 0536 194 lb 12.8 oz (88.4 kg)     I/O:   Intake/Output Summary (Last 24 hours) at 2022 1030  Last data filed at 2022 5263  Gross per 24 hour   Intake 6003.53 ml   Output 2704 ml   Net 3299.53 ml     Urine (aly): 1860-181-729 ml/shift  Chest tube:  -115 ml/shift  serosanguinous, on suction, no leak    LINES/ACCESS:   Central Access: RIJ Day #: 1   Peripheral Access: Rt wrist Day #: 1                    Aly Day #: 1           Pacing Wires Day #:  1      Diet: ADULT DIET; Regular; 3 carb choices (45 gm/meal);  No Added Salt (3-4 gm); 1500 ml    MEDICATIONS:      sodium chloride flush  5-40 mL IntraVENous 2 times per day    aspirin  325 mg Oral Daily    clopidogrel  75 mg Oral Daily    chlorhexidine  15 mL Mouth/Throat BID furosemide  40 mg IntraVENous BID    magnesium oxide  400 mg Oral BID    mupirocin   Nasal BID    potassium chloride  10 mEq Oral TID WC    polyethylene glycol  17 g Oral Daily    metoprolol tartrate  12.5 mg Oral BID    [START ON 9/3/2022] lisinopril  2.5 mg Oral Lunch    atorvastatin  40 mg Oral Nightly    [START ON 9/3/2022] pantoprazole  40 mg Oral BID AC    pantoprazole (PROTONIX) 40 mg injection  40 mg IntraVENous Daily    ceFAZolin (ANCEF) IVPB  2,000 mg IntraVENous Q8H    vancomycin (VANCOCIN) IV  1,500 mg IntraVENous Q12H    insulin glargine  0.15 Units/kg SubCUTAneous Nightly    insulin lispro  0-12 Units SubCUTAneous 4x Daily AC & HS    acetaminophen  1,000 mg Oral Q6H    gabapentin  300 mg Oral TID    FLUoxetine  10 mg Oral Daily    levothyroxine  50 mcg Oral Daily       Infusions:   sodium chloride      sodium chloride Stopped (09/02/22 0550)    insulin 3.3 Units/hr (09/02/22 1000)    dextrose         DATA REVIEW:   CBC:   Recent Labs     09/01/22  1003 09/01/22  1026 09/01/22  1103 09/01/22  1200 09/02/22  0405   WBC  --   --   --  13.9* 7.3   HGB 9.1* 8.3* 8.6* 10.7* 8.6*   HCT  --   --   --  32.7* 25.6*   MCV  --   --   --  91.2 92.2   PLT  --   --   --  166 109*     BMP:   Recent Labs     09/01/22 1200 09/02/22  0405    139   K 3.7 4.5    109   CO2 22 23   GLUCOSE 128* 101*   BUN 13 11   CREATININE 0.8 0.7   CALCIUM 8.5 8.3   MG 2.90* 2.00     Accucheck Glucoses:   Recent Labs     09/02/22  0604 09/02/22  0720 09/02/22  0823 09/02/22  0911 09/02/22  1001   POCGLU 102* 103* 140* 142* 126*     Cardiac Enzymes: No results for input(s): CKTOTAL, CKMB, CKMBINDEX in the last 72 hours. Invalid input(s): TROPONIN  PT/INR:   Recent Labs     09/01/22  1200 09/02/22  0405   PROTIME 17.8* 17.2*   INR 1.47* 1.40*     APTT:   Recent Labs     09/01/22  1200   APTT 35.8*     LFT: No results for input(s): AST, ALT, ALB, BILIDIR, BILITOT, ALKPHOS in the last 72 hours.   Troponin: Invalid input(s): TROPONIN  BNP: No results for input(s): BNP in the last 72 hours. ABG:    Lab Results   Component Value Date/Time    PHART 7.314 09/02/2022 05:15 AM    PO2ART 113.9 09/02/2022 05:15 AM    FRQ8XBB 47.3 09/02/2022 05:15 AM    Z0HGLRHI 98 09/02/2022 05:15 AM    XLA4NYF 26 09/02/2022 05:15 AM    TBW3IMC 24.0 09/02/2022 05:15 AM    BEART -2 09/02/2022 05:15 AM    BEART -2 09/02/2022 03:11 AM    BEART -2 09/02/2022 02:10 AM    BEART -2 09/02/2022 01:13 AM    BEART -1 09/01/2022 10:11 PM     U/A:  No results for input(s): NITRITE, COLORU, PHUR, LABCAST, WBCUA, RBCUA, MUCUS, TRICHOMONAS, YEAST, BACTERIA, CLARITYU, SPECGRAV, LEUKOCYTESUR, UROBILINOGEN, BILIRUBINUR, BLOODU, GLUCOSEU, AMORPHOUS in the last 72 hours.     Invalid input(s): Dickpablito Jack    Urine Culture:    Blood Culture:            Chest X-Ray:       EKG:     ASSESSMENT:   Patient Active Problem List:     Environmental allergies     Mixed hyperlipidemia     Acquired hypothyroidism     Insomnia     Herpes simplex type 2 infection     Class 2 severe obesity with serious comorbidity and body mass index (BMI) of 36.0 to 36.9 in [facility])     Major depressive disorder in remission (HCC)     Gastroesophageal reflux disease with esophagitis     Prediabetes     Numbness and tingling in both hands     Obstructive sleep apnea     Abnormal stress test     MICHAEL (stress urinary incontinence, female)     Chest pain on exertion     CAD in native artery      Neuro: awake, alert and oriented x 3  CV:   Sinus, pacing wires intact  Pulm:  normal work of breathing  Abd:  soft, non-tender; bowel sounds normal; passing flatus, no BM  Mcneal: clear yellow  Wounds: clean, dry and intact  Ext: warm, + edema    PLAN:   Neuro - pain tolerated    - intraop pec blocks, Precedex while chest tube are in place   - continued scheduled APAP and gabapentin, PRN ketoralac   - PT  16/24 on 9/2  on the AM-PAC short mobility form   - OT  18/24 on 9/2 on the AM-PAC ADL Inpatient form  CV - keep pacing

## 2022-09-02 NOTE — PROGRESS NOTES
MECHANICAL VENTILATION WEANING PROTOCOL    PRE-TRIAL PATIENT ASSESSMENT - COMPLETED AT 0200    Ventilatory Assessment:    PARAMETER CRITERIA FOR WEANING   Spontaneous Cough:  Yes    Sputum Characteristics:          SPONTANEOUS COUGH With small to moderate  Amount of secretions   FiO2 : 30 % FIO2 less than or equal to 50%     PEEP less than or equal to 8   Progressive Mobility Protocol  Yes     ABG:  Lab Results   Component Value Date/Time    PHART 7.314 09/02/2022 05:15 AM    NBY6XSU 47.3 09/02/2022 05:15 AM    PO2ART 113.9 09/02/2022 05:15 AM    Z2JDOXWA 98 09/02/2022 05:15 AM    QRZ6YYT 24.0 09/02/2022 05:15 AM    BEART -2 09/02/2022 05:15 AM     HGB/WBC:  Lab Results   Component Value Date/Time    HGB 8.6 09/02/2022 04:05 AM    WBC 7.3 09/02/2022 04:05 AM        Vital Signs:    PARAMETER CRITERIA FOR WEANING Meets Criteria   Heart Rate: 72 Within patient's normal limits / stable Yes   Resp: 18 Less than or equal to 30 Yes   BP: 113/69 Within patient's normal limits / minimal pressors (Hemodynamically Stable) Yes   SpO2: 99 % Greater than or equal to 90% Yes   End Tidal CO2: 32 (%) Within patient's normal limits Yes   Temp: 99.5 °F (37.5 °C) Less than 38. 5oC / 101. 3oF Yes     [x]    Based on this assessment and the [facility] Ventilator Weaning Protocol, this patient  IS being placed on a Spontaneous Breathing Trial (SBT) at this time.  []    Based on this assessment and the [facility] Ventilator Weaning Protocol, this patient  IS NOT being placed on a Spontaneous Breathing Trial (SBT) at this time. []    Patient  IS NOT being placed on a Spontaneous Breathing Trial (SBT) at this time because of factors not previously addressed.   Those factors include          SBT - Initiated at  0200    Ventilator Settings:  CPAP - 5 cmH2O, PS - 8  mH2O(if using settings other than CPAP 5/PS 8, please explain reasons for settings here):       1 Minute SBT Respiratory Parameters:   VE: 11 L   RR: 22 b/m   VT: 422 mL (average VT = VE/RR)   RSBI: 75 (RR/VT in liters)   ETCO2: 37 cmH2O   SPO2: 97 %   If on sedation, amount and type     [x]   RR is less than 35, RSBI is less than 100, patient's vitals signs are stable, and patient is in no apparent distress; therefore, patient is being left on SBT for up to 1 hour. []   RR is greater than 35, RSBI is greater than 100, VS's are unstable, or patient is in distress; therefore, patient is being placed back on previous settings. SBT - Concluded at  0325. Weaning Parameters/VS's at conclusion of SBT:   VE: 9 L   RR: 18 b/m   VT: 455 mL (average VT = VE/RR)   RSBI: 34 (RR/VT in liters)   ETCO2: 37 cmH2O   SPO2: 99 %    If on sedation, amount and type     [x]   Patient tolerated SBT for full 60 minutes with acceptable weaning parameters and vital signs and showed no signs or distress. []   Patient tolerated SBT for full 60 minutes, but had unacceptable weaning parameters or vital signs, and/or signs of distress. []   Patient was unable to tolerate SBT for 60 minutes and was placed back on previous settings.             COMMENTS:  extubated  to 2 liters NC

## 2022-09-02 NOTE — PROGRESS NOTES
Physical Therapy  Facility/Department: [facility] ICU  Physical Therapy Initial Assessment/Treatment    Name: Ulisses Oropeza  : 1950  MRN: [id number]  Date of Service: 2022    Discharge Recommendations:    Ulisses Oropeza scored a 16/24 on the AM-PAC short mobility form. Current research shows that an AM-PAC score of 18 or greater is typically associated with a discharge to the patient's home setting. Based on the patient's AM-PAC score and their current functional mobility deficits, it is recommended that the patient have 2-3 sessions per week of Physical Therapy at d/c to increase the patient's independence. At this time, this patient demonstrates the endurance and safety to discharge home with home services and a follow up treatment frequency of 2-3x/wk. Please see assessment section for further patient specific details. If patient discharges prior to next session this note will serve as a discharge summary. Please see below for the latest assessment towards goals. PT Equipment Recommendations  Equipment Needed: Yes  Mobility Devices: Michael Fajardo: Liliana      Patient Diagnosis(es): There were no encounter diagnoses. Past Medical History:  has a past medical history of Abnormal Pap smear of cervix, Acid reflux, Bacterial vaginosis, CAD (coronary artery disease), Dysmenorrhea, Environmental allergies, Fibrocystic breast, Fibroid, Herpes simplex type 2 infection, History of blood transfusion, Hyperlipidemia, Hypothyroid, Insomnia, Menopause, Menopause ovarian failure, Menorrhagia, Sleep apnea, Urinary incontinence, and Urogenital trichomoniasis. Past Surgical History:  has a past surgical history that includes Hysterectomy, total abdominal (); Upper gastrointestinal endoscopy (N/A, 10/25/2018); Esophagus dilation (N/A, 10/25/2018); Intracapsular cataract extraction (Left, 2019); Cataract removal with implant (Right, 2019);  Intracapsular cataract extraction (Right, 06/26/2019); capsulotomy (Bilateral, 02/2020); Breast biopsy; Dilation and curettage of uterus; Cardiac catheterization (08/10/2022); Colonoscopy; and Coronary artery bypass graft (N/A, 9/1/2022). Assessment   Body Structures, Functions, Activity Limitations Requiring Skilled Therapeutic Intervention: Decreased functional mobility ; Decreased balance;Decreased endurance;Decreased strength  Assessment: pt presents s/p CABG x4 with sternal precautions and deficits in mobility. At baseline pt lives alone and is IND without device for mobility and ADLs, working fulltime as . She is currently requiring CGA to stand, ambulating 40ft with CGA x2 for line mgmt, dependent for bed mobility. She plans to return home where her daughter will be staying with her and available for 24/7 assist, pt also plans to sleep in recliner at [place]. [facility] at [place], will follow in acute setting. Treatment Diagnosis: Gait difficulty  Therapy Prognosis: Good  Decision Making: Medium Complexity  Requires PT Follow-Up: Yes  Activity Tolerance  Activity Tolerance: Patient limited by fatigue;Patient tolerated evaluation without incident     Plan   Plan  Plan:  (2-5x/week)  Current Treatment Recommendations: Strengthening, Balance training, Endurance training, Functional mobility training, Gait training, Stair training, Patient/Caregiver education & training, Therapeutic activities, Positioning, Safety education & training, Pain management, Home exercise program  Safety Devices  Type of Devices: Left in bed, Bed alarm in place, Call light within reach, Nurse notified, Gait belt     Restrictions  Position Activity Restriction  Other position/activity restrictions: up with assist, sternal precautions     Subjective   General  Chart Reviewed: Yes  Patient assessed for rehabilitation services?: Yes  Additional Pertinent Hx: The patient is a 80 y.o. female with c/o exertional angina was found to have multivessel CAD.  She presents s/p CABG x4 9/1. Referring Practitioner: Elijah Weems MD  Follows Commands: Within Functional Limits  General Comment  Comments: pt cleared to see by RN Lloyd Gilliland. Subjective  Subjective: pt sitting up in chair finishing breakfast on entry, pleasant and agreeable to therapy session. Social/Functional History  Social/Functional History  Lives With: Alone  Type of Home: House  Home Layout: One level  Home Access: Stairs to enter without rails  Entrance Stairs - Number of Steps: 1  Bathroom Shower/Tub: Tub/Shower unit  Bathroom Toilet: Standard  Has the patient had two or more falls in the past year or any fall with injury in the past year?: No  ADL Assistance: Independent  Homemaking Assistance: Independent  Ambulation Assistance: Independent  Transfer Assistance: Independent  Active : Yes  Occupation: Full time employment  Type of Occupation:   Leisure & Hobbies: gardening, be outside, reading  [address]: Impaired  Vision Exceptions: Wears glasses for reading  Hearing  Hearing: Exceptions to [facility]  Hearing Exceptions: Bilateral hearing aid    Cognition   Orientation  Orientation Level: Oriented X4     Objective   Heart Rate: 76  BP: (!) 149/73  BP Location: Left upper arm  BP Method: Automatic  Patient Position: Up in chair  MAP (Calculated): 98.33  Resp: 19  SpO2: 99 %  O2 Device: Nasal cannula        Gross Assessment  AROM: Within functional limits  PROM: Within functional limits  Strength: Generally decreased, functional (RLE 3/5, LLE 4/5)  Coordination: Within functional limits  Tone: Normal  Sensation: Intact      Balance  Sitting: Intact (SPV)  Standing: Intact (CGA/SBA standing at walker)  Bed mobility  Sit to Supine: Dependent/Total (HOB flat, no rails, cues for sternal precautions)  Bed Mobility Comments: pt reports she will be sleeping in recliner at home while she has sternal precautions.   Transfers  Sit to Stand: Contact guard assistance  Stand to sit: Contact guard assistance  Comment: Cues for hand placement on heart pillow to maintain sternal precautions  Ambulation  Surface: level tile  Device: Rollator  Assistance: 2 Person assistance;Contact guard assistance  Gait Deviations: Slow Ronen  Distance: 40ft  Comments: CGA x2 + assist of 3rd for line mgmt for safety, slow ronen, reports fatigue with ambulation. AM-PAC Score  AM-PAC Inpatient Mobility Raw Score : 16 (09/02/22 1019)  AM-PAC Inpatient T-Scale Score : 40.78 (09/02/22 1019)  Mobility Inpatient CMS 0-100% Score: 54.16 (09/02/22 1019)  Mobility Inpatient CMS G-Code Modifier : CK (09/02/22 1019)        Goals  Short Term Goals  Time Frame for Short term goals: discharge  Short term goal 1: pt will perform STS with LRAD SPV  Short term goal 2: pt will ambulate 50ft with LRAD with SPV  Short term goal 3: pt will negotiate 1 stair with LRAD and CGA       Education  Patient Education  Education Given To: Patient; Family  Education Provided: Role of Therapy;Plan of Care;Energy Conservation  Education Method: Verbal  Barriers to Learning: None  Education Outcome: Verbalized understanding      Therapy Time   Individual Concurrent Group Co-treatment   Time In 0915         Time Out 0943         Minutes 28          Timed Code Treatment Minutes: 13    Total Treatment Minutes:28     If patient is discharged prior to next treatment, this note will serve as the discharge summary.     Bibi Forrester PT, DPT, NCS, CSRS

## 2022-09-02 NOTE — PROGRESS NOTES
Pt A/O x4; c/o 6/10 pain on the L side of chest pain worse with deep breathing. Tylenol & Gabapentin given, pt reports pain is improved. /73 (97), HR 76 bpm with bigeminy; K 4.5, Mg 2; 2 gm IV Mg given per Dr. Macarena Herrera. Ate some breakfast.  Ambulated in [place] & then returned to bed for a nap.

## 2022-09-02 NOTE — PROGRESS NOTES
Occupational Therapy  Facility/Department: [address] [place] [address] N ICU  Occupational Therapy Initial Assessment and Treatment Note    Name: Kendall Boyer  : 1950  MRN: [id number]  Date of Service: 2022    Discharge Recommendations: Kendall Boyer scored a 18/24 on the AM-PAC ADL Inpatient form. Current research shows that an AM-PAC score of 18 or greater is typically associated with a discharge to the patient's home setting. Based on the patient's AM-PAC score, and their current ADL deficits, it is recommended that the patient have 2-3 sessions per week of Occupational Therapy at d/c to increase the patient's independence. At this time, this patient demonstrates the endurance and safety to discharge home with 24hr assist/supervision and a follow up treatment frequency of 2-3x/wk. Please see assessment section for further patient specific details. If patient discharges prior to next session this note will serve as a discharge summary. Please see below for the latest assessment towards goals. 24 hour supervision or assist  OT Equipment Recommendations  Equipment Needed: No  Other: defer       Patient Diagnosis(es): There were no encounter diagnoses. Past Medical History:  has a past medical history of Abnormal Pap smear of cervix, Acid reflux, Bacterial vaginosis, CAD (coronary artery disease), Dysmenorrhea, Environmental allergies, Fibrocystic breast, Fibroid, Herpes simplex type 2 infection, History of blood transfusion, Hyperlipidemia, Hypothyroid, Insomnia, Menopause, Menopause ovarian failure, Menorrhagia, Sleep apnea, Urinary incontinence, and Urogenital trichomoniasis. Past Surgical History:  has a past surgical history that includes Hysterectomy, total abdominal (); Upper gastrointestinal endoscopy (N/A, 10/25/2018); Esophagus dilation (N/A, 10/25/2018); Intracapsular cataract extraction (Left, 2019); Cataract removal with implant (Right, 2019);  Intracapsular cataract extraction (Right, 06/26/2019); capsulotomy (Bilateral, 02/2020); Breast biopsy; Dilation and curettage of uterus; Cardiac catheterization (08/10/2022); Colonoscopy; and Coronary artery bypass graft (N/A, 9/1/2022). Treatment Diagnosis: impaired endurance and ADLs      Assessment   Performance deficits / Impairments: Decreased functional mobility ; Decreased ADL status; Decreased endurance  Assessment: Pt from home alone/independent at baseline. Pt functioning below baseline this date. Pt demo CGA for functional mobility / transfers with min verb cues for following sternal precautions. Pt edu on home safety, sternal precautions, and home safety - verb understanding. Pt plans for home at d/c - will have 24hr assist from daughter. Will follow as inpt. Treatment Diagnosis: impaired endurance and ADLs  Prognosis: Good  Decision Making: Low Complexity  REQUIRES OT FOLLOW-UP: Yes  Activity Tolerance  Activity Tolerance: Patient Tolerated treatment well  Activity Tolerance Comments: Pt on 2L o2        Plan   Plan  Times per Week: 2-5x  Times per Day: Daily  Current Treatment Recommendations: Strengthening, Functional mobility training, Safety education & training, Endurance training, Self-Care / ADL, Patient/Caregiver education & training     Restrictions  Position Activity Restriction  Other position/activity restrictions: up with assist, sternal precautions    Subjective   General  Chart Reviewed: Yes  Patient assessed for rehabilitation services?: Yes  Additional Pertinent Hx: Admit 9/1 with CAD s/p CABG x4. PMHX: Acid reflux, CAD (coronary artery disease), Fibrocystic breast, Fibroid, Herpes simplex type 2 infection, Hyperlipidemia, Hypothyroid  Diagnosis: CAD s/p CABG x4  Subjective  Subjective: Pt in chair eating breakfast and agreeable to OOB OT eval and tx.      Social/Functional History  Social/Functional History  Lives With: Alone  Type of Home: House  Home Layout: One level  Home Access: Stairs to enter without rails  Entrance Stairs - Number of Steps: 1  Bathroom Shower/Tub: Tub/Shower unit  Bathroom Toilet: Standard  Has the patient had two or more falls in the past year or any fall with injury in the past year?: No  ADL Assistance: Independent  Homemaking Assistance: Independent  Ambulation Assistance: Independent  Transfer Assistance: Independent  Active : Yes  Occupation: Full time employment  Type of Occupation:   Leisure & Hobbies: gardening, be outside, reading       Objective Treatment included functional transfer training, ADL's and pt. education. Safety Devices  Type of Devices: Left in bed;Bed alarm in place;Call light within reach;Nurse notified;Gait belt        AROM: Within functional limits  Strength: Within functional limits  Tone: Normal  ADL  Feeding: Independent  Toileting: Dependent/Total  Toileting Skilled Clinical Factors: Mcneal in place        Bed mobility  Sit to Supine: Dependent/Total  Transfers  Sit to stand: Contact guard assistance  Transfer Comments: Pt required min verb cues for adhering to sternal precautions during transfers  Vision  Vision: Impaired  Vision Exceptions: Wears glasses for reading  Hearing  Hearing: Exceptions to [facility]  Hearing Exceptions: Bilateral hearing aid  Cognition  Overall Cognitive Status: WNL  Orientation  Orientation Level: Oriented X4                  Education Given To: Patient; Family (daughter)  Education Provided: Role of Therapy;Transfer Training;Plan of Care;Precautions;IADL Safety; ADL Adaptive Strategies  Education Provided Comments: Pt edu on home safety, sternal precautions, and home safety - verb understanding.   Education Method: Verbal  Barriers to Learning: None  Education Outcome: Verbalized understanding;Demonstrated understanding;Continued education needed          AM-PAC Score  AM-PAC Inpatient Daily Activity Raw Score: 18 (09/02/22 1002)  AM-PAC Inpatient ADL T-Scale Score : 38.66 (09/02/22 1002)  ADL Inpatient CMS 0-100% Score: 46.65 (09/02/22 1002)  ADL Inpatient CMS G-Code Modifier : CK (09/02/22 1002)        Goals  Short Term Goals  Time Frame for Short term goals: d/c  Short Term Goal 1: Functional transfer with supervision  Short Term Goal 2: Stance at sink x5 mins for ADLs/IADLs with supervision  Short Term Goal 3: State sternal precautions with <1 verb cue  Short Term Goal 4: LB dressing with mod I       Therapy Time   Individual Concurrent Group Co-treatment   Time In 0915         Time Out 0945         Minutes 30             Timed Code Treatment Minutes:   15 mins    Total Treatment Minutes:  30 mins      BANDAR Norris    Therapist was present, directed the patients care, made skilled judgement and was responsible for assessment and treatment of the patient.      Sherrell Jordan  MS, OTR/L #[id number]

## 2022-09-02 NOTE — PROGRESS NOTES
Extubated at 03:30. On a wean for 2 hours. At time of extubation RSBI was 35. ETCO2 37. RR 19. Pt able to follow commands and lift head for 5 sec. ABG was unremarkable. Pt extubated on 2L. AxO x4 post extubation.

## 2022-09-03 LAB
ANION GAP SERPL CALCULATED.3IONS-SCNC: 11 MMOL/L (ref 3–16)
BUN BLDV-MCNC: 13 MG/DL (ref 7–20)
CALCIUM SERPL-MCNC: 8 MG/DL (ref 8.3–10.6)
CHLORIDE BLD-SCNC: 106 MMOL/L (ref 99–110)
CO2: 23 MMOL/L (ref 21–32)
CREAT SERPL-MCNC: 0.9 MG/DL (ref 0.6–1.2)
GFR AFRICAN AMERICAN: >60
GFR NON-AFRICAN AMERICAN: >60
GLUCOSE BLD-MCNC: 143 MG/DL (ref 70–99)
GLUCOSE BLD-MCNC: 153 MG/DL (ref 70–99)
GLUCOSE BLD-MCNC: 157 MG/DL (ref 70–99)
GLUCOSE BLD-MCNC: 195 MG/DL (ref 70–99)
HCT VFR BLD CALC: 30.2 % (ref 36–48)
HEMOGLOBIN: 9.6 G/DL (ref 12–16)
MAGNESIUM: 2.3 MG/DL (ref 1.8–2.4)
MCH RBC QN AUTO: 29.7 PG (ref 26–34)
MCHC RBC AUTO-ENTMCNC: 31.7 G/DL (ref 31–36)
MCV RBC AUTO: 93.7 FL (ref 80–100)
PDW BLD-RTO: 15.4 % (ref 12.4–15.4)
PERFORMED ON: ABNORMAL
PLATELET # BLD: 122 K/UL (ref 135–450)
PMV BLD AUTO: 9.4 FL (ref 5–10.5)
POTASSIUM SERPL-SCNC: 4.3 MMOL/L (ref 3.5–5.1)
RBC # BLD: 3.22 M/UL (ref 4–5.2)
SODIUM BLD-SCNC: 140 MMOL/L (ref 136–145)
WBC # BLD: 9.3 K/UL (ref 4–11)

## 2022-09-03 PROCEDURE — 6370000000 HC RX 637 (ALT 250 FOR IP): Performed by: CLINICAL NURSE SPECIALIST

## 2022-09-03 PROCEDURE — 2580000003 HC RX 258: Performed by: THORACIC SURGERY (CARDIOTHORACIC VASCULAR SURGERY)

## 2022-09-03 PROCEDURE — 2700000000 HC OXYGEN THERAPY PER DAY

## 2022-09-03 PROCEDURE — 85027 COMPLETE CBC AUTOMATED: CPT

## 2022-09-03 PROCEDURE — 36415 COLL VENOUS BLD VENIPUNCTURE: CPT

## 2022-09-03 PROCEDURE — 99024 POSTOP FOLLOW-UP VISIT: CPT | Performed by: THORACIC SURGERY (CARDIOTHORACIC VASCULAR SURGERY)

## 2022-09-03 PROCEDURE — 6370000000 HC RX 637 (ALT 250 FOR IP): Performed by: THORACIC SURGERY (CARDIOTHORACIC VASCULAR SURGERY)

## 2022-09-03 PROCEDURE — 2060000000 HC ICU INTERMEDIATE R&B

## 2022-09-03 PROCEDURE — 94799 UNLISTED PULMONARY SVC/PX: CPT

## 2022-09-03 PROCEDURE — 6360000002 HC RX W HCPCS: Performed by: THORACIC SURGERY (CARDIOTHORACIC VASCULAR SURGERY)

## 2022-09-03 PROCEDURE — 83735 ASSAY OF MAGNESIUM: CPT

## 2022-09-03 PROCEDURE — 36592 COLLECT BLOOD FROM PICC: CPT

## 2022-09-03 PROCEDURE — 94761 N-INVAS EAR/PLS OXIMETRY MLT: CPT

## 2022-09-03 PROCEDURE — 80048 BASIC METABOLIC PNL TOTAL CA: CPT

## 2022-09-03 PROCEDURE — 94150 VITAL CAPACITY TEST: CPT

## 2022-09-03 RX ADMIN — Medication 15 ML: at 21:10

## 2022-09-03 RX ADMIN — CEFAZOLIN 2000 MG: 2 INJECTION, POWDER, FOR SOLUTION INTRAMUSCULAR; INTRAVENOUS at 00:02

## 2022-09-03 RX ADMIN — ATORVASTATIN CALCIUM 40 MG: 40 TABLET, FILM COATED ORAL at 21:10

## 2022-09-03 RX ADMIN — FLUOXETINE 10 MG: 10 CAPSULE ORAL at 07:56

## 2022-09-03 RX ADMIN — GABAPENTIN 300 MG: 300 CAPSULE ORAL at 14:25

## 2022-09-03 RX ADMIN — ACETAMINOPHEN 1000 MG: 500 TABLET ORAL at 06:49

## 2022-09-03 RX ADMIN — LEVOTHYROXINE SODIUM 50 MCG: 50 TABLET ORAL at 07:56

## 2022-09-03 RX ADMIN — FUROSEMIDE 40 MG: 10 INJECTION, SOLUTION INTRAMUSCULAR; INTRAVENOUS at 17:38

## 2022-09-03 RX ADMIN — SENNOSIDES 17.2 MG: 8.6 TABLET, FILM COATED ORAL at 16:24

## 2022-09-03 RX ADMIN — SODIUM CHLORIDE, PRESERVATIVE FREE 10 ML: 5 INJECTION INTRAVENOUS at 08:02

## 2022-09-03 RX ADMIN — Medication 400 MG: at 07:57

## 2022-09-03 RX ADMIN — POTASSIUM CHLORIDE 10 MEQ: 10 TABLET, EXTENDED RELEASE ORAL at 17:38

## 2022-09-03 RX ADMIN — Medication 400 MG: at 21:10

## 2022-09-03 RX ADMIN — KETOROLAC TROMETHAMINE 15 MG: 15 INJECTION, SOLUTION INTRAMUSCULAR; INTRAVENOUS at 06:49

## 2022-09-03 RX ADMIN — POLYETHYLENE GLYCOL 3350 17 G: 17 POWDER, FOR SOLUTION ORAL at 07:56

## 2022-09-03 RX ADMIN — GABAPENTIN 300 MG: 300 CAPSULE ORAL at 21:10

## 2022-09-03 RX ADMIN — METOPROLOL TARTRATE 12.5 MG: 25 TABLET, FILM COATED ORAL at 07:56

## 2022-09-03 RX ADMIN — POTASSIUM CHLORIDE 10 MEQ: 10 TABLET, EXTENDED RELEASE ORAL at 12:32

## 2022-09-03 RX ADMIN — LISINOPRIL 2.5 MG: 2.5 TABLET ORAL at 12:32

## 2022-09-03 RX ADMIN — PANTOPRAZOLE SODIUM 40 MG: 40 TABLET, DELAYED RELEASE ORAL at 16:24

## 2022-09-03 RX ADMIN — ASPIRIN 325 MG: 325 TABLET, COATED ORAL at 07:57

## 2022-09-03 RX ADMIN — MUPIROCIN: 20 OINTMENT TOPICAL at 21:11

## 2022-09-03 RX ADMIN — PANTOPRAZOLE SODIUM 40 MG: 40 TABLET, DELAYED RELEASE ORAL at 06:49

## 2022-09-03 RX ADMIN — ACETAMINOPHEN 1000 MG: 500 TABLET ORAL at 12:32

## 2022-09-03 RX ADMIN — Medication 15 ML: at 08:07

## 2022-09-03 RX ADMIN — SODIUM CHLORIDE, PRESERVATIVE FREE 10 ML: 5 INJECTION INTRAVENOUS at 21:10

## 2022-09-03 RX ADMIN — CLOPIDOGREL BISULFATE 75 MG: 75 TABLET ORAL at 07:57

## 2022-09-03 RX ADMIN — ACETAMINOPHEN 1000 MG: 500 TABLET ORAL at 23:40

## 2022-09-03 RX ADMIN — KETOROLAC TROMETHAMINE 15 MG: 15 INJECTION, SOLUTION INTRAMUSCULAR; INTRAVENOUS at 14:25

## 2022-09-03 RX ADMIN — GABAPENTIN 300 MG: 300 CAPSULE ORAL at 07:56

## 2022-09-03 RX ADMIN — MUPIROCIN: 20 OINTMENT TOPICAL at 08:05

## 2022-09-03 RX ADMIN — FUROSEMIDE 40 MG: 10 INJECTION, SOLUTION INTRAMUSCULAR; INTRAVENOUS at 08:01

## 2022-09-03 RX ADMIN — METOPROLOL TARTRATE 12.5 MG: 25 TABLET, FILM COATED ORAL at 21:10

## 2022-09-03 RX ADMIN — POTASSIUM CHLORIDE 10 MEQ: 10 TABLET, EXTENDED RELEASE ORAL at 07:56

## 2022-09-03 ASSESSMENT — PAIN SCALES - GENERAL
PAINLEVEL_OUTOF10: 0
PAINLEVEL_OUTOF10: 2
PAINLEVEL_OUTOF10: 1
PAINLEVEL_OUTOF10: 0
PAINLEVEL_OUTOF10: 3
PAINLEVEL_OUTOF10: 2
PAINLEVEL_OUTOF10: 0
PAINLEVEL_OUTOF10: 0

## 2022-09-03 ASSESSMENT — PAIN - FUNCTIONAL ASSESSMENT: PAIN_FUNCTIONAL_ASSESSMENT: PREVENTS OR INTERFERES SOME ACTIVE ACTIVITIES AND ADLS

## 2022-09-03 ASSESSMENT — PAIN DESCRIPTION - PAIN TYPE: TYPE: SURGICAL PAIN

## 2022-09-03 ASSESSMENT — PAIN DESCRIPTION - ORIENTATION: ORIENTATION: LEFT

## 2022-09-03 ASSESSMENT — PAIN DESCRIPTION - LOCATION: LOCATION: CHEST

## 2022-09-03 ASSESSMENT — PAIN DESCRIPTION - FREQUENCY: FREQUENCY: INTERMITTENT

## 2022-09-03 ASSESSMENT — PAIN DESCRIPTION - ONSET: ONSET: ON-GOING

## 2022-09-03 ASSESSMENT — PAIN DESCRIPTION - DESCRIPTORS: DESCRIPTORS: JABBING

## 2022-09-03 NOTE — PLAN OF CARE
Problem: Pain  Goal: Verbalizes/displays adequate comfort level or baseline comfort level  9/3/2022 0118 by Miesha Banerjee RN  Outcome: Progressing

## 2022-09-03 NOTE — PROGRESS NOTES
POD #2    NSR, VSS  No c/o pain. I.S. barely to 250 cc's. Coached her on use of this. CT's about ready to come out along with aly. Will increase walking once tubes/aly are out. Overall coming along very well. Anticipate going home by Monday at current rate of progress.

## 2022-09-03 NOTE — PROGRESS NOTES
Chest Tube Removal Documentation    _____ Order is written to remove chest tube  ___X_ Criteria is met per Clinical Pathway to remove    No air leak is noted and crepitus is absent. Instructed patient on the procedure. Premedicated patient with Toradol    Wound is clean, dry and intact. Cleansed with chloroprep. Chest tube:   Mediastinal x 1 Pleural x 1  Removed without difficulty and vaseline gauze applied. Purse string sutures secured and dry sterile dressing applied. Bilateral breath sounds are audible. SpO2 is 92 %  Patient tolerated procedure well.       Rissa Bender RN  9/3/2022  2:59 PM

## 2022-09-03 NOTE — PROGRESS NOTES
Pt up to chair, ate 100% of breakfast.  Ambulated 100 feet in [place]; returned to bed. Pacing wires removed; no immediate complications.

## 2022-09-03 NOTE — PLAN OF CARE
Problem: Discharge Planning  Goal: Discharge to home or other facility with appropriate resources  Outcome: Progressing  Flowsheets (Taken 9/3/2022 0815)  Discharge to home or other facility with appropriate resources:   Identify barriers to discharge with patient and caregiver   Arrange for needed discharge resources and transportation as appropriate   Identify discharge learning needs (meds, wound care, etc)     Problem: Pain  Goal: Verbalizes/displays adequate comfort level or baseline comfort level  9/3/2022 1315 by Raina Hutchins RN  Outcome: Progressing  Flowsheets (Taken 9/1/2022 1825)  Verbalizes/displays adequate comfort level or baseline comfort level:   Encourage patient to monitor pain and request assistance   Administer analgesics based on type and severity of pain and evaluate response   Consider cultural and social influences on pain and pain management   Assess pain using appropriate pain scale   Implement non-pharmacological measures as appropriate and evaluate response   Notify Licensed Independent Practitioner if interventions unsuccessful or patient reports new pain     Problem: Cardiovascular - Adult  Goal: Maintains optimal cardiac output and hemodynamic stability  Outcome: Progressing  Flowsheets (Taken 9/3/2022 0815)  Maintains optimal cardiac output and hemodynamic stability:   Monitor blood pressure and heart rate   Monitor urine output and notify Licensed Independent Practitioner for values outside of normal range   Assess for signs of decreased cardiac output   Administer fluid and/or volume expanders as ordered   Administer vasoactive medications as ordered  Goal: Absence of cardiac dysrhythmias or at baseline  Outcome: Progressing  Flowsheets (Taken 9/3/2022 0815)  Absence of cardiac dysrhythmias or at baseline:   Monitor cardiac rate and rhythm   Assess for signs of decreased cardiac output   Administer antiarrhythmia medication and electrolyte replacement as ordered     Problem: Safety - Adult  Goal: Free from fall injury  Outcome: Progressing  Flowsheets (Taken 9/3/2022 0814)  Free From Fall Injury: Instruct family/caregiver on patient safety  Note: Pt is at risk for falls; see Sprague fall score. Bed is locked & in lowest position, side rails up x3. Fall risk bracelet applied, non-skid socks on. Call light and possessions within reach. Patient/family educated in fall risk protocol. Patient instructed to call prior to getting up. Hourly rounds done to anticipate needs. Bed and chair alarms used this shift.       Problem: ABCDS Injury Assessment  Goal: Absence of physical injury  Outcome: Progressing  Flowsheets (Taken 9/3/2022 0814)  Absence of Physical Injury: Implement safety measures based on patient assessment

## 2022-09-04 LAB
ANION GAP SERPL CALCULATED.3IONS-SCNC: 6 MMOL/L (ref 3–16)
BUN BLDV-MCNC: 21 MG/DL (ref 7–20)
CALCIUM SERPL-MCNC: 7.7 MG/DL (ref 8.3–10.6)
CHLORIDE BLD-SCNC: 105 MMOL/L (ref 99–110)
CO2: 27 MMOL/L (ref 21–32)
CREAT SERPL-MCNC: 0.7 MG/DL (ref 0.6–1.2)
GFR AFRICAN AMERICAN: >60
GFR NON-AFRICAN AMERICAN: >60
GLUCOSE BLD-MCNC: 116 MG/DL (ref 70–99)
GLUCOSE BLD-MCNC: 133 MG/DL (ref 70–99)
GLUCOSE BLD-MCNC: 134 MG/DL (ref 70–99)
GLUCOSE BLD-MCNC: 151 MG/DL (ref 70–99)
GLUCOSE BLD-MCNC: 156 MG/DL (ref 70–99)
GLUCOSE BLD-MCNC: 96 MG/DL (ref 70–99)
HCT VFR BLD CALC: 26.6 % (ref 36–48)
HEMOGLOBIN: 8.8 G/DL (ref 12–16)
MAGNESIUM: 2.1 MG/DL (ref 1.8–2.4)
MCH RBC QN AUTO: 30.8 PG (ref 26–34)
MCHC RBC AUTO-ENTMCNC: 33.1 G/DL (ref 31–36)
MCV RBC AUTO: 92.9 FL (ref 80–100)
PDW BLD-RTO: 15.7 % (ref 12.4–15.4)
PERFORMED ON: ABNORMAL
PLATELET # BLD: 118 K/UL (ref 135–450)
PMV BLD AUTO: 9.7 FL (ref 5–10.5)
POTASSIUM SERPL-SCNC: 4.4 MMOL/L (ref 3.5–5.1)
RBC # BLD: 2.87 M/UL (ref 4–5.2)
SODIUM BLD-SCNC: 138 MMOL/L (ref 136–145)
WBC # BLD: 8.5 K/UL (ref 4–11)

## 2022-09-04 PROCEDURE — 94761 N-INVAS EAR/PLS OXIMETRY MLT: CPT

## 2022-09-04 PROCEDURE — 85027 COMPLETE CBC AUTOMATED: CPT

## 2022-09-04 PROCEDURE — 83735 ASSAY OF MAGNESIUM: CPT

## 2022-09-04 PROCEDURE — 2700000000 HC OXYGEN THERAPY PER DAY

## 2022-09-04 PROCEDURE — 99024 POSTOP FOLLOW-UP VISIT: CPT | Performed by: THORACIC SURGERY (CARDIOTHORACIC VASCULAR SURGERY)

## 2022-09-04 PROCEDURE — 6360000002 HC RX W HCPCS: Performed by: THORACIC SURGERY (CARDIOTHORACIC VASCULAR SURGERY)

## 2022-09-04 PROCEDURE — 80048 BASIC METABOLIC PNL TOTAL CA: CPT

## 2022-09-04 PROCEDURE — 36592 COLLECT BLOOD FROM PICC: CPT

## 2022-09-04 PROCEDURE — 2580000003 HC RX 258: Performed by: THORACIC SURGERY (CARDIOTHORACIC VASCULAR SURGERY)

## 2022-09-04 PROCEDURE — 6370000000 HC RX 637 (ALT 250 FOR IP): Performed by: CLINICAL NURSE SPECIALIST

## 2022-09-04 PROCEDURE — 2060000000 HC ICU INTERMEDIATE R&B

## 2022-09-04 PROCEDURE — 36415 COLL VENOUS BLD VENIPUNCTURE: CPT

## 2022-09-04 PROCEDURE — 6370000000 HC RX 637 (ALT 250 FOR IP): Performed by: THORACIC SURGERY (CARDIOTHORACIC VASCULAR SURGERY)

## 2022-09-04 RX ADMIN — PANTOPRAZOLE SODIUM 40 MG: 40 TABLET, DELAYED RELEASE ORAL at 17:32

## 2022-09-04 RX ADMIN — METOPROLOL TARTRATE 12.5 MG: 25 TABLET, FILM COATED ORAL at 08:41

## 2022-09-04 RX ADMIN — GABAPENTIN 300 MG: 300 CAPSULE ORAL at 13:02

## 2022-09-04 RX ADMIN — CLOPIDOGREL BISULFATE 75 MG: 75 TABLET ORAL at 08:37

## 2022-09-04 RX ADMIN — ASPIRIN 325 MG: 325 TABLET, COATED ORAL at 08:37

## 2022-09-04 RX ADMIN — POTASSIUM CHLORIDE 10 MEQ: 10 TABLET, EXTENDED RELEASE ORAL at 17:29

## 2022-09-04 RX ADMIN — ACETAMINOPHEN 1000 MG: 500 TABLET ORAL at 06:06

## 2022-09-04 RX ADMIN — POTASSIUM CHLORIDE 10 MEQ: 10 TABLET, EXTENDED RELEASE ORAL at 08:37

## 2022-09-04 RX ADMIN — LEVOTHYROXINE SODIUM 50 MCG: 50 TABLET ORAL at 08:36

## 2022-09-04 RX ADMIN — ACETAMINOPHEN 1000 MG: 500 TABLET ORAL at 13:02

## 2022-09-04 RX ADMIN — POLYETHYLENE GLYCOL 3350 17 G: 17 POWDER, FOR SOLUTION ORAL at 08:37

## 2022-09-04 RX ADMIN — FLUOXETINE 10 MG: 10 CAPSULE ORAL at 08:37

## 2022-09-04 RX ADMIN — Medication 15 ML: at 20:18

## 2022-09-04 RX ADMIN — POTASSIUM CHLORIDE 10 MEQ: 10 TABLET, EXTENDED RELEASE ORAL at 13:01

## 2022-09-04 RX ADMIN — FUROSEMIDE 40 MG: 10 INJECTION, SOLUTION INTRAMUSCULAR; INTRAVENOUS at 17:30

## 2022-09-04 RX ADMIN — Medication 400 MG: at 20:18

## 2022-09-04 RX ADMIN — Medication 400 MG: at 08:37

## 2022-09-04 RX ADMIN — LISINOPRIL 2.5 MG: 2.5 TABLET ORAL at 13:01

## 2022-09-04 RX ADMIN — GABAPENTIN 300 MG: 300 CAPSULE ORAL at 08:36

## 2022-09-04 RX ADMIN — MUPIROCIN: 20 OINTMENT TOPICAL at 20:19

## 2022-09-04 RX ADMIN — MUPIROCIN: 20 OINTMENT TOPICAL at 08:38

## 2022-09-04 RX ADMIN — GABAPENTIN 300 MG: 300 CAPSULE ORAL at 20:30

## 2022-09-04 RX ADMIN — ACETAMINOPHEN 1000 MG: 500 TABLET ORAL at 17:30

## 2022-09-04 RX ADMIN — ATORVASTATIN CALCIUM 40 MG: 40 TABLET, FILM COATED ORAL at 20:19

## 2022-09-04 RX ADMIN — METOPROLOL TARTRATE 12.5 MG: 25 TABLET, FILM COATED ORAL at 20:18

## 2022-09-04 RX ADMIN — FUROSEMIDE 40 MG: 10 INJECTION, SOLUTION INTRAMUSCULAR; INTRAVENOUS at 08:37

## 2022-09-04 RX ADMIN — Medication 15 ML: at 08:36

## 2022-09-04 RX ADMIN — SODIUM CHLORIDE, PRESERVATIVE FREE 10 ML: 5 INJECTION INTRAVENOUS at 08:38

## 2022-09-04 RX ADMIN — SODIUM CHLORIDE, PRESERVATIVE FREE 10 ML: 5 INJECTION INTRAVENOUS at 20:19

## 2022-09-04 RX ADMIN — PANTOPRAZOLE SODIUM 40 MG: 40 TABLET, DELAYED RELEASE ORAL at 06:06

## 2022-09-04 ASSESSMENT — PAIN SCALES - GENERAL
PAINLEVEL_OUTOF10: 0

## 2022-09-04 NOTE — PROGRESS NOTES
Returned to bed with minimal assist. Alert & oriented x4 HR 70s BP stable spo2 85 RA pt placed on 2 liters O2 NC IS & pulmonary toilet encouraged. At this point in time pt denies complaints.

## 2022-09-04 NOTE — PROGRESS NOTES
HR 60s, BP got soft 88/41 & 95/54 after received BB at HS. Pt currently resting in bed quietly sound asleep.

## 2022-09-05 VITALS
WEIGHT: 200.4 LBS | HEIGHT: 60 IN | DIASTOLIC BLOOD PRESSURE: 66 MMHG | HEART RATE: 66 BPM | OXYGEN SATURATION: 96 % | SYSTOLIC BLOOD PRESSURE: 120 MMHG | TEMPERATURE: 98.1 F | RESPIRATION RATE: 17 BRPM | BODY MASS INDEX: 39.34 KG/M2

## 2022-09-05 LAB
ANION GAP SERPL CALCULATED.3IONS-SCNC: 10 MMOL/L (ref 3–16)
BUN BLDV-MCNC: 19 MG/DL (ref 7–20)
CALCIUM SERPL-MCNC: 8.4 MG/DL (ref 8.3–10.6)
CHLORIDE BLD-SCNC: 106 MMOL/L (ref 99–110)
CO2: 27 MMOL/L (ref 21–32)
CREAT SERPL-MCNC: 0.7 MG/DL (ref 0.6–1.2)
GFR AFRICAN AMERICAN: >60
GFR NON-AFRICAN AMERICAN: >60
GLUCOSE BLD-MCNC: 116 MG/DL (ref 70–99)
GLUCOSE BLD-MCNC: 120 MG/DL (ref 70–99)
GLUCOSE BLD-MCNC: 128 MG/DL (ref 70–99)
GLUCOSE BLD-MCNC: 200 MG/DL (ref 70–99)
HCT VFR BLD CALC: 28.9 % (ref 36–48)
HEMOGLOBIN: 9.3 G/DL (ref 12–16)
MAGNESIUM: 2.4 MG/DL (ref 1.8–2.4)
MCH RBC QN AUTO: 29.8 PG (ref 26–34)
MCHC RBC AUTO-ENTMCNC: 32.1 G/DL (ref 31–36)
MCV RBC AUTO: 92.9 FL (ref 80–100)
PDW BLD-RTO: 15.4 % (ref 12.4–15.4)
PERFORMED ON: ABNORMAL
PLATELET # BLD: 173 K/UL (ref 135–450)
PMV BLD AUTO: 9.8 FL (ref 5–10.5)
POTASSIUM SERPL-SCNC: 4.9 MMOL/L (ref 3.5–5.1)
RBC # BLD: 3.11 M/UL (ref 4–5.2)
SODIUM BLD-SCNC: 143 MMOL/L (ref 136–145)
WBC # BLD: 8.4 K/UL (ref 4–11)

## 2022-09-05 PROCEDURE — 80048 BASIC METABOLIC PNL TOTAL CA: CPT

## 2022-09-05 PROCEDURE — 6370000000 HC RX 637 (ALT 250 FOR IP): Performed by: CLINICAL NURSE SPECIALIST

## 2022-09-05 PROCEDURE — 6370000000 HC RX 637 (ALT 250 FOR IP): Performed by: THORACIC SURGERY (CARDIOTHORACIC VASCULAR SURGERY)

## 2022-09-05 PROCEDURE — 83735 ASSAY OF MAGNESIUM: CPT

## 2022-09-05 PROCEDURE — 2580000003 HC RX 258: Performed by: THORACIC SURGERY (CARDIOTHORACIC VASCULAR SURGERY)

## 2022-09-05 PROCEDURE — 85027 COMPLETE CBC AUTOMATED: CPT

## 2022-09-05 PROCEDURE — 6360000002 HC RX W HCPCS: Performed by: THORACIC SURGERY (CARDIOTHORACIC VASCULAR SURGERY)

## 2022-09-05 PROCEDURE — 99024 POSTOP FOLLOW-UP VISIT: CPT | Performed by: THORACIC SURGERY (CARDIOTHORACIC VASCULAR SURGERY)

## 2022-09-05 PROCEDURE — 36415 COLL VENOUS BLD VENIPUNCTURE: CPT

## 2022-09-05 RX ORDER — GABAPENTIN 300 MG/1
300 CAPSULE ORAL 3 TIMES DAILY
Qty: 42 CAPSULE | Refills: 1 | Status: SHIPPED | OUTPATIENT
Start: 2022-09-05 | End: 2022-09-23 | Stop reason: ALTCHOICE

## 2022-09-05 RX ORDER — ATORVASTATIN CALCIUM 20 MG/1
40 TABLET, FILM COATED ORAL DAILY
Qty: 90 TABLET | Refills: 5 | Status: SHIPPED | OUTPATIENT
Start: 2022-09-05 | End: 2022-09-14

## 2022-09-05 RX ORDER — LISINOPRIL 2.5 MG/1
2.5 TABLET ORAL
Qty: 30 TABLET | Refills: 3 | Status: SHIPPED | OUTPATIENT
Start: 2022-09-05 | End: 2022-09-06

## 2022-09-05 RX ADMIN — SODIUM CHLORIDE, PRESERVATIVE FREE 10 ML: 5 INJECTION INTRAVENOUS at 07:57

## 2022-09-05 RX ADMIN — LISINOPRIL 2.5 MG: 2.5 TABLET ORAL at 12:05

## 2022-09-05 RX ADMIN — ACETAMINOPHEN 1000 MG: 500 TABLET ORAL at 06:14

## 2022-09-05 RX ADMIN — METOPROLOL TARTRATE 12.5 MG: 25 TABLET, FILM COATED ORAL at 07:48

## 2022-09-05 RX ADMIN — Medication 15 ML: at 07:50

## 2022-09-05 RX ADMIN — POLYETHYLENE GLYCOL 3350 17 G: 17 POWDER, FOR SOLUTION ORAL at 07:48

## 2022-09-05 RX ADMIN — ACETAMINOPHEN 1000 MG: 500 TABLET ORAL at 00:10

## 2022-09-05 RX ADMIN — ASPIRIN 325 MG: 325 TABLET, COATED ORAL at 07:48

## 2022-09-05 RX ADMIN — FLUOXETINE 10 MG: 10 CAPSULE ORAL at 07:49

## 2022-09-05 RX ADMIN — LEVOTHYROXINE SODIUM 50 MCG: 50 TABLET ORAL at 07:50

## 2022-09-05 RX ADMIN — SENNOSIDES 17.2 MG: 8.6 TABLET, FILM COATED ORAL at 06:14

## 2022-09-05 RX ADMIN — Medication 400 MG: at 07:49

## 2022-09-05 RX ADMIN — PANTOPRAZOLE SODIUM 40 MG: 40 TABLET, DELAYED RELEASE ORAL at 06:14

## 2022-09-05 RX ADMIN — GABAPENTIN 300 MG: 300 CAPSULE ORAL at 07:48

## 2022-09-05 RX ADMIN — FUROSEMIDE 40 MG: 10 INJECTION, SOLUTION INTRAMUSCULAR; INTRAVENOUS at 07:50

## 2022-09-05 RX ADMIN — POTASSIUM CHLORIDE 10 MEQ: 10 TABLET, EXTENDED RELEASE ORAL at 12:11

## 2022-09-05 RX ADMIN — CLOPIDOGREL BISULFATE 75 MG: 75 TABLET ORAL at 07:49

## 2022-09-05 RX ADMIN — POTASSIUM CHLORIDE 10 MEQ: 10 TABLET, EXTENDED RELEASE ORAL at 07:49

## 2022-09-05 RX ADMIN — MUPIROCIN: 20 OINTMENT TOPICAL at 07:57

## 2022-09-05 ASSESSMENT — PAIN SCALES - GENERAL
PAINLEVEL_OUTOF10: 0

## 2022-09-05 NOTE — PROGRESS NOTES
POD #4    Looks great. Asking to go home. Reviewed early post discharge activity expectations and all questions answered. Okay for d/c today.   F/U in office 1-2 weeks and prn

## 2022-09-05 NOTE — PROGRESS NOTES
No issues overnight, slept well. Vss RA. walked an entire lap around the unit with standby assist, tolerated well. Total urine output this shift 1300 ml. Standing scale wt  99.9 kg. Denies pain throughout this entire shift.

## 2022-09-05 NOTE — PROGRESS NOTES
Pt successfully discharged via wheelchair. Family member present for . OHR education verbally provided upon discharge and written instruction also given, with all questions answered.      Electronically signed by Stefania Ramirez RN on 9/5/2022 at 2:43 PM

## 2022-09-05 NOTE — PLAN OF CARE
Problem: Discharge Planning  Goal: Discharge to home or other facility with appropriate resources  Outcome: Completed     Problem: Pain  Goal: Verbalizes/displays adequate comfort level or baseline comfort level  Outcome: Completed     Problem: Cardiovascular - Adult  Goal: Maintains optimal cardiac output and hemodynamic stability  Outcome: Completed     Problem: Cardiovascular - Adult  Goal: Absence of cardiac dysrhythmias or at baseline  Outcome: Completed     Problem: Safety - Adult  Goal: Free from fall injury  Outcome: Completed     Problem: ABCDS Injury Assessment  Goal: Absence of physical injury  Outcome: Completed

## 2022-09-05 NOTE — PROGRESS NOTES
Tolerating activities well returned to bed with minimal assist. Vss RA. Denies complaints at this time. Assessment completed. Fall precautions in place call light within reach.

## 2022-09-06 ENCOUNTER — TELEPHONE (OUTPATIENT)
Dept: PULMONOLOGY | Age: 72
End: 2022-09-06

## 2022-09-06 RX ORDER — LISINOPRIL 2.5 MG/1
2.5 TABLET ORAL
Qty: 90 TABLET | Refills: 0 | Status: SHIPPED | OUTPATIENT
Start: 2022-09-06

## 2022-09-06 NOTE — PATIENT INSTRUCTIONS
Increase Bumex to 3 mg twice daily.  Labs as scheduled   Thank you   Increase simvastatin to 40 mg (double up with what you have) and when that runs out, instead of filling the simvastatin, you will change to atorvastatin. [employer] has many resources and tips to help with getting exercise  and physical activity as one ages. [employer]  Sit and Be Fit programs on local [employer] ([facility]) at 10:30 am and 3:30 pm  Walk at Home [place]. Schedule with eye doctor  Schedule with sleep doctor.

## 2022-09-06 NOTE — TELEPHONE ENCOUNTER
Patient called to try and be seen sooner. Patient just got out of the hospital after having bypass surgery. I told patient I would call [address] [place] [address] ad see if she could be set up with her machine to be seen sooner. Called [address] [place] [address] and asked for patient to be called and set up sooner. Patient to call back with set up date so she can schedule 31-90 follow up.

## 2022-09-06 NOTE — DISCHARGE SUMMARY
DISCHARGE SUMMARY    Patient ID: Maura Morrison  MRN:  [id number]  YOB: 1950      Admission Date:  9/1/2022  5:30 AM  Discharge Date:  9/5/2022  1:40 PM     Principle Diagnosis:  CAD in native artery    Secondary Diagnosis:  Principal Problem:    CAD in native artery    Hyperlipidemia    Hypothyroidism    Sleep apnea    Depression    H/o migraine headaches    Obesity    Acute blood loss anemia    Procedure:  CABG x 4, LIMA to LAD, SVG to D1, OM2 and PDA; EVH R thigh; MARION; TCPB; sternal plate x 1 (Biomet)     History:  The patient is a 80 y.o.  female who has had symptoms of exertional angina since last summer. This had gotten a bit worse this summer compared to last.  She related this information to her primary care physician who sent her for a stress test.  She made it about 2 minutes on the stress test and her blood pressure luis and her heart rate went up and she felt poorly. Follow-up cardiac catheterization found her to have significant three-vessel coronary disease with preserved left ventricular function. She was referred for elective surgical management of her coronary disease. Hospital Course: The patient underwent elective CABG x 4 + sternal plate x 1 on September 1, 2022. Her operative course was uncomplicated. She transferred to ICU for ongoing care. She was extubated early morning of POD1 due to sleepiness. She transitioned to progressive care on the first postoperative day. Her bladder and bowels were functioning normally. Her incisional pain was well controlled. She was ambulating around the ICU . Her postop course followed the usual clinical pathway. She was discharged home on the fourth postoperative day.     Consults:  IP CONSULT TO CARDIAC REHAB  IP CONSULT TO CASE MANAGEMENT  IP CONSULT TO SOCIAL WORK  IP CONSULT TO DIETITIAN     Significant Diagnostic Studies:   Chest X-ray  EKG    Treatments: IV hydration, antibiotics: Ancef and vancomycin, cardiac meds: lisinopril (generic), metoprolol, and furosemide, anticoagulation: ASA and Plavix, and therapies: PT and OT    LABS:  Recent Labs     09/04/22  0605 09/05/22 0432   WBC 8.5 8.4   HGB 8.8* 9.3*   HCT 26.6* 28.9*   * 173                                                                  Recent Labs     09/04/22  0605 09/05/22 0432    143   K 4.4 4.9    106   CO2 27 27   BUN 21* 19   CREATININE 0.7 0.7   GLUCOSE 96 120*     Condition at discharge: Stable     Disposition:  home    Physical Exam on discharge day:   /66   Pulse 66   Temp 98.1 °F (36.7 °C) (Oral)   Resp 17   Ht 5' (1.524 m)   Wt 200 lb 6.4 oz (90.9 kg)   SpO2 96%   BMI 39.14 kg/m²     Neuro: awake, alert and oriented x 3  CV:   Sinus  Pulm:  normal work of breathing  Abd:  soft, non-tender; bowel sounds normal; passing flatus, + BM  Wounds: clean, dry and intact  Ext: warm, + edema    Discharge Medications:      Medication List        START taking these medications      aspirin 325 MG EC tablet  Take 1 tablet by mouth daily  Replaces: aspirin 81 MG chewable tablet     gabapentin 300 MG capsule  Commonly known as: NEURONTIN  Take 1 capsule by mouth 3 times daily for 14 days. CHANGE how you take these medications      atorvastatin 20 MG tablet  Commonly known as: LIPITOR  Take 2 tablets by mouth daily  What changed: See the new instructions.             CONTINUE taking these medications      ALLEGRA-D 12 HOUR PO     bisacodyl 5 MG EC tablet  Commonly known as: DULCOLAX     buPROPion 300 MG extended release tablet  Commonly known as: WELLBUTRIN XL  TAKE 1 TABLET EVERY MORNING     * docusate sodium 100 MG capsule  Commonly known as: COLACE     * docusate sodium 100 MG capsule  Commonly known as: COLACE     Fish Oil Burp-Less 1200 MG Caps     FLUoxetine 10 MG capsule  Commonly known as: PROZAC  TAKE 1 CAPSULE DAILY     ibuprofen 600 MG tablet  Commonly known as: ADVIL;MOTRIN     levothyroxine 50 MCG tablet  Commonly known as: SYNTHROID  TAKE 1 TABLET DAILY     omeprazole 40 MG delayed release capsule  Commonly known as: PRILOSEC  Take 1 capsule by mouth in the morning and at bedtime     Premarin 0.625 MG/GM vaginal cream  Generic drug: conjugated estrogens  PLACE 0.5G VAGINALLY TWICE A WEEK . traZODone 50 MG tablet  Commonly known as: DESYREL  TAKE 1 TO 2 TABLETS NIGHTLYAS NEEDED FOR SLEEP     triamcinolone 55 MCG/ACT nasal inhaler  Commonly known as: Nasacort Allergy 24HR  2 SPRAYS IN EACH NOSTRIL one time a day     valACYclovir 1 g tablet  Commonly known as: VALTREX  TAKE 1 TABLET DAILY     vitamin B-12 1000 MCG tablet  Commonly known as: CYANOCOBALAMIN  Take 1 tablet by mouth daily     vitamin D3 25 MCG (1000 UT) Tabs tablet  Commonly known as: CHOLECALCIFEROL  Take 1 tablet by mouth daily           * This list has 2 medication(s) that are the same as other medications prescribed for you. Read the directions carefully, and ask your doctor or other care provider to review them with you. STOP taking these medications      aspirin 81 MG chewable tablet  Replaced by: aspirin 325 MG EC tablet               Where to Get Your Medications        These medications were sent to [facility] [address] [phone]  [address] [place] [id number]      Phone: [phone]   aspirin 325 MG EC tablet  atorvastatin 20 MG tablet  gabapentin 300 MG capsule       Allergies   Allergen Reactions    Latex Rash    Penicillins Rash       Discharge Instructions: Activity: No heavy lifting greater than 5 pounds for 6 weeks post-op. Do not use arms to get up from a seated position. Instead rock in chair to give yourself momentum to sit up. No driving  Diet: cardiac diet  Wound Care: keep wound clean and dry and open to air. Use antibacterial soap and clean washcloth to cleanse incisional wounds daily.     Follow up Visits:  Dr Jamila Sherwood in 1-2 weeks  Dr Irene Enciso in 2-3 samy Francis in 3-4 weeks      Referred to cardiac rehab for Phase II and will follow up as an outpatient.         Bernard Dominguez, APRN  9/6/2022  3:37 PM

## 2022-09-07 ENCOUNTER — TELEPHONE (OUTPATIENT)
Dept: CARDIOTHORACIC SURGERY | Age: 72
End: 2022-09-07

## 2022-09-07 NOTE — TELEPHONE ENCOUNTER
Patient's daughter, Mara Parker called reporting that patient has a small blister above her R SVG incision. Some tenderness noted. Denies fever. Picture was sent via secure email. Instructed for patient to continue to clean with antibacterial soap and water and to cover blister with a dry gauze. Instructed for patient to contact the office with any increased redness or purulence. They are aware and agreeable.

## 2022-09-09 ENCOUNTER — TELEPHONE (OUTPATIENT)
Dept: INTERNAL MEDICINE CLINIC | Age: 72
End: 2022-09-09

## 2022-09-09 NOTE — TELEPHONE ENCOUNTER
[facility] Transitions Initial Follow Up Call    Outreach made within 2 business days of discharge: No    Patient: Ulisses Oropeza Patient : 1950   MRN: [id number]  Reason for Admission: There are no discharge diagnoses documented for the most recent discharge.   Discharge Date: 22       Spoke with: Patient    Discharge department/facility: [facility]    Patient scheduled 22    Scheduled appointment with PCP within 7-14 days    Follow Up  Future Appointments   Date Time Provider Tommie Ramos   2022  2:15 PM MD SOILA Christensen [facility]   10/12/2022  8:00 AM MD TONY Childers [place] [facility]   10/19/2022 10:00 AM Tanner Arriaga MD [address] [place] [address]       Sara Ferguson MA

## 2022-09-10 NOTE — OP NOTE
[address]               [address] [place], [address]                                OPERATIVE REPORT    PATIENT NAME: Solitario Sanz                :        1950  MED REC NO:   [id number]                          ROOM:       1092  ACCOUNT NO:   [de-identified]                           ADMIT DATE: 2022  PROVIDER:     Opal Munoz MD    DATE OF PROCEDURE:  2022    PREOPERATIVE DIAGNOSES:  Three-vessel coronary artery disease with  stable exertional angina; obstructive sleep apnea; chronic hypertension;  obesity; type 2 diabetes mellitus. POSTOPERATIVE DIAGNOSES:  Three-vessel coronary artery disease with  stable exertional angina; obstructive sleep apnea; chronic hypertension;  obesity; type 2 diabetes mellitus; and acute blood loss anemia. PROCEDURES PERFORMED:  Coronary artery bypass grafting x4 - LIMA to mid  LAD; reverse aortic coronary saphenous vein graft sequentially to first  diagonal, second obtuse marginal and posterior descending; endoscopic  saphenous vein harvest from right thigh; transesophageal  echocardiography; total cardiopulmonary bypass; placement of sternal  plate to inferior aspect of sternum x1 (Biomet plate). SURGEON:  Opal Munoz MD    FIRST ASSISTANT:  SHANIQUA Chandra (Ms. Dinah Valles' presence was  essential as no qualified resident or staff surgeon was available to  perform endoscopic saphenous vein harvest or to assist with this complex  case). ANESTHESIA:  General endotracheal.    ESTIMATED BLOOD LOSS:  Autotransfusion. INDICATIONS:  The patient is a 73-year-old woman who because of  exertional angina ultimately underwent cardiac catheterization where she  was found to have three-vessel coronary artery disease readily amendable  to coronary artery bypass surgery. She was brought to the operating  room today on elective basis for this purpose.     FINDINGS AT SURGERY:  Of note, intraoperatively is that she had moderate  left ventricular hypertrophy, suggesting nonideally controlled  hypertension. Additionally, the preoperative echocardiogram showed 1+  mitral valve regurgitation whereas after revascularization, the mitral  valve regurgitation was 0. DESCRIPTION OF PROCEDURE:  After obtaining adequate general endotracheal  anesthesia and administration of appropriate preoperative prophylactic  antibiotics, the patient's anterior chest, abdomen, and legs were all  meticulously prepped and draped in a sterile manner. A standard timeout  procedure was completed. A segment of saphenous vein was endoscopically harvested from the  patient's right thigh. Simultaneously, the chest was entered via  limited skin incision median sternotomy and the left internal mammary  artery was extensively mobilized. Once all conduits had been fully  mobilized, the patient was then systemically heparinized. The  pericardium was then incised and suspended. Routine cannulation of the  distal ascending aorta and right atrium was performed. Once an adequate  ACT was noted, perfusion was initiated. Both antegrade and retrograde  cardioplegic cannulae were placed. Distal targets were identified. The  aorta was cross-clamped. Cardioplegia was then delivered first through  the aortic root and then through the coronary sinus. A total dose of  1200 mL was given in the evenly divided doses through each of these  routes. Subsequent dose was given at about 20 minute intervals  thereafter and only through a retrograde route. The heart was then appropriately positioned and the saphenous vein was  then grafted in an end-to-side fashion of the posterior descending, in a  side-to-side fashion of the second obtuse marginal and finally in a  side-to-side fashion to the first diagonal branch to the LAD.   As each  of anastomosis was placed, it was tested with heparinized saline  solution and flow through each of the grafts was found to be superb. The left mammary artery was then brought through generous lateral tunnel  of the pericardium and grafted to the mid left anterior descending in an  end-to-side fashion. Flow was briefly allowed in the left mammary  artery to test the anastomosis and there was instantaneous filling of  the LAD proximally and distally. The anastomosis itself was entirely  hemostatic. The heart was returned to its normal anatomic position. The patient was  rewarmed. The saphenous vein was connected to the proximal ascending  aorta in an end-to-side fashion. The ascending aorta was thoroughly and  meticulously deaired as the aortic cross-clamp was released. Generous  backbleeding through the proximal anastomotic site was allowed to flush  out any possible microbubbles or debris before the suture was snugged  down and tied. The entirety of the graft system was then carefully inspected to make  sure that hemostasis was intact throughout the graft system and the lie  of the graft was as desired. This being the case, the heart was  returned to its normal position, and needed to be defibrillated a single  time and sinus rhythm returned immediately thereafter and remained. The patient was weaned from cardiopulmonary bypass without need for  additional support. Of note is the immediate postprocedure,  echocardiogram showed normal left ventricular function with complete  resolution of the mitral valve regurgitation. This suggested that the  mitral valve regurgitation was functional and related to ischemia of the  inferolateral wall rather than any structural compromise of the valve  itself. Once off cardiopulmonary bypass, the right heart was decannulated. The  patient's heparin was reversed with protamine sulfate. Once all  protamine had been given, the aortic cannula was removed and the site  secured and then oversewn with a 5-0 Prolene over-and-over suture.     Meticulous attention was given to hemostasis throughout every aspect of  the operative field. This being achieved, a single bipolar ventricular  pacing wire was placed in the anterior surface of the right ventricle. The pericardium was tacked together with total of five simple 0 Vicryl  sutures to provide complete tissue coverage at the front of the heart. Two 36[place] mediastinal drainage catheters were placed directing the  angled tube partially at the left pleural cavity and the straight tube  anterior to the pericardium. Final inspection was made to ensure that  hemostasis was pristine throughout before reapproximating the sternum  with multiple figure-of-eight #6 stainless steel wire sutures. At the  inferior end of the sternum, in deference to her body habitus, I placed  a small titanium plate so that _____ abdominal wall musculature would  minimize its _____ on the sternum as it was trying to heal.    With the sternum slightly closed, subcutaneous tissues were then  copiously irrigated with antibiotic-containing saline solution. The  subcutaneous tissues and skin were then closed in multiple layers with  running absorbable suture. A Prineo dressing was applied. The patient  was then prepared for transfer to the ICU for ongoing care.         Tamela Reyes MD    D: 09/09/2022 16:11:30       T: 09/09/2022 21:51:39     MARLINE/MARLINE_[email or web address]  Job#: [id number]     Doc#: [id number]    CC:  Thalia Wing MD

## 2022-09-11 ENCOUNTER — APPOINTMENT (OUTPATIENT)
Dept: CT IMAGING | Age: 72
DRG: 195 | End: 2022-09-11
Payer: COMMERCIAL

## 2022-09-11 ENCOUNTER — APPOINTMENT (OUTPATIENT)
Dept: GENERAL RADIOLOGY | Age: 72
DRG: 195 | End: 2022-09-11
Payer: COMMERCIAL

## 2022-09-11 ENCOUNTER — HOSPITAL ENCOUNTER (INPATIENT)
Age: 72
LOS: 1 days | Discharge: HOME | DRG: 195 | End: 2022-09-12
Attending: EMERGENCY MEDICINE | Admitting: INTERNAL MEDICINE
Payer: COMMERCIAL

## 2022-09-11 DIAGNOSIS — R07.89 CHEST WALL PAIN: Primary | ICD-10-CM

## 2022-09-11 DIAGNOSIS — M79.89 RIGHT LEG SWELLING: ICD-10-CM

## 2022-09-11 DIAGNOSIS — R06.09 OTHER FORM OF DYSPNEA: ICD-10-CM

## 2022-09-11 PROBLEM — M79.604 LEG PAIN, ANTERIOR, RIGHT: Status: ACTIVE | Noted: 2022-09-11

## 2022-09-11 LAB
ANION GAP SERPL CALCULATED.3IONS-SCNC: 13 MMOL/L (ref 3–16)
APTT: 32.1 SEC (ref 23–34.3)
BASOPHILS ABSOLUTE: 0.1 K/UL (ref 0–0.2)
BASOPHILS RELATIVE PERCENT: 1.3 %
BUN BLDV-MCNC: 14 MG/DL (ref 7–20)
CALCIUM SERPL-MCNC: 9 MG/DL (ref 8.3–10.6)
CHLORIDE BLD-SCNC: 104 MMOL/L (ref 99–110)
CO2: 22 MMOL/L (ref 21–32)
CREAT SERPL-MCNC: 0.7 MG/DL (ref 0.6–1.2)
D DIMER: 6.49 UG/ML FEU (ref 0–0.6)
EOSINOPHILS ABSOLUTE: 0.3 K/UL (ref 0–0.6)
EOSINOPHILS RELATIVE PERCENT: 3.4 %
GFR AFRICAN AMERICAN: >60
GFR NON-AFRICAN AMERICAN: >60
GLUCOSE BLD-MCNC: 127 MG/DL (ref 70–99)
HCT VFR BLD CALC: 30.2 % (ref 36–48)
HCT VFR BLD CALC: 30.2 % (ref 36–48)
HEMOGLOBIN: 9.8 G/DL (ref 12–16)
HEMOGLOBIN: 9.8 G/DL (ref 12–16)
INR BLD: 1.19 (ref 0.87–1.14)
LYMPHOCYTES ABSOLUTE: 1.9 K/UL (ref 1–5.1)
LYMPHOCYTES RELATIVE PERCENT: 23.9 %
MCH RBC QN AUTO: 30.1 PG (ref 26–34)
MCH RBC QN AUTO: 30.6 PG (ref 26–34)
MCHC RBC AUTO-ENTMCNC: 32.5 G/DL (ref 31–36)
MCHC RBC AUTO-ENTMCNC: 32.6 G/DL (ref 31–36)
MCV RBC AUTO: 92.7 FL (ref 80–100)
MCV RBC AUTO: 94 FL (ref 80–100)
MONOCYTES ABSOLUTE: 0.5 K/UL (ref 0–1.3)
MONOCYTES RELATIVE PERCENT: 6.2 %
NEUTROPHILS ABSOLUTE: 5.2 K/UL (ref 1.7–7.7)
NEUTROPHILS RELATIVE PERCENT: 65.2 %
PDW BLD-RTO: 15.8 % (ref 12.4–15.4)
PDW BLD-RTO: 15.9 % (ref 12.4–15.4)
PLATELET # BLD: 306 K/UL (ref 135–450)
PLATELET # BLD: 325 K/UL (ref 135–450)
PMV BLD AUTO: 8.7 FL (ref 5–10.5)
PMV BLD AUTO: 9.1 FL (ref 5–10.5)
POTASSIUM REFLEX MAGNESIUM: 4.3 MMOL/L (ref 3.5–5.1)
PRO-BNP: 1048 PG/ML (ref 0–124)
PROTHROMBIN TIME: 15.1 SEC (ref 11.7–14.5)
RBC # BLD: 3.22 M/UL (ref 4–5.2)
RBC # BLD: 3.26 M/UL (ref 4–5.2)
SODIUM BLD-SCNC: 139 MMOL/L (ref 136–145)
TROPONIN: <0.01 NG/ML
WBC # BLD: 8 K/UL (ref 4–11)
WBC # BLD: 8.8 K/UL (ref 4–11)

## 2022-09-11 PROCEDURE — 6360000004 HC RX CONTRAST MEDICATION: Performed by: EMERGENCY MEDICINE

## 2022-09-11 PROCEDURE — 2580000003 HC RX 258: Performed by: INTERNAL MEDICINE

## 2022-09-11 PROCEDURE — 1200000000 HC SEMI PRIVATE

## 2022-09-11 PROCEDURE — 99285 EMERGENCY DEPT VISIT HI MDM: CPT

## 2022-09-11 PROCEDURE — 6370000000 HC RX 637 (ALT 250 FOR IP): Performed by: INTERNAL MEDICINE

## 2022-09-11 PROCEDURE — 85730 THROMBOPLASTIN TIME PARTIAL: CPT

## 2022-09-11 PROCEDURE — 93005 ELECTROCARDIOGRAM TRACING: CPT | Performed by: PHYSICIAN ASSISTANT

## 2022-09-11 PROCEDURE — 85025 COMPLETE CBC W/AUTO DIFF WBC: CPT

## 2022-09-11 PROCEDURE — 84484 ASSAY OF TROPONIN QUANT: CPT

## 2022-09-11 PROCEDURE — 83880 ASSAY OF NATRIURETIC PEPTIDE: CPT

## 2022-09-11 PROCEDURE — 85610 PROTHROMBIN TIME: CPT

## 2022-09-11 PROCEDURE — 71260 CT THORAX DX C+: CPT | Performed by: PHYSICIAN ASSISTANT

## 2022-09-11 PROCEDURE — 71045 X-RAY EXAM CHEST 1 VIEW: CPT

## 2022-09-11 PROCEDURE — 85027 COMPLETE CBC AUTOMATED: CPT

## 2022-09-11 PROCEDURE — 6360000002 HC RX W HCPCS: Performed by: INTERNAL MEDICINE

## 2022-09-11 PROCEDURE — 85379 FIBRIN DEGRADATION QUANT: CPT

## 2022-09-11 PROCEDURE — 80048 BASIC METABOLIC PNL TOTAL CA: CPT

## 2022-09-11 PROCEDURE — 36415 COLL VENOUS BLD VENIPUNCTURE: CPT

## 2022-09-11 PROCEDURE — 93005 ELECTROCARDIOGRAM TRACING: CPT | Performed by: INTERNAL MEDICINE

## 2022-09-11 RX ORDER — SODIUM CHLORIDE 9 MG/ML
INJECTION, SOLUTION INTRAVENOUS PRN
Status: DISCONTINUED | OUTPATIENT
Start: 2022-09-11 | End: 2022-09-12 | Stop reason: HOSPADM

## 2022-09-11 RX ORDER — ACETAMINOPHEN 325 MG/1
650 TABLET ORAL EVERY 6 HOURS PRN
Status: DISCONTINUED | OUTPATIENT
Start: 2022-09-11 | End: 2022-09-12

## 2022-09-11 RX ORDER — POLYETHYLENE GLYCOL 3350 17 G/17G
17 POWDER, FOR SOLUTION ORAL DAILY PRN
Status: DISCONTINUED | OUTPATIENT
Start: 2022-09-11 | End: 2022-09-12 | Stop reason: HOSPADM

## 2022-09-11 RX ORDER — HEPARIN SODIUM 10000 [USP'U]/100ML
5-30 INJECTION, SOLUTION INTRAVENOUS CONTINUOUS
Status: DISCONTINUED | OUTPATIENT
Start: 2022-09-11 | End: 2022-09-12 | Stop reason: HOSPADM

## 2022-09-11 RX ORDER — ACETAMINOPHEN 650 MG/1
650 SUPPOSITORY RECTAL EVERY 6 HOURS PRN
Status: DISCONTINUED | OUTPATIENT
Start: 2022-09-11 | End: 2022-09-12

## 2022-09-11 RX ORDER — ONDANSETRON 4 MG/1
4 TABLET, ORALLY DISINTEGRATING ORAL EVERY 8 HOURS PRN
Status: DISCONTINUED | OUTPATIENT
Start: 2022-09-11 | End: 2022-09-12 | Stop reason: HOSPADM

## 2022-09-11 RX ORDER — HEPARIN SODIUM 1000 [USP'U]/ML
40 INJECTION, SOLUTION INTRAVENOUS; SUBCUTANEOUS PRN
Status: DISCONTINUED | OUTPATIENT
Start: 2022-09-11 | End: 2022-09-12 | Stop reason: HOSPADM

## 2022-09-11 RX ORDER — BUPROPION HYDROCHLORIDE 150 MG/1
300 TABLET ORAL DAILY
Status: DISCONTINUED | OUTPATIENT
Start: 2022-09-12 | End: 2022-09-12 | Stop reason: HOSPADM

## 2022-09-11 RX ORDER — NITROGLYCERIN 0.4 MG/1
0.4 TABLET SUBLINGUAL EVERY 5 MIN PRN
Status: DISCONTINUED | OUTPATIENT
Start: 2022-09-11 | End: 2022-09-12 | Stop reason: HOSPADM

## 2022-09-11 RX ORDER — GABAPENTIN 300 MG/1
300 CAPSULE ORAL 3 TIMES DAILY
Status: DISCONTINUED | OUTPATIENT
Start: 2022-09-11 | End: 2022-09-12 | Stop reason: HOSPADM

## 2022-09-11 RX ORDER — ONDANSETRON 2 MG/ML
4 INJECTION INTRAMUSCULAR; INTRAVENOUS EVERY 6 HOURS PRN
Status: DISCONTINUED | OUTPATIENT
Start: 2022-09-11 | End: 2022-09-12 | Stop reason: HOSPADM

## 2022-09-11 RX ORDER — SENNA AND DOCUSATE SODIUM 50; 8.6 MG/1; MG/1
2 TABLET, FILM COATED ORAL DAILY
Status: DISCONTINUED | OUTPATIENT
Start: 2022-09-11 | End: 2022-09-12 | Stop reason: HOSPADM

## 2022-09-11 RX ORDER — HEPARIN SODIUM 1000 [USP'U]/ML
80 INJECTION, SOLUTION INTRAVENOUS; SUBCUTANEOUS PRN
Status: DISCONTINUED | OUTPATIENT
Start: 2022-09-11 | End: 2022-09-12 | Stop reason: HOSPADM

## 2022-09-11 RX ORDER — LISINOPRIL 2.5 MG/1
2.5 TABLET ORAL
Status: DISCONTINUED | OUTPATIENT
Start: 2022-09-11 | End: 2022-09-12 | Stop reason: HOSPADM

## 2022-09-11 RX ORDER — TRAZODONE HYDROCHLORIDE 100 MG/1
100 TABLET ORAL NIGHTLY
Status: DISCONTINUED | OUTPATIENT
Start: 2022-09-11 | End: 2022-09-12 | Stop reason: HOSPADM

## 2022-09-11 RX ORDER — SODIUM CHLORIDE 0.9 % (FLUSH) 0.9 %
5-40 SYRINGE (ML) INJECTION EVERY 12 HOURS SCHEDULED
Status: DISCONTINUED | OUTPATIENT
Start: 2022-09-11 | End: 2022-09-12 | Stop reason: HOSPADM

## 2022-09-11 RX ORDER — LANOLIN ALCOHOL/MO/W.PET/CERES
1000 CREAM (GRAM) TOPICAL DAILY
Status: DISCONTINUED | OUTPATIENT
Start: 2022-09-12 | End: 2022-09-12 | Stop reason: HOSPADM

## 2022-09-11 RX ORDER — HEPARIN SODIUM 1000 [USP'U]/ML
80 INJECTION, SOLUTION INTRAVENOUS; SUBCUTANEOUS ONCE
Status: COMPLETED | OUTPATIENT
Start: 2022-09-11 | End: 2022-09-11

## 2022-09-11 RX ORDER — LEVOTHYROXINE SODIUM 0.05 MG/1
50 TABLET ORAL DAILY
Status: DISCONTINUED | OUTPATIENT
Start: 2022-09-12 | End: 2022-09-12 | Stop reason: HOSPADM

## 2022-09-11 RX ORDER — ATORVASTATIN CALCIUM 40 MG/1
40 TABLET, FILM COATED ORAL DAILY
Status: DISCONTINUED | OUTPATIENT
Start: 2022-09-11 | End: 2022-09-12 | Stop reason: HOSPADM

## 2022-09-11 RX ORDER — SODIUM CHLORIDE 0.9 % (FLUSH) 0.9 %
5-40 SYRINGE (ML) INJECTION PRN
Status: DISCONTINUED | OUTPATIENT
Start: 2022-09-11 | End: 2022-09-12 | Stop reason: HOSPADM

## 2022-09-11 RX ADMIN — ACETAMINOPHEN 650 MG: 325 TABLET ORAL at 21:16

## 2022-09-11 RX ADMIN — DOCUSATE SODIUM 50 MG AND SENNOSIDES 8.6 MG 2 TABLET: 8.6; 5 TABLET, FILM COATED ORAL at 21:13

## 2022-09-11 RX ADMIN — SODIUM CHLORIDE, PRESERVATIVE FREE 10 ML: 5 INJECTION INTRAVENOUS at 21:40

## 2022-09-11 RX ADMIN — GABAPENTIN 300 MG: 300 CAPSULE ORAL at 21:13

## 2022-09-11 RX ADMIN — LISINOPRIL 2.5 MG: 2.5 TABLET ORAL at 21:58

## 2022-09-11 RX ADMIN — HEPARIN SODIUM AND DEXTROSE 18 UNITS/KG/HR: 10000; 5 INJECTION INTRAVENOUS at 20:22

## 2022-09-11 RX ADMIN — METOPROLOL TARTRATE 12.5 MG: 25 TABLET, FILM COATED ORAL at 21:13

## 2022-09-11 RX ADMIN — HEPARIN SODIUM 6990 UNITS: 1000 INJECTION INTRAVENOUS; SUBCUTANEOUS at 20:15

## 2022-09-11 RX ADMIN — SODIUM CHLORIDE, PRESERVATIVE FREE 5 ML: 5 INJECTION INTRAVENOUS at 21:11

## 2022-09-11 RX ADMIN — IOPAMIDOL 75 ML: 755 INJECTION, SOLUTION INTRAVENOUS at 15:30

## 2022-09-11 RX ADMIN — TRAZODONE HYDROCHLORIDE 100 MG: 100 TABLET ORAL at 23:25

## 2022-09-11 RX ADMIN — NITROGLYCERIN 0.4 MG: 0.4 TABLET, ORALLY DISINTEGRATING SUBLINGUAL at 21:55

## 2022-09-11 RX ADMIN — ONDANSETRON 4 MG: 2 INJECTION INTRAMUSCULAR; INTRAVENOUS at 21:39

## 2022-09-11 RX ADMIN — ATORVASTATIN CALCIUM 40 MG: 40 TABLET, FILM COATED ORAL at 21:13

## 2022-09-11 ASSESSMENT — PAIN SCALES - GENERAL
PAINLEVEL_OUTOF10: 6
PAINLEVEL_OUTOF10: 5
PAINLEVEL_OUTOF10: 5
PAINLEVEL_OUTOF10: 0

## 2022-09-11 ASSESSMENT — PAIN - FUNCTIONAL ASSESSMENT
PAIN_FUNCTIONAL_ASSESSMENT: ACTIVITIES ARE NOT PREVENTED
PAIN_FUNCTIONAL_ASSESSMENT: ACTIVITIES ARE NOT PREVENTED
PAIN_FUNCTIONAL_ASSESSMENT: NONE - DENIES PAIN
PAIN_FUNCTIONAL_ASSESSMENT: 0-10

## 2022-09-11 ASSESSMENT — PAIN DESCRIPTION - ORIENTATION
ORIENTATION: MID
ORIENTATION: LEFT;LOWER

## 2022-09-11 ASSESSMENT — ENCOUNTER SYMPTOMS
SHORTNESS OF BREATH: 1
VOMITING: 0
COUGH: 0
NAUSEA: 0
DIARRHEA: 0
ABDOMINAL PAIN: 0

## 2022-09-11 ASSESSMENT — PAIN DESCRIPTION - DESCRIPTORS
DESCRIPTORS: PRESSURE;CRAMPING
DESCRIPTORS: SHARP

## 2022-09-11 ASSESSMENT — PAIN DESCRIPTION - LOCATION
LOCATION: CHEST
LOCATION: CHEST

## 2022-09-11 ASSESSMENT — PAIN DESCRIPTION - PAIN TYPE
TYPE: ACUTE PAIN
TYPE: ACUTE PAIN;SURGICAL PAIN

## 2022-09-11 NOTE — ED TRIAGE NOTES
Had CABG X4 on 9/1/22 with [facility]. Has had a pain under left breast since OR that is continually worsening and is sharp now, rates 6/10. SOB has also been worsening since OR. Uses IS as directed, was pulling 1500ml on it and today could only pull 1000. Weight is up 1# since yesterday. Right leg graft incision has a hard knot above incision that she noticed about 5 days ago. Also has several small open areas form the tape she was using on her chest incision and leg incision.

## 2022-09-11 NOTE — H&P
Hospital Medicine History & Physical      PCP: Aldo Johnson MD    Date of Admission: 9/11/2022    Date of Service: Pt seen/examined on 09/12/22 and Admitted to Inpatient with expected LOS greater than two midnights due to medical therapy. Chief Complaint:  SOB and Chest pain       History Of Present Illness:      80 y.o. female with PMHx significant for CAD s/p CABG 10 days ago by Sarina Medeiros who presented to ED with cc of SOB and chest pain. Left chest wall pain, pleuritic. Worsening with deep breath. Intermittent pain. Associated with a dry cough. Also concern of right leg pain and bruising and nodules which was new. Daughter at bedside. Denies fever or chills. Denies n/v, abd pain, diarrhea or constipation. Denies blood in stool or urine. VS sable. Lab signifiant for BNP 1048, Troponin <0.01. EKG sinus rhythm at 63, no ST-T wave changes, QTc 419  Hgb 9.8 at baseline. D-Dimer elevated at 6.49. CTPA, negative for PE, positive for small pleural effusion. Past Medical History:          Diagnosis Date    Abnormal Pap smear of cervix     Acid reflux     Bacterial vaginosis     CAD (coronary artery disease)     Dysmenorrhea     Environmental allergies     Fibrocystic breast     Fibroid     Herpes simplex type 2 infection     History of blood transfusion     Hyperlipidemia     Hypothyroid     Dx about 15 years ago, but htne was off med for  long time    Insomnia     Menopause     approx age 37    Menopause ovarian failure     Menorrhagia     Sleep apnea 08/2022    just diagnosed in April, waiting for CPAP    Urinary incontinence     Urogenital trichomoniasis        Past Surgical History:          Procedure Laterality Date    BREAST BIOPSY      CAPSULOTOMY Bilateral 02/2020    Yag    CARDIAC CATHETERIZATION  08/10/2022    LAD with 80-90% prox. Lg D1 with 80% prox. Cx with prox 90%.    RCA with prox 90%-dom    CATARACT REMOVAL WITH IMPLANT Right 06/26/2019    COLONOSCOPY      CORONARY ARTERY BYPASS GRAFT N/A 9/1/2022    CABG x 4, LIMA to LAD, SVG to D1, OM2 and PDA; EVH R thigh; MARION; TCPB; sternal plate x 1 (Biomet) performed by Wai Bustos MD at [facility] N/A 10/25/2018    ESOPHAGEAL DILATION [place] performed by Parris Boswell MD at [facility]., TOTAL ABDOMINAL (CERVIX REMOVED)  1990    fibroid-still with ovaries    INTRACAPSULAR CATARACT EXTRACTION Left 06/12/2019    PHACOEMULSIFICATION OF CATARACT LEFT EYE WITH INTRAOCULAR LENS IMPLANT performed by Chuy Arias MD at [facility] Right 06/26/2019    PHACOEMULSIFICATION OF CATARACT RIGHT EYE WITH INTRAOCULAR LENS IMPLANT performed by Chuy Arias MD at [address] [facility] N/A 10/25/2018    EGD BIOPSY performed by Parris Boswell MD at [place] [address]       Medications Prior to Admission:      Prior to Admission medications    Medication Sig Start Date End Date Taking? Authorizing Provider   lisinopril (PRINIVIL;ZESTRIL) 2.5 MG tablet TAKE 1 TABLET BY MOUTH DAILY WITH LUNCH 9/6/22   Wai Bustos MD   metoprolol tartrate (LOPRESSOR) 25 MG tablet TAKE 1/2 TABLET BY MOUTH TWICE DAILY 9/6/22   Wai Bustos MD   aspirin 325 MG EC tablet Take 1 tablet by mouth daily 9/6/22   Wai Bustos MD   gabapentin (NEURONTIN) 300 MG capsule Take 1 capsule by mouth 3 times daily for 14 days.  9/5/22 9/19/22  Wai Bustos MD   atorvastatin (LIPITOR) 20 MG tablet Take 2 tablets by mouth daily 9/5/22 12/4/22  Wai Bustos MD   docusate sodium (COLACE) 100 MG capsule Take 200 mg by mouth every morning    Historical Provider, MD   docusate sodium (COLACE) 100 MG capsule Take 100 mg by mouth at bedtime    Historical Provider, MD   Fexofenadine-Pseudoephedrine (ALLEGRA-D 12 HOUR PO) Take by mouth daily    Historical Provider, MD   omeprazole (PRILOSEC) 40 MG delayed release capsule Take 1 capsule by mouth in the morning and at bedtime 5/27/22   Lester Hays MD   buPROPion (WELLBUTRIN XL) 300 MG extended release tablet TAKE 1 TABLET EVERY MORNING 5/19/22   Lester Hays MD   FLUoxetine (PROZAC) 10 MG capsule TAKE 1 CAPSULE DAILY 4/25/22   Lester Hays MD   traZODone (DESYREL) 50 MG tablet TAKE 1 TO 2 TABLETS NIGHTLYAS NEEDED FOR SLEEP 3/8/22   Lester Hays MD   levothyroxine (SYNTHROID) 50 MCG tablet TAKE 1 TABLET DAILY 1/23/22   Lester Hays, MD   valACYclovir (VALTREX) 1 g tablet TAKE 1 TABLET DAILY 1/23/22   Lester Hays, MD   vitamin B-12 (CYANOCOBALAMIN) 1000 MCG tablet Take 1 tablet by mouth daily 5/7/21   Lester Hays MD   Cholecalciferol (VITAMIN D3) 1000 units TABS Take 1 tablet by mouth daily 1/26/18   Lester Hays MD   PREMARIN 0.625 MG/GM vaginal cream PLACE 0.5G VAGINALLY TWICE A WEEK . Patient not taking: Reported on 9/11/2022 11/8/17   Lester Hays MD   triamcinolone (NASACORT ALLERGY 24HR) 55 MCG/ACT nasal inhaler 2 SPRAYS IN EACH NOSTRIL one time a day  Patient not taking: Reported on 9/11/2022 10/31/16   Lester Hays MD   mometasone (NASONEX) 50 MCG/ACT nasal spray USE 2 SPRAYS BY NASAL ROUTE DAILY 4/6/16 1/30/19  Lester Hays MD   bisacodyl (DULCOLAX) 5 MG EC tablet Take 10 mg by mouth daily as needed for Constipation. Patient not taking: Reported on 9/11/2022    Historical Provider, MD   Omega-3 Fatty Acids (FISH OIL BURP-LESS) 1200 MG CAPS Take 1 tablet by mouth 2 times daily    Historical Provider, MD   ibuprofen (ADVIL;MOTRIN) 600 MG tablet Take 600 mg by mouth every 6 hours as needed for Pain. Patient not taking: Reported on 9/11/2022    Historical Provider, MD       Allergies:  Latex and Penicillins    Social History:      The patient currently lives with family    TOBACCO:   reports that she has never smoked. She has never used smokeless tobacco.  ETOH:   reports no history of alcohol use.   E-cigarette/Vaping       Questions Responses    E-cigarette/Vaping Use Never User    Start Date     Passive Exposure     Quit Date     Counseling Given     Comments               Family History:       Reviewed and negative in regards to presenting illness/complaint. Problem Relation Age of Onset    Lung Cancer Mother 54    Lung Cancer Father 76        heavy smoker    Depression Father     High Cholesterol Brother     Diabetes Brother     Anxiety Disorder Brother     Sleep Apnea Brother     Diabetes Brother     Sleep Apnea Brother     Lung Cancer Other        REVIEW OF SYSTEMS COMPLETED:   Pertinent positives as noted in the HPI. All other systems reviewed and negative. PHYSICAL EXAM PERFORMED:    /86   Pulse 73   Temp 98.8 °F (37.1 °C) (Oral)   Resp 20   Ht 5' (1.524 m)   Wt 186 lb 8.2 oz (84.6 kg)   SpO2 94%   BMI 36.43 kg/m²     General appearance:  No apparent distress, appears stated age and cooperative. HEENT:  Normal cephalic, atraumatic without obvious deformity. Pupils equal, round, and reactive to light. Extra ocular muscles intact. Conjunctivae/corneas clear. Neck: Supple, with full range of motion. No jugular venous distention. Trachea midline. Respiratory:  Normal respiratory effort. Clear to auscultation, bilaterally without Rales/Wheezes/Rhonchi. Cardiovascular:  Regular rate and rhythm with normal S1/S2 without murmurs, rubs or gallops. Abdomen: Soft, non-tender, non-distended with normal bowel sounds. Musculoskeletal:  No clubbing, cyanosis or edema bilaterally. Full range of motion without deformity. Skin: surgical scar in the middle of chest, no signs of infection, right inner thigh erythematous, bruising, nodularity palpable. Neurologic:  Neurovascularly intact without any focal sensory/motor deficits.  Cranial nerves: II-XII intact, grossly non-focal.  Psychiatric:  Alert and oriented, thought content appropriate, normal insight  Capillary Refill: Brisk,3 seconds, normal  Peripheral Pulses: +2 palpable, equal bilaterally       Labs:     Recent Labs     09/11/22  1425 09/11/22  1900   WBC 8.0 8.8   HGB 9.8* 9.8*   HCT 30.2* 30.2*    325     Recent Labs     09/11/22  1425      K 4.3      CO2 22   BUN 14   CREATININE 0.7   CALCIUM 9.0     No results for input(s): AST, ALT, BILIDIR, BILITOT, ALKPHOS in the last 72 hours. Recent Labs     09/11/22  1900   INR 1.19*     Recent Labs     09/11/22  1425   TROPONINI <0.01       Urinalysis:      Lab Results   Component Value Date/Time    NITRU Negative 08/26/2022 09:00 AM    WBCUA 0-2 08/26/2022 09:00 AM    BACTERIA Rare 08/26/2022 09:00 AM    RBCUA 3-4 08/26/2022 09:00 AM    BLOODU TRACE-INTACT 08/26/2022 09:00 AM    SPECGRAV 1.020 08/26/2022 09:00 AM    GLUCOSEU Negative 08/26/2022 09:00 AM       Radiology:     CXR: I have reviewed the CXR with the following interpretation:   EKG:  I have reviewed the EKG with the following interpretation: sinus rhythm at 63, no ST-T wave changes, QTc 419    XR CHEST PORTABLE   Final Result   Minor atelectatic changes right lower lung, status post CABG      CT CHEST PULMONARY EMBOLISM W CONTRAST   Final Result   1. Likely small pneumonic infiltrate left lower lobe with small left pleural effusion   2. No evidence for pulmonary embolism   3. Cholelithiasis          Consults:    IP CONSULT TO CARDIOTHORACIC SURGERY  IP CONSULT TO HOSPITALIST    ASSESSMENT:    Active Hospital Problems    Diagnosis Date Noted    Leg pain, anterior, right [M79.604] 09/11/2022     Priority: Medium     ##Right leg swelling and pain, with elevated D-Dimer, US rule out DVT (not able to be done Sunday), empirically coverage with heparin gtt, CTS agreeable.   ##CAD s/p CAGB on 9/1/22 by Sarina Medeiros, continue BB, ACEi, ASA, CTS consult  ##Chronic anemia, stable, monitor HH closely while on heparin gtt  ##Obesity, BMI 36, complicates over all care  ##LISSA on CPAP  ##Right chest wall pleuritic pain, 2/2 small pleural effusion, encourage IS, pain manage with PRN tylenol  ##cholelithiasis, Asymptomatic    DVT Prophylaxis: heparin gtt  Diet: ADULT DIET; Regular  Code Status: Full Code    PT/OT Eval Status: ordered    Dispo - inpatient, pending evaluation       Kofi Bruce MD    Thank you Vidal Timmons MD for the opportunity to be involved in this patient's care. If you have any questions or concerns please feel free to contact me at [phone].

## 2022-09-11 NOTE — ED PROVIDER NOTES
[address] [facility] ENCOUNTER          PHYSICIAN ASSISTANT NOTE     Date of evaluation: 9/11/2022    ADDENDUM:      Care of this patient was assumed from [facility] - YUMIKO DE LA TORRE PA-C. The patient was seen for Shortness of Breath and Chest Pain  . The patient's initial evaluation and plan have been discussed with the prior provider who initially evaluated the patient. Nursing Notes, Past Medical Hx, Past Surgical Hx, Social Hx, Allergies, and Family Hx were all reviewed. Diagnostic Results     RADIOLOGY:  XR CHEST PORTABLE   Final Result   Minor atelectatic changes right lower lung, status post CABG      CT CHEST PULMONARY EMBOLISM W CONTRAST   Final Result   1. Likely small pneumonic infiltrate left lower lobe with small left pleural effusion   2. No evidence for pulmonary embolism   3.  Cholelithiasis          LABS:   Results for orders placed or performed during the hospital encounter of 09/11/22   BMP w/ Reflex to MG   Result Value Ref Range    Sodium 139 136 - 145 mmol/L    Potassium reflex Magnesium 4.3 3.5 - 5.1 mmol/L    Chloride 104 99 - 110 mmol/L    CO2 22 21 - 32 mmol/L    Anion Gap 13 3 - 16    Glucose 127 (H) 70 - 99 mg/dL    BUN 14 7 - 20 mg/dL    Creatinine 0.7 0.6 - 1.2 mg/dL    GFR Non-African American >60 >60    GFR African American >60 >60    Calcium 9.0 8.3 - 10.6 mg/dL   CBC with Auto Differential   Result Value Ref Range    WBC 8.0 4.0 - 11.0 K/uL    RBC 3.26 (L) 4.00 - 5.20 M/uL    Hemoglobin 9.8 (L) 12.0 - 16.0 g/dL    Hematocrit 30.2 (L) 36.0 - 48.0 %    MCV 92.7 80.0 - 100.0 fL    MCH 30.1 26.0 - 34.0 pg    MCHC 32.5 31.0 - 36.0 g/dL    RDW 15.9 (H) 12.4 - 15.4 %    Platelets 822 979 - 907 K/uL    MPV 8.7 5.0 - 10.5 fL    Neutrophils % 65.2 %    Lymphocytes % 23.9 %    Monocytes % 6.2 %    Eosinophils % 3.4 %    Basophils % 1.3 %    Neutrophils Absolute 5.2 1.7 - 7.7 K/uL    Lymphocytes Absolute 1.9 1.0 - 5.1 K/uL    Monocytes Absolute 0.5 0.0 - 1.3 K/uL    Eosinophils Absolute 0.3 0.0 - 0.6 K/uL    Basophils Absolute 0.1 0.0 - 0.2 K/uL   Troponin   Result Value Ref Range    Troponin <0.01 <0.01 ng/mL   Brain Natriuretic Peptide   Result Value Ref Range    Pro-BNP 1,048 (H) 0 - 124 pg/mL   CBC   Result Value Ref Range    WBC 8.8 4.0 - 11.0 K/uL    RBC 3.22 (L) 4.00 - 5.20 M/uL    Hemoglobin 9.8 (L) 12.0 - 16.0 g/dL    Hematocrit 30.2 (L) 36.0 - 48.0 %    MCV 94.0 80.0 - 100.0 fL    MCH 30.6 26.0 - 34.0 pg    MCHC 32.6 31.0 - 36.0 g/dL    RDW 15.8 (H) 12.4 - 15.4 %    Platelets 948 601 - 296 K/uL    MPV 9.1 5.0 - 10.5 fL   APTT   Result Value Ref Range    aPTT 32.1 23.0 - 34.3 sec   Protime-INR   Result Value Ref Range    Protime 15.1 (H) 11.7 - 14.5 sec    INR 1.19 (H) 0.87 - 1.14   D-Dimer, Quantitative   Result Value Ref Range    D-Dimer, Quant 6.49 (H) 0.00 - 0.60 ug/mL FEU       RECENT VITALS:  BP: 112/83, Temp: 98.8 °F (37.1 °C), Heart Rate: 73, Resp: 20, SpO2: 94 %       ED Course     The patient was given the following medications:  Orders Placed This Encounter   Medications    iopamidol (ISOVUE-370) 76 % injection 75 mL    heparin (porcine) injection 6,990 Units    heparin (porcine) injection 6,990 Units    heparin (porcine) injection 3,500 Units    heparin 25,000 units in dextrose 5% 250 mL (premix) infusion    atorvastatin (LIPITOR) tablet 40 mg    buPROPion (WELLBUTRIN XL) extended release tablet 300 mg    gabapentin (NEURONTIN) capsule 300 mg    lisinopril (PRINIVIL;ZESTRIL) tablet 2.5 mg    metoprolol tartrate (LOPRESSOR) tablet 12.5 mg    vitamin B-12 (CYANOCOBALAMIN) tablet 1,000 mcg    sodium chloride flush 0.9 % injection 5-40 mL    sodium chloride flush 0.9 % injection 5-40 mL    0.9 % sodium chloride infusion    OR Linked Order Group     ondansetron (ZOFRAN-ODT) disintegrating tablet 4 mg     ondansetron (ZOFRAN) injection 4 mg    polyethylene glycol (GLYCOLAX) packet 17 g    OR Linked Order Group     acetaminophen (TYLENOL) tablet 650 mg     acetaminophen

## 2022-09-11 NOTE — PROGRESS NOTES
4 Eyes Admission Assessment     I agree as the admission nurse that 2 RN's have performed a thorough Head to Toe Skin Assessment on the patient. ALL assessment sites listed below have been assessed on admission. Areas assessed by both nurses: Rosaura Farhad  [x]   Head, Face, and Ears   [x]   Shoulders, Back, and Chest  [x]   Arms, Elbows, and Hands   [x]   Coccyx, Sacrum, and Ischium  [x]   Legs, Feet, and Heels        Does the Patient have Skin Breakdown? Yes a wound was noted on the Admission Assessment and an LDA was Initiated documentation include the Ashley-wound, Wound Assessment, Measurements, Dressing Treatment, Drainage, and Color\",     Sternal Wound with surgical tape  Chest Tube site x2 to mis abdomen with sutures  RLE pretibial incision with surgical tape. RLE bruising anterior and posterior leg.    Abrasion to abdomen x2, RLE x1          Toni Prevention initiated:  Yes   Wound Care Orders initiated:  No      WOC nurse consulted for Pressure Injury (Stage 3,4, Unstageable, DTI, NWPT, and Complex wounds) or Toni score 18 or lower:  No      Nurse 1 eSignature: Electronically signed by Hayley Aragon RN on 9/11/22 at 8:08 PM EDT    **SHARE this note so that the co-signing nurse is able to place an eSignature**    Nurse 2 eSignature: Electronically signed by Jelani Aguirre RN on 9/11/22 at 8:00 PM EDT

## 2022-09-11 NOTE — PROGRESS NOTES
Pt family called this am via perfect serv. She had increased L chest pain and dyspnea as well as decreased IS. We discussed options but unfortunately with it being Sunday the best evaluation would be in ED. CT and labs were obtained which were relatively underwhelming. There was clinical concern for DVT so she'll be placed on heparin and venous duplexes done in am. The PE study was negative. Obs status.

## 2022-09-11 NOTE — ED PROVIDER NOTES
[address] [facility] 71 ENCOUNTER          PHYSICIAN ASSISTANT NOTE       Date of evaluation: 9/11/2022    Chief Complaint     Shortness of Breath and Chest Pain      History of Present Illness     Manohar Chamberlain is a 80 y.o. female with a past medical history as listed below to the status post CABG 10 days ago performed by Dr Cecilia Whyte, who comes in to the Emergency department today complaining of shortness of breath that has been ongoing since the procedure but has been worsening over the past several days. She also endorses a left lower chest wall pain that is sharp and worse when she takes a deep breath. This pain is intermittent. She also has concern for pain in her right knee as well as swelling in her right knee worsening over the past several days. She does endorse a dry cough. She denies fevers, abdominal pain, nausea, vomiting, change in bowel or bladder function. She is not currently anticoagulated. Review of Systems     Review of Systems   Constitutional:  Negative for chills and fever. Respiratory:  Positive for shortness of breath. Negative for cough. Cardiovascular:  Positive for chest pain. Gastrointestinal:  Negative for abdominal pain, diarrhea, nausea and vomiting. Genitourinary:  Negative for dysuria. Neurological:  Negative for headaches. Past Medical, Surgical, Family, and Social History     She has a past medical history of Abnormal Pap smear of cervix, Acid reflux, Bacterial vaginosis, CAD (coronary artery disease), Dysmenorrhea, Environmental allergies, Fibrocystic breast, Fibroid, Herpes simplex type 2 infection, History of blood transfusion, Hyperlipidemia, Hypothyroid, Insomnia, Menopause, Menopause ovarian failure, Menorrhagia, Sleep apnea, Urinary incontinence, and Urogenital trichomoniasis. She has a past surgical history that includes Hysterectomy, total abdominal (1990); Upper gastrointestinal endoscopy (N/A, 10/25/2018);  Esophagus dilation (N/A, 10/25/2018); Intracapsular cataract extraction (Left, 06/12/2019); Cataract removal with implant (Right, 06/26/2019); Intracapsular cataract extraction (Right, 06/26/2019); capsulotomy (Bilateral, 02/2020); Breast biopsy; Dilation and curettage of uterus; Cardiac catheterization (08/10/2022); Colonoscopy; and Coronary artery bypass graft (N/A, 9/1/2022). Her family history includes Anxiety Disorder in her brother; Depression in her father; Diabetes in her brother and brother; High Cholesterol in her brother; Link [place] in an other family member; Lung Cancer (age of onset: 54) in her mother; Lung Cancer (age of onset: 76) in her father; Sleep Apnea in her brother and brother. She reports that she has never smoked. She has never used smokeless tobacco. She reports that she does not drink alcohol and does not use drugs. Medications     Previous Medications    ASPIRIN 325 MG EC TABLET    Take 1 tablet by mouth daily    ATORVASTATIN (LIPITOR) 20 MG TABLET    Take 2 tablets by mouth daily    BISACODYL (DULCOLAX) 5 MG EC TABLET    Take 10 mg by mouth daily as needed for Constipation. BUPROPION (WELLBUTRIN XL) 300 MG EXTENDED RELEASE TABLET    TAKE 1 TABLET EVERY MORNING    CHOLECALCIFEROL (VITAMIN D3) 1000 UNITS TABS    Take 1 tablet by mouth daily    DOCUSATE SODIUM (COLACE) 100 MG CAPSULE    Take 200 mg by mouth every morning    DOCUSATE SODIUM (COLACE) 100 MG CAPSULE    Take 100 mg by mouth at bedtime    FEXOFENADINE-PSEUDOEPHEDRINE (ALLEGRA-D 12 HOUR PO)    Take by mouth daily    FLUOXETINE (PROZAC) 10 MG CAPSULE    TAKE 1 CAPSULE DAILY    GABAPENTIN (NEURONTIN) 300 MG CAPSULE    Take 1 capsule by mouth 3 times daily for 14 days. IBUPROFEN (ADVIL;MOTRIN) 600 MG TABLET    Take 600 mg by mouth every 6 hours as needed for Pain.     LEVOTHYROXINE (SYNTHROID) 50 MCG TABLET    TAKE 1 TABLET DAILY    LISINOPRIL (PRINIVIL;ZESTRIL) 2.5 MG TABLET    TAKE 1 TABLET BY MOUTH DAILY WITH LUNCH    METOPROLOL TARTRATE (LOPRESSOR) 25 MG TABLET    TAKE 1/2 TABLET BY MOUTH TWICE DAILY    OMEGA-3 FATTY ACIDS (FISH OIL BURP-LESS) 1200 MG CAPS    Take 1 tablet by mouth 2 times daily    OMEPRAZOLE (PRILOSEC) 40 MG DELAYED RELEASE CAPSULE    Take 1 capsule by mouth in the morning and at bedtime    PREMARIN 0.625 MG/GM VAGINAL CREAM    PLACE 0.5G VAGINALLY TWICE A WEEK . TRAZODONE (DESYREL) 50 MG TABLET    TAKE 1 TO 2 TABLETS NIGHTLYAS NEEDED FOR SLEEP    TRIAMCINOLONE (NASACORT ALLERGY 24HR) 55 MCG/ACT NASAL INHALER    2 SPRAYS IN EACH NOSTRIL one time a day    VALACYCLOVIR (VALTREX) 1 G TABLET    TAKE 1 TABLET DAILY    VITAMIN B-12 (CYANOCOBALAMIN) 1000 MCG TABLET    Take 1 tablet by mouth daily       Allergies     She is allergic to latex and penicillins. Physical Exam     INITIAL VITALS: BP: 103/65, Temp: 98 °F (36.7 °C), Heart Rate: 69, Resp: 24, SpO2: 92 %  Physical Exam  Constitutional:       Appearance: Normal appearance. HENT:      Head: Normocephalic and atraumatic. Cardiovascular:      Rate and Rhythm: Normal rate and regular rhythm. Pulses: Normal pulses. Heart sounds: Normal heart sounds. No murmur heard. No friction rub. Pulmonary:      Effort: Pulmonary effort is normal.   Musculoskeletal:         General: Normal range of motion. Cervical back: Normal range of motion. Comments: The patient's suture sites over her chest are well-healing and without signs of infection including fluctuance, induration, significant erythema. Mild tenderness to palpation of her suture sites over her chest.  There is no abnormal chest wall movement. Lung sounds are clear to auscultation bilaterally. On examination of the right knee the patient has swelling diffusely with ecchymosis present. Suture sites are well-healing and without fluctuance or induration surrounding. No significant erythema present.   The medial aspect of the patient's knee does have a hard bean sized palpable area that is tender to the touch. Remainder of the knee is nontender. Patient is able to range her right knee   Skin:     General: Skin is warm and dry. Neurological:      Mental Status: She is alert and oriented to person, place, and time. Psychiatric:         Mood and Affect: Mood normal.         Behavior: Behavior normal.       Diagnostic Results     EKG   EKG shows normal sinus rhythm at a rate of 69 bpm with ST depression and T wave inversion in lead I new from prior EKG. There is T wave inversion in lead II new from prior EKG. No other dynamic changes. FL interval 194. QRS 88. . RADIOLOGY:  CT CHEST PULMONARY EMBOLISM W CONTRAST    (Results Pending)       LABS:   No results found for this visit on 09/11/22. ED BEDSIDE ULTRASOUND:  No results found. RECENT VITALS:  BP: 103/65, Temp: 98 °F (36.7 °C), Heart Rate: 69, Resp: 24, SpO2: 92 %     Procedures         ED Course     Nursing Notes, Past Medical Hx,Past Surgical Hx, Social Hx, Allergies, and Family Hx were reviewed. The patient was given the following medications:  No orders of the defined types were placed in this encounter. CONSULTS:  None    MEDICAL DECISION MAKING / ASSESSMENT / PLAN     Jennifer John is a 80 y.o. female with a past medical history as listed below to the status post CABG 10 days ago performed by Dr Seema Tuttle, who comes in to the Emergency department today complaining of shortness of breath that has been ongoing since the procedure but has been worsening over the past several days. She also endorses a left lower chest wall pain that is sharp and worse when she takes a deep breath. This pain is intermittent. She also has concern for pain in her right knee as well as swelling in her right knee worsening over the past several days. She does endorse a dry cough. She denies fevers, abdominal pain, nausea, vomiting, change in bowel or bladder function. She is not currently anticoagulated.   She is alert and oriented x4.  Vital signs are stable. On exam the patient's suture sites over her chest are well-healing and without signs of infection including fluctuance, induration, significant erythema. Mild tenderness to palpation of her suture sites over her chest.  There is no abnormal chest wall movement. Lung sounds are clear to auscultation bilaterally. On examination of the right knee the patient has swelling diffusely with ecchymosis present. Suture sites are well-healing and without fluctuance or induration surrounding. No significant erythema present. The medial aspect of the patient's knee does have a hard bean sized palpable area that is tender to the touch. Remainder of the knee is nontender. Patient is able to range her right knee. She has been walking however with some increasing difficulty recently. BNP is 1048; BMP is unremarkable; CBC shows hemoglobin 9.8 which is up from 9.3 three days ago; troponin is negative    EKG shows normal sinus rhythm at a rate of 69 bpm with ST depression and T wave inversion in lead I new from prior EKG. There is T wave inversion in lead II new from prior EKG. No other dynamic changes. CA interval 194. QRS 88. . At this time I am going off service will be turning over care of this patient to my colleague Wanda Lopez PA-C. Steps still remaining in care and evaluation of the patient at the time I went off service are:    CTPA  Cardiothoracic surgery consult  Disposition    This patient was also evaluated by the attending physician. All care plans were discussed and agreed upon. Clinical Impression     1. Chest wall pain    2. Other form of dyspnea        Disposition     PATIENT REFERRED TO:  No follow-up provider specified.     DISCHARGE MEDICATIONS:  New Prescriptions    No medications on file       [facility]  09/11/22 8406

## 2022-09-12 ENCOUNTER — APPOINTMENT (OUTPATIENT)
Dept: VASCULAR LAB | Age: 72
DRG: 195 | End: 2022-09-12
Payer: COMMERCIAL

## 2022-09-12 VITALS
SYSTOLIC BLOOD PRESSURE: 127 MMHG | HEART RATE: 70 BPM | OXYGEN SATURATION: 95 % | BODY MASS INDEX: 36.96 KG/M2 | RESPIRATION RATE: 18 BRPM | TEMPERATURE: 98 F | DIASTOLIC BLOOD PRESSURE: 75 MMHG | HEIGHT: 60 IN | WEIGHT: 188.27 LBS

## 2022-09-12 LAB
A/G RATIO: 1.4 (ref 1.1–2.2)
ALBUMIN SERPL-MCNC: 3.9 G/DL (ref 3.4–5)
ALP BLD-CCNC: 87 U/L (ref 40–129)
ALT SERPL-CCNC: 20 U/L (ref 10–40)
ANION GAP SERPL CALCULATED.3IONS-SCNC: 11 MMOL/L (ref 3–16)
ANTI-XA UNFRAC HEPARIN: 0.12 IU/ML (ref 0.3–0.7)
ANTI-XA UNFRAC HEPARIN: 0.7 IU/ML (ref 0.3–0.7)
ANTI-XA UNFRAC HEPARIN: 0.75 IU/ML (ref 0.3–0.7)
AST SERPL-CCNC: 21 U/L (ref 15–37)
BASOPHILS ABSOLUTE: 0.1 K/UL (ref 0–0.2)
BASOPHILS RELATIVE PERCENT: 1.1 %
BILIRUB SERPL-MCNC: 0.5 MG/DL (ref 0–1)
BUN BLDV-MCNC: 15 MG/DL (ref 7–20)
CALCIUM SERPL-MCNC: 9.1 MG/DL (ref 8.3–10.6)
CHLORIDE BLD-SCNC: 104 MMOL/L (ref 99–110)
CO2: 25 MMOL/L (ref 21–32)
CREAT SERPL-MCNC: 1 MG/DL (ref 0.6–1.2)
EKG ATRIAL RATE: 68 BPM
EKG DIAGNOSIS: NORMAL
EKG P AXIS: 33 DEGREES
EKG P-R INTERVAL: 188 MS
EKG Q-T INTERVAL: 392 MS
EKG QRS DURATION: 86 MS
EKG QTC CALCULATION (BAZETT): 416 MS
EKG R AXIS: 62 DEGREES
EKG T AXIS: 160 DEGREES
EKG VENTRICULAR RATE: 68 BPM
EOSINOPHILS ABSOLUTE: 0.1 K/UL (ref 0–0.6)
EOSINOPHILS RELATIVE PERCENT: 0.5 %
GFR AFRICAN AMERICAN: >60
GFR NON-AFRICAN AMERICAN: 55
GLUCOSE BLD-MCNC: 171 MG/DL (ref 70–99)
HCT VFR BLD CALC: 29.8 % (ref 36–48)
HEMOGLOBIN: 9.7 G/DL (ref 12–16)
LYMPHOCYTES ABSOLUTE: 1.6 K/UL (ref 1–5.1)
LYMPHOCYTES RELATIVE PERCENT: 14.9 %
MCH RBC QN AUTO: 29.8 PG (ref 26–34)
MCHC RBC AUTO-ENTMCNC: 32.7 G/DL (ref 31–36)
MCV RBC AUTO: 91 FL (ref 80–100)
MONOCYTES ABSOLUTE: 0.4 K/UL (ref 0–1.3)
MONOCYTES RELATIVE PERCENT: 3.7 %
NEUTROPHILS ABSOLUTE: 8.8 K/UL (ref 1.7–7.7)
NEUTROPHILS RELATIVE PERCENT: 79.8 %
PDW BLD-RTO: 15.2 % (ref 12.4–15.4)
PLATELET # BLD: 205 K/UL (ref 135–450)
PMV BLD AUTO: 9 FL (ref 5–10.5)
POTASSIUM REFLEX MAGNESIUM: 4.6 MMOL/L (ref 3.5–5.1)
RBC # BLD: 3.28 M/UL (ref 4–5.2)
SODIUM BLD-SCNC: 140 MMOL/L (ref 136–145)
TOTAL PROTEIN: 6.6 G/DL (ref 6.4–8.2)
TROPONIN: <0.01 NG/ML
WBC # BLD: 11 K/UL (ref 4–11)

## 2022-09-12 PROCEDURE — 84484 ASSAY OF TROPONIN QUANT: CPT

## 2022-09-12 PROCEDURE — 6370000000 HC RX 637 (ALT 250 FOR IP): Performed by: INTERNAL MEDICINE

## 2022-09-12 PROCEDURE — 93971 EXTREMITY STUDY: CPT

## 2022-09-12 PROCEDURE — 36415 COLL VENOUS BLD VENIPUNCTURE: CPT

## 2022-09-12 PROCEDURE — 80053 COMPREHEN METABOLIC PANEL: CPT

## 2022-09-12 PROCEDURE — 2580000003 HC RX 258: Performed by: INTERNAL MEDICINE

## 2022-09-12 PROCEDURE — 6370000000 HC RX 637 (ALT 250 FOR IP): Performed by: CLINICAL NURSE SPECIALIST

## 2022-09-12 PROCEDURE — 94761 N-INVAS EAR/PLS OXIMETRY MLT: CPT

## 2022-09-12 PROCEDURE — 85025 COMPLETE CBC W/AUTO DIFF WBC: CPT

## 2022-09-12 PROCEDURE — 6360000002 HC RX W HCPCS: Performed by: INTERNAL MEDICINE

## 2022-09-12 PROCEDURE — 85520 HEPARIN ASSAY: CPT

## 2022-09-12 PROCEDURE — 6370000000 HC RX 637 (ALT 250 FOR IP): Performed by: HOSPITALIST

## 2022-09-12 RX ORDER — PANTOPRAZOLE SODIUM 40 MG/1
40 TABLET, DELAYED RELEASE ORAL
Status: DISCONTINUED | OUTPATIENT
Start: 2022-09-12 | End: 2022-09-12 | Stop reason: HOSPADM

## 2022-09-12 RX ORDER — ACETAMINOPHEN 500 MG
1000 TABLET ORAL EVERY 6 HOURS
Status: DISCONTINUED | OUTPATIENT
Start: 2022-09-12 | End: 2022-09-12 | Stop reason: HOSPADM

## 2022-09-12 RX ADMIN — LEVOTHYROXINE SODIUM 50 MCG: 50 TABLET ORAL at 05:56

## 2022-09-12 RX ADMIN — BUPROPION HYDROCHLORIDE 300 MG: 150 TABLET, FILM COATED, EXTENDED RELEASE ORAL at 10:14

## 2022-09-12 RX ADMIN — GABAPENTIN 300 MG: 300 CAPSULE ORAL at 10:08

## 2022-09-12 RX ADMIN — GABAPENTIN 300 MG: 300 CAPSULE ORAL at 14:14

## 2022-09-12 RX ADMIN — CYANOCOBALAMIN TAB 1000 MCG 1000 MCG: 1000 TAB at 10:08

## 2022-09-12 RX ADMIN — HEPARIN SODIUM AND DEXTROSE 17 UNITS/KG/HR: 10000; 5 INJECTION INTRAVENOUS at 14:20

## 2022-09-12 RX ADMIN — ATORVASTATIN CALCIUM 40 MG: 40 TABLET, FILM COATED ORAL at 10:08

## 2022-09-12 RX ADMIN — ACETAMINOPHEN 1000 MG: 500 TABLET ORAL at 17:08

## 2022-09-12 RX ADMIN — METOPROLOL TARTRATE 12.5 MG: 25 TABLET, FILM COATED ORAL at 10:08

## 2022-09-12 RX ADMIN — PANTOPRAZOLE SODIUM 40 MG: 40 TABLET, DELAYED RELEASE ORAL at 15:55

## 2022-09-12 RX ADMIN — SODIUM CHLORIDE, PRESERVATIVE FREE 10 ML: 5 INJECTION INTRAVENOUS at 10:15

## 2022-09-12 RX ADMIN — ASPIRIN 325 MG: 325 TABLET, COATED ORAL at 10:08

## 2022-09-12 RX ADMIN — LISINOPRIL 2.5 MG: 2.5 TABLET ORAL at 12:06

## 2022-09-12 ASSESSMENT — PAIN SCALES - GENERAL
PAINLEVEL_OUTOF10: 0
PAINLEVEL_OUTOF10: 2

## 2022-09-12 ASSESSMENT — PAIN DESCRIPTION - LOCATION: LOCATION: CHEST

## 2022-09-12 ASSESSMENT — PAIN DESCRIPTION - ORIENTATION: ORIENTATION: LEFT

## 2022-09-12 ASSESSMENT — PAIN DESCRIPTION - DESCRIPTORS: DESCRIPTORS: SHARP

## 2022-09-12 NOTE — PROGRESS NOTES
Reviewed AVS with pt. Pt verbalized understanding. Reviewed medications. No new medications prescribed. Pt discharged via wheelchair to main entrance, home with daughter.

## 2022-09-12 NOTE — ED PROVIDER NOTES
ED Attending Attestation Note     Date of evaluation: 9/11/2022    This patient was seen by the advance practice provider. I have seen and examined the patient, agree with the workup, evaluation, management and diagnosis. The care plan has been discussed. I have reviewed the ECG and concur with the AMOS's interpretation. She is presenting with rib discomfort, along with findings in her right lower extremity concerning for a DVT. We will perform a cardiac work-up and contact cardiothoracic surgery. Likely will require admission.        Camden Myrick MD  09/11/22 1412

## 2022-09-12 NOTE — PROGRESS NOTES
Hospitalist Progress Note      PCP: Kaden Cabrera MD    Date of Admission: 9/11/2022        Subjective:     No chest pain or sob today. Has expected RLE swelling/pain s/p CBAG      Medications:  Reviewed    Infusion Medications    heparin (PORCINE) Infusion 17 Units/kg/hr (09/12/22 0140)    sodium chloride       Scheduled Medications    atorvastatin  40 mg Oral Daily    buPROPion  300 mg Oral Daily    gabapentin  300 mg Oral TID    lisinopril  2.5 mg Oral Lunch    metoprolol tartrate  12.5 mg Oral BID    vitamin B-12  1,000 mcg Oral Daily    sodium chloride flush  5-40 mL IntraVENous 2 times per day    aspirin  325 mg Oral Daily    levothyroxine  50 mcg Oral Daily    sennosides-docusate sodium  2 tablet Oral Daily    traZODone  100 mg Oral Nightly     PRN Meds: heparin (porcine), heparin (porcine), sodium chloride flush, sodium chloride, ondansetron **OR** ondansetron, polyethylene glycol, acetaminophen **OR** acetaminophen, nitroGLYCERIN      Intake/Output Summary (Last 24 hours) at 9/12/2022 0942  Last data filed at 9/12/2022 0500  Gross per 24 hour   Intake --   Output 1550 ml   Net -1550 ml       Physical Exam Performed:    BP (!) 112/53   Pulse 62   Temp 97.7 °F (36.5 °C) (Oral)   Resp 16   Ht 5' (1.524 m)   Wt 188 lb 4.4 oz (85.4 kg)   SpO2 94%   BMI 36.77 kg/m²     General appearance: No apparent distress, appears stated age and cooperative. HEENT: Pupils equal, round, and reactive to light. Conjunctivae/corneas clear. Neck: Supple, with full range of motion. No jugular venous distention. Trachea midline. Respiratory:  Normal respiratory effort. Clear to auscultation, bilaterally without Rales/Wheezes/Rhonchi. Cardiovascular: Regular rate and rhythm with normal S1/S2 without murmurs, rubs or gallops. Abdomen: Soft, non-tender, non-distended with normal bowel sounds. Musculoskeletal: No clubbing, cyanosis or edema bilaterally. Full range of motion without deformity.   Skin: Skin color, Disposition- home this pm after U/S to rule out DVT completed      Catalina Blake MD

## 2022-09-12 NOTE — CARE COORDINATION
Case Management Assessment           Initial Evaluation                Date / Time of Evaluation: 9/12/2022 12:47 PM                 Assessment Completed by: Sarah Roche RN    Patient Name: Tatyana Posadas     YOB: 1950  Diagnosis: Chest wall pain [R07.89]  Leg pain, anterior, right [M79.604]  Right leg swelling [M79.89]  Other form of dyspnea [R06.09]     Date / Time: 9/11/2022 11:49 AM    Patient Admission Status: Inpatient    If patient is discharged prior to next notation, then this note serves as note for discharge by case management.      Current PCP: Krista Rodríguez MD  Clinic Patient: No    Chart Reviewed: Yes  Patient/ Family Interviewed: Yes    Initial assessment completed at bedside with: patient and daughter    Hospitalization in the last 30 days: Yes    Emergency Contacts:  Extended Emergency Contact Information  Primary Emergency Contact: PamelaNikki menon  Address: [address] [place] [address] Phone: [phone]  Mobile Phone: [phone]  Relation: Child  Secondary Emergency Contact: [facility] Phone: [phone]  Mobile Phone: [phone]  Relation: Child    Advance Directives:   Code Status: Full 2021 Luz Johnsy: Yes  Agent: childrent  Copy present: Yes     In paper Chart: No    Scanned into EMR Yes    Financial  Payor: Lawrence Boron / Plan: Lawrence Boron / Product Type: *No Product type* /     Pre-cert required for SNF: Yes    Pharmacy     [facility], [facility] [phone] - B [address] [place] [address]  Phone: [phone] Fax: [phone], [address] [place] [address],[address] Floor - P [phone] - f [phone]  [address] [place] [address]  Phone: [phone] Fax: [address] [phone] - f [phone]   Phone: [phone] Fax: [phone]      Potential assistance Purchasing Medications: Potential Assistance Purchasing Medications: No  Does Patient want to participate in local refill/ meds to beds program?:      Meds To Beds General Rules:  1. Can ONLY be done Monday- Friday between 8:30am-5pm  2. Prescription(s) must be in pharmacy by 3pm to be filled same day  3. Copy of patient's insurance/ prescription drug card and patient face sheet must be sent along with the prescription(s)  4. Cost of Rx cannot be added to hospital bill. If financial assistance is needed, please contact unit  or ;  or  CANNOT provide pharmacy voucher for patients co-pays  5.  Patients can then  the prescription on their way out of the hospital at discharge, or pharmacy can deliver to the bedside if staff is available. (payment due at time of pick-up or delivery - cash, check, or card accepted)     Able to afford home medications/ co-pay costs: Yes    ADLS  Support Systems: Children, Family Members    PT AM-PAC:   /24  OT AM-PAC:   /24    [place]: ranch  Steps: 1    Plans to RETURN to current housing: Yes  Barriers to RETURNING to current housing: none    Merry Marshall 79  Currently ACTIVE with [address]: No  Home Care Agency: Not Applicable        Durable Medical Equipment  DME Provider: n/a  Equipment: n/a    Home Oxygen and [address] prior to admission: No  Elysia Skel[address]: Not Applicable  Other Respiratory Equipment: cpap      Dialysis  Active with HD/PD prior to admission: No  Nephrologist:     HD Center:  Not Applicable    DISCHARGE PLAN:  Disposition: Home- No Services Needed    Transportation PLAN for discharge: family     Factors facilitating achievement of predicted outcomes: Family support    Barriers to discharge: Medical complications    Additional Case Management Notes:  Patient is from home alone, has daughter support when needed, no DME or service needs. Patient's daughter to transport home. The Plan for Transition of Care is related to the following treatment goals of Chest wall pain [R07.89]  Leg pain, anterior, right [M79.604]  Right leg swelling [M79.89]  Other form of dyspnea [R06.09]    The Patient and/or patient representative Jagjit Rees and her family were provided with a choice of provider and agrees with the discharge plan Yes    Freedom of choice list was provided with basic dialogue that supports the patient's individualized plan of care/goals and shares the quality data associated with the providers.  Yes    Care Transition patient: No    Alicia Amos RN  [facility]  Case Management Department  Ph: [phone]   Fax: [phone]

## 2022-09-12 NOTE — PROGRESS NOTES
Progress Note  Hospital Day: 2  POD#  11  S/P Coronary artery bypass x 4 + sternal plate x 1 (1278)    Chief Complaint: Postop follow up    Ms. Charlie Gentile has shortness of breath after walking around the unit which is typical at this point in her postop recovery. Today's plan: LE venous duplex, if negative may discharge    VITAL SIGNS   Temperature:  Current - Temp: 97.7 °F (36.5 °C); Max - Temp  Av.1 °F (36.7 °C)  Min: 97.7 °F (36.5 °C)  Max: 98.8 °F (37.1 °C)    Respiratory Rate : Resp  Av.4  Min: 15  Max: 23    Pulse Range: Pulse  Av.1  Min: 62  Max: 77    Blood Pressure Range:  Systolic (27WCY), PWI:830 , Min:99 , XSJ:631   ; Diastolic (61CEA), EKP:50, Min:53, Max:86    Pulse ox Range: SpO2  Av.9 %  Min: 87 %  Max: 97 %  O2 Flow Rate (L/min): 2 L/min     Admission Weight: Weight: 192 lb 9.6 oz (87.4 kg)    Current Weight: Patient Vitals for the past 96 hrs (Last 3 readings):   Weight   22 0500 188 lb 4.4 oz (85.4 kg)   22 2001 186 lb 8.2 oz (84.6 kg)   22 1200 192 lb 9.6 oz (87.4 kg)     I/O:   Intake/Output Summary (Last 24 hours) at 2022 1213  Last data filed at 2022 0949  Gross per 24 hour   Intake --   Output 2050 ml   Net -2050 ml     Urine: 450-1100 ml/shift    LINES/ACCESS:   Peripheral Access: Rt forearm Day #: 1    Diet: ADULT DIET;  Regular    MEDICATIONS:      acetaminophen  1,000 mg Oral Q6H    atorvastatin  40 mg Oral Daily    buPROPion  300 mg Oral Daily    gabapentin  300 mg Oral TID    lisinopril  2.5 mg Oral Lunch    metoprolol tartrate  12.5 mg Oral BID    vitamin B-12  1,000 mcg Oral Daily    sodium chloride flush  5-40 mL IntraVENous 2 times per day    aspirin  325 mg Oral Daily    levothyroxine  50 mcg Oral Daily    sennosides-docusate sodium  2 tablet Oral Daily    traZODone  100 mg Oral Nightly     Infusions:   heparin (PORCINE) Infusion 17 Units/kg/hr (22 0140)    sodium chloride       DATA REVIEW:   CBC:   Recent Labs 09/11/22  1425 09/11/22  1900 09/12/22  0629   WBC 8.0 8.8 11.0   HGB 9.8* 9.8* 9.7*   HCT 30.2* 30.2* 29.8*   MCV 92.7 94.0 91.0    325 205     BMP:   Recent Labs     09/11/22  1425 09/12/22  0629    140   K 4.3 4.6    104   CO2 22 25   GLUCOSE 127* 171*   BUN 14 15   CREATININE 0.7 1.0   CALCIUM 9.0 9.1     PT/INR:   Recent Labs     09/11/22 1900   PROTIME 15.1*   INR 1.19*     APTT:   Recent Labs     09/11/22 1900   APTT 32.1     LFT:   Recent Labs     09/12/22  0629   AST 21   ALT 20   BILITOT 0.5   ALKPHOS 87     ABG:    Lab Results   Component Value Date/Time    PHART 7.314 09/02/2022 05:15 AM    PO2ART 113.9 09/02/2022 05:15 AM    HRL6JRQ 47.3 09/02/2022 05:15 AM    C9DYWJYE 98 09/02/2022 05:15 AM    RII6DBW 26 09/02/2022 05:15 AM    UAU0OON 24.0 09/02/2022 05:15 AM    BEART -2 09/02/2022 05:15 AM    BEART -2 09/02/2022 03:11 AM    BEART -2 09/02/2022 02:10 AM    BEART -2 09/02/2022 01:13 AM    BEART -1 09/01/2022 10:11 PM        Portable chest xray 9/11/2022:  Minor atelectatic changes right lower lung, status post CABG     Chest CT PE 9/11/2022:     1. Likely small pneumonic infiltrate left lower lobe with small left pleural effusion   2. No evidence for pulmonary embolism   3.  Cholelithiasis     EKG 9/11/2022: NSR    ASSESSMENT:   Patient Active Problem List:     Environmental allergies     Mixed hyperlipidemia     Acquired hypothyroidism     Insomnia     Herpes simplex type 2 infection     Class 2 severe obesity with serious comorbidity and body mass index (BMI) of 36.0 to 36.9 in adult [facility])     Major depressive disorder in remission (HCC)     Gastroesophageal reflux disease with esophagitis     Prediabetes     Numbness and tingling in both hands     Obstructive sleep apnea     Abnormal stress test     MICHAEL (stress urinary incontinence, female)     CAD in native artery     Leg pain, anterior, right    Neuro: awake, alert and oriented x 3  CV:   Sinus  Pulm:  normal work of breathing  Abd:  soft, non-tender; bowel sounds normal  Wounds: clean, dry and intact  Ext: warm, several areas of ecchymosis consistent with recent CABG surgery    PLAN:   LE venous duplex. If negative, may stop Heparin and be discharged. Follow up to see Dr Stuart Carcamo in 1 week. Patient seen with Dr Stuart Carcamo who is agreeable to assessment and plan.         Brock Haque, [address] [facility] pager  9/12/2022  12:13 PM

## 2022-09-12 NOTE — DISCHARGE INSTRUCTIONS
Follow up to see Dr Freddy Pritchett on Tuesday, September 20, 2022 at 11:15 AM.            From [address]:   [address] to [address] [facility]. Turn right onto [address] [facility] Go past the [facility] (which will be  on your right) to [address] Turn right onto [address]. Continue past the [facility] (still on your right) to [address]. Turn left onto [address] Turn right at the first drive off of [address] into the parking lot for The [address] [place] [address] medical offices. From [address]:   Exit [address] to [address]. Turn left onto [address]. Continue past the [facility] (which will be on your right when you cross [address] Turn left onto [address] Turn right at the first drive off of [address] into the parking lot for  The [address] [place] [address] medical offices. Please feel free to call    [phone] for further assistance.

## 2022-09-12 NOTE — PLAN OF CARE
Problem: Discharge Planning  Goal: Discharge to home or other facility with appropriate resources  Outcome: Progressing  Flowsheets (Taken 9/12/2022 1831)  Discharge to home or other facility with appropriate resources:   Identify barriers to discharge with patient and caregiver   Arrange for needed discharge resources and transportation as appropriate   Identify discharge learning needs (meds, wound care, etc)     Problem: Pain  Goal: Verbalizes/displays adequate comfort level or baseline comfort level  Outcome: Progressing  Flowsheets (Taken 9/12/2022 1831)  Verbalizes/displays adequate comfort level or baseline comfort level:   Encourage patient to monitor pain and request assistance   Assess pain using appropriate pain scale   Administer analgesics based on type and severity of pain and evaluate response     Problem: Safety - Adult  Goal: Free from fall injury  Outcome: Progressing  Flowsheets (Taken 9/12/2022 1831)  Free From Fall Injury:   Instruct family/caregiver on patient safety   Based on caregiver fall risk screen, instruct family/caregiver to ask for assistance with transferring infant if caregiver noted to have fall risk factors

## 2022-09-12 NOTE — DISCHARGE SUMMARY
Warm, dry with normal turgor. No rash        Significant Diagnostic Studies:    See above        Treatments: As above. Discharge Medications:     Medication List        CONTINUE taking these medications      ALLEGRA-D 12 HOUR PO     aspirin 325 MG EC tablet  Take 1 tablet by mouth daily     atorvastatin 20 MG tablet  Commonly known as: LIPITOR  Take 2 tablets by mouth daily     bisacodyl 5 MG EC tablet  Commonly known as: DULCOLAX     buPROPion 300 MG extended release tablet  Commonly known as: WELLBUTRIN XL  TAKE 1 TABLET EVERY MORNING     * docusate sodium 100 MG capsule  Commonly known as: COLACE     * docusate sodium 100 MG capsule  Commonly known as: COLACE     Fish Oil Burp-Less 1200 MG Caps     FLUoxetine 10 MG capsule  Commonly known as: PROZAC  TAKE 1 CAPSULE DAILY     gabapentin 300 MG capsule  Commonly known as: NEURONTIN  Take 1 capsule by mouth 3 times daily for 14 days. levothyroxine 50 MCG tablet  Commonly known as: SYNTHROID  TAKE 1 TABLET DAILY     lisinopril 2.5 MG tablet  Commonly known as: PRINIVIL;ZESTRIL  TAKE 1 TABLET BY MOUTH DAILY WITH LUNCH     metoprolol tartrate 25 MG tablet  Commonly known as: LOPRESSOR  TAKE 1/2 TABLET BY MOUTH TWICE DAILY     omeprazole 40 MG delayed release capsule  Commonly known as: PRILOSEC  Take 1 capsule by mouth in the morning and at bedtime     traZODone 50 MG tablet  Commonly known as: DESYREL  TAKE 1 TO 2 TABLETS NIGHTLYAS NEEDED FOR SLEEP     valACYclovir 1 g tablet  Commonly known as: VALTREX  TAKE 1 TABLET DAILY     vitamin B-12 1000 MCG tablet  Commonly known as: CYANOCOBALAMIN  Take 1 tablet by mouth daily     vitamin D3 25 MCG (1000 UT) Tabs tablet  Commonly known as: CHOLECALCIFEROL  Take 1 tablet by mouth daily           * This list has 2 medication(s) that are the same as other medications prescribed for you. Read the directions carefully, and ask your doctor or other care provider to review them with you.                 STOP taking these medications      ibuprofen 600 MG tablet  Commonly known as: ADVIL;MOTRIN     Premarin 0.625 MG/GM vaginal cream  Generic drug: conjugated estrogens     triamcinolone 55 MCG/ACT nasal inhaler  Commonly known as: Nasacort Allergy 24HR              Activity: activity as tolerated  Diet: ADULT DIET; Regular      Disposition: home  Discharged Condition: Stable  Follow Up:   Zaire Calle MD  [address]  [facility] [address]    Go on 9/20/2022  For 1st postop visit on TUESDAY, September 20, 2022 at 11:15 AM        Code status:  Full Code         Total time spent on discharge, finalizing medications, referrals and arranging outpatient follow up was more than 45 minutes      Thank you Dr. Fatmata Ng MD for the opportunity to be involved in this patients care.

## 2022-09-13 ENCOUNTER — CARE COORDINATION (OUTPATIENT)
Dept: CASE MANAGEMENT | Age: 72
End: 2022-09-13

## 2022-09-13 LAB
EKG ATRIAL RATE: 63 BPM
EKG DIAGNOSIS: NORMAL
EKG P AXIS: -1 DEGREES
EKG P-R INTERVAL: 178 MS
EKG Q-T INTERVAL: 410 MS
EKG QRS DURATION: 92 MS
EKG QTC CALCULATION (BAZETT): 419 MS
EKG R AXIS: 19 DEGREES
EKG T AXIS: 120 DEGREES
EKG VENTRICULAR RATE: 63 BPM

## 2022-09-13 PROCEDURE — 93010 ELECTROCARDIOGRAM REPORT: CPT | Performed by: INTERNAL MEDICINE

## 2022-09-13 NOTE — CARE COORDINATION
Care Transitions Outreach Attempt    Call within 2 business days of discharge: Yes   Attempted to reach patient for 1st attempt at 24 hr  transitions of care follow up. Unable to reach patient. Left HIPPA Compliant VM. Corrie Roers  LPN, [facility] 30: [phone]      Patient: Marquise Javed Patient : 1950 MRN: [id number]    Last Discharge [facility]       Date Complaint Diagnosis Description Type Department Provider    22 Shortness of Breath; Chest Pain Chest wall pain . .. ED to Hosp-Admission (Discharged) (ADMITTED) [facility] 4 PCU Cas Andres MD; Sandra Chaney. .. Was this an external facility discharge?  No Discharge Facility: [facility]    Noted following upcoming appointments from discharge chart review:   [facility] follow up appointment(s):   Future Appointments   Date Time Provider Tommie Ramos   2022  2:30 PM Nancy Coy MD [id number] [facility]   2022 11:15 AM [facility]   10/12/2022  8:00 AM MD TONY Laguerre  [place] - DYD   10/19/2022 10:00 AM Yolis Finn MD [facility]     Non-[facility] follow up appointment(s):

## 2022-09-14 ENCOUNTER — CARE COORDINATION (OUTPATIENT)
Dept: CASE MANAGEMENT | Age: 72
End: 2022-09-14

## 2022-09-14 ENCOUNTER — OFFICE VISIT (OUTPATIENT)
Dept: INTERNAL MEDICINE CLINIC | Age: 72
End: 2022-09-14
Payer: COMMERCIAL

## 2022-09-14 VITALS
WEIGHT: 193.8 LBS | DIASTOLIC BLOOD PRESSURE: 62 MMHG | BODY MASS INDEX: 37.85 KG/M2 | HEART RATE: 70 BPM | SYSTOLIC BLOOD PRESSURE: 124 MMHG | OXYGEN SATURATION: 94 %

## 2022-09-14 DIAGNOSIS — Z09 HOSPITAL DISCHARGE FOLLOW-UP: Primary | ICD-10-CM

## 2022-09-14 DIAGNOSIS — R07.89 CHEST WALL PAIN: ICD-10-CM

## 2022-09-14 DIAGNOSIS — B00.9 HERPES SIMPLEX TYPE 2 INFECTION: ICD-10-CM

## 2022-09-14 DIAGNOSIS — R07.89 CHEST WALL PAIN: Primary | ICD-10-CM

## 2022-09-14 DIAGNOSIS — I25.10 CAD IN NATIVE ARTERY: ICD-10-CM

## 2022-09-14 DIAGNOSIS — Z95.1 S/P CABG X 4: ICD-10-CM

## 2022-09-14 PROCEDURE — 1111F DSCHRG MED/CURRENT MED MERGE: CPT | Performed by: INTERNAL MEDICINE

## 2022-09-14 PROCEDURE — G0008 ADMIN INFLUENZA VIRUS VAC: HCPCS | Performed by: INTERNAL MEDICINE

## 2022-09-14 PROCEDURE — 90694 VACC AIIV4 NO PRSRV 0.5ML IM: CPT | Performed by: INTERNAL MEDICINE

## 2022-09-14 PROCEDURE — 99495 TRANSJ CARE MGMT MOD F2F 14D: CPT | Performed by: INTERNAL MEDICINE

## 2022-09-14 RX ORDER — ATORVASTATIN CALCIUM 40 MG/1
40 TABLET, FILM COATED ORAL DAILY
Qty: 90 TABLET | Refills: 3 | Status: SHIPPED | OUTPATIENT
Start: 2022-09-14 | End: 2022-10-25 | Stop reason: SDUPTHER

## 2022-09-14 SDOH — ECONOMIC STABILITY: FOOD INSECURITY: WITHIN THE PAST 12 MONTHS, THE FOOD YOU BOUGHT JUST DIDN'T LAST AND YOU DIDN'T HAVE MONEY TO GET MORE.: NEVER TRUE

## 2022-09-14 SDOH — ECONOMIC STABILITY: FOOD INSECURITY: WITHIN THE PAST 12 MONTHS, YOU WORRIED THAT YOUR FOOD WOULD RUN OUT BEFORE YOU GOT MONEY TO BUY MORE.: NEVER TRUE

## 2022-09-14 ASSESSMENT — SOCIAL DETERMINANTS OF HEALTH (SDOH): HOW HARD IS IT FOR YOU TO PAY FOR THE VERY BASICS LIKE FOOD, HOUSING, MEDICAL CARE, AND HEATING?: NOT HARD AT ALL

## 2022-09-14 NOTE — CARE COORDINATION
Roberto 45 Transitions Initial Follow Up Call    Call within 2 business days of discharge: Yes    Patient: Lynsey Burgos Patient : 1950   MRN: [id number]   Reason for Admission: Chest Wall Pain, Leg Pain  Discharge Date: 22 RARS: Readmission Risk Score: 16.3      Last Discharge [facility]       Date Complaint Diagnosis Description Type Department Provider    22 Shortness of Breath; Chest Pain Chest wall pain . .. ED to Hosp-Admission (Discharged) (ADMITTED) [address] 4 PCU Lauren Radford MD; Indio Galvez. .. Spoke with: Francis 46: [facility]     CTN spoke with patient this am for initial 24 hour discharge follow up CTN call. Patient states she is doing well, no more leg pain, denies having any fever, chills, nausea, vomiting, chest pain, SOB or cough. Patient with no Patient with no congestion, pain, difficulty emptying bladder, LE edema, feeling lightheaded, dizziness, and heart palpitations. CTN and Pt reviewed meds and CTN completed the 1111f. Non-face-to-face services provided:  Obtained and reviewed discharge summary and/or continuity of care documents  Education of patient/family/caregiver/guardian to support self-management-Patient instructed to continue to monitor for any returning symptoms, reporting to MD immediately. Patient also instructed to continue to monitor for any of the other above noted s/s, making sure to take all medications as prescribed. Assessment and support for treatment adherence and medication management-Medication Review Completed. Transitions of Care Initial Call    Was this an external facility discharge? No     Challenges to be reviewed by the provider   Additional needs identified to be addressed with provider: No  none             Method of communication with provider : none    Advance Care Planning:   Does patient have an Advance Directive: reviewed and current.      Care Transition Nurse contacted the patient by telephone to perform post hospital discharge assessment. Verified name and  with patient as identifiers. Provided introduction to self, and explanation of the CTN role. CTN reviewed discharge instructions, medical action plan and red flags with patient who verbalized understanding. Patient given an opportunity to ask questions and does not have any further questions or concerns at this time. Were discharge instructions available to patient? Yes. Reviewed appropriate site of care based on symptoms and resources available to patient including: PCP  Specialist  Urgent care clinics  [facility]   When to call 911. The patient agrees to contact the PCP office for questions related to their healthcare. Medication reconciliation was performed with patient, who verbalizes understanding of administration of home medications. Advised obtaining a 90-day supply of all daily and as-needed medications. Was patient discharged with a pulse oximeter? no    CTN provided contact information. Plan for follow-up call in 5-7 days based on severity of symptoms and risk factors. Plan for next call: Any issues or concerns that may arise.     Care Transitions 24 Hour Call    Schedule Follow Up Appointment with PCP: Brigette Reynolds you have a copy of your discharge instructions?: Yes  Do you have all of your prescriptions and are they filled?: Yes  Have you been contacted by a [employer]?: No  Have you scheduled your follow up appointment?: Yes  How are you going to get to your appointment?: Car - family or friend to transport  Do you have support at home?: Alone  Do you feel like you have everything you need to keep you well at home?: Yes  Are you an active caregiver in your home?: No  Care Transitions Interventions  No Identified Needs       Follow Up  Future Appointments   Date Time Provider Tommie Ramos   2022  2:30 PM Payam Lane MD [id number] Corrina Chaney   2022  1:30 PM MD SOILA Olmedo S [facility]   10/12/2022  8:00 AM MD TONY Mcpherson [place] - DYD   10/19/2022 10:00 AM MD [place] Dickey [facility]     Thank Dane Evangelista, RN  Care Transition Coordinator  Contact W[employer]HE[employer]:[phone]

## 2022-09-14 NOTE — ASSESSMENT & PLAN NOTE
No recent outbreaks. Has cut back on suppressive therapy to 500 mg daily for several months. Due to recent surgery, advised continue suppressive therapy through the month, and then can stop in good episodic treatment. At first sign of outbreak, Valtrex 500 mg twice daily for 3 days.

## 2022-09-14 NOTE — PROGRESS NOTES
Post-Discharge Transitional Care Follow Up      Tamera Rivera   YOB: 1950    Date of Office Visit:  9/14/2022  Date of Hospital Admission: 9/11/22  Date of Hospital Discharge: 9/12/22  Readmission Risk Score (high >=14%. Medium >=10%):Readmission Risk Score: 16.3      Care management risk score Rising risk (score 2-5) and Complex Care (Scores >=6): No Risk Score On File     Non face to face  following discharge, date last encounter closed (first attempt may have been earlier): 09/14/2022     Call initiated 2 business days of discharge: Yes     Hospital discharge follow-up  CAD in native artery  Chest wall pain  S/P CABG x 4  Chest wall pain significantly better since discharge from hospital 2 days ago. Overall feels better since bypass surgery. On aspirin 325, atorvastatin 40 mg (prescription changed to 40 mg tablet today), lisinopril and metoprolol. Has follow-up with Dr. Princess Lee next week, and is going to call and schedule with Dr. Martin Montelongo. Herpes simplex type 2 infection  Assessment & Plan:  No recent outbreaks. Has cut back on suppressive therapy to 500 mg daily for several months. Due to recent surgery, advised continue suppressive therapy through the month, and then can stop in good episodic treatment. At first sign of outbreak, Valtrex 500 mg twice daily for 3 days. Obstructive sleep apnea. Just started CPAP. Still adjusting but motivated to make it work. Medical Decision Making: moderate complexity  Will keep follow-up appointment for next month, however if everything going well, okay to bump into December. Subjective: Today with her daughter Funmilayo Oscar. Funmilayo Oscar had stayed with her from time of surgery until yesterday. We are working together on healthier eating habits and getting up for some walks. HPI  Patient presented with classic angina last month. Following evaluation, was admitted for bypass surgery with Dr. Flonnie Kussmaul September 1.   Did well but then readmitted September 11 with right leg swelling and chest wall pain. Inpatient course: Discharge summary reviewed- see chart. Eval was negative, hospital team felt some the symptoms were due to splinting from chest pain. Interval history/Current status:  Since discharge, doing much better. Only pain medication currently is the gabapentin. Wants to see if can go off valtrex. Doesn't have outbreaks now. Just occasional prodrome mow. Was bad in 2009. Currently taking just 1/2 of the valtrex. Just got CPAP a few days ago, and working out some kinks. Patient Active Problem List   Diagnosis    Environmental allergies    Mixed hyperlipidemia    Acquired hypothyroidism    Insomnia    Herpes simplex type 2 infection    Class 2 severe obesity with serious comorbidity and body mass index (BMI) of 36.0 to 36.9 in adult [facility])    Major depressive disorder in remission ([facility] [address]    Gastroesophageal reflux disease with esophagitis    Prediabetes    Numbness and tingling in both hands    Obstructive sleep apnea    Abnormal stress test    MICHAEL (stress urinary incontinence, female)    CAD in native artery    Leg pain, anterior, right    S/P CABG x 4       Medications listed as ordered at the time of discharge from hospital     Medication List            Accurate as of September 14, 2022  3:19 PM. If you have any questions, ask your nurse or doctor. CHANGE how you take these medications      atorvastatin 40 MG tablet  Commonly known as: LIPITOR  Take 1 tablet by mouth daily  What changed: medication strength  Changed by: Soheila Rivera MD     valACYclovir 1 g tablet  Commonly known as: VALTREX  TAKE 1 TABLET DAILY  What changed: See the new instructions.             CONTINUE taking these medications      ALLEGRA-D 12 HOUR PO     aspirin 325 MG EC tablet  Take 1 tablet by mouth daily     bisacodyl 5 MG EC tablet  Commonly known as: DULCOLAX     buPROPion 300 MG extended release tablet  Commonly known as: WELLBUTRIN XL  TAKE 1 TABLET EVERY MORNING     * docusate sodium 100 MG capsule  Commonly known as: COLACE     * docusate sodium 100 MG capsule  Commonly known as: COLACE     FLUoxetine 10 MG capsule  Commonly known as: PROZAC  TAKE 1 CAPSULE DAILY     gabapentin 300 MG capsule  Commonly known as: NEURONTIN  Take 1 capsule by mouth 3 times daily for 14 days. levothyroxine 50 MCG tablet  Commonly known as: SYNTHROID  TAKE 1 TABLET DAILY     lisinopril 2.5 MG tablet  Commonly known as: PRINIVIL;ZESTRIL  TAKE 1 TABLET BY MOUTH DAILY WITH LUNCH     metoprolol tartrate 25 MG tablet  Commonly known as: LOPRESSOR  TAKE 1/2 TABLET BY MOUTH TWICE DAILY     omeprazole 40 MG delayed release capsule  Commonly known as: PRILOSEC  Take 1 capsule by mouth in the morning and at bedtime     traZODone 50 MG tablet  Commonly known as: DESYREL  TAKE 1 TO 2 TABLETS NIGHTLYAS NEEDED FOR SLEEP     vitamin B-12 1000 MCG tablet  Commonly known as: CYANOCOBALAMIN  Take 1 tablet by mouth daily     vitamin D3 25 MCG (1000 UT) Tabs tablet  Commonly known as: CHOLECALCIFEROL  Take 1 tablet by mouth daily           * This list has 2 medication(s) that are the same as other medications prescribed for you. Read the directions carefully, and ask your doctor or other care provider to review them with you.                    Where to Get Your Medications        These medications were sent to [address]  [address] [facility], [address] [place] [id number]      Phone: [phone]   atorvastatin 40 MG tablet          Medications marked \"taking\" at this time  Outpatient Medications Marked as Taking for the 9/14/22 encounter (Office Visit) with Ton Burk MD   Medication Sig Dispense Refill    atorvastatin (LIPITOR) 40 MG tablet Take 1 tablet by mouth daily 90 tablet 3    lisinopril (PRINIVIL;ZESTRIL) 2.5 MG tablet TAKE 1 TABLET BY MOUTH DAILY WITH LUNCH 90 tablet 0 metoprolol tartrate (LOPRESSOR) 25 MG tablet TAKE 1/2 TABLET BY MOUTH TWICE DAILY 90 tablet 0    aspirin 325 MG EC tablet Take 1 tablet by mouth daily 30 tablet 3    gabapentin (NEURONTIN) 300 MG capsule Take 1 capsule by mouth 3 times daily for 14 days. 42 capsule 1    docusate sodium (COLACE) 100 MG capsule Take 200 mg by mouth every morning      docusate sodium (COLACE) 100 MG capsule Take 100 mg by mouth at bedtime      Fexofenadine-Pseudoephedrine (ALLEGRA-D 12 HOUR PO) Take by mouth daily      omeprazole (PRILOSEC) 40 MG delayed release capsule Take 1 capsule by mouth in the morning and at bedtime 180 capsule 1    buPROPion (WELLBUTRIN XL) 300 MG extended release tablet TAKE 1 TABLET EVERY MORNING 90 tablet 1    FLUoxetine (PROZAC) 10 MG capsule TAKE 1 CAPSULE DAILY 90 capsule 1    traZODone (DESYREL) 50 MG tablet TAKE 1 TO 2 TABLETS NIGHTLYAS NEEDED FOR SLEEP 60 tablet 5    levothyroxine (SYNTHROID) 50 MCG tablet TAKE 1 TABLET DAILY 90 tablet 1    valACYclovir (VALTREX) 1 g tablet TAKE 1 TABLET DAILY (Patient taking differently: 500 mg TAKE 1 TABLET DAILY) 90 tablet 1    vitamin B-12 (CYANOCOBALAMIN) 1000 MCG tablet Take 1 tablet by mouth daily 30 tablet 3    Cholecalciferol (VITAMIN D3) 1000 units TABS Take 1 tablet by mouth daily 30 tablet 11    bisacodyl (DULCOLAX) 5 MG EC tablet Take 10 mg by mouth daily as needed for Constipation. Medications patient taking as of now reconciled against medications ordered at time of hospital discharge: Yes    Review of Systems    Objective:    /62   Pulse 70   Wt 193 lb 12.8 oz (87.9 kg)   SpO2 94%   BMI 37.85 kg/m²   Physical Exam  Constitutional:       Appearance: Normal appearance. Eyes:      Comments: Subtle ptosis of right eyelid   Cardiovascular:      Rate and Rhythm: Normal rate and regular rhythm. Heart sounds: Normal heart sounds.    Pulmonary:      Effort: Pulmonary effort is normal.      Breath sounds: Rales (Left base (postop)) present. Chest:      Comments: Steri-Strips intact. Incision site is normal-appearing with no redness or drainage. Musculoskeletal:      Right lower leg: No edema. Left lower leg: No edema. Psychiatric:         Mood and Affect: Mood normal.       An electronic signature was used to authenticate this note.   --Fatmata Ng MD

## 2022-09-14 NOTE — PATIENT INSTRUCTIONS
Stop the daily valtrex at end of September. Take valtrex 500 mg twice daily for 3 days at first sign of outbreak.

## 2022-09-16 NOTE — PROGRESS NOTES
Physician Progress Note      Emy Funes  [facility] #:                  [id number]  :                       1950  ADMIT DATE:       2022 11:49 AM  DISCH DATE:        2022 7:19 PM  RESPONDING  PROVIDER #:        Berenice Manrique MD          QUERY TEXT:    Pt admitted with Chest pain. Pt noted to have \"Left chest wall pain,   pleuritic\". If possible, please document in progress notes and discharge   summary if you are evaluating and/or treating any of the following: The medical record reflects the following:  Risk Factors: shortness of breath, \"chest wall pain\"  Clinical Indicators: per H/P \"Left chest wall pain, pleuritic. Worsening   with deep breath. Intermittent  pain. Associated with a dry cough\". CT: positive for small pleural effusion  Treatment: CMP; CMP; CT of chest; Cardiothoracic consult; EKGs, Troponin  Options provided:  -- Chest pain due to pleural effusion  -- Chest pain due to costochondritis  -- Chest pain due to pleurisy  -- Other - I will add my own diagnosis  -- Disagree - Not applicable / Not valid  -- Disagree - Clinically unable to determine / Unknown  -- Refer to Clinical Documentation Reviewer    PROVIDER RESPONSE TEXT:    This patient has chest pain due to pleurisy. Query created by: Jarrell Mendosa on 9/15/2022 7:00 AM      Electronically signed by:   Berenice Manrique MD 2022 10:55 AM

## 2022-09-19 ENCOUNTER — OFFICE VISIT (OUTPATIENT)
Dept: CARDIOTHORACIC SURGERY | Age: 72
End: 2022-09-19

## 2022-09-19 VITALS
HEART RATE: 78 BPM | WEIGHT: 189.6 LBS | HEIGHT: 60 IN | SYSTOLIC BLOOD PRESSURE: 132 MMHG | OXYGEN SATURATION: 95 % | TEMPERATURE: 98.2 F | DIASTOLIC BLOOD PRESSURE: 70 MMHG | BODY MASS INDEX: 37.22 KG/M2

## 2022-09-19 DIAGNOSIS — Z09 FOLLOW-UP SURGERY CARE: Primary | ICD-10-CM

## 2022-09-19 PROCEDURE — 99024 POSTOP FOLLOW-UP VISIT: CPT | Performed by: THORACIC SURGERY (CARDIOTHORACIC VASCULAR SURGERY)

## 2022-09-19 RX ORDER — LEVOTHYROXINE SODIUM 0.05 MG/1
TABLET ORAL
Qty: 90 TABLET | Refills: 1 | Status: SHIPPED | OUTPATIENT
Start: 2022-09-19

## 2022-09-19 RX ORDER — TRAZODONE HYDROCHLORIDE 50 MG/1
TABLET ORAL
Qty: 60 TABLET | Refills: 5 | Status: SHIPPED | OUTPATIENT
Start: 2022-09-19

## 2022-09-19 NOTE — PROGRESS NOTES
Christofer Turner returns the office today for continued follow-up after her recent coronary bypass procedure. She comes the office today with her daughter. She has no complaints of any kind. She is getting outside to walk at least once a day with her daughter and walks a full block without stopping. He is eating well and sleeping well. She tells me she is already lost 10 pounds compared to her preoperative weight. On exam she looks well in general.  Her blood pressure is 132/70 in her left arm while seated. Her pulse is 78 and regular. She is afebrile. Her room air oxygen saturation is 95%. Her lungs were thoroughly clear in all fields. Her cardiac auscultation is normal.  Specifically there are no cardiac murmurs rubs or gallops. The chest incision dressing is still completely adherent along its entire length. The Prineo dressing on her calf is starting to flake up on the edges and I gave her instructions on how to remove this with the aid of a little Vaseline. Overall Christofer Turner is coming along beautifully. I have asked her to make an appointment to see Dr. Jonnie Jane for ongoing follow-up and to talk about getting involved in cardiac rehab. I have made no changes in her current medications. She is going to return to my office in 6 weeks. She and her daughter know that she is welcome to call or return to my office at anytime in the interim should any new problems, questions or concerns arise for which we can be of help.

## 2022-09-21 ENCOUNTER — CARE COORDINATION (OUTPATIENT)
Dept: CASE MANAGEMENT | Age: 72
End: 2022-09-21

## 2022-09-21 NOTE — CARE COORDINATION
Roberto 45 Transitions Follow Up Call    2022    Patient: Sung Prado  Patient : 1950   MRN: [id number]   Reason for Admission: Chest Pain, S/P CABG  Discharge Date: 22 RARS: Readmission Risk Score: 16.3         Spoke with: Sung Prado     CTN spoke with patient this afternoon for follow up CTN call. Patient states she is doing well, no reports of any fever, chills, nausea, vomiting, chest pain, SOB or cough. Patient with no Patient with no congestion, pain, difficulty emptying bladder, LE edema, feeling lightheaded, dizziness, and heart palpitations. Denies having any weight gain, had follow up with PCP and Cardiothoracic Surgeon. Care Transitions Follow Up Call    Needs to be reviewed by the provider   Additional needs identified to be addressed with provider: No  none             Method of communication with provider : none      Care Transition Nurse contacted the patient by telephone to follow up after admission on 2022. Verified name and  with patient as identifiers. Addressed changes since last contact: none  Discussed follow-up appointments. If no appointment was previously scheduled, appointment scheduling offered: Yes. Is follow up appointment scheduled within 7 days of discharge? Yes. Advance Care Planning:   Does patient have an Advance Directive: reviewed and current. CTN reviewed discharge instructions, medical action plan and red flags with patient and discussed any barriers to care and/or understanding of plan of care after discharge. Discussed appropriate site of care based on symptoms and resources available to patient including: PCP  Specialist  Urgent care clinics  [facility]   When to call 911. The patient agrees to contact the PCP office for questions related to their healthcare.      Patients top risk factors for readmission: medical condition-S/P CABG  medication management  multiple health system providers  polypharmacy  Interventions to address risk factors: Education of patient/family/caregiver/guardian to support self-management-Patient instructed to continue to monitor for any of the above noted s/s, reporting to MD immediately, also instructed to continue to monitor weight daily. CTN provided contact information for future needs. Plan for follow-up call in 5-7 days based on severity of symptoms and risk factors. Plan for next call: Any issues or concerns that may arise. Care Transitions Subsequent and Final Call    Schedule Follow Up Appointment with PCP: Declined  Subsequent and Final Calls  Do you have any ongoing symptoms?: No  Have your medications changed?: No  Do you have any questions related to your medications?: No  Do you currently have any active services?: No  Do you have any needs or concerns that I can assist you with?: No  Identified Barriers: None  Care Transitions Interventions  No Identified Needs  Other Interventions:            Follow Up  Future Appointments   Date Time Provider Tommie Ramos   9/23/2022 10:30 AM MD Victor Manuel Bianchi [facility]   10/12/2022  8:00 AM Payam Lane MD [facility] [place] - DYD   10/19/2022 10:00 AM MD [place] Galvan SLEEP [facility]   11/2/2022  2:00 PM MD VICTOR MANUEL Salcedo S [facility]     Thank You,    Rachel Hartley RN  Care Transition Coordinator  Contact FSGDRJ:[phone]

## 2022-09-23 ENCOUNTER — OFFICE VISIT (OUTPATIENT)
Dept: CARDIOLOGY CLINIC | Age: 72
End: 2022-09-23
Payer: COMMERCIAL

## 2022-09-23 VITALS — WEIGHT: 189.4 LBS | BODY MASS INDEX: 36.99 KG/M2

## 2022-09-23 DIAGNOSIS — E78.5 HYPERLIPIDEMIA, UNSPECIFIED HYPERLIPIDEMIA TYPE: ICD-10-CM

## 2022-09-23 DIAGNOSIS — Z95.1 HX OF CABG: ICD-10-CM

## 2022-09-23 DIAGNOSIS — I25.10 CAD IN NATIVE ARTERY: Primary | ICD-10-CM

## 2022-09-23 PROCEDURE — 1123F ACP DISCUSS/DSCN MKR DOCD: CPT | Performed by: INTERNAL MEDICINE

## 2022-09-23 PROCEDURE — 99214 OFFICE O/P EST MOD 30 MIN: CPT | Performed by: INTERNAL MEDICINE

## 2022-09-23 ASSESSMENT — ENCOUNTER SYMPTOMS
CHEST TIGHTNESS: 0
SHORTNESS OF BREATH: 0
COUGH: 0
CHOKING: 0

## 2022-09-23 NOTE — PROGRESS NOTES
Subjective:      Patient ID: Sven Srivastava is a 80 y.o. female. HPI  Follow up for CAD/CABG/Lipids. Doing well post cabg. Increasing activities. Walking daily. Chest wall good. No angina. Wt down. No pnd/orthopnea. No edema. No tachy/syncope. Past Medical History:   Diagnosis Date    Abnormal Pap smear of cervix     Acid reflux     Bacterial vaginosis     CAD (coronary artery disease)     Dysmenorrhea     Environmental allergies     Fibrocystic breast     Fibroid     Herpes simplex type 2 infection     History of blood transfusion     Hyperlipidemia     Hypothyroid     Dx about 15 years ago, but htne was off med for  long time    Insomnia     Menopause     approx age 37    Menopause ovarian failure     Menorrhagia     Sleep apnea 08/2022    just diagnosed in April, waiting for CPAP    Urinary incontinence     Urogenital trichomoniasis      Past Surgical History:   Procedure Laterality Date    BREAST BIOPSY      CAPSULOTOMY Bilateral 02/2020    Yag    CARDIAC CATHETERIZATION  08/10/2022    LAD with 80-90% prox. Lg D1 with 80% prox. Cx with prox 90%.    RCA with prox 90%-dom    CATARACT REMOVAL WITH IMPLANT Right 06/26/2019    COLONOSCOPY      CORONARY ARTERY BYPASS GRAFT N/A 9/1/2022    CABG x 4, LIMA to LAD, SVG to D1, OM2 and PDA; EVH R thigh; MARION; TCPB; sternal plate x 1 (Biomet) performed by Carlos Enrique Rios MD at [facility] 94 N/A 10/25/2018    ESOPHAGEAL DILATION [place] performed by Donna Murphy MD at [facility]. 6., TOTAL ABDOMINAL (CERVIX REMOVED)  1990    fibroid-still with ovaries    INTRACAPSULAR CATARACT EXTRACTION Left 06/12/2019    PHACOEMULSIFICATION OF CATARACT LEFT EYE WITH INTRAOCULAR LENS IMPLANT performed by Jose R Novak MD at [facility] 267 Right 06/26/2019    PHACOEMULSIFICATION OF CATARACT RIGHT EYE WITH INTRAOCULAR LENS IMPLANT performed by Jose R Novak MD at [facility] [facility] 10/25/2018    EGD BIOPSY performed by Verna Botello MD at [address] History     Socioeconomic History    Marital status:      Spouse name: ,  works    Number of children: 4    Years of education: Not on file    Highest education level: Not on file   Occupational History    Occupation:    Tobacco Use    Smoking status: Never    Smokeless tobacco: Never    Tobacco comments:     Never smoked, never will! Vaping Use    Vaping Use: Never used   Substance and Sexual Activity    Alcohol use: No    Drug use: Never    Sexual activity: Not Currently     Partners: Male   Other Topics Concern    Not on file   Social History Narrative    Exercise:    2-3x/week, recumbent bike or walking for 20-30 min     Social Determinants of Health     Financial Resource Strain: Low Risk     Difficulty of Paying Living Expenses: Not hard at all   Food Insecurity: No Food Insecurity    Worried About Running Out of Food in the Last Year: Never true    Ran Out of Food in the Last Year: Never true   Transportation Needs: Not on file   Physical Activity: Not on file   Stress: Not on file   Social Connections: Not on file   Intimate Partner Violence: Not on file   Housing Stability: Not on file     FH reviewed, negative for cardiac issues,  Bro with CAD    /54  P 78    Wt 189    Review of Systems   Constitutional:  Negative for activity change, appetite change and fatigue. Respiratory:  Negative for cough, choking, chest tightness and shortness of breath. Cardiovascular:  Positive for chest pain. Negative for palpitations and leg swelling. Denies PND or orthopnea. No tachycardia or syncope. Neurological:  Negative for dizziness, syncope and light-headedness. Psychiatric/Behavioral:  Negative for agitation, behavioral problems and confusion. All other systems reviewed and are negative.     Objective:   Physical Exam  Constitutional:       General: She is not in acute distress. Appearance: Normal appearance. She is well-developed. HENT:      Head: Normocephalic and atraumatic. Right Ear: External ear normal.      Left Ear: External ear normal.      Nose: Nose normal.   Neck:      Vascular: No JVD. Cardiovascular:      Rate and Rhythm: Normal rate and regular rhythm. Heart sounds: Normal heart sounds. No murmur heard. No gallop. Pulmonary:      Effort: Pulmonary effort is normal. No respiratory distress. Breath sounds: Normal breath sounds. No wheezing or rales. Abdominal:      General: Bowel sounds are normal. There is no distension. Palpations: Abdomen is soft. Tenderness: There is no abdominal tenderness. Musculoskeletal:         General: Normal range of motion. Cervical back: Normal range of motion. Skin:     General: Skin is warm and dry. Neurological:      General: No focal deficit present. Mental Status: She is alert and oriented to person, place, and time. Psychiatric:         Behavior: Behavior normal.       Assessment:       Diagnosis Orders   1. CAD in native artery        2. Hx of CABG        3. Hyperlipidemia, unspecified hyperlipidemia type                  Plan:      CV stable. Walking daily. Compensated. No angina. Continue lopressor/lisinopril asa/lipitor. Cardiac rehab. Reviewed previous records and testing including Cath 8/22 and hosp for CABG. Follow 2 months.         Helga Gayle MD

## 2022-09-28 ENCOUNTER — CARE COORDINATION (OUTPATIENT)
Dept: CASE MANAGEMENT | Age: 72
End: 2022-09-28

## 2022-09-28 NOTE — CARE COORDINATION
[facility] Care Transitions Follow Up Call    Care Transition Nurse contacted the patient by telephone to follow up after admission on 2022. Verified name and  with patient as identifiers. Patient: Jennifer John  Patient : 1950   MRN: [id number]   Reason for Admission: Chest Wall Pain, S/P CABG  Discharge Date: 22 RARS: Readmission Risk Score: 16.3      Needs to be reviewed by the provider   Additional needs identified to be addressed with provider: No  none             Method of communication with provider: none. CTN spoke with patient this afternoon for follow up CTN call. Patient states she is doing well, states she does not have any fever, chills, nausea, vomiting, chest pain, SOB or cough. Patient with no Patient with no congestion, pain, difficulty emptying bladder, LE edema, feeling lightheaded, dizziness, and heart palpitations. No weight gain, BP's running 120's/60's. Patient has had follow up with both Cardiologist and Cardiothoracic Surgeon, no new or changed medications, CT has signed off, patient able to drive and has returned to work. Addressed changes since last contact:  none  Discussed follow-up appointments. If no appointment was previously scheduled, appointment scheduling offered: Yes. Is follow up appointment scheduled within 7 days of discharge? Yes. Follow Up  Future Appointments   Date Time Provider Tommie Ramos   10/12/2022  8:00 AM Ignacio Boyce MD [facility] [place] - [place]D   10/19/2022 10:00 AM Chapo Pinto MD [place] [facility]   2022  2:00 PM Satnam Soto MD [place] CT S [facility]   2022  9:00 AM Coral Welch MD [facility]       Care Transition Nurse reviewed red flags with patient and discussed any barriers to care and/or understanding of plan of care after discharge.  Discussed appropriate site of care based on symptoms and resources available to patient including: PCP  Specialist  Urgent care clinics  [facility]   When to call 911. The patient agrees to contact the PCP office for questions related to their healthcare. Advance Care Planning:   reviewed and current. Patients top risk factors for readmission: medical condition-S/P CABG, medication management, and polypharmacy  Interventions to address risk factors: Education of patient/family/caregiver/guardian to support self-management-Patient instructed to continue to weigh daily, checking BP routinely, make sure to rest as needed and take all medications as prescribed. Offered patient enrollment in the Remote Patient Monitoring (RPM) program for in-home monitoring: Patient declined. Care Transitions Subsequent and Final Call    Schedule Follow Up Appointment with PCP: Declined  Subsequent and Final Calls  Do you have any ongoing symptoms?: No  Have your medications changed?: No  Do you have any questions related to your medications?: No  Do you currently have any active services?: No  Do you have any needs or concerns that I can assist you with?: No  Identified Barriers: None  Care Transitions Interventions  No Identified Needs  Other Interventions:             Care Transition Nurse provided contact information for future needs. Plan for follow-up call in 5-7 days based on severity of symptoms and risk factors. Plan for next call: Any other issues or concerns that may arise.     Thank Misbah Jackson RN  Care Transition Coordinator  Contact [phone]

## 2022-10-05 ENCOUNTER — CARE COORDINATION (OUTPATIENT)
Dept: CASE MANAGEMENT | Age: 72
End: 2022-10-05

## 2022-10-05 NOTE — CARE COORDINATION
[facility] Care Transitions Follow Up Call    Care Transition Nurse contacted the patient by telephone to follow up after admission on 2022. Verified name and  with patient as identifiers. Patient: Gus Puga  Patient : 1950   MRN: [id number]   Reason for Admission: S/P CABG  Discharge Date: 22 RARS: Readmission Risk Score: 16.3      Needs to be reviewed by the provider   Additional needs identified to be addressed with provider: No  none             Method of communication with provider: none. CTN spoke with patient this afternoon for follow up CTN call. Patient states she is doing really well, no reports of any fever, chills, nausea, vomiting, chest pain, SOB or cough. Patient with no reports of any weight gain, no BLE Edema, no heart palpitations or dizziness. Patient has went back to work PT, tolerating well. Patient has also had follow up with Cardiologist and Cardiothoracic Surgeon. Patient to call Cardiologist to get Cardiac Rehab started. Addressed changes since last contact:  none  Discussed follow-up appointments. If no appointment was previously scheduled, appointment scheduling offered: Yes. Is follow up appointment scheduled within 7 days of discharge? Yes. Follow Up  Future Appointments   Date Time Provider Tommie Ramos   10/12/2022  8:00 AM Phyllis Shipley MD [facility] [place] - D[place]D   10/19/2022 10:00 AM Maddy Hernandez MD [place] [facility]   2022  2:00 PM Jannet Corona MD [facility]   2022  9:00 AM Mae Mederos MD [facility]       Care Transition Nurse reviewed red flags with patient and discussed any barriers to care and/or understanding of plan of care after discharge. Discussed appropriate site of care based on symptoms and resources available to patient including: PCP  Specialist  Urgent care clinics  [facility]   When to call 911. The patient agrees to contact the PCP office for questions related to their healthcare. Advance Care Planning:   reviewed and current. Patients top risk factors for readmission: medical condition-S/P CABG  Interventions to address risk factors: Education of patient/family/caregiver/guardian to support self-management-Patient instructed to continue to monitor for any of the above noted s/s, reporting to MD immediately. Offered patient enrollment in the Remote Patient Monitoring (RPM) program for in-home monitoring: Patient declined. Care Transitions Subsequent and Final Call    Subsequent and Final Calls  Do you have any ongoing symptoms?: No  Have your medications changed?: No  Do you have any questions related to your medications?: No  Do you currently have any active services?: No  Do you have any needs or concerns that I can assist you with?: No  Identified Barriers: None  Care Transitions Interventions  No Identified Needs  Other Interventions:             Care Transition Nurse provided contact information for future needs. Plan for follow-up call in 5-7 days based on severity of symptoms and risk factors. Plan for next call: Any issues or concerns, plan with Cardiac Rehab.     Thank Hira Pressley RN  Care Transition Coordinator  Contact EDGARDO:[phone]

## 2022-10-10 ENCOUNTER — CARE COORDINATION (OUTPATIENT)
Dept: CASE MANAGEMENT | Age: 72
End: 2022-10-10

## 2022-10-10 ENCOUNTER — PATIENT MESSAGE (OUTPATIENT)
Dept: INTERNAL MEDICINE CLINIC | Age: 72
End: 2022-10-10

## 2022-10-10 NOTE — CARE COORDINATION
[facility] Transitions Follow Up Call    10/10/2022    Patient: Geraldo Mcnulty  Patient : 1950   MRN: [id number]  Reason for Admission: Chest Wall Pain, Leg Pain  Discharge Date: 22 RARS: Readmission Risk Score: 16.3         Spoke with: Neal Corral  Patient stated she is doing well. No problems. Appetite and fluid intake is good. No issues with bladder or bowel elimination. She denies sob, fever, chills, nvd, abdominal pain, leg pain or swelling. Notified patient this is the final call . If any medical issues arise to contact her PCP. No questions or needs voiced. No further outreach scheduled. Care Transitions Follow Up Call    Needs to be reviewed by the provider   Additional needs identified to be addressed with provider: No  none             Method of communication with provider : none      Care Transition Nurse (CTN) contacted the patient by telephone to follow up after admission on 2022. Verified name and  with patient as identifiers. Addressed changes since last contact: none  Discussed follow-up appointments. If no appointment was previously scheduled, appointment scheduling offered: NA. Is follow up appointment scheduled within 7 days of discharge? Yes. Advance Care Planning:   Does patient have an Advance Directive: reviewed and current. The patient agrees to contact the PCP office for questions related to their healthcare. Patients top risk factors for readmission: depression  ineffective coping  Interventions to address risk factors: Education of patient/family/caregiver/guardian to support self-management-       Non-[facility] follow up appointment(s):     CTN provided contact information for future needs. No further follow-up call indicated based on severity of symptoms and risk factors.             Care Transitions Subsequent and Final Call    Subsequent and Final Calls  Do you have any ongoing symptoms?: No  Have your medications changed?: No  Do you have any questions related to your medications?: No  Do you currently have any active services?: No  Do you have any needs or concerns that I can assist you with?: No  Identified Barriers: None  Care Transitions Interventions  Other Interventions:              Follow Up  Future Appointments   Date Time Provider Tommie Ramos   10/12/2022  8:00 AM Jorge Lee MD [facility] [place] - [place]   10/19/2022 10:00 AM MD [place] [facility] [facility]   11/2/2022  2:00 PM MD [place] MANUEL [place] [facility]   11/11/2022  9:00 AM MD Victor Manuel Hernandez LPN

## 2022-10-10 NOTE — TELEPHONE ENCOUNTER
Geovanny Thomas MA 10/10/2022 9:21 AM EDT      ----- Message -----  From: Geraldo Mcnulty  Sent: 10/10/2022 9:12 AM EDT  To: [facility]  Subject: Oct 12th Appointment     Good morning! I think it's been less than a month since I had my follow up appointment with you. Nothing has changed with me, except I am getting stronger, each day. Do you still want to see me this Wednesday morning?     Thank you,    Neal Corral

## 2022-10-11 RX ORDER — FLUOXETINE 10 MG/1
CAPSULE ORAL
Qty: 90 CAPSULE | Refills: 1 | Status: SHIPPED | OUTPATIENT
Start: 2022-10-11

## 2022-10-11 RX ORDER — ATORVASTATIN CALCIUM 20 MG/1
TABLET, FILM COATED ORAL
Qty: 90 TABLET | Refills: 1 | OUTPATIENT
Start: 2022-10-11

## 2022-10-11 NOTE — TELEPHONE ENCOUNTER
Regarding: Oct 12th Appointment  ----- Message from Jorgito Saeed MD sent at 10/10/2022  7:38 PM EDT -----       ----- Message sent from Jorgito Saeed MD to Franchesca Macie at 10/10/2022  7:36 PM -----   So glad to hear that. I will cancel appointment for this Wednesday and have someone call you to reschedule for 2-3 months.       ----- Message -----       From:Nichol Mazariegos       Sent:10/10/2022  9:12 AM EDT         To:Dr. Jorgito Saeed    Subject:Oct 12th Appointment    Good morning! I think it's been less than a month since I had my follow up appointment with you. Nothing has changed with me, except I am getting stronger, each day. Do you still want to see me this Wednesday morning?     Thank you,    Esteban Ragland

## 2022-10-19 ENCOUNTER — TELEMEDICINE (OUTPATIENT)
Dept: PULMONOLOGY | Age: 72
End: 2022-10-19
Payer: COMMERCIAL

## 2022-10-19 DIAGNOSIS — K21.00 GASTROESOPHAGEAL REFLUX DISEASE WITH ESOPHAGITIS, UNSPECIFIED WHETHER HEMORRHAGE: Chronic | ICD-10-CM

## 2022-10-19 DIAGNOSIS — Z91.09 ENVIRONMENTAL ALLERGIES: Chronic | ICD-10-CM

## 2022-10-19 DIAGNOSIS — E66.01 CLASS 2 SEVERE OBESITY WITH SERIOUS COMORBIDITY AND BODY MASS INDEX (BMI) OF 36.0 TO 36.9 IN ADULT, UNSPECIFIED OBESITY TYPE (HCC): Chronic | ICD-10-CM

## 2022-10-19 DIAGNOSIS — I25.10 CAD IN NATIVE ARTERY: Chronic | ICD-10-CM

## 2022-10-19 DIAGNOSIS — G47.33 OBSTRUCTIVE SLEEP APNEA: ICD-10-CM

## 2022-10-19 PROCEDURE — 99214 OFFICE O/P EST MOD 30 MIN: CPT | Performed by: INTERNAL MEDICINE

## 2022-10-19 PROCEDURE — 1123F ACP DISCUSS/DSCN MKR DOCD: CPT | Performed by: INTERNAL MEDICINE

## 2022-10-19 RX ORDER — OMEPRAZOLE 40 MG/1
CAPSULE, DELAYED RELEASE ORAL
Qty: 180 CAPSULE | Refills: 1 | Status: SHIPPED | OUTPATIENT
Start: 2022-10-19

## 2022-10-19 ASSESSMENT — SLEEP AND FATIGUE QUESTIONNAIRES
ESS TOTAL SCORE: 6
HOW LIKELY ARE YOU TO NOD OFF OR FALL ASLEEP WHILE LYING DOWN TO REST IN THE AFTERNOON WHEN CIRCUMSTANCES PERMIT: 0
HOW LIKELY ARE YOU TO NOD OFF OR FALL ASLEEP WHEN YOU ARE A PASSENGER IN A CAR FOR AN HOUR WITHOUT A BREAK: 1
HOW LIKELY ARE YOU TO NOD OFF OR FALL ASLEEP WHILE SITTING INACTIVE IN A PUBLIC PLACE: 1
HOW LIKELY ARE YOU TO NOD OFF OR FALL ASLEEP IN A CAR, WHILE STOPPED FOR A FEW MINUTES IN TRAFFIC: 0
HOW LIKELY ARE YOU TO NOD OFF OR FALL ASLEEP WHILE SITTING QUIETLY AFTER LUNCH WITHOUT ALCOHOL: 1
HOW LIKELY ARE YOU TO NOD OFF OR FALL ASLEEP WHILE WATCHING TV: 2
HOW LIKELY ARE YOU TO NOD OFF OR FALL ASLEEP WHILE SITTING AND TALKING TO SOMEONE: 0
HOW LIKELY ARE YOU TO NOD OFF OR FALL ASLEEP WHILE SITTING AND READING: 1

## 2022-10-19 NOTE — PROGRESS NOTES
Deidre Primrose MD  Kent Bias CNP [place]  [address] [facility]  [place], [address] [facility]- [phone]   [facility] [address] Fabi Gilliland. [address] [place] [address]. Melvina Rico 37 [phone]     Video Visit- Follow up    Annalisa Ayala, was evaluated through a synchronous (real-time) audio-video  encounter. The patient (or guardian if applicable) is aware that this is a billable  service, which includes applicable co-pays. This Virtual Visit was conducted with  patient's (and/or legal guardian's) consent. The visit was conducted pursuant to  the emergency declaration under the [address] [place] [address] authority and the [employer] General Act. Patient identification was verified,  and a caregiver was present when appropriate. The patient was located in a  state where the provider was licensed to provide care. Assessment/Plan:      1. Obstructive sleep apnea  Assessment & Plan:  New Problem - On Tx. Reviewed sleep study and download compliance data with patient. Supplies and parts as needed for her machine. These are medically necessary. Limit caffeine use after 3pm.    Discussed the patient needs to try full facemask due to her congestion and needs to add a nasal steroid back twice daily along with sinus rinses to help with her congestion as well. 2. CAD in native artery  Assessment & Plan:  Chronic- Stable. Discussed the importance of treating sleep apnea as part of the management of this disorder. Cont any meds per PCP and other physicians. 3. Gastroesophageal reflux disease with esophagitis, unspecified whether hemorrhage  Assessment & Plan:  Chronic- Stable. Discussed the importance of treating sleep apnea as part of the management of this disorder. Cont any meds per PCP and other physicians.    4. Class 2 severe obesity with serious comorbidity and body mass index (BMI) of 36.0 to 36.9 in adult, unspecified obesity type [facility])  Assessment & Plan:  Chronic-not stable:  Discussed importance of treating obstructive sleep apnea and getting sufficient sleep to assist with weight control. Encouraged her to work on weight loss through diet and exercise. Recommended DASH or Mediterranean diets. 5. Environmental allergies  Assessment & Plan:  Chronic-worsening: Discussed the patient needs to use a nasal saline spray at nighttime to help with her congestion and then add a nasal steroid spray back 1 spray in each nostril twice daily     Reviewed, analyzed, and documented physiologic data from patient's PAP machine. This information was analyzed to assess complexity and medical decision making in regards to further testing and management. Diagnoses of Obstructive sleep apnea, CAD in native artery, Gastroesophageal reflux disease with esophagitis, unspecified whether hemorrhage, Class 2 severe obesity with serious comorbidity and body mass index (BMI) of 36.0 to 36.9 in adult, unspecified obesity type ([address] and Environmental allergies were pertinent to this visit. The chronic medical conditions listed are directly related to the primary diagnosis listed above. The management of the primary diagnosis affects the secondary diagnosis and vice versa. Subjective:     Patient ID: Xiomy Long is a 80 y.o. female. Chief Complaint   Patient presents with    Sleep Apnea     Subjective   HPI:    Machine Modem/Download Info:  Compliance (hours/night): 5.55 hrs/night  % of nights >= 4 hrs: 70 %  Download AHI (/hour): 1.7 /HR  Average CPAP Pressure : 13 cmH2O APAP - Settings  Pressure Min: 10 cmH2O  Pressure Max: 20 cmH2O                 Comfort Settings  Humidity Level (0-8): 4  Heated Tubing (Yes/No): Yes  Flex/EPR (0-3): 3       PSG on 5/4/2022 showed a CMS AHI of 56.7/HR but a RDI of 80.7/HR with a low sat of 77% and time below 90 of 140.6 minutes. Patient has CPAP titration done on 6/15/2022.     Starting to sleep better and feeling more improved and waking up with a little more energy but then last week her allergies have gotten much worse and she is having severe congestion and not able to put the mask on. She was on a nasal steroid prior to her CABG but that was discontinued postop. She is currently taking Allegra-D just in the morning. When she can use her machine the pressure feels good.     [address]    Maxie - Maxie Sleepiness Score: 6    Current Outpatient Medications   Medication Instructions    aspirin 325 mg, Oral, DAILY    atorvastatin (LIPITOR) 40 mg, Oral, DAILY    bisacodyl (DULCOLAX) 10 mg, Oral, DAILY PRN    buPROPion (WELLBUTRIN XL) 300 MG extended release tablet TAKE 1 TABLET EVERY MORNING    Fexofenadine-Pseudoephedrine (ALLEGRA-D 12 HOUR PO) Oral, DAILY    FLUoxetine (PROZAC) 10 MG capsule TAKE 1 CAPSULE DAILY    levothyroxine (SYNTHROID) 50 MCG tablet TAKE 1 TABLET DAILY    lisinopril (PRINIVIL;ZESTRIL) 2.5 mg, Oral, DAILY WITH LUNCH    metoprolol tartrate (LOPRESSOR) 25 MG tablet TAKE 1/2 TABLET BY MOUTH TWICE DAILY    omeprazole (PRILOSEC) 40 mg, Oral, 2 times daily    traZODone (DESYREL) 50 MG tablet TAKE 1 TO 2 TABLETS NIGHTLYAS NEEDED FOR SLEEP    valACYclovir (VALTREX) 1 g tablet TAKE 1 TABLET DAILY    vitamin B-12 (CYANOCOBALAMIN) 1,000 mcg, Oral, DAILY    vitamin D3 (CHOLECALCIFEROL) 1,000 Units, Oral, DAILY        Electronically signed by Becky Bingham MD on 10/19/2022 at 10:43 AM

## 2022-10-19 NOTE — ASSESSMENT & PLAN NOTE
Chronic-worsening: Discussed the patient needs to use a nasal saline spray at nighttime to help with her congestion and then add a nasal steroid spray back 1 spray in each nostril twice daily

## 2022-10-19 NOTE — PROGRESS NOTES
Ariane Horacio         : 1950    Diagnosis: [x] LISSA (G47.33) [] CSA (G47.31) [] Apnea (G47.30)   Length of Need: [x] 18 Months [] 99 Months [] Other:    Machine (SRINIVAS!): [] Respironics Dream Station      Auto [] ResMed AirSense     Auto [] Other:     []  CPAP () [] Bilevel ()   Mode: [] Auto [] Spontaneous    Mode: [] Auto [] Spontaneous              Comfort Settings:        Humidifier: [] Heated ()        [x] Water chamber replacement ()/ 1 per 6 months        Mask:   [] Nasal () /1 per 3 months [x] Full Face () /1 per 3 months   [] Patient choice -Size and fit mask [x] Patient Choice - Size and fit mask   [] Dispense:  [] Dispense:    [] Headgear () / 1 per 3 months [x] Headgear () / 1 per 3 months   [] Replacement Nasal Cushion ()/2 per month [x] Interface Replacement ()/1 per month   [] Replacement Nasal Pillows ()/2 per month         Tubing: [x] Heated ()/1 per 3 months    [] Standard ()/1 per 3 months [] Other:           Filters: [x] Non-disposable ()/1 per 6 months     [x] Ultra-Fine, Disposable ()/2 per month        Miscellaneous: [] Chin Strap ()/ 1 per 6 months [] O2 bleed-in:       LPM   [] Oximetry on CPAP/Bilevel []  Other:    [x] Modem: ()         Start Order Date: 10/19/22    MEDICAL JUSTIFICATION:  I, the undersigned, certify that the above prescribed supplies are medically necessary for this patients wellbeing. In my opinion, the supplies are both reasonable and necessary in reference to accepted standards of medicalpractice in treatment of this patients condition.     Amelia Osullivan MD      NPI: [id number]       Order Signed Date: 10/19/22    Electronically signed by Amelia Osullivan MD on 10/19/2022 at 10:33 AM    Ariane Leonard  1950  [address] [facility] [address]  [phone] (home)   [phone] (mobile)      Insurance Info (confirm with patient if correct):  Payer/Plan Subscr  Sex Relation Sub.  Ins. ID Effective Group Num

## 2022-10-19 NOTE — LETTER
[facility]  [place] [address] [place] [address]  Phone: [phone]  Fax: [phone]    Corinna Cruz MD    October 19, 2022     Jroge Lee MD  [address] [place] [id number]    Patient: Kelly Hunt   MR Number: [id number]   YOB: 1950   Date of Visit: 10/19/2022       Dear Jorge Lee: Thank you for referring Laureano Spann to me for evaluation/treatment. Below are the relevant portions of my assessment and plan of care. 1. Obstructive sleep apnea  Assessment & Plan:  New Problem - On Tx. Reviewed sleep study and download compliance data with patient. Supplies and parts as needed for her machine. These are medically necessary. Limit caffeine use after 3pm.    Discussed the patient needs to try full facemask due to her congestion and needs to add a nasal steroid back twice daily along with sinus rinses to help with her congestion as well. 2. CAD in native artery  Assessment & Plan:  Chronic- Stable. Discussed the importance of treating sleep apnea as part of the management of this disorder. Cont any meds per PCP and other physicians. 3. Gastroesophageal reflux disease with esophagitis, unspecified whether hemorrhage  Assessment & Plan:  Chronic- Stable. Discussed the importance of treating sleep apnea as part of the management of this disorder. Cont any meds per PCP and other physicians. 4. Class 2 severe obesity with serious comorbidity and body mass index (BMI) of 36.0 to 36.9 in adult, unspecified obesity type [facility])  Assessment & Plan:  Chronic-not stable:  Discussed importance of treating obstructive sleep apnea and getting sufficient sleep to assist with weight control. Encouraged her to work on weight loss through diet and exercise. Recommended DASH or Mediterranean diets.    5. Environmental allergies  Assessment & Plan:  Chronic-worsening: Discussed the patient needs to use a nasal saline spray at nighttime to help with her congestion and then add a nasal steroid spray back 1 spray in each nostril twice daily     Reviewed, analyzed, and documented physiologic data from patient's PAP machine. This information was analyzed to assess complexity and medical decision making in regards to further testing and management. Diagnoses of Obstructive sleep apnea, CAD in native artery, Gastroesophageal reflux disease with esophagitis, unspecified whether hemorrhage, Class 2 severe obesity with serious comorbidity and body mass index (BMI) of 36.0 to 36.9 in adult, unspecified obesity type ([address] and Environmental allergies were pertinent to this visit. The chronic medical conditions listed are directly related to the primary diagnosis listed above. The management of the primary diagnosis affects the secondary diagnosis and vice versa. If you have questions, please do not hesitate to call me. I look forward to following Anthony Garcia along with you.     Sincerely,      Federico Krishnan MD

## 2022-10-19 NOTE — ASSESSMENT & PLAN NOTE
New Problem - On Tx. Reviewed sleep study and download compliance data with patient. Supplies and parts as needed for her machine. These are medically necessary. Limit caffeine use after 3pm.    Discussed the patient needs to try full facemask due to her congestion and needs to add a nasal steroid back twice daily along with sinus rinses to help with her congestion as well.

## 2022-10-25 RX ORDER — ATORVASTATIN CALCIUM 20 MG/1
TABLET, FILM COATED ORAL
Qty: 90 TABLET | Refills: 1 | OUTPATIENT
Start: 2022-10-25

## 2022-10-25 RX ORDER — ATORVASTATIN CALCIUM 40 MG/1
40 TABLET, FILM COATED ORAL DAILY
Qty: 90 TABLET | Refills: 3 | Status: SHIPPED | OUTPATIENT
Start: 2022-10-25 | End: 2023-01-23

## 2022-10-25 RX ORDER — VALACYCLOVIR HYDROCHLORIDE 1 G/1
TABLET, FILM COATED ORAL
Qty: 90 TABLET | Refills: 1 | OUTPATIENT
Start: 2022-10-25

## 2022-10-25 NOTE — TELEPHONE ENCOUNTER
Patient stated that she's not having out breaks more then once a month and stated that she has enough for the 1 G to last her a while and stated that the pharmacy told her that they need a new RX For the  Lipitor 40 mg

## 2022-10-25 NOTE — TELEPHONE ENCOUNTER
Please call her. Have refill request for her valtrex. It might be time to cut down the strength. How often is she having outbreaks now. More than once a month? If not, I am going to decrease her to 500 mg.

## 2022-11-02 ENCOUNTER — OFFICE VISIT (OUTPATIENT)
Dept: CARDIOTHORACIC SURGERY | Age: 72
End: 2022-11-02

## 2022-11-02 VITALS
BODY MASS INDEX: 36.6 KG/M2 | TEMPERATURE: 98.8 F | SYSTOLIC BLOOD PRESSURE: 131 MMHG | WEIGHT: 186.4 LBS | HEIGHT: 60 IN | OXYGEN SATURATION: 96 % | HEART RATE: 79 BPM | DIASTOLIC BLOOD PRESSURE: 70 MMHG

## 2022-11-02 DIAGNOSIS — Z95.1 S/P CABG X 4: Primary | ICD-10-CM

## 2022-11-02 DIAGNOSIS — I25.118 CORONARY ARTERY DISEASE INVOLVING NATIVE CORONARY ARTERY OF NATIVE HEART WITH OTHER FORM OF ANGINA PECTORIS (HCC): ICD-10-CM

## 2022-11-02 PROCEDURE — 99024 POSTOP FOLLOW-UP VISIT: CPT | Performed by: PHYSICIAN ASSISTANT

## 2022-11-02 NOTE — PROGRESS NOTES
Progress Note    S/P CABG x 4, LIMA to LAD, SVG to D1, OM2 and PDA; EVH R thigh; MARION; TCPB; sternal plate x 1 (Biomet) per Dr Shannon Rueda on 9/1/22    1st postop visit 9/19/22 with Dr Shannon Rueda. No issues. Patient seen today for 2nd postop visit. No issues. Eating and drinking adequately. Pain controlled. Sleeping okay - started CPAP and affecting her sleep, planning to get refitted. Incisions are healing well, c/d/I. All questions answered. Vital Signs:                                                 /70 (Site: Left Upper Arm, Position: Sitting)   Pulse 79   Temp 98.8 °F (37.1 °C) (Infrared)   Ht 5' (1.524 m)   Wt 186 lb 6.4 oz (84.6 kg)   SpO2 96%   BMI 36.40 kg/m²        Weights:   Discharge: 200 lbs  Today: 186 lbs    CV: reg, wound c/d/i, sternum stable  Pulm: clear, decreased at bases  Abd: soft  Ext: warm. No edema. R saph incision c/d/i. Medications:  Prior to Admission medications    Medication Sig Start Date End Date Taking?  Authorizing Provider   atorvastatin (LIPITOR) 40 MG tablet Take 1 tablet by mouth daily 10/25/22 1/23/23 Yes Jones Franklin MD   omeprazole (PRILOSEC) 40 MG delayed release capsule TAKE 1 CAPSULE IN THE      MORNING AND AT BEDTIME 10/19/22  Yes Jones Franklin MD   FLUoxetine (PROZAC) 10 MG capsule TAKE 1 CAPSULE DAILY 10/11/22  Yes Jones Franklin MD   traZODone (DESYREL) 50 MG tablet TAKE 1 TO 2 TABLETS NIGHTLYAS NEEDED FOR SLEEP 9/19/22  Yes Jones Franklin MD   levothyroxine (SYNTHROID) 50 MCG tablet TAKE 1 TABLET DAILY 9/19/22  Yes Jones Franklin MD   lisinopril (PRINIVIL;ZESTRIL) 2.5 MG tablet TAKE 1 TABLET BY MOUTH DAILY WITH LUNCH 9/6/22  Yes Aimee Cordon MD   metoprolol tartrate (LOPRESSOR) 25 MG tablet TAKE 1/2 TABLET BY MOUTH TWICE DAILY 9/6/22  Yes Aimee Cordon MD   aspirin 325 MG EC tablet Take 1 tablet by mouth daily 9/6/22  Yes Aimee Cordon MD   Fexofenadine-Pseudoephedrine (ALLEGRA-D 12 HOUR PO) Take by mouth daily   Yes Historical Provider, MD   buPROPion (WELLBUTRIN XL) 300 MG extended release tablet TAKE 1 TABLET EVERY MORNING 5/19/22  Yes Agata Steele MD   vitamin B-12 (CYANOCOBALAMIN) 1000 MCG tablet Take 1 tablet by mouth daily 5/7/21  Yes Agata Steele MD   Cholecalciferol (VITAMIN D3) 1000 units TABS Take 1 tablet by mouth daily 1/26/18  Yes Agata Steele MD   bisacodyl (DULCOLAX) 5 MG EC tablet Take 10 mg by mouth daily as needed for Constipation. Yes Historical Provider, MD   valACYclovir (VALTREX) 1 g tablet TAKE 1 TABLET DAILY  Patient not taking: Reported on 11/2/2022 1/23/22   Agata Steele MD   PREMARIN 0.625 MG/GM vaginal cream PLACE 0.5G VAGINALLY TWICE A WEEK . Patient not taking: Reported on 9/11/2022 11/8/17 9/12/22  Agata Steele MD   triamcinolone (NASACORT ALLERGY 24HR) 55 MCG/ACT nasal inhaler 2 SPRAYS IN EACH NOSTRIL one time a day  Patient not taking: Reported on 9/11/2022 10/31/16 9/12/22  Agata Steele MD   mometasone (NASONEX) 50 MCG/ACT nasal spray USE 2 SPRAYS BY NASAL ROUTE DAILY 4/6/16 1/30/19  Agata Steele MD   ibuprofen (ADVIL;MOTRIN) 600 MG tablet Take 600 mg by mouth every 6 hours as needed for Pain.   Patient not taking: Reported on 9/11/2022 9/12/22  Historical Provider, MD        Data Review:  CBC:   Lab Results   Component Value Date/Time    WBC 11.0 09/12/2022 06:29 AM    HGB 9.7 09/12/2022 06:29 AM    HCT 29.8 09/12/2022 06:29 AM    MCV 91.0 09/12/2022 06:29 AM     09/12/2022 06:29 AM     BMP:   Lab Results   Component Value Date/Time     09/12/2022 06:29 AM    K 4.6 09/12/2022 06:29 AM     09/12/2022 06:29 AM    CO2 25 09/12/2022 06:29 AM    BUN 15 09/12/2022 06:29 AM    CREATININE 1.0 09/12/2022 06:29 AM    CALCIUM 9.1 09/12/2022 06:29 AM    MG 2.40 09/05/2022 04:32 AM     PT/INR:   Lab Results   Component Value Date/Time    PROTIME 15.1 09/11/2022 07:00 PM    INR 1.19 09/11/2022 07:00 PM       Plan:    - informed patient she does not need to see us again, but to please call our office with any questions or concerns. - cardiology appointment with Dr Dania Winston made 11/11    - lifting as tolerated  - continue cardiac diet   - Wash incisions daily with soap and water, dry well. Do not wash your incisions after you have cleansed other parts of your body.     - continue medication regimen    SHANIQUA Regan  11/2/2022  2:32 PM

## 2022-11-03 ENCOUNTER — TELEPHONE (OUTPATIENT)
Dept: PULMONOLOGY | Age: 72
End: 2022-11-03

## 2022-11-03 NOTE — TELEPHONE ENCOUNTER
Patient called back and stated that [facility] told her they do not have an order from us for the full face mask and to please fax it over again.

## 2022-11-11 ENCOUNTER — OFFICE VISIT (OUTPATIENT)
Dept: CARDIOLOGY CLINIC | Age: 72
End: 2022-11-11
Payer: COMMERCIAL

## 2022-11-11 VITALS
SYSTOLIC BLOOD PRESSURE: 130 MMHG | BODY MASS INDEX: 35.54 KG/M2 | HEART RATE: 68 BPM | WEIGHT: 182 LBS | DIASTOLIC BLOOD PRESSURE: 80 MMHG

## 2022-11-11 DIAGNOSIS — E78.5 HYPERLIPIDEMIA, UNSPECIFIED HYPERLIPIDEMIA TYPE: ICD-10-CM

## 2022-11-11 DIAGNOSIS — I25.10 CAD IN NATIVE ARTERY: Primary | ICD-10-CM

## 2022-11-11 DIAGNOSIS — Z95.1 HX OF CABG: ICD-10-CM

## 2022-11-11 PROCEDURE — 99214 OFFICE O/P EST MOD 30 MIN: CPT | Performed by: INTERNAL MEDICINE

## 2022-11-11 PROCEDURE — 1123F ACP DISCUSS/DSCN MKR DOCD: CPT | Performed by: INTERNAL MEDICINE

## 2022-11-11 RX ORDER — LISINOPRIL 2.5 MG/1
2.5 TABLET ORAL
Qty: 90 TABLET | Refills: 0 | Status: SHIPPED | OUTPATIENT
Start: 2022-11-11

## 2022-11-11 RX ORDER — METOPROLOL SUCCINATE 25 MG/1
25 TABLET, EXTENDED RELEASE ORAL DAILY
COMMUNITY

## 2022-11-11 ASSESSMENT — ENCOUNTER SYMPTOMS
SHORTNESS OF BREATH: 0
COUGH: 0
CHEST TIGHTNESS: 0
CHOKING: 0

## 2022-11-11 NOTE — PROGRESS NOTES
Subjective:      Patient ID: Kelly Hunt is a 71 y.o. female. HPI  Follow up for CAD/CABG/Lipids. Doing well post cabg. Increasing activities. Walking daily. Chest wall good. No angina. Wt down. No pnd/orthopnea. No edema. No tachy/syncope. Past Medical History:   Diagnosis Date    Abnormal Pap smear of cervix     Acid reflux     Bacterial vaginosis     CAD (coronary artery disease)     Dysmenorrhea     Environmental allergies     Fibrocystic breast     Fibroid     Herpes simplex type 2 infection     History of blood transfusion     Hyperlipidemia     Hypothyroid     Dx about 15 years ago, but htne was off med for  long time    Insomnia     Menopause     approx age 37    Menopause ovarian failure     Menorrhagia     Obstructive sleep apnea 12/13/2021    HST-severe, Dr. Abhishek Tabares. 5/2022    Sleep apnea 08/2022    just diagnosed in April, waiting for CPAP    Urinary incontinence     Urogenital trichomoniasis      Past Surgical History:   Procedure Laterality Date    BREAST BIOPSY      CAPSULOTOMY Bilateral 02/2020    Yag    CARDIAC CATHETERIZATION  08/10/2022    LAD with 80-90% prox. Lg D1 with 80% prox. Cx with prox 90%.    RCA with prox 90%-dom    CATARACT REMOVAL WITH IMPLANT Right 06/26/2019    COLONOSCOPY      CORONARY ARTERY BYPASS GRAFT N/A 9/1/2022    CABG x 4, LIMA to LAD, SVG to D1, OM2 and PDA; EVH R thigh; MARION; TCPB; sternal plate x 1 (Biomet) performed by Chinmay Shukla MD at [facility] N/A 10/25/2018    ESOPHAGEAL DILATION [place] performed by Nick Murphy MD at [facility]., TOTAL ABDOMINAL (CERVIX REMOVED)  1990    fibroid-still with ovaries    INTRACAPSULAR CATARACT EXTRACTION Left 06/12/2019    PHACOEMULSIFICATION OF CATARACT LEFT EYE WITH INTRAOCULAR LENS IMPLANT performed by Abhishek Aldrich MD at [facility] [address] Right 06/26/2019    PHACOEMULSIFICATION OF CATARACT RIGHT EYE WITH INTRAOCULAR LENS IMPLANT performed by Geoffrey Sharp MD at [address] [facility] N/A 10/25/2018    EGD BIOPSY performed by Valorie Gonzalez MD at [address] History     Socioeconomic History    Marital status:      Spouse name: ,  works    Number of children: 4    Years of education: Not on file    Highest education level: Not on file   Occupational History    Occupation:    Tobacco Use    Smoking status: Never    Smokeless tobacco: Never    Tobacco comments:     Never smoked, never will! Vaping Use    Vaping Use: Never used   Substance and Sexual Activity    Alcohol use: No    Drug use: Never    Sexual activity: Not Currently     Partners: Male   Other Topics Concern    Not on file   Social History Narrative    Exercise:    2-3x/week, recumbent bike or walking for 20-30 min     Social Determinants of Health     Financial Resource Strain: Low Risk     Difficulty of Paying Living Expenses: Not hard at all   Food Insecurity: No Food Insecurity    Worried About Running Out of Food in the Last Year: Never true    Ran Out of Food in the Last Year: Never true   Transportation Needs: Not on file   Physical Activity: Not on file   Stress: Not on file   Social Connections: Not on file   Intimate Partner Violence: Not on file   Housing Stability: Not on file     FH reviewed, negative for cardiac issues,  Bro with CAD      Vitals:    11/11/22 0911   BP: 130/80   Pulse: 68       Wt 182    Review of Systems   Constitutional:  Negative for activity change, appetite change and fatigue. Respiratory:  Negative for cough, choking, chest tightness and shortness of breath. Cardiovascular:  Positive for chest pain. Negative for palpitations and leg swelling. Denies PND or orthopnea. No tachycardia or syncope. Neurological:  Negative for dizziness, syncope and light-headedness.    Psychiatric/Behavioral:  Negative for agitation, behavioral problems and confusion. All other systems reviewed and are negative. Objective:   Physical Exam  Constitutional:       General: She is not in acute distress. Appearance: Normal appearance. She is well-developed. HENT:      Head: Normocephalic and atraumatic. Right Ear: External ear normal.      Left Ear: External ear normal.      Nose: Nose normal.   Neck:      Vascular: No JVD. Cardiovascular:      Rate and Rhythm: Normal rate and regular rhythm. Heart sounds: Normal heart sounds. No murmur heard. No gallop. Pulmonary:      Effort: Pulmonary effort is normal. No respiratory distress. Breath sounds: Normal breath sounds. No wheezing or rales. Abdominal:      General: Bowel sounds are normal. There is no distension. Palpations: Abdomen is soft. Tenderness: There is no abdominal tenderness. Musculoskeletal:         General: Normal range of motion. Cervical back: Normal range of motion. Skin:     General: Skin is warm and dry. Neurological:      General: No focal deficit present. Mental Status: She is alert and oriented to person, place, and time. Psychiatric:         Behavior: Behavior normal.       Assessment:       Diagnosis Orders   1. CAD in native artery        2. Hx of CABG        3. Hyperlipidemia, unspecified hyperlipidemia type                  Plan:      CV stable. Walking daily. Compensated. No angina. Continue lopressor/lisinopril asa/lipitor. Cardiac rehab. Try to schedule again. Lipids per PCP. Reviewed previous records and testing including Cath 8/22 and hosp for CABG. Follow 3 months.         Rosanna Weldon MD

## 2022-11-23 RX ORDER — BUPROPION HYDROCHLORIDE 300 MG/1
TABLET ORAL
Qty: 90 TABLET | Refills: 1 | Status: SHIPPED | OUTPATIENT
Start: 2022-11-23

## 2022-12-20 ENCOUNTER — TELEPHONE (OUTPATIENT)
Dept: CARDIOLOGY CLINIC | Age: 72
End: 2022-12-20

## 2022-12-20 NOTE — TELEPHONE ENCOUNTER
Patient called and stated that she was informed to take a 1/2 tablet of metoprolol succinate (TOPROL XL) 25 MG extended release tablet. When she got a refill, the prescription said to take a whole pill. The prescription is still 25 MG. Patient is inquiring of the medication changed.   Patient wants a call back at [phone]

## 2022-12-20 NOTE — TELEPHONE ENCOUNTER
Pt has been on Lopressor 12.5 mg BID since her CABG and no changes have been made since that time. This past refill was an error.

## 2023-01-05 ENCOUNTER — PATIENT MESSAGE (OUTPATIENT)
Dept: INTERNAL MEDICINE CLINIC | Age: 73
End: 2023-01-05

## 2023-01-05 DIAGNOSIS — E78.2 MIXED HYPERLIPIDEMIA: ICD-10-CM

## 2023-01-05 DIAGNOSIS — D64.9 ANEMIA, UNSPECIFIED TYPE: ICD-10-CM

## 2023-01-05 DIAGNOSIS — E03.9 ACQUIRED HYPOTHYROIDISM: Primary | ICD-10-CM

## 2023-01-06 NOTE — TELEPHONE ENCOUNTER
From: Mariaelena Mazariegos  To: Dr. Fatmata Ng  Sent: 1/5/2023 11:53 PM EST  Subject: Lab tests    I have an appointment with you on Jan 11th. Will I need to have labs drawn? Thank you!

## 2023-01-10 DIAGNOSIS — E78.2 MIXED HYPERLIPIDEMIA: ICD-10-CM

## 2023-01-10 DIAGNOSIS — D64.9 ANEMIA, UNSPECIFIED TYPE: ICD-10-CM

## 2023-01-10 DIAGNOSIS — E03.9 ACQUIRED HYPOTHYROIDISM: ICD-10-CM

## 2023-01-10 PROBLEM — R74.01 ELEVATED TRANSAMINASE LEVEL: Status: ACTIVE | Noted: 2023-01-10

## 2023-01-10 PROBLEM — R94.39 ABNORMAL STRESS TEST: Status: RESOLVED | Noted: 2022-05-27 | Resolved: 2023-01-10

## 2023-01-10 LAB
A/G RATIO: 1.2 (ref 1.1–2.2)
ALBUMIN SERPL-MCNC: 4 G/DL (ref 3.4–5)
ALP BLD-CCNC: 174 U/L (ref 40–129)
ALT SERPL-CCNC: 44 U/L (ref 10–40)
ANION GAP SERPL CALCULATED.3IONS-SCNC: 10 MMOL/L (ref 3–16)
AST SERPL-CCNC: 41 U/L (ref 15–37)
BILIRUB SERPL-MCNC: 0.4 MG/DL (ref 0–1)
BUN BLDV-MCNC: 15 MG/DL (ref 7–20)
CALCIUM SERPL-MCNC: 9.4 MG/DL (ref 8.3–10.6)
CHLORIDE BLD-SCNC: 105 MMOL/L (ref 99–110)
CHOLESTEROL, TOTAL: 135 MG/DL (ref 0–199)
CO2: 26 MMOL/L (ref 21–32)
CREAT SERPL-MCNC: 0.8 MG/DL (ref 0.6–1.2)
GFR SERPL CREATININE-BSD FRML MDRD: >60 ML/MIN/{1.73_M2}
GLUCOSE BLD-MCNC: 120 MG/DL (ref 70–99)
HCT VFR BLD CALC: 38.8 % (ref 36–48)
HDLC SERPL-MCNC: 35 MG/DL (ref 40–60)
HEMOGLOBIN: 12 G/DL (ref 12–16)
LDL CHOLESTEROL CALCULATED: 75 MG/DL
MCH RBC QN AUTO: 25 PG (ref 26–34)
MCHC RBC AUTO-ENTMCNC: 30.8 G/DL (ref 31–36)
MCV RBC AUTO: 81 FL (ref 80–100)
PDW BLD-RTO: 18 % (ref 12.4–15.4)
PLATELET # BLD: 254 K/UL (ref 135–450)
PMV BLD AUTO: 9.7 FL (ref 5–10.5)
POTASSIUM SERPL-SCNC: 4.5 MMOL/L (ref 3.5–5.1)
RBC # BLD: 4.78 M/UL (ref 4–5.2)
SODIUM BLD-SCNC: 141 MMOL/L (ref 136–145)
TOTAL PROTEIN: 7.3 G/DL (ref 6.4–8.2)
TRIGL SERPL-MCNC: 126 MG/DL (ref 0–150)
TSH REFLEX: 1.73 UIU/ML (ref 0.27–4.2)
VLDLC SERPL CALC-MCNC: 25 MG/DL
WBC # BLD: 6.1 K/UL (ref 4–11)

## 2023-01-11 ENCOUNTER — OFFICE VISIT (OUTPATIENT)
Dept: INTERNAL MEDICINE CLINIC | Age: 73
End: 2023-01-11
Payer: COMMERCIAL

## 2023-01-11 VITALS
WEIGHT: 183.2 LBS | HEART RATE: 64 BPM | BODY MASS INDEX: 35.78 KG/M2 | DIASTOLIC BLOOD PRESSURE: 76 MMHG | SYSTOLIC BLOOD PRESSURE: 134 MMHG | OXYGEN SATURATION: 97 % | TEMPERATURE: 98.3 F

## 2023-01-11 DIAGNOSIS — E78.2 MIXED HYPERLIPIDEMIA: Chronic | ICD-10-CM

## 2023-01-11 DIAGNOSIS — E66.01 CLASS 2 SEVERE OBESITY WITH SERIOUS COMORBIDITY AND BODY MASS INDEX (BMI) OF 35.0 TO 35.9 IN ADULT, UNSPECIFIED OBESITY TYPE (HCC): ICD-10-CM

## 2023-01-11 DIAGNOSIS — R05.2 SUBACUTE COUGH: Primary | ICD-10-CM

## 2023-01-11 DIAGNOSIS — E03.9 ACQUIRED HYPOTHYROIDISM: Chronic | ICD-10-CM

## 2023-01-11 DIAGNOSIS — F33.40 RECURRENT MAJOR DEPRESSIVE DISORDER, IN REMISSION (HCC): ICD-10-CM

## 2023-01-11 DIAGNOSIS — R74.01 ELEVATED TRANSAMINASE LEVEL: ICD-10-CM

## 2023-01-11 DIAGNOSIS — R73.03 PREDIABETES: Chronic | ICD-10-CM

## 2023-01-11 DIAGNOSIS — R73.9 HYPERGLYCEMIA: ICD-10-CM

## 2023-01-11 PROBLEM — E11.9 NEW ONSET TYPE 2 DIABETES MELLITUS (HCC): Status: ACTIVE | Noted: 2019-01-30

## 2023-01-11 LAB
ESTIMATED AVERAGE GLUCOSE: 142.7 MG/DL
HBA1C MFR BLD: 6.6 %

## 2023-01-11 PROCEDURE — 1123F ACP DISCUSS/DSCN MKR DOCD: CPT | Performed by: INTERNAL MEDICINE

## 2023-01-11 PROCEDURE — 99214 OFFICE O/P EST MOD 30 MIN: CPT | Performed by: INTERNAL MEDICINE

## 2023-01-11 RX ORDER — LOSARTAN POTASSIUM 25 MG/1
25 TABLET ORAL DAILY
Qty: 30 TABLET | Refills: 0 | Status: SHIPPED | OUTPATIENT
Start: 2023-01-11

## 2023-01-11 RX ORDER — IPRATROPIUM BROMIDE 42 UG/1
2 SPRAY, METERED NASAL 4 TIMES DAILY
Qty: 15 ML | Refills: 3 | Status: SHIPPED | OUTPATIENT
Start: 2023-01-11

## 2023-01-11 RX ORDER — LOSARTAN POTASSIUM 25 MG/1
25 TABLET ORAL DAILY
Qty: 90 TABLET | OUTPATIENT
Start: 2023-01-11

## 2023-01-11 ASSESSMENT — PATIENT HEALTH QUESTIONNAIRE - PHQ9
8. MOVING OR SPEAKING SO SLOWLY THAT OTHER PEOPLE COULD HAVE NOTICED. OR THE OPPOSITE, BEING SO FIGETY OR RESTLESS THAT YOU HAVE BEEN MOVING AROUND A LOT MORE THAN USUAL: 0
SUM OF ALL RESPONSES TO PHQ QUESTIONS 1-9: 6
SUM OF ALL RESPONSES TO PHQ QUESTIONS 1-9: 6
9. THOUGHTS THAT YOU WOULD BE BETTER OFF DEAD, OR OF HURTING YOURSELF: 0
1. LITTLE INTEREST OR PLEASURE IN DOING THINGS: 0
2. FEELING DOWN, DEPRESSED OR HOPELESS: 0
SUM OF ALL RESPONSES TO PHQ QUESTIONS 1-9: 6
4. FEELING TIRED OR HAVING LITTLE ENERGY: 3
7. TROUBLE CONCENTRATING ON THINGS, SUCH AS READING THE NEWSPAPER OR WATCHING TELEVISION: 0
SUM OF ALL RESPONSES TO PHQ9 QUESTIONS 1 & 2: 0
3. TROUBLE FALLING OR STAYING ASLEEP: 3
6. FEELING BAD ABOUT YOURSELF - OR THAT YOU ARE A FAILURE OR HAVE LET YOURSELF OR YOUR FAMILY DOWN: 0
SUM OF ALL RESPONSES TO PHQ QUESTIONS 1-9: 6
5. POOR APPETITE OR OVEREATING: 0
10. IF YOU CHECKED OFF ANY PROBLEMS, HOW DIFFICULT HAVE THESE PROBLEMS MADE IT FOR YOU TO DO YOUR WORK, TAKE CARE OF THINGS AT HOME, OR GET ALONG WITH OTHER PEOPLE: 0

## 2023-01-11 NOTE — PROGRESS NOTES
Crystal Gruber   :  1950  2023    ASSESSMENT/PLAN:   1. Subacute cough  Comments:  New problem, related to ACE inhibitor. Stop lisinopril. Losartan 25 mg daily  2. Mixed hyperlipidemia  Assessment & Plan:  Asymptomatic, LDL improved with increased dose atorvastatin, but not below 70. Hold atorvastatin at current 40 mg for now due to new bump in LFTs. 3. Elevated transaminase level  Assessment & Plan:  Minimal increase, <2x ULN. Correlates with increase in statin. Will follow, and adjust medication if indicated. 4. Recurrent major depressive disorder, in remission [facility])  Assessment & Plan:   Feels slightly more symptomatic, but also addressing issues with difficulty with CPAP currently. Declines increase in medication at this time, will call if worsens  5. Acquired hypothyroidism  Assessment & Plan:   Well-controlled, continue current medications  6. Prediabetes  Assessment & Plan:  Encourage exercise. Add A1c to labs done earlier this week. 7. Class 2 severe obesity with serious comorbidity and body mass index (BMI) of 35.0 to 35.9 in adult, unspecified obesity type ([facility] [address]  Assessment & Plan:  BMI stable. Not doing well with physical activity. Encourage follow-up with Dr. Noy Zhou to get into cardiac rehab which may be a good way to start. 8. Hyperglycemia  -     Hemoglobin A1C; Future    ADD A!c to labs  Return in about 7 weeks (around 3/1/2023). SUBJECTIVE     71 y.o. female established patient here for:   Chief Complaint   Patient presents with    Follow-up     Discuss lab results, cough (believes that its due to effects of medication she's on)     Cough/throat-clearing for several months. Very disruptive, and interferes even with sleep. Wonders if lisinopril, but also with some post-nasal drainage. Has read omeprazole can increase mucus production. Due for mammo, mentioned tender but says always tender more than used to be. Needs diagnostic order.      Tired when gets home, not motivated to exercise so not doing anything. Was walking every day when daughter was here. Still hasn't had cardiac rehab. Seeing Dr. Mildred Roberto 2/23. Discuss labs. New elevated transaminases. Not able to use the CPAP. Nasal congestion. Dr. Prachi Curiel has put on full facemask. Able to use just a little longer every night. Not using nasacort. Was worried it was causing more mucus. Review of Systems    Outpatient Medications Marked as Taking for the 1/11/23 encounter (Office Visit) with Misbah Snyder MD   Medication Sig Dispense Refill    losartan (COZAAR) 25 MG tablet Take 1 tablet by mouth daily 30 tablet 0    ipratropium (ATROVENT) 0.06 % nasal spray 2 sprays by Each Nostril route 4 times daily 15 mL 3    metoprolol tartrate (LOPRESSOR) 25 MG tablet Take 0.5 tablets by mouth 2 times daily 90 tablet 3    buPROPion (WELLBUTRIN XL) 300 MG extended release tablet TAKE 1 TABLET EVERY MORNING 90 tablet 1    aspirin 325 MG EC tablet Take 1 tablet by mouth daily 30 tablet 3    atorvastatin (LIPITOR) 40 MG tablet Take 1 tablet by mouth daily 90 tablet 3    omeprazole (PRILOSEC) 40 MG delayed release capsule TAKE 1 CAPSULE IN THE      MORNING AND AT BEDTIME 180 capsule 1    FLUoxetine (PROZAC) 10 MG capsule TAKE 1 CAPSULE DAILY 90 capsule 1    traZODone (DESYREL) 50 MG tablet TAKE 1 TO 2 TABLETS NIGHTLYAS NEEDED FOR SLEEP 60 tablet 5    levothyroxine (SYNTHROID) 50 MCG tablet TAKE 1 TABLET DAILY 90 tablet 1    Fexofenadine-Pseudoephedrine (ALLEGRA-D 12 HOUR PO) Take by mouth daily      valACYclovir (VALTREX) 1 g tablet TAKE 1 TABLET DAILY 90 tablet 1    vitamin B-12 (CYANOCOBALAMIN) 1000 MCG tablet Take 1 tablet by mouth daily 30 tablet 3    Cholecalciferol (VITAMIN D3) 1000 units TABS Take 1 tablet by mouth daily 30 tablet 11    bisacodyl (DULCOLAX) 5 MG EC tablet Take 10 mg by mouth daily as needed for Constipation.          OBJECTIVE:  Vitals:    01/11/23 0802   BP: 134/76   Pulse: 64   Temp: 98.3 °F (36.8 °C) TempSrc: Temporal   SpO2: 97%   Weight: 183 lb 3.2 oz (83.1 kg)     Physical Exam  Constitutional:       Appearance: Normal appearance. Eyes:      Comments: Subtle ptosis of right eyelid   Cardiovascular:      Rate and Rhythm: Normal rate and regular rhythm. Heart sounds: Normal heart sounds. Pulmonary:      Effort: Pulmonary effort is normal.      Breath sounds: Normal breath sounds. Chest:      Chest wall: No tenderness. Musculoskeletal:      Right lower leg: No edema. Left lower leg: No edema. Psychiatric:         Mood and Affect: Mood normal.       This note was generated completely or in part utilizing Dragon dictation speech recognition software. Occasionally, words are mistranscribed and despite editing, the text may contain inaccuracies due to incorrect word recognition.   If further clarification is needed please contact the office at [phone]  --Geo Pichardo MD

## 2023-01-11 NOTE — TELEPHONE ENCOUNTER
Last appointment: 1/11/2023  Next appointment: 3/1/2023  Last refill: 1/11/23    Pharmacy requesting 90 day supply

## 2023-01-11 NOTE — ASSESSMENT & PLAN NOTE
BMI stable. Not doing well with physical activity. Encourage follow-up with Dr. Carlos Castro to get into cardiac rehab which may be a good way to start.

## 2023-01-11 NOTE — ASSESSMENT & PLAN NOTE
Asymptomatic, LDL improved with increased dose atorvastatin, but not below 70. Hold atorvastatin at current 40 mg for now due to new bump in LFTs.

## 2023-01-11 NOTE — ASSESSMENT & PLAN NOTE
Feels slightly more symptomatic, but also addressing issues with difficulty with CPAP currently.   Declines increase in medication at this time, will call if worsens

## 2023-01-11 NOTE — ASSESSMENT & PLAN NOTE
Minimal increase, <2x ULN. Correlates with increase in statin. Will follow, and adjust medication if indicated.

## 2023-01-11 NOTE — ASSESSMENT & PLAN NOTE
Patient states he was not wanting to cancel, there was a misunderstanding.  He was just calling to confirm he was still on the schedule for 1/19/23.     Well-controlled, continue current medications

## 2023-01-17 ENCOUNTER — PATIENT MESSAGE (OUTPATIENT)
Dept: INTERNAL MEDICINE CLINIC | Age: 73
End: 2023-01-17

## 2023-01-17 DIAGNOSIS — N64.4 BREAST TENDERNESS IN FEMALE: Primary | ICD-10-CM

## 2023-01-18 NOTE — TELEPHONE ENCOUNTER
From: Chucky Mazariegos  To: Dr. Zeina Abdullahi  Sent: 1/17/2023 11:22 PM EST  Subject: Mammogram     Please order a diagnostic mammogram for me. Thank you!

## 2023-01-30 ENCOUNTER — TELEPHONE (OUTPATIENT)
Dept: INTERNAL MEDICINE CLINIC | Age: 73
End: 2023-01-30

## 2023-01-30 DIAGNOSIS — N64.4 BREAST TENDERNESS IN FEMALE: Primary | ICD-10-CM

## 2023-01-30 NOTE — TELEPHONE ENCOUNTER
Araceli with [facility] is calling to let Dr. Emiliana James know that if a diagnostic mammogram is ordered, an ultrasound also needs to be ordered with it. Please create new order for US. Araceli can be contacted at number provided with any additional questions.

## 2023-02-06 RX ORDER — LOSARTAN POTASSIUM 25 MG/1
25 TABLET ORAL DAILY
Qty: 30 TABLET | Refills: 0 | Status: SHIPPED | OUTPATIENT
Start: 2023-02-06 | End: 2023-02-07

## 2023-02-07 RX ORDER — LOSARTAN POTASSIUM 25 MG/1
25 TABLET ORAL DAILY
Qty: 90 TABLET | Refills: 0 | Status: SHIPPED | OUTPATIENT
Start: 2023-02-07

## 2023-02-09 ENCOUNTER — HOSPITAL ENCOUNTER (OUTPATIENT)
Dept: WOMENS IMAGING | Age: 73
Discharge: HOME | End: 2023-02-09
Payer: COMMERCIAL

## 2023-02-09 ENCOUNTER — TELEPHONE (OUTPATIENT)
Dept: PULMONOLOGY | Age: 73
End: 2023-02-09

## 2023-02-09 VITALS — BODY MASS INDEX: 33.99 KG/M2 | HEIGHT: 61 IN | WEIGHT: 180 LBS

## 2023-02-09 DIAGNOSIS — N64.4 BREAST TENDERNESS IN FEMALE: ICD-10-CM

## 2023-02-09 PROCEDURE — 77066 DX MAMMO INCL CAD BI: CPT

## 2023-02-09 NOTE — TELEPHONE ENCOUNTER
Patient is having some issues with her pressures. She has a full face mask now. It starts with a low pressure for first 45 minutes and then when it ramps up, it breaks the seal of the mask. Please call patient at [phone].

## 2023-02-09 NOTE — TELEPHONE ENCOUNTER
Discussed during her last visit and encouraged her to work with her DME on mask fit and/or consider a mask liner. She can view mask liners at [email or web address]. She needs a higher pressure to control her sleep apnea. She can also run the mask fit option in the \"more options\" menu on her machine prior to falling asleep to check the fit at the higher pressure and make sure she has the mask secure.

## 2023-02-10 NOTE — TELEPHONE ENCOUNTER
Leroy Castrogillianbhavya Gentile encourages you to work with her DME on mask fit and/or consider a mask liner. You can view mask liners at [employer] You need a higher pressure to control her sleep apnea. You can also run the mask fit option in the \"more options\" menu on your machine prior to falling asleep to check the fit at the higher pressure and make sure you have the mask secure. If you have any further questions regarding pressures, please give us a call at [phone] & our R.T. can go over the machine with you. I hope this helps, have a good weekend! Thanks!

## 2023-02-10 NOTE — TELEPHONE ENCOUNTER
If there is high leak the machine can increased the pressure from mistaking the leak as an event. If she is having trouble with the mask leaking even at a pressure of 5 she will need to find a different mask. It is not uncommon to have to try multiple different mask until finding a comfortable one. Once the mask is fitting better and has a better seal she should be able to better tolerate the pressure. I could try lowering the minimum pressure some until she finds a comfortable mask but the machine will still adjust to the higher pressure if needed.

## 2023-02-21 RX ORDER — LEVOTHYROXINE SODIUM 0.05 MG/1
TABLET ORAL
Qty: 90 TABLET | Refills: 1 | Status: SHIPPED | OUTPATIENT
Start: 2023-02-21

## 2023-02-22 PROBLEM — G47.33 OBSTRUCTIVE SLEEP APNEA: Chronic | Status: ACTIVE | Noted: 2021-12-13

## 2023-02-23 ENCOUNTER — OFFICE VISIT (OUTPATIENT)
Dept: CARDIOLOGY CLINIC | Age: 73
End: 2023-02-23
Payer: COMMERCIAL

## 2023-02-23 VITALS
DIASTOLIC BLOOD PRESSURE: 70 MMHG | BODY MASS INDEX: 34.77 KG/M2 | SYSTOLIC BLOOD PRESSURE: 130 MMHG | HEART RATE: 60 BPM | WEIGHT: 184 LBS

## 2023-02-23 DIAGNOSIS — E78.5 HYPERLIPIDEMIA, UNSPECIFIED HYPERLIPIDEMIA TYPE: ICD-10-CM

## 2023-02-23 DIAGNOSIS — I25.10 CAD IN NATIVE ARTERY: Primary | ICD-10-CM

## 2023-02-23 DIAGNOSIS — Z95.1 HX OF CABG: ICD-10-CM

## 2023-02-23 PROCEDURE — 1123F ACP DISCUSS/DSCN MKR DOCD: CPT | Performed by: INTERNAL MEDICINE

## 2023-02-23 PROCEDURE — 99214 OFFICE O/P EST MOD 30 MIN: CPT | Performed by: INTERNAL MEDICINE

## 2023-02-23 ASSESSMENT — ENCOUNTER SYMPTOMS
CHOKING: 0
CHEST TIGHTNESS: 0
COUGH: 0
SHORTNESS OF BREATH: 0

## 2023-02-23 NOTE — PROGRESS NOTES
Subjective:      Patient ID: January Gutierrez is a 71 y.o. female. HPI  Follow up for CAD/CABG/Lipids. Doing well post cabg. Increasing activities. Walking daily. Chest wall good. No angina. Wt down. No pnd/orthopnea. No edema. No tachy/syncope. Past Medical History:   Diagnosis Date    Abnormal Pap smear of cervix     Acid reflux     Bacterial vaginosis     CAD (coronary artery disease)     Dysmenorrhea     Environmental allergies     Fibrocystic breast     Fibroid     Herpes simplex type 2 infection     History of blood transfusion     Hyperlipidemia     Hypothyroid     Dx about 15 years ago, but htne was off med for  long time    Insomnia     Menopause     approx age 37    Menopause ovarian failure     Menorrhagia     Obstructive sleep apnea 12/13/2021    HST-severe, Dr. Demetris Vee. 5/2022    Sleep apnea 08/2022    just diagnosed in April, waiting for CPAP    Urinary incontinence     Urogenital trichomoniasis      Past Surgical History:   Procedure Laterality Date    BREAST BIOPSY      CAPSULOTOMY Bilateral 02/2020    Yag    CARDIAC CATHETERIZATION  08/10/2022    LAD with 80-90% prox. Lg D1 with 80% prox. Cx with prox 90%.    RCA with prox 90%-dom    CATARACT REMOVAL WITH IMPLANT Right 06/26/2019    COLONOSCOPY      CORONARY ARTERY BYPASS GRAFT N/A 9/1/2022    CABG x 4, LIMA to LAD, SVG to D1, OM2 and PDA; EVH R thigh; MARION; TCPB; sternal plate x 1 (Biomet) performed by Liam Bunn MD at [facility] 94 N/A 10/25/2018    ESOPHAGEAL DILATION [place] performed by Heather Sifuentes MD at [facility] 6., TOTAL ABDOMINAL (CERVIX REMOVED)  1990    fibroid-still with ovaries    INTRACAPSULAR CATARACT EXTRACTION Left 06/12/2019    PHACOEMULSIFICATION OF CATARACT LEFT EYE WITH INTRAOCULAR LENS IMPLANT performed by Moe Ly MD at [facility] 267 Right 06/26/2019    PHACOEMULSIFICATION OF CATARACT RIGHT EYE WITH INTRAOCULAR LENS IMPLANT performed by Xiomy Fuentes MD at [facility] N/A 10/25/2018    EGD BIOPSY performed by Taylor Guzman MD at [address] History     Socioeconomic History    Marital status:      Spouse name: ,  works    Number of children: 4    Years of education: Not on file    Highest education level: Not on file   Occupational History    Occupation:    Tobacco Use    Smoking status: Never    Smokeless tobacco: Never    Tobacco comments:     Never smoked, never will! Vaping Use    Vaping Use: Never used   Substance and Sexual Activity    Alcohol use: No    Drug use: Never    Sexual activity: Not Currently     Partners: Male   Other Topics Concern    Not on file   Social History Narrative    Exercise:    2-3x/week, recumbent bike or walking for 20-30 min     Social Determinants of Health     Financial Resource Strain: Low Risk     Difficulty of Paying Living Expenses: Not hard at all   Food Insecurity: No Food Insecurity    Worried About Running Out of Food in the Last Year: Never true    Ran Out of Food in the Last Year: Never true   Transportation Needs: Not on file   Physical Activity: Not on file   Stress: Not on file   Social Connections: Not on file   Intimate Partner Violence: Not on file   Housing Stability: Not on file     FH reviewed, negative for cardiac issues,  Bro with CAD      Vitals:    02/23/23 0931   BP: 130/70   Pulse: 60       Wt 184    Review of Systems   Constitutional:  Negative for activity change, appetite change and fatigue. Respiratory:  Negative for cough, choking, chest tightness and shortness of breath. Cardiovascular:  Negative for chest pain, palpitations and leg swelling. Denies PND or orthopnea. No tachycardia or syncope. Neurological:  Negative for dizziness, syncope and light-headedness.    Psychiatric/Behavioral:  Negative for agitation, behavioral problems and confusion.    All other systems reviewed and are negative.    Objective:   Physical Exam  Constitutional:       General: She is not in acute distress.     Appearance: Normal appearance. She is well-developed.   HENT:      Head: Normocephalic and atraumatic.      Right Ear: External ear normal.      Left Ear: External ear normal.      Nose: Nose normal.   Neck:      Vascular: No JVD.   Cardiovascular:      Rate and Rhythm: Normal rate and regular rhythm.      Heart sounds: Normal heart sounds. No murmur heard.    No gallop.   Pulmonary:      Effort: Pulmonary effort is normal. No respiratory distress.      Breath sounds: Normal breath sounds. No wheezing or rales.   Abdominal:      General: Bowel sounds are normal. There is no distension.      Palpations: Abdomen is soft.      Tenderness: There is no abdominal tenderness.   Musculoskeletal:         General: Normal range of motion.      Cervical back: Normal range of motion.   Skin:     General: Skin is warm and dry.   Neurological:      General: No focal deficit present.      Mental Status: She is alert and oriented to person, place, and time.   Psychiatric:         Behavior: Behavior normal.       Assessment:       Diagnosis Orders   1. CAD in native artery        2. Hx of CABG        3. Hyperlipidemia, unspecified hyperlipidemia type                  Plan:      CV stable.  Walking daily.  BP good.   Compensated.  No angina.  Continue lopressor/losartan/ asa/lipitor.  Cardiac rehab. Try to schedule again. Lipids per PCP. Reviewed previous records and testing including Cath 8/22 and hosp for CABG. Follow 3 months.        Scot Nye MD

## 2023-02-24 RX ORDER — TRAZODONE HYDROCHLORIDE 50 MG/1
TABLET ORAL
Qty: 60 TABLET | Refills: 5 | Status: SHIPPED | OUTPATIENT
Start: 2023-02-24

## 2023-02-27 SDOH — ECONOMIC STABILITY: FOOD INSECURITY: WITHIN THE PAST 12 MONTHS, YOU WORRIED THAT YOUR FOOD WOULD RUN OUT BEFORE YOU GOT MONEY TO BUY MORE.: NEVER TRUE

## 2023-02-27 SDOH — ECONOMIC STABILITY: TRANSPORTATION INSECURITY
IN THE PAST 12 MONTHS, HAS LACK OF TRANSPORTATION KEPT YOU FROM MEETINGS, WORK, OR FROM GETTING THINGS NEEDED FOR DAILY LIVING?: NO

## 2023-02-27 SDOH — ECONOMIC STABILITY: INCOME INSECURITY: HOW HARD IS IT FOR YOU TO PAY FOR THE VERY BASICS LIKE FOOD, HOUSING, MEDICAL CARE, AND HEATING?: NOT VERY HARD

## 2023-02-27 SDOH — ECONOMIC STABILITY: FOOD INSECURITY: WITHIN THE PAST 12 MONTHS, THE FOOD YOU BOUGHT JUST DIDN'T LAST AND YOU DIDN'T HAVE MONEY TO GET MORE.: NEVER TRUE

## 2023-02-27 SDOH — ECONOMIC STABILITY: HOUSING INSECURITY
IN THE LAST 12 MONTHS, WAS THERE A TIME WHEN YOU DID NOT HAVE A STEADY PLACE TO SLEEP OR SLEPT IN A SHELTER (INCLUDING NOW)?: NO

## 2023-03-01 ENCOUNTER — OFFICE VISIT (OUTPATIENT)
Dept: INTERNAL MEDICINE CLINIC | Age: 73
End: 2023-03-01

## 2023-03-01 VITALS
HEIGHT: 61 IN | DIASTOLIC BLOOD PRESSURE: 76 MMHG | OXYGEN SATURATION: 96 % | BODY MASS INDEX: 35.5 KG/M2 | SYSTOLIC BLOOD PRESSURE: 138 MMHG | WEIGHT: 188 LBS | HEART RATE: 67 BPM

## 2023-03-01 DIAGNOSIS — E11.9 NEW ONSET TYPE 2 DIABETES MELLITUS (HCC): Primary | ICD-10-CM

## 2023-03-01 DIAGNOSIS — R73.9 HYPERGLYCEMIA: ICD-10-CM

## 2023-03-01 DIAGNOSIS — I25.118 CORONARY ARTERY DISEASE INVOLVING NATIVE CORONARY ARTERY OF NATIVE HEART WITH OTHER FORM OF ANGINA PECTORIS (HCC): ICD-10-CM

## 2023-03-01 DIAGNOSIS — R05.3 CHRONIC COUGH: ICD-10-CM

## 2023-03-01 PROBLEM — R05.2 SUBACUTE COUGH: Status: ACTIVE | Noted: 2023-03-01

## 2023-03-01 LAB
CHP ED QC CHECK: NORMAL
CREATININE URINE: 92.9 MG/DL (ref 28–259)
GLUCOSE BLD-MCNC: 6.1 MG/DL
MICROALBUMIN UR-MCNC: <1.2 MG/DL
MICROALBUMIN/CREAT UR-RTO: NORMAL MG/G (ref 0–30)

## 2023-03-01 RX ORDER — FEXOFENADINE HCL 180 MG/1
180 TABLET ORAL DAILY
Qty: 90 TABLET | Refills: 3 | Status: SHIPPED | OUTPATIENT
Start: 2023-03-01 | End: 2023-03-31

## 2023-03-01 RX ORDER — IPRATROPIUM BROMIDE 42 UG/1
2 SPRAY, METERED NASAL 4 TIMES DAILY
Qty: 30 ML | Refills: 3 | Status: SHIPPED | OUTPATIENT
Start: 2023-03-01

## 2023-03-01 NOTE — ASSESSMENT & PLAN NOTE
Asymptomatic. Doing really well with watching carbs, staying consistent with exercise. Patient desires to manage with lifestyle alone at this time. We will refer for diabetic education. She has an Accu-Chek, glucometer at home, and will let us know specifically what the test strips are so we can order those for her.

## 2023-03-01 NOTE — PROGRESS NOTES
Alexander Mayberry   :  1950  3/1/2023    ASSESSMENT/PLAN:   1. New onset type 2 diabetes mellitus ([address]  Assessment & Plan:  Asymptomatic. Doing really well with watching carbs, staying consistent with exercise. Patient desires to manage with lifestyle alone at this time. We will refer for diabetic education. She has an Accu-Chek, glucometer at home, and will let us know specifically what the test strips are so we can order those for her. Orders:  -     [facility] Individual Diabetes Education (Non Care Coord Patient), [facility] DIABETES FOOT EXAM  -     POCT Glucose  2. Coronary artery disease involving native coronary artery of native heart with other form of angina pectoris [facility])  Assessment & Plan:  Stable, saw cardiology last week. Does mention 2 episodes with some chest tightness and discomfort with exertion, generally no discomfort when exercising. Of asked her to just make Dr. Shala Coello aware  3. Chronic cough  Assessment & Plan:  Improved with change from lisinopril to losartan but still having some symptoms on a daily basis. She is having some postnasal drainage. Better response to Atrovent and Nasacort however costly. At lower cost with good Rx, so patient will return to the Atrovent. Also changing from Allegra-D once daily to fexofenadine 180 for more sustained response and to limit the decongestant. 4. Hyperglycemia  -     MICROALBUMIN / CREATININE URINE RATIO; Future    Return in about 3 months (around 2023). SUBJECTIVE     71 y.o. female established patient here for:   Chief Complaint   Patient presents with    Diabetes     Follow up      Still issues with sleep and CPAP. Getting new machine. Not able to uses currently. Has recumbent bike, and walks after work. Has had chest tightness a few times with exertion. Coming up stairs and today with brisk walk into office. Hasn't had on recumbent bike.      Cough much better since changed from lisinopril to losartan, but still has coughing spell once or twice daily. Starts with tickle in throat. Usually very brief. Has a lot of mucus in back of mouth. Atrovent nasal spray was working well but very expensive so went back to other. New diabetes, watching carbs, getting about 30 g /meal.   Has 's old accucheck. When last checked it was 100. Review of Systems    Outpatient Medications Marked as Taking for the 3/1/23 encounter (Office Visit) with Jocelyne Phillips MD   Medication Sig Dispense Refill    fexofenadine (ALLEGRA) 180 MG tablet Take 1 tablet by mouth daily 90 tablet 3    Triamcinolone Acetonide (NASACORT AQ NA) by Nasal route      ipratropium (ATROVENT) 0.06 % nasal spray 2 sprays by Each Nostril route 4 times daily 30 mL 3    traZODone (DESYREL) 50 MG tablet TAKE 1 TO 2 TABLETS NIGHTLYAS NEEDED FOR SLEEP 60 tablet 5    levothyroxine (SYNTHROID) 50 MCG tablet TAKE 1 TABLET DAILY 90 tablet 1    losartan (COZAAR) 25 MG tablet TAKE 1 TABLET BY MOUTH DAILY 90 tablet 0    metoprolol tartrate (LOPRESSOR) 25 MG tablet Take 0.5 tablets by mouth 2 times daily 90 tablet 3    buPROPion (WELLBUTRIN XL) 300 MG extended release tablet TAKE 1 TABLET EVERY MORNING 90 tablet 1    aspirin 325 MG EC tablet Take 1 tablet by mouth daily 30 tablet 3    atorvastatin (LIPITOR) 40 MG tablet Take 1 tablet by mouth daily 90 tablet 3    omeprazole (PRILOSEC) 40 MG delayed release capsule TAKE 1 CAPSULE IN THE      MORNING AND AT BEDTIME 180 capsule 1    FLUoxetine (PROZAC) 10 MG capsule TAKE 1 CAPSULE DAILY 90 capsule 1    Fexofenadine-Pseudoephedrine (ALLEGRA-D 12 HOUR PO) Take by mouth daily      valACYclovir (VALTREX) 1 g tablet TAKE 1 TABLET DAILY 90 tablet 1    vitamin B-12 (CYANOCOBALAMIN) 1000 MCG tablet Take 1 tablet by mouth daily 30 tablet 3    Cholecalciferol (VITAMIN D3) 1000 units TABS Take 1 tablet by mouth daily 30 tablet 11    bisacodyl (DULCOLAX) 5 MG EC tablet Take 10 mg by mouth daily as needed for Constipation. OBJECTIVE:  Vitals:    03/01/23 0822   BP: 138/76   Pulse: 67   SpO2: 96%   Weight: 188 lb (85.3 kg)   Height: 5' 1\" (1.549 m)     Physical Exam  Constitutional:       Appearance: Normal appearance. HENT:      Mouth/Throat:      Comments: Mild cobblestoning O/P  Eyes:      Comments: Subtle ptosis of right eyelid   Cardiovascular:      Rate and Rhythm: Normal rate and regular rhythm. Pulses:           Dorsalis pedis pulses are 1+ on the right side and 1+ on the left side. Posterior tibial pulses are 0 on the right side and 0 on the left side. Heart sounds: Normal heart sounds. Pulmonary:      Effort: Pulmonary effort is normal.      Breath sounds: Normal breath sounds. Chest:      Chest wall: No tenderness. Musculoskeletal:      Right lower leg: No edema. Left lower leg: No edema. Feet:      Right foot:      Protective Sensation: 6 sites tested. 6 sites sensed. Skin integrity: Skin integrity normal.      Left foot:      Protective Sensation: 6 sites tested. Skin integrity: Skin integrity normal.   Psychiatric:         Mood and Affect: Mood normal.       This note was generated completely or in part utilizing Dragon dictation speech recognition software. Occasionally, words are mistranscribed and despite editing, the text may contain inaccuracies due to incorrect word recognition.   If further clarification is needed please contact the office at [phone]  --Baron Grabiel MD

## 2023-03-01 NOTE — ASSESSMENT & PLAN NOTE
Improved with change from lisinopril to losartan but still having some symptoms on a daily basis. She is having some postnasal drainage. Better response to Atrovent and Nasacort however costly. At lower cost with good Rx, so patient will return to the Atrovent. Also changing from Allegra-D once daily to fexofenadine 180 for more sustained response and to limit the decongestant.

## 2023-03-01 NOTE — ASSESSMENT & PLAN NOTE
Stable, saw cardiology last week. Does mention 2 episodes with some chest tightness and discomfort with exertion, generally no discomfort when exercising.   Of asked her to just make Dr. Meagan Nova aware

## 2023-05-11 RX ORDER — LOSARTAN POTASSIUM 25 MG/1
25 TABLET ORAL DAILY
Qty: 90 TABLET | Refills: 0 | OUTPATIENT
Start: 2023-05-11

## 2023-05-11 RX ORDER — LOSARTAN POTASSIUM 25 MG/1
25 TABLET ORAL DAILY
Qty: 90 TABLET | Refills: 0 | Status: SHIPPED | OUTPATIENT
Start: 2023-05-11

## 2023-05-11 NOTE — TELEPHONE ENCOUNTER
----- Message from Barbara Mazariegos sent at 5/10/2023  7:06 PM EDT -----  Regarding: Refill? Contact: [phone]  It was Wesley who called to refill. Please send my Losartan prescription to [facility].     Thank you,  Mary Lou Krishnan

## 2023-05-14 RX ORDER — BUPROPION HYDROCHLORIDE 300 MG/1
TABLET ORAL
Qty: 90 TABLET | Refills: 1 | Status: SHIPPED | OUTPATIENT
Start: 2023-05-14

## 2023-05-17 ENCOUNTER — OFFICE VISIT (OUTPATIENT)
Dept: INTERNAL MEDICINE CLINIC | Age: 73
End: 2023-05-17

## 2023-05-17 VITALS
OXYGEN SATURATION: 96 % | WEIGHT: 186.6 LBS | SYSTOLIC BLOOD PRESSURE: 134 MMHG | TEMPERATURE: 97.5 F | BODY MASS INDEX: 35.26 KG/M2 | HEART RATE: 61 BPM | DIASTOLIC BLOOD PRESSURE: 59 MMHG

## 2023-05-17 DIAGNOSIS — R74.8 ELEVATED LIVER ENZYMES: ICD-10-CM

## 2023-05-17 DIAGNOSIS — E11.9 TYPE 2 DIABETES MELLITUS WITHOUT COMPLICATION, WITHOUT LONG-TERM CURRENT USE OF INSULIN (HCC): ICD-10-CM

## 2023-05-17 DIAGNOSIS — R20.2 NUMBNESS AND TINGLING IN RIGHT HAND: ICD-10-CM

## 2023-05-17 DIAGNOSIS — E03.9 ACQUIRED HYPOTHYROIDISM: Chronic | ICD-10-CM

## 2023-05-17 DIAGNOSIS — R05.3 CHRONIC COUGH: ICD-10-CM

## 2023-05-17 DIAGNOSIS — R23.3 PETECHIAL RASH: Primary | ICD-10-CM

## 2023-05-17 DIAGNOSIS — R23.3 PETECHIAL RASH: ICD-10-CM

## 2023-05-17 DIAGNOSIS — R20.0 NUMBNESS AND TINGLING IN RIGHT HAND: ICD-10-CM

## 2023-05-17 LAB
ALBUMIN SERPL-MCNC: 4 G/DL (ref 3.4–5)
ALBUMIN/GLOB SERPL: 0.8 {RATIO} (ref 1.1–2.2)
ALP SERPL-CCNC: 121 U/L (ref 40–129)
ALT SERPL-CCNC: 152 U/L (ref 10–40)
ANION GAP SERPL CALCULATED.3IONS-SCNC: 15 MMOL/L (ref 3–16)
AST SERPL-CCNC: 183 U/L (ref 15–37)
BASOPHILS # BLD: 0.1 K/UL (ref 0–0.2)
BASOPHILS NFR BLD: 1 %
BILIRUB SERPL-MCNC: 0.7 MG/DL (ref 0–1)
BUN SERPL-MCNC: 12 MG/DL (ref 7–20)
CALCIUM SERPL-MCNC: 8.9 MG/DL (ref 8.3–10.6)
CHLORIDE SERPL-SCNC: 103 MMOL/L (ref 99–110)
CO2 SERPL-SCNC: 20 MMOL/L (ref 21–32)
CREAT SERPL-MCNC: 0.9 MG/DL (ref 0.6–1.2)
DEPRECATED RDW RBC AUTO: 17.3 % (ref 12.4–15.4)
EOSINOPHIL # BLD: 0.2 K/UL (ref 0–0.6)
EOSINOPHIL NFR BLD: 4.3 %
GFR SERPLBLD CREATININE-BSD FMLA CKD-EPI: >60 ML/MIN/{1.73_M2}
GLUCOSE SERPL-MCNC: 111 MG/DL (ref 70–99)
HCT VFR BLD AUTO: 35.5 % (ref 36–48)
HGB BLD-MCNC: 11.1 G/DL (ref 12–16)
LYMPHOCYTES # BLD: 2 K/UL (ref 1–5.1)
LYMPHOCYTES NFR BLD: 35.9 %
MCH RBC QN AUTO: 26.3 PG (ref 26–34)
MCHC RBC AUTO-ENTMCNC: 31.4 G/DL (ref 31–36)
MCV RBC AUTO: 83.9 FL (ref 80–100)
MONOCYTES # BLD: 0.5 K/UL (ref 0–1.3)
MONOCYTES NFR BLD: 8.4 %
NEUTROPHILS # BLD: 2.8 K/UL (ref 1.7–7.7)
NEUTROPHILS NFR BLD: 50.4 %
PLATELET # BLD AUTO: 228 K/UL (ref 135–450)
PMV BLD AUTO: 10.9 FL (ref 5–10.5)
POTASSIUM SERPL-SCNC: 4.6 MMOL/L (ref 3.5–5.1)
PROT SERPL-MCNC: 8.8 G/DL (ref 6.4–8.2)
RBC # BLD AUTO: 4.23 M/UL (ref 4–5.2)
SODIUM SERPL-SCNC: 138 MMOL/L (ref 136–145)
TSH SERPL DL<=0.005 MIU/L-ACNC: 2.71 UIU/ML (ref 0.27–4.2)
WBC # BLD AUTO: 5.6 K/UL (ref 4–11)

## 2023-05-17 NOTE — ASSESSMENT & PLAN NOTE
New problem, etiology/prognosis unknown. Concern for possible vasculitis. Check urinalysis with microscopy, CBC with differential and CMP. Further labs as indicated.

## 2023-05-17 NOTE — ASSESSMENT & PLAN NOTE
Asymptomatic. Has not learned how to use glucometer, but declines offer to bring into office. Will confirm on 6/2 that she has been able to do so. We will also encourage her to go for regular diabetic education.

## 2023-05-17 NOTE — PROGRESS NOTES
Luci Orellana   :  1950  2023    ASSESSMENT/PLAN:   1. Petechial rash  Assessment & Plan:  New problem, etiology/prognosis unknown. Concern for possible vasculitis. Check urinalysis with microscopy, CBC with differential and CMP. Further labs as indicated. Orders:  -     Urinalysis with Microscopic  -     CBC with Auto Differential; Future  -     Comprehensive Metabolic Panel; Future  2. Type 2 diabetes mellitus without complication, without long-term current use of insulin (HCC)  Assessment & Plan:  Asymptomatic. Has not learned how to use glucometer, but declines offer to bring into office. Will confirm on  that she has been able to do so. We will also encourage her to go for regular diabetic education. Orders:  -     Hemoglobin A1C; Future  3. Chronic cough  Assessment & Plan:  Essentially resolved, periodic cough now. 4. Numbness and tingling in right hand  Comments:  New problem, CTS versus radiculopathy? Check TSH today, further evaluation at her follow-up in a few weeks. 5. Acquired hypothyroidism  -     TSH with Reflex; Future      Keep  appointment, agudelo to AWV and assess hand numbness if persists. SUBJECTIVE     71 y.o. female established patient here for:   Chief Complaint   Patient presents with    Rash     Leg rashes      Rash on legs started last Thursday after spending a lot of time outside gardening. She states that her legs were swollen as well although does not describe severe swelling. Rash is not itchy or painful. Did a telehealth visit and was prescribed triamcinolone cream.  Rash seems to be fading since last Thursday. Urine usually dark, no different. Drinks tea or coffee during the day and just water with meds morning at night. Also twisted right ankle on Saturday, Was sore but getting better. Able to walk without difficulty. Doing fair with BiPAP. Keeping on at least 4 hours with 1.9 events. On this since March.      Numbness in

## 2023-05-18 LAB
BACTERIA URNS QL MICRO: ABNORMAL /HPF
BILIRUB UR QL STRIP.AUTO: NEGATIVE
CLARITY UR: ABNORMAL
COLOR UR: YELLOW
EPI CELLS #/AREA URNS AUTO: 2 /HPF (ref 0–5)
EST. AVERAGE GLUCOSE BLD GHB EST-MCNC: 134.1 MG/DL
GLUCOSE UR STRIP.AUTO-MCNC: NEGATIVE MG/DL
HBA1C MFR BLD: 6.3 %
HGB UR QL STRIP.AUTO: NEGATIVE
HYALINE CASTS #/AREA URNS AUTO: 0 /LPF (ref 0–8)
KETONES UR STRIP.AUTO-MCNC: NEGATIVE MG/DL
LEUKOCYTE ESTERASE UR QL STRIP.AUTO: NEGATIVE
NITRITE UR QL STRIP.AUTO: NEGATIVE
PH UR STRIP.AUTO: 7 [PH] (ref 5–8)
PROT UR STRIP.AUTO-MCNC: NEGATIVE MG/DL
RBC CLUMPS #/AREA URNS AUTO: 1 /HPF (ref 0–4)
SP GR UR STRIP.AUTO: 1.01 (ref 1–1.03)
UA DIPSTICK W REFLEX MICRO PNL UR: YES
URN SPEC COLLECT METH UR: ABNORMAL
UROBILINOGEN UR STRIP-ACNC: 1 E.U./DL
WBC #/AREA URNS AUTO: 0 /HPF (ref 0–5)

## 2023-05-19 ENCOUNTER — TELEPHONE (OUTPATIENT)
Dept: INTERNAL MEDICINE CLINIC | Age: 73
End: 2023-05-19

## 2023-05-19 LAB
HBV SURFACE AB SERPL IA-ACNC: <3.5 MIU/ML
HBV SURFACE AG SERPL QL IA: NORMAL
HCV AB SERPL QL IA: NORMAL

## 2023-05-21 LAB — HBV CORE AB SERPL QL IA: NEGATIVE

## 2023-05-22 ENCOUNTER — TELEPHONE (OUTPATIENT)
Dept: INTERNAL MEDICINE CLINIC | Age: 73
End: 2023-05-22

## 2023-05-22 DIAGNOSIS — R23.3 PETECHIAL RASH: ICD-10-CM

## 2023-05-22 DIAGNOSIS — E88.09 HYPERPROTEINEMIA: Primary | ICD-10-CM

## 2023-05-22 LAB
REASON FOR REJECTION: NORMAL
REJECTED TEST: NORMAL

## 2023-05-22 NOTE — TELEPHONE ENCOUNTER
Please call her. Need her to stop in lab for some additional testing. Lab was not able to add on what was needed to help determine what is causing rash as well as unexpected lab abnormalities.

## 2023-05-22 NOTE — TELEPHONE ENCOUNTER
Left message on voicemail advising pt to stop in or go to another [facility] draw site to have additional testing

## 2023-05-22 NOTE — TELEPHONE ENCOUNTER
Ayesha from [facility] is calling to state she had to \"cancel the order for Serum protein immunoelectrophoresis\" due to not enough specimen. Please contact Ayesha with any additional questions.

## 2023-05-23 ENCOUNTER — HOSPITAL ENCOUNTER (OUTPATIENT)
Age: 73
Discharge: HOME | End: 2023-05-23
Payer: COMMERCIAL

## 2023-05-23 DIAGNOSIS — R23.3 PETECHIAL RASH: ICD-10-CM

## 2023-05-23 DIAGNOSIS — E88.09 HYPERPROTEINEMIA: ICD-10-CM

## 2023-05-23 LAB
ALBUMIN SERPL-MCNC: 3.9 G/DL (ref 3.4–5)
ALBUMIN/GLOB SERPL: 0.8 {RATIO} (ref 1.1–2.2)
ALP SERPL-CCNC: 119 U/L (ref 40–129)
ALT SERPL-CCNC: 138 U/L (ref 10–40)
ANION GAP SERPL CALCULATED.3IONS-SCNC: 9 MMOL/L (ref 3–16)
AST SERPL-CCNC: 139 U/L (ref 15–37)
BASOPHILS # BLD: 0.1 K/UL (ref 0–0.2)
BASOPHILS NFR BLD: 1.3 %
BILIRUB SERPL-MCNC: 0.7 MG/DL (ref 0–1)
BUN SERPL-MCNC: 15 MG/DL (ref 7–20)
CALCIUM SERPL-MCNC: 9.3 MG/DL (ref 8.3–10.6)
CHLORIDE SERPL-SCNC: 101 MMOL/L (ref 99–110)
CO2 SERPL-SCNC: 25 MMOL/L (ref 21–32)
CREAT SERPL-MCNC: 0.8 MG/DL (ref 0.6–1.2)
CRP SERPL-MCNC: 6.6 MG/L (ref 0–5.1)
DEPRECATED RDW RBC AUTO: 17.6 % (ref 12.4–15.4)
EOSINOPHIL # BLD: 0.1 K/UL (ref 0–0.6)
EOSINOPHIL NFR BLD: 2.1 %
ERYTHROCYTE [SEDIMENTATION RATE] IN BLOOD BY WESTERGREN METHOD: >130 MM/HR (ref 0–30)
GFR SERPLBLD CREATININE-BSD FMLA CKD-EPI: >60 ML/MIN/{1.73_M2}
GLUCOSE SERPL-MCNC: 104 MG/DL (ref 70–99)
HCT VFR BLD AUTO: 34.9 % (ref 36–48)
HGB BLD-MCNC: 11.5 G/DL (ref 12–16)
LYMPHOCYTES # BLD: 2.3 K/UL (ref 1–5.1)
LYMPHOCYTES NFR BLD: 40.9 %
MCH RBC QN AUTO: 27.8 PG (ref 26–34)
MCHC RBC AUTO-ENTMCNC: 32.9 G/DL (ref 31–36)
MCV RBC AUTO: 84.5 FL (ref 80–100)
MONOCYTES # BLD: 0.4 K/UL (ref 0–1.3)
MONOCYTES NFR BLD: 7 %
NEUTROPHILS # BLD: 2.7 K/UL (ref 1.7–7.7)
NEUTROPHILS NFR BLD: 48.7 %
PLATELET # BLD AUTO: 228 K/UL (ref 135–450)
PMV BLD AUTO: 10.5 FL (ref 5–10.5)
POTASSIUM SERPL-SCNC: 4.4 MMOL/L (ref 3.5–5.1)
PROT SERPL-MCNC: 9.1 G/DL (ref 6.4–8.2)
RBC # BLD AUTO: 4.13 M/UL (ref 4–5.2)
SODIUM SERPL-SCNC: 135 MMOL/L (ref 136–145)
WBC # BLD AUTO: 5.6 K/UL (ref 4–11)

## 2023-05-23 PROCEDURE — 86140 C-REACTIVE PROTEIN: CPT

## 2023-05-23 PROCEDURE — 84155 ASSAY OF PROTEIN SERUM: CPT

## 2023-05-23 PROCEDURE — 84165 PROTEIN E-PHORESIS SERUM: CPT

## 2023-05-23 PROCEDURE — 85652 RBC SED RATE AUTOMATED: CPT

## 2023-05-23 PROCEDURE — 36415 COLL VENOUS BLD VENIPUNCTURE: CPT

## 2023-05-23 PROCEDURE — 86334 IMMUNOFIX E-PHORESIS SERUM: CPT

## 2023-05-23 PROCEDURE — 85025 COMPLETE CBC W/AUTO DIFF WBC: CPT

## 2023-05-23 PROCEDURE — 80053 COMPREHEN METABOLIC PANEL: CPT

## 2023-05-23 PROCEDURE — 82595 ASSAY OF CRYOGLOBULIN: CPT

## 2023-05-24 LAB
ALBUMIN SERPL ELPH-MCNC: 3.3 G/DL (ref 3.1–4.9)
ALPHA1 GLOB SERPL ELPH-MCNC: 0.4 G/DL (ref 0.2–0.4)
ALPHA2 GLOB SERPL ELPH-MCNC: 0.8 G/DL (ref 0.4–1.1)
B-GLOBULIN SERPL ELPH-MCNC: 1.3 G/DL (ref 0.9–1.6)
GAMMA GLOB SERPL ELPH-MCNC: 3.3 G/DL (ref 0.6–1.8)
SPE/IFE INTERPRETATION: NORMAL

## 2023-05-25 ENCOUNTER — OFFICE VISIT (OUTPATIENT)
Dept: CARDIOLOGY CLINIC | Age: 73
End: 2023-05-25
Payer: COMMERCIAL

## 2023-05-25 VITALS
DIASTOLIC BLOOD PRESSURE: 60 MMHG | HEART RATE: 84 BPM | BODY MASS INDEX: 35.41 KG/M2 | WEIGHT: 187.4 LBS | SYSTOLIC BLOOD PRESSURE: 126 MMHG

## 2023-05-25 DIAGNOSIS — E78.5 HYPERLIPIDEMIA, UNSPECIFIED HYPERLIPIDEMIA TYPE: ICD-10-CM

## 2023-05-25 DIAGNOSIS — I25.10 CAD IN NATIVE ARTERY: Primary | ICD-10-CM

## 2023-05-25 DIAGNOSIS — Z95.1 HX OF CABG: ICD-10-CM

## 2023-05-25 PROCEDURE — 1123F ACP DISCUSS/DSCN MKR DOCD: CPT | Performed by: INTERNAL MEDICINE

## 2023-05-25 PROCEDURE — 99214 OFFICE O/P EST MOD 30 MIN: CPT | Performed by: INTERNAL MEDICINE

## 2023-05-25 ASSESSMENT — ENCOUNTER SYMPTOMS
CHEST TIGHTNESS: 0
SHORTNESS OF BREATH: 0
COUGH: 0
CHOKING: 0

## 2023-05-25 NOTE — PROGRESS NOTES
Subjective:      Patient ID: Nalini Gaviria is a 71 y.o. female. HPI  Follow up for CAD/CABG/Lipids. Doing well. Walking daily. 2-3 days a week. Also yard work. No sob. No angina. Wt stable. No pnd/orthopnea. No edema. No tachy/syncope. Some fatigue. Past Medical History:   Diagnosis Date    Abnormal Pap smear of cervix     Acid reflux     Bacterial vaginosis     CAD (coronary artery disease)     Dysmenorrhea     Environmental allergies     Fibrocystic breast     Fibroid     Herpes simplex type 2 infection     History of blood transfusion     Hyperlipidemia     Hypothyroid     Dx about 15 years ago, but htne was off med for  long time    Insomnia     Menopause     approx age 37    Menopause ovarian failure     Menorrhagia     Obstructive sleep apnea 12/13/2021    HST-severe, Dr. Yusra Encarnacion. 5/2022    Sleep apnea 08/2022    just diagnosed in April, waiting for CPAP    Urinary incontinence     Urogenital trichomoniasis      Past Surgical History:   Procedure Laterality Date    BREAST BIOPSY      CAPSULOTOMY Bilateral 02/2020    Yag    CARDIAC CATHETERIZATION  08/10/2022    LAD with 80-90% prox. Lg D1 with 80% prox. Cx with prox 90%.    RCA with prox 90%-dom    CATARACT REMOVAL WITH IMPLANT Right 06/26/2019    COLONOSCOPY      CORONARY ARTERY BYPASS GRAFT N/A 9/1/2022    CABG x 4, LIMA to LAD, SVG to D1, OM2 and PDA; EVH R thigh; MARION; TCPB; sternal plate x 1 (Biomet) performed by Sasha Irizarry MD at [facility] 94 N/A 10/25/2018    ESOPHAGEAL DILATION [place] performed by Yahaira Smith MD at [facility] 6., TOTAL ABDOMINAL (CERVIX REMOVED)  1990    fibroid-still with ovaries    INTRACAPSULAR CATARACT EXTRACTION Left 06/12/2019    PHACOEMULSIFICATION OF CATARACT LEFT EYE WITH INTRAOCULAR LENS IMPLANT performed by Griselda Lopez MD at [facility] 267 Right 06/26/2019

## 2023-05-30 LAB — CRYOGLOB SER QL: NORMAL

## 2023-05-30 SDOH — HEALTH STABILITY: PHYSICAL HEALTH: ON AVERAGE, HOW MANY MINUTES DO YOU ENGAGE IN EXERCISE AT THIS LEVEL?: 20 MIN

## 2023-05-30 SDOH — HEALTH STABILITY: PHYSICAL HEALTH: ON AVERAGE, HOW MANY DAYS PER WEEK DO YOU ENGAGE IN MODERATE TO STRENUOUS EXERCISE (LIKE A BRISK WALK)?: 3 DAYS

## 2023-05-30 ASSESSMENT — PATIENT HEALTH QUESTIONNAIRE - PHQ9
SUM OF ALL RESPONSES TO PHQ QUESTIONS 1-9: 0
1. LITTLE INTEREST OR PLEASURE IN DOING THINGS: 0
SUM OF ALL RESPONSES TO PHQ9 QUESTIONS 1 & 2: 0
SUM OF ALL RESPONSES TO PHQ QUESTIONS 1-9: 0
SUM OF ALL RESPONSES TO PHQ QUESTIONS 1-9: 0
2. FEELING DOWN, DEPRESSED OR HOPELESS: 0
SUM OF ALL RESPONSES TO PHQ QUESTIONS 1-9: 0

## 2023-05-30 ASSESSMENT — LIFESTYLE VARIABLES
HOW MANY STANDARD DRINKS CONTAINING ALCOHOL DO YOU HAVE ON A TYPICAL DAY: PATIENT DOES NOT DRINK
HOW OFTEN DO YOU HAVE A DRINK CONTAINING ALCOHOL: 1
HOW MANY STANDARD DRINKS CONTAINING ALCOHOL DO YOU HAVE ON A TYPICAL DAY: 0
HOW OFTEN DO YOU HAVE SIX OR MORE DRINKS ON ONE OCCASION: 1
HOW OFTEN DO YOU HAVE A DRINK CONTAINING ALCOHOL: NEVER

## 2023-05-31 ENCOUNTER — TELEPHONE (OUTPATIENT)
Dept: INTERNAL MEDICINE CLINIC | Age: 73
End: 2023-05-31

## 2023-05-31 ENCOUNTER — PATIENT MESSAGE (OUTPATIENT)
Dept: INTERNAL MEDICINE CLINIC | Age: 73
End: 2023-05-31

## 2023-05-31 DIAGNOSIS — R79.89 ELEVATED LFTS: ICD-10-CM

## 2023-05-31 DIAGNOSIS — M31.0 LEUKOCYTOCLASTIC VASCULITIS (HCC): Primary | ICD-10-CM

## 2023-05-31 DIAGNOSIS — R76.8 DS DNA ANTIBODY POSITIVE: ICD-10-CM

## 2023-05-31 NOTE — TELEPHONE ENCOUNTER
Left message for patient requesting to know if rash has resolved? Any new symptoms? Needing some more labs as well, as prior labs were abnormal..

## 2023-06-02 ENCOUNTER — OFFICE VISIT (OUTPATIENT)
Dept: INTERNAL MEDICINE CLINIC | Age: 73
End: 2023-06-02

## 2023-06-02 VITALS
HEART RATE: 70 BPM | HEIGHT: 61 IN | SYSTOLIC BLOOD PRESSURE: 140 MMHG | WEIGHT: 187 LBS | DIASTOLIC BLOOD PRESSURE: 62 MMHG | BODY MASS INDEX: 35.3 KG/M2 | OXYGEN SATURATION: 97 %

## 2023-06-02 DIAGNOSIS — R23.3 PETECHIAL RASH: ICD-10-CM

## 2023-06-02 DIAGNOSIS — R05.9 COUGH, UNSPECIFIED TYPE: ICD-10-CM

## 2023-06-02 DIAGNOSIS — Z00.00 MEDICARE ANNUAL WELLNESS VISIT, SUBSEQUENT: Primary | ICD-10-CM

## 2023-06-02 LAB
ALBUMIN SERPL-MCNC: 4.1 G/DL (ref 3.4–5)
ALBUMIN/GLOB SERPL: 0.9 {RATIO} (ref 1.1–2.2)
ALP SERPL-CCNC: 94 U/L (ref 40–129)
ALT SERPL-CCNC: 61 U/L (ref 10–40)
ANION GAP SERPL CALCULATED.3IONS-SCNC: 11 MMOL/L (ref 3–16)
AST SERPL-CCNC: 60 U/L (ref 15–37)
BILIRUB SERPL-MCNC: 0.4 MG/DL (ref 0–1)
BUN SERPL-MCNC: 15 MG/DL (ref 7–20)
C3 SERPL-MCNC: 137.6 MG/DL (ref 90–180)
C4 SERPL-MCNC: 14.4 MG/DL (ref 10–40)
CALCIUM SERPL-MCNC: 8.9 MG/DL (ref 8.3–10.6)
CHLORIDE SERPL-SCNC: 102 MMOL/L (ref 99–110)
CO2 SERPL-SCNC: 26 MMOL/L (ref 21–32)
CREAT SERPL-MCNC: 0.8 MG/DL (ref 0.6–1.2)
CRP SERPL-MCNC: 7 MG/L (ref 0–5.1)
DEPRECATED RDW RBC AUTO: 17.8 % (ref 12.4–15.4)
ERYTHROCYTE [SEDIMENTATION RATE] IN BLOOD BY WESTERGREN METHOD: 115 MM/HR (ref 0–30)
GFR SERPLBLD CREATININE-BSD FMLA CKD-EPI: >60 ML/MIN/{1.73_M2}
GLUCOSE SERPL-MCNC: 119 MG/DL (ref 70–99)
HCT VFR BLD AUTO: 34.3 % (ref 36–48)
HGB BLD-MCNC: 11.4 G/DL (ref 12–16)
MCH RBC QN AUTO: 27.7 PG (ref 26–34)
MCHC RBC AUTO-ENTMCNC: 33.2 G/DL (ref 31–36)
MCV RBC AUTO: 83.3 FL (ref 80–100)
PLATELET # BLD AUTO: 200 K/UL (ref 135–450)
PMV BLD AUTO: 10.5 FL (ref 5–10.5)
POTASSIUM SERPL-SCNC: 4.4 MMOL/L (ref 3.5–5.1)
PROT SERPL-MCNC: 8.5 G/DL (ref 6.4–8.2)
RBC # BLD AUTO: 4.12 M/UL (ref 4–5.2)
RHEUMATOID FACT SER IA-ACNC: <10 IU/ML
SODIUM SERPL-SCNC: 139 MMOL/L (ref 136–145)
WBC # BLD AUTO: 5.8 K/UL (ref 4–11)

## 2023-06-02 RX ORDER — TRAZODONE HYDROCHLORIDE 50 MG/1
TABLET ORAL
Qty: 90 TABLET | Refills: 5
Start: 2023-06-02

## 2023-06-02 ASSESSMENT — ENCOUNTER SYMPTOMS
COUGH: 1
DIARRHEA: 0
BLOOD IN STOOL: 0
CHEST TIGHTNESS: 0
ABDOMINAL PAIN: 0
SHORTNESS OF BREATH: 1

## 2023-06-02 ASSESSMENT — PATIENT HEALTH QUESTIONNAIRE - PHQ9
6. FEELING BAD ABOUT YOURSELF - OR THAT YOU ARE A FAILURE OR HAVE LET YOURSELF OR YOUR FAMILY DOWN: 0
SUM OF ALL RESPONSES TO PHQ9 QUESTIONS 1 & 2: 0
10. IF YOU CHECKED OFF ANY PROBLEMS, HOW DIFFICULT HAVE THESE PROBLEMS MADE IT FOR YOU TO DO YOUR WORK, TAKE CARE OF THINGS AT HOME, OR GET ALONG WITH OTHER PEOPLE: 0
4. FEELING TIRED OR HAVING LITTLE ENERGY: 0
5. POOR APPETITE OR OVEREATING: 0
9. THOUGHTS THAT YOU WOULD BE BETTER OFF DEAD, OR OF HURTING YOURSELF: 0
1. LITTLE INTEREST OR PLEASURE IN DOING THINGS: 0
2. FEELING DOWN, DEPRESSED OR HOPELESS: 0
3. TROUBLE FALLING OR STAYING ASLEEP: 0
SUM OF ALL RESPONSES TO PHQ QUESTIONS 1-9: 0
7. TROUBLE CONCENTRATING ON THINGS, SUCH AS READING THE NEWSPAPER OR WATCHING TELEVISION: 0
8. MOVING OR SPEAKING SO SLOWLY THAT OTHER PEOPLE COULD HAVE NOTICED. OR THE OPPOSITE, BEING SO FIGETY OR RESTLESS THAT YOU HAVE BEEN MOVING AROUND A LOT MORE THAN USUAL: 0
SUM OF ALL RESPONSES TO PHQ QUESTIONS 1-9: 0

## 2023-06-02 NOTE — PATIENT INSTRUCTIONS
It is possible to meet your calcium requirement with diet alone, but a vitamin D supplement is usually necessary to meet this goal.  When exposed to the sun, use a sunscreen that protects against both UVA and UVB radiation with an SPF of 30 or greater. Reapply every 2 to 3 hours or after sweating, drying off with a towel, or swimming. Always wear a seat belt when traveling in a car. Always wear a helmet when riding a bicycle or motorcycle.

## 2023-06-02 NOTE — ASSESSMENT & PLAN NOTE
Continue to get new lesions. Concern for vasculitis. Ischial labs unrevealing as to etiology. Referral to dermatology for biopsy. Additional labs today to evaluate for underlying cause.

## 2023-06-02 NOTE — PROGRESS NOTES
Medicare Annual Wellness Visit    Migdalia Childress is here for Medicare AWV     Assessment & Plan   Medicare annual wellness visit, subsequent  Recommendations for Preventive Services Due: see orders and patient instructions/AVS.  Recommended screening schedule for the next 5-10 years is provided to the patient in written form: see Patient Instructions/AVS.  Patient agrees to schedule with eye doctor. Petechial rash  Assessment & Plan:  Continue to get new lesions. Concern for vasculitis. Ischial labs unrevealing as to etiology. Referral to dermatology for biopsy. Additional labs today to evaluate for underlying cause. Orders:  -     Rheumatoid Factor; Future  -     Anti SSA; Future  -     Anti SSB; Future  -     Anti-DNA Antibody, Double-Stranded; Future  -     Anti- Smith; Future  -     C4 Complement; Future  -     C3 Complement; Future  -     BAMBI; Future  -     Sedimentation Rate; Future  -     C-Reactive Protein; Future  -     CBC; Future  -     Comprehensive Metabolic Panel; Future  -     COMPLEMENT, TOTAL; Future  -     XR CHEST (2 VW); Future    Cough, unspecified type  Comments:  Chronic intermittent perhaps increased recently. Chest x-ray. Restart Flonase. Orders:  -     XR CHEST (2 VW); Future       Return in 6 weeks (on 7/14/2023). Subjective   The following acute and/or chronic problems were also addressed today:  Follow-up on petechial rash and abnormal labs. Gets more short of breath more easily recently but not bad Not at all like before surgery. No associated chest tightness or heaviness. Patient's complete Health Risk Assessment and screening values have been reviewed and are found in Flowsheets. The following problems were reviewed today and where indicated follow up appointments were made and/or referrals ordered. Review of Systems   Constitutional:  Negative for activity change, chills, fever and unexpected weight change.    Respiratory:  Positive for cough (chronic

## 2023-06-02 NOTE — PROGRESS NOTES
Jason Navarro   :  1950  2023    ASSESSMENT/PLAN:   {There are no diagnoses linked to this encounter. (Refresh or delete this SmartLink)}    No follow-ups on file. {Time Documentation Optional:[id number]}      SUBJECTIVE     71 y.o. female established patient here for:   Chief Complaint   Patient presents with    Medicare AWV       Gets more short of breath more easily recently but not bad Not at all like before surgery. No associated chest tightness or heaviness. Review of Systems   Respiratory:  Positive for cough (slight cough,  thinks chronic, ,and with throat clearing.) and shortness of breath (exertional now, new since CABG). Negative for chest tightness. Cardiovascular:  Negative for chest pain, palpitations and leg swelling. Gastrointestinal:  Negative for abdominal pain, blood in stool and diarrhea.      Outpatient Medications Marked as Taking for the 23 encounter (Office Visit) with Carlo Cortés MD   Medication Sig Dispense Refill    Fexofenadine HCl (ALLEGRA ALLERGY PO) Take by mouth      buPROPion (WELLBUTRIN XL) 300 MG extended release tablet TAKE 1 TABLET EVERY MORNING 90 tablet 1    losartan (COZAAR) 25 MG tablet TAKE 1 TABLET BY MOUTH DAILY 90 tablet 0    FLUoxetine (PROZAC) 10 MG capsule TAKE 1 CAPSULE DAILY 90 capsule 0    ipratropium (ATROVENT) 0.06 % nasal spray 2 sprays by Each Nostril route 4 times daily 30 mL 3    traZODone (DESYREL) 50 MG tablet TAKE 1 TO 2 TABLETS NIGHTLYAS NEEDED FOR SLEEP 60 tablet 5    levothyroxine (SYNTHROID) 50 MCG tablet TAKE 1 TABLET DAILY 90 tablet 1    metoprolol tartrate (LOPRESSOR) 25 MG tablet Take 0.5 tablets by mouth 2 times daily 90 tablet 3    aspirin 325 MG EC tablet Take 1 tablet by mouth daily 30 tablet 3    omeprazole (PRILOSEC) 40 MG delayed release capsule TAKE 1 CAPSULE IN THE      MORNING AND AT BEDTIME 180 capsule 1    vitamin B-12 (CYANOCOBALAMIN) 1000 MCG tablet Take 1 tablet by mouth daily 30 tablet 3

## 2023-06-03 LAB
ANA SER QL IA: POSITIVE
DSDNA AB SER-ACNC: 83 IU/ML (ref 0–9)
ENA SM AB SER IA-ACNC: <0.2 AI (ref 0–0.9)
ENA SS-A AB SER IA-ACNC: <0.2 AI (ref 0–0.9)
ENA SS-B AB SER IA-ACNC: <0.2 AI (ref 0–0.9)

## 2023-06-04 LAB — COMPLEMENT ACTIVITY, TOTAL TURBIDIMETRIC: 57.9 U/ML (ref 38.7–89.9)

## 2023-06-05 ENCOUNTER — HOSPITAL ENCOUNTER (OUTPATIENT)
Dept: GENERAL RADIOLOGY | Age: 73
Discharge: HOME | End: 2023-06-05
Payer: COMMERCIAL

## 2023-06-05 DIAGNOSIS — R05.9 COUGH, UNSPECIFIED TYPE: ICD-10-CM

## 2023-06-05 DIAGNOSIS — R23.3 PETECHIAL RASH: ICD-10-CM

## 2023-06-05 LAB
MYELOPEROXIDASE AB SER-ACNC: 5 AU/ML (ref 0–19)
PROTEINASE3 AB SER-ACNC: 6 AU/ML (ref 0–19)

## 2023-06-05 PROCEDURE — 71046 X-RAY EXAM CHEST 2 VIEWS: CPT

## 2023-06-06 ENCOUNTER — TELEPHONE (OUTPATIENT)
Dept: DERMATOLOGY | Age: 73
End: 2023-06-06

## 2023-06-06 LAB
ANA PATTERN: ABNORMAL
ANA TITER: ABNORMAL
ANTINUCLEAR AB INTERPRETIVE COMMENT: ABNORMAL
NUCLEAR IGG SER QL IF: DETECTED

## 2023-06-06 NOTE — PROGRESS NOTES
[facility]  Ag Xavier MD  [address]  1950    71 y.o. female     Date of Visit: 6/7/2023    Chief Complaint: rash    History of Present Illness:    She presents today for a 1 month history of an asymptomatic eruption on the legs. The lesions were more numerous initially but she has continued to develop new lesions. She denies any pain or discomfort. Other than some fatigue she is otherwise in her normal state of health. She has not been on any new medications. She has not been ill at all. Her liver enzymes were elevated initially on 5/17/23 (, ); hep B and C studies were negative. By 6/2/2023 her LFTs had decreased: AST 60, ALT 61.    5/17/2023: Urinalysis unremarkable. Labs from 6/2/2023: White blood cell count within normal limits, hemoglobin slightly low at 11.4, platelets within normal limits. C-reactive protein elevated at 7.0, ESR elevated at 115, C3 and C4 within normal limits, anti-Smith negative, anti-DNA antibody positive at 83 international units/mL, SSA and SSB negative, rheumatoid factor and ANCA antibodies negative. Labs from 5/23/2023: Cryoglobulins negative    BAMBI + at > 1:2560. Homogeneous pattern. Chest x-ray was within normal limits. 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 + for fatigue. Neuro: no HA  Musculoskeletal: no new onset joint pain  GI: no abdominal pain, no change in stool caliber or color  : No hematuria      Past Medical History, Family History, Surgical History, Medications and Allergies reviewed.     Past Medical History:   Diagnosis Date    Abnormal Pap smear of cervix     Acid reflux     Bacterial vaginosis     CAD (coronary artery disease)     Dysmenorrhea     Environmental allergies     Fibrocystic breast     Fibroid     Herpes simplex type 2 infection     History of blood transfusion     Hyperlipidemia

## 2023-06-06 NOTE — TELEPHONE ENCOUNTER
Patient referred by Dr Janae Garland for petechial rash. Dr Waldo Camacho agreeable to see patient. Called and left message for patient to call back office. Please offer cancellation for today @ 4:45pm or any other cancellation this week.

## 2023-06-07 ENCOUNTER — OFFICE VISIT (OUTPATIENT)
Dept: DERMATOLOGY | Age: 73
End: 2023-06-07
Payer: COMMERCIAL

## 2023-06-07 DIAGNOSIS — R21 RASH: Primary | ICD-10-CM

## 2023-06-07 PROCEDURE — 11104 PUNCH BX SKIN SINGLE LESION: CPT | Performed by: DERMATOLOGY

## 2023-06-07 PROCEDURE — 11105 PUNCH BX SKIN EA SEP/ADDL: CPT | Performed by: DERMATOLOGY

## 2023-06-15 ENCOUNTER — TELEPHONE (OUTPATIENT)
Dept: DERMATOLOGY | Age: 73
End: 2023-06-15

## 2023-06-18 PROBLEM — M31.0 LEUKOCYTOCLASTIC VASCULITIS (HCC): Status: ACTIVE | Noted: 2023-05-17

## 2023-06-18 RX ORDER — SULFAMETHOXAZOLE AND TRIMETHOPRIM 800; 160 MG/1; MG/1
1 TABLET ORAL 2 TIMES DAILY
Qty: 6 TABLET | Refills: 0 | Status: SHIPPED | OUTPATIENT
Start: 2023-06-18 | End: 2023-06-21

## 2023-06-27 DIAGNOSIS — R79.89 ELEVATED LFTS: ICD-10-CM

## 2023-06-27 LAB
ALBUMIN SERPL-MCNC: 4.1 G/DL (ref 3.4–5)
ALP SERPL-CCNC: 71 U/L (ref 40–129)
ALT SERPL-CCNC: 29 U/L (ref 10–40)
AST SERPL-CCNC: 32 U/L (ref 15–37)
BILIRUB DIRECT SERPL-MCNC: <0.2 MG/DL (ref 0–0.3)
BILIRUB INDIRECT SERPL-MCNC: ABNORMAL MG/DL (ref 0–1)
BILIRUB SERPL-MCNC: 0.4 MG/DL (ref 0–1)
PROT SERPL-MCNC: 8.4 G/DL (ref 6.4–8.2)

## 2023-06-30 LAB
SMA IGG SER-ACNC: 80 UNITS (ref 0–19)
SMOOTH MUSCLE IGG TITR SER: ABNORMAL {TITER}

## 2023-07-05 RX ORDER — FLUOXETINE 10 MG/1
CAPSULE ORAL
Qty: 90 CAPSULE | Refills: 0 | Status: SHIPPED | OUTPATIENT
Start: 2023-07-05

## 2023-07-14 RX ORDER — OMEPRAZOLE 40 MG/1
CAPSULE, DELAYED RELEASE ORAL
Qty: 180 CAPSULE | Refills: 1 | Status: SHIPPED | OUTPATIENT
Start: 2023-07-14

## 2023-07-16 PROBLEM — R76.8 ANA POSITIVE: Status: ACTIVE | Noted: 2023-07-16

## 2023-07-16 NOTE — RESULT ENCOUNTER NOTE
Positive f-actin but neg smooth muscle antibody mwhich is more specfici and lft back to normal. Non-specific BAMBI and dsDNA, asymptomatic so not being treated.

## 2023-07-25 DIAGNOSIS — R76.8 ANA POSITIVE: ICD-10-CM

## 2023-07-25 DIAGNOSIS — M31.0 LEUKOCYTOCLASTIC VASCULITIS (HCC): ICD-10-CM

## 2023-07-25 LAB
ALBUMIN SERPL-MCNC: 4.2 G/DL (ref 3.4–5)
ALBUMIN/GLOB SERPL: 1.1 {RATIO} (ref 1.1–2.2)
ALP SERPL-CCNC: 62 U/L (ref 40–129)
ALT SERPL-CCNC: 20 U/L (ref 10–40)
ANION GAP SERPL CALCULATED.3IONS-SCNC: 10 MMOL/L (ref 3–16)
AST SERPL-CCNC: 23 U/L (ref 15–37)
BASOPHILS # BLD: 0.1 K/UL (ref 0–0.2)
BASOPHILS NFR BLD: 1 %
BILIRUB SERPL-MCNC: 0.4 MG/DL (ref 0–1)
BUN SERPL-MCNC: 16 MG/DL (ref 7–20)
CALCIUM SERPL-MCNC: 9.2 MG/DL (ref 8.3–10.6)
CHLORIDE SERPL-SCNC: 104 MMOL/L (ref 99–110)
CO2 SERPL-SCNC: 24 MMOL/L (ref 21–32)
CREAT SERPL-MCNC: 0.9 MG/DL (ref 0.6–1.2)
CRP SERPL-MCNC: 4.4 MG/L (ref 0–5.1)
DEPRECATED RDW RBC AUTO: 17.7 % (ref 12.4–15.4)
EOSINOPHIL # BLD: 0.2 K/UL (ref 0–0.6)
EOSINOPHIL NFR BLD: 2.9 %
ERYTHROCYTE [SEDIMENTATION RATE] IN BLOOD BY WESTERGREN METHOD: 83 MM/HR (ref 0–30)
GFR SERPLBLD CREATININE-BSD FMLA CKD-EPI: >60 ML/MIN/{1.73_M2}
GLUCOSE SERPL-MCNC: 133 MG/DL (ref 70–99)
HCT VFR BLD AUTO: 32.6 % (ref 36–48)
HGB BLD-MCNC: 10.6 G/DL (ref 12–16)
LYMPHOCYTES # BLD: 2.4 K/UL (ref 1–5.1)
LYMPHOCYTES NFR BLD: 45.3 %
MCH RBC QN AUTO: 26.7 PG (ref 26–34)
MCHC RBC AUTO-ENTMCNC: 32.5 G/DL (ref 31–36)
MCV RBC AUTO: 82.4 FL (ref 80–100)
MONOCYTES # BLD: 0.4 K/UL (ref 0–1.3)
MONOCYTES NFR BLD: 7.7 %
NEUTROPHILS # BLD: 2.3 K/UL (ref 1.7–7.7)
NEUTROPHILS NFR BLD: 43.1 %
PLATELET # BLD AUTO: 234 K/UL (ref 135–450)
PMV BLD AUTO: 9.5 FL (ref 5–10.5)
POTASSIUM SERPL-SCNC: 4.2 MMOL/L (ref 3.5–5.1)
PROT SERPL-MCNC: 8.2 G/DL (ref 6.4–8.2)
RBC # BLD AUTO: 3.96 M/UL (ref 4–5.2)
SODIUM SERPL-SCNC: 138 MMOL/L (ref 136–145)
WBC # BLD AUTO: 5.4 K/UL (ref 4–11)

## 2023-07-26 ENCOUNTER — OFFICE VISIT (OUTPATIENT)
Dept: INTERNAL MEDICINE CLINIC | Age: 73
End: 2023-07-26

## 2023-07-26 VITALS
SYSTOLIC BLOOD PRESSURE: 139 MMHG | HEART RATE: 74 BPM | OXYGEN SATURATION: 96 % | BODY MASS INDEX: 36.62 KG/M2 | WEIGHT: 193.8 LBS | DIASTOLIC BLOOD PRESSURE: 59 MMHG

## 2023-07-26 DIAGNOSIS — R20.0 NUMBNESS AND TINGLING IN BOTH HANDS: ICD-10-CM

## 2023-07-26 DIAGNOSIS — M31.0 LEUKOCYTOCLASTIC VASCULITIS (HCC): ICD-10-CM

## 2023-07-26 DIAGNOSIS — R74.01 ELEVATED TRANSAMINASE LEVEL: ICD-10-CM

## 2023-07-26 DIAGNOSIS — R20.2 NUMBNESS AND TINGLING IN BOTH HANDS: ICD-10-CM

## 2023-07-26 DIAGNOSIS — R76.8 DS DNA ANTIBODY POSITIVE: Primary | ICD-10-CM

## 2023-07-26 DIAGNOSIS — E78.2 MIXED HYPERLIPIDEMIA: Chronic | ICD-10-CM

## 2023-07-26 DIAGNOSIS — E11.9 TYPE 2 DIABETES MELLITUS WITHOUT COMPLICATION, WITHOUT LONG-TERM CURRENT USE OF INSULIN (HCC): ICD-10-CM

## 2023-07-26 DIAGNOSIS — R79.89 ELEVATED LFTS: ICD-10-CM

## 2023-07-26 DIAGNOSIS — D64.9 ANEMIA, UNSPECIFIED TYPE: ICD-10-CM

## 2023-07-26 RX ORDER — ATORVASTATIN CALCIUM 40 MG/1
20 TABLET, FILM COATED ORAL DAILY
Qty: 90 TABLET | Refills: 1
Start: 2023-07-26

## 2023-07-26 NOTE — ASSESSMENT & PLAN NOTE
Recent diagnosis, doing extremely well managing with lifestyle. Current A1c 6.3. Still encourage her to learn how to use glucometer and do diabetic education.

## 2023-07-26 NOTE — ASSESSMENT & PLAN NOTE
Remains asymptomatic with no treatment. Inflammatory markers improving spontaneously. 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 Continue to follow clinically.

## 2023-07-26 NOTE — PROGRESS NOTES
Arturo Taylor   :  1950  2023    ASSESSMENT/PLAN:   1. Ds DNA antibody positive  Assessment & Plan:  Remains asymptomatic with no treatment. Inflammatory markers improving spontaneously. 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 Continue to follow clinically. Orders:  -     Comprehensive Metabolic Panel; Future  -     HISTONE ANTIBODIES IGG; Future  2. 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 Suspect autoimmune related to the positive BAMBI and double-stranded DNA, although clinically no other symptoms of lupus. Continue to follow  3. Mixed hyperlipidemia  Assessment & Plan:  CAD status post CABG. LFTs normalized. Restart atorvastatin at 20 mg daily. Recheck hepatic profile in 3 to 4 weeks. 4. Anemia, unspecified type  -     CBC; Future  -     Iron and TIBC; Future  -     Vitamin B12; Future  -     Lactate Dehydrogenase; Future  -     Haptoglobin; Future  5. Type 2 diabetes mellitus without complication, without long-term current use of insulin (HCC)  Assessment & Plan:  Recent diagnosis, doing extremely well managing with lifestyle. Current A1c 6.3. Still encourage her to learn how to use glucometer and do diabetic education. 6. Elevated LFTs  -     Comprehensive Metabolic Panel; Future  7. Numbness and tingling in both hands  Assessment & Plan:  Chronic problem, ready to pursue evaluation. Likely to be carpal tunnel. EMG and then appropriate referral.  Encourage patient to wear wrist splints   Orders:  -     Lawrence Martínez MD (Inpatient and Outpatient EMG), [facility]  8. Elevated transaminase level  Assessment & Plan:   Complete return to normal.  Rechallenge with atorvastatin 20 mg. Hepatic profile in 3 to 4 weeks. Return in about 3 months (around 10/26/2023).         SUBJECTIVE     71 y.o. female established patient here for:   Chief Complaint   Patient

## 2023-07-26 NOTE — ASSESSMENT & PLAN NOTE
Rash resolved spontaneously. Suspect autoimmune related to the positive BAMBI and double-stranded DNA, although clinically no other symptoms of lupus.   Follow for now

## 2023-07-26 NOTE — ASSESSMENT & PLAN NOTE
Chronic problem, ready to pursue evaluation. Likely to be carpal tunnel.   EMG and then appropriate referral.  Encourage patient to wear wrist splints

## 2023-07-26 NOTE — ASSESSMENT & PLAN NOTE
CAD status post CABG. LFTs normalized. Restart atorvastatin at 20 mg daily. Recheck hepatic profile in 3 to 4 weeks.

## 2023-07-27 DIAGNOSIS — R76.8 DS DNA ANTIBODY POSITIVE: ICD-10-CM

## 2023-07-27 DIAGNOSIS — D64.9 ANEMIA, UNSPECIFIED TYPE: ICD-10-CM

## 2023-07-27 LAB
HAPTOGLOB SERPL-MCNC: 105 MG/DL (ref 30–200)
IRON SATN MFR SERPL: 10 % (ref 15–50)
IRON SERPL-MCNC: 42 UG/DL (ref 37–145)
LDH SERPL L TO P-CCNC: 163 U/L (ref 100–190)
TIBC SERPL-MCNC: 409 UG/DL (ref 260–445)
VIT B12 SERPL-MCNC: 1463 PG/ML (ref 211–911)

## 2023-07-29 LAB — HISTONE IGG SER IA-ACNC: 5.5 UNITS (ref 0–0.9)

## 2023-08-09 RX ORDER — LOSARTAN POTASSIUM 25 MG/1
25 TABLET ORAL DAILY
Qty: 90 TABLET | Refills: 1 | Status: SHIPPED | OUTPATIENT
Start: 2023-08-09

## 2023-08-16 ENCOUNTER — TELEPHONE (OUTPATIENT)
Dept: INTERNAL MEDICINE CLINIC | Age: 73
End: 2023-08-16

## 2023-08-16 ENCOUNTER — E-VISIT (OUTPATIENT)
Dept: INTERNAL MEDICINE CLINIC | Age: 73
End: 2023-08-16
Payer: COMMERCIAL

## 2023-08-16 DIAGNOSIS — N30.00 ACUTE CYSTITIS WITHOUT HEMATURIA: Primary | ICD-10-CM

## 2023-08-16 PROCEDURE — 99421 OL DIG E/M SVC 5-10 MIN: CPT | Performed by: INTERNAL MEDICINE

## 2023-08-16 RX ORDER — NITROFURANTOIN 25; 75 MG/1; MG/1
100 CAPSULE ORAL 2 TIMES DAILY
Qty: 10 CAPSULE | Refills: 0 | Status: SHIPPED | OUTPATIENT
Start: 2023-08-16 | End: 2023-08-21

## 2023-08-16 NOTE — PROGRESS NOTES
HPI: as per patient provided history  Exam: N/A (electronic visit)  ASSESSMENT/PLAN:  Diagnoses and all orders for this visit:    Acute cystitis without hematuria    Other orders  -     nitrofurantoin, macrocrystal-monohydrate, (MACROBID) 100 MG capsule; Take 1 capsule by mouth 2 times daily for 5 days        Patient instructed to call the office if worsens, or fails to improve as anticipated. 5-10 minutes were spent on the digital evaluation and management of this patient.     Araceli Kiran MD yes

## 2023-08-17 ENCOUNTER — PROCEDURE VISIT (OUTPATIENT)
Dept: NEUROLOGY | Age: 73
End: 2023-08-17
Payer: COMMERCIAL

## 2023-08-17 DIAGNOSIS — G56.03 BILATERAL CARPAL TUNNEL SYNDROME: Primary | ICD-10-CM

## 2023-08-17 PROCEDURE — 95886 MUSC TEST DONE W/N TEST COMP: CPT | Performed by: PSYCHIATRY & NEUROLOGY

## 2023-08-17 PROCEDURE — 95911 NRV CNDJ TEST 9-10 STUDIES: CPT | Performed by: PSYCHIATRY & NEUROLOGY

## 2023-08-17 NOTE — PROGRESS NOTES
Marry Gaines M.D. [facility]  Board Certified in [facility] [address] [place] [address], [address] [facility], [address] [facility]    EMG / NERVE CONDUCTION STUDY      PATIENT:  Rosa Maria Rendon       DATE OF EM23     YOB: 1950       REASON FOR EMG:   Bilateral arm numbness      REFERRING PHYSICIAN:  Juan Lucia MD  [address] [facility],  [address]     SUMMARY:   The left median sensory nerve study had a prolonged distal latency. The left median motor nerve study was normal.  The right median motor and sensory nerve studies had prolonged distal latencies. Bilateral ulnar motor nerve studies with slowing of conduction velocities across the elbow. Bilateral ulnar sensory nerve studies were normal.  The left radial sensory nerve study was normal.  Needle EMG of several muscles of both upper extremities was normal.      CLINICAL DIAGNOSIS:  Carpal tunnel syndrome        EMG RESULTS:     1. This patient has bilateral median nerve lesions at the wrist.  (Carpal tunnel syndrome). The right side is moderately severe whereas the left side is mild. 2.  This patient also has moderately severe bilateral ulnar nerve lesions at the elbow. Both sides are approximately equally affected.        ---------------------------------------------  Marry Gaines M.D.   Electromyographer / Neurologist

## 2023-08-20 DIAGNOSIS — G56.03 BILATERAL CARPAL TUNNEL SYNDROME: Primary | ICD-10-CM

## 2023-08-25 ENCOUNTER — OFFICE VISIT (OUTPATIENT)
Dept: CARDIOLOGY CLINIC | Age: 73
End: 2023-08-25
Payer: COMMERCIAL

## 2023-08-25 VITALS
HEART RATE: 67 BPM | BODY MASS INDEX: 36.84 KG/M2 | DIASTOLIC BLOOD PRESSURE: 80 MMHG | WEIGHT: 195 LBS | SYSTOLIC BLOOD PRESSURE: 130 MMHG

## 2023-08-25 DIAGNOSIS — I25.118 CORONARY ARTERY DISEASE INVOLVING NATIVE CORONARY ARTERY OF NATIVE HEART WITH OTHER FORM OF ANGINA PECTORIS (HCC): Primary | Chronic | ICD-10-CM

## 2023-08-25 DIAGNOSIS — R07.89 OTHER CHEST PAIN: Primary | ICD-10-CM

## 2023-08-25 DIAGNOSIS — I25.10 CAD IN NATIVE ARTERY: ICD-10-CM

## 2023-08-25 DIAGNOSIS — E78.5 HYPERLIPIDEMIA, UNSPECIFIED HYPERLIPIDEMIA TYPE: ICD-10-CM

## 2023-08-25 DIAGNOSIS — Z95.1 HX OF CABG: ICD-10-CM

## 2023-08-25 PROCEDURE — 99214 OFFICE O/P EST MOD 30 MIN: CPT | Performed by: INTERNAL MEDICINE

## 2023-08-25 PROCEDURE — 93000 ELECTROCARDIOGRAM COMPLETE: CPT | Performed by: INTERNAL MEDICINE

## 2023-08-25 PROCEDURE — 1123F ACP DISCUSS/DSCN MKR DOCD: CPT | Performed by: INTERNAL MEDICINE

## 2023-08-25 ASSESSMENT — ENCOUNTER SYMPTOMS
CHOKING: 0
COUGH: 0
CHEST TIGHTNESS: 0
SHORTNESS OF BREATH: 0

## 2023-08-25 NOTE — PROGRESS NOTES
twinge in chest. EKG today shows NSR, WNL. Will do jessee. Walking daily. BP good. Compensated. No angina. Continue lopressor/losartan/ asa/lipitor. Lipids per PCP. Reviewed previous records and testing including Cath 8/22 and hosp for CABG. Follow 3 months.         Sakshi Meza MD

## 2023-08-28 ENCOUNTER — NURSE ONLY (OUTPATIENT)
Dept: INTERNAL MEDICINE CLINIC | Age: 73
End: 2023-08-28
Payer: COMMERCIAL

## 2023-08-28 ENCOUNTER — TELEPHONE (OUTPATIENT)
Dept: INTERNAL MEDICINE CLINIC | Age: 73
End: 2023-08-28

## 2023-08-28 DIAGNOSIS — R30.0 DYSURIA: Primary | ICD-10-CM

## 2023-08-28 DIAGNOSIS — R30.0 DYSURIA: ICD-10-CM

## 2023-08-28 PROCEDURE — 81001 URINALYSIS AUTO W/SCOPE: CPT | Performed by: INTERNAL MEDICINE

## 2023-08-28 NOTE — TELEPHONE ENCOUNTER
With ongoing symptoms, need to test her urine to be able to treat appropriately. . Have her come in today to leave specimen to send to lab. Orders in lab system.

## 2023-08-28 NOTE — TELEPHONE ENCOUNTER
Patient had a evisit with Dr. Authur Duverney on 8/16/23 for UTI. She is still experiencing sxs. She feels the medications prescribed were not effective in treating her sxs. Patient requesting an order for another urine test.  She has been advised that an appointment may be needed. URINARY SYMPTOMS TRIAGE    Pain during urination? Yes * Burns  Urinary frequency or urgency? yes  Difficulty passing urine? yes   Change in appearance or smell of urine? yes - cloudy  Fevers? none  Back pain? mild * but that's not unusual for her to have lower back but it has become for frequent  Abdominal pain? no  GI symptoms? no    Other symptoms? none  Treatment tried so far?  Prescribed medications completed a week ago and drinking plenty of fluids

## 2023-08-29 LAB
BACTERIA URNS QL MICRO: ABNORMAL /HPF
BILIRUB UR QL STRIP.AUTO: NEGATIVE
CLARITY UR: ABNORMAL
COLOR UR: YELLOW
EPI CELLS #/AREA URNS AUTO: 0 /HPF (ref 0–5)
GLUCOSE UR STRIP.AUTO-MCNC: NEGATIVE MG/DL
HGB UR QL STRIP.AUTO: ABNORMAL
HYALINE CASTS #/AREA URNS AUTO: 1 /LPF (ref 0–8)
KETONES UR STRIP.AUTO-MCNC: NEGATIVE MG/DL
LEUKOCYTE ESTERASE UR QL STRIP.AUTO: ABNORMAL
NITRITE UR QL STRIP.AUTO: NEGATIVE
PH UR STRIP.AUTO: 6.5 [PH] (ref 5–8)
PROT UR STRIP.AUTO-MCNC: 30 MG/DL
RBC CLUMPS #/AREA URNS AUTO: 2 /HPF (ref 0–4)
SP GR UR STRIP.AUTO: 1.02 (ref 1–1.03)
UA DIPSTICK W REFLEX MICRO PNL UR: YES
URN SPEC COLLECT METH UR: ABNORMAL
UROBILINOGEN UR STRIP-ACNC: 1 E.U./DL
WBC #/AREA URNS AUTO: 500 /HPF (ref 0–5)

## 2023-08-30 ENCOUNTER — TELEPHONE (OUTPATIENT)
Dept: INTERNAL MEDICINE CLINIC | Age: 73
End: 2023-08-30

## 2023-08-30 DIAGNOSIS — R82.81 STERILE PYURIA: Primary | ICD-10-CM

## 2023-08-30 DIAGNOSIS — R82.90 ABNORMAL FINDING ON URINALYSIS: ICD-10-CM

## 2023-08-30 LAB — BACTERIA UR CULT: NORMAL

## 2023-08-30 NOTE — TELEPHONE ENCOUNTER
----- Message from Naveed Hernandez [place] sent at 8/30/2023  3:36 PM EDT -----  I called and read your message to her and she still had questions about the WBCs, blood, bacteria, and all the abnormal findings in the test. She said the WBCs definitely indicates infection. She wanted to you to give her a call when you can. I did ask her about the antibiotic use and she said she did not take any prior to sample.

## 2023-08-30 NOTE — TELEPHONE ENCOUNTER
Spoke with patient about the results. Has sterile pyuria. Only rare bacteria on the UA, and culture was negative. Greater than 500 WBCs per high-power field, with less than 2 RBCs. Explained that results are not normal and require more evaluation but not a bacterial UTI. Urine eosinophils, if normal, repeat urinalysis with her renal profile and likely refer to urology group. Patient reports currently experiencing some urgency but no dysuria, denies any vaginal discharge.

## 2023-08-31 NOTE — TELEPHONE ENCOUNTER
Please call lab. Need to know how long it will take to do the urine eospinophil smear Add-on order was faxed yesterday.

## 2023-08-31 NOTE — TELEPHONE ENCOUNTER
Gary Siegel stated the lab should have called us yesterday, the urine was only good for 24 hours. When they got the add on it was past the time.

## 2023-08-31 NOTE — TELEPHONE ENCOUNTER
Please call patient. Let her know that the lab was not able to add on the needed test, and need another urine sample. I will put as lab collect so she can go anywhere.

## 2023-09-03 DIAGNOSIS — R79.89 ELEVATED LFTS: Primary | ICD-10-CM

## 2023-09-03 DIAGNOSIS — R76.8 ANA POSITIVE: ICD-10-CM

## 2023-09-03 DIAGNOSIS — E03.9 ACQUIRED HYPOTHYROIDISM: Chronic | ICD-10-CM

## 2023-09-05 ENCOUNTER — TELEPHONE (OUTPATIENT)
Dept: INTERNAL MEDICINE CLINIC | Age: 73
End: 2023-09-05

## 2023-09-05 NOTE — TELEPHONE ENCOUNTER
It looks like the culture was plated on 9/2, prelim back but still need sensitivities. She does not need to leave another specimen.

## 2023-09-05 NOTE — TELEPHONE ENCOUNTER
Miya from [facility] lab is calling to let Dr. Gael Fraser know that she is unable to add a Urine culture to the labs done on 9/1 due to only being good for 72 hours.      Please advise if patient needs to come back

## 2023-09-06 LAB
BACTERIA UR CULT: ABNORMAL
ORGANISM: ABNORMAL

## 2023-09-06 RX ORDER — CIPROFLOXACIN 250 MG/1
250 TABLET, FILM COATED ORAL 2 TIMES DAILY
Qty: 14 TABLET | Refills: 0 | Status: SHIPPED | OUTPATIENT
Start: 2023-09-06 | End: 2023-09-13

## 2023-09-11 ENCOUNTER — HOSPITAL ENCOUNTER (OUTPATIENT)
Dept: NON INVASIVE DIAGNOSTICS | Age: 73
Discharge: HOME | End: 2023-09-11
Payer: COMMERCIAL

## 2023-09-11 DIAGNOSIS — I25.118 CORONARY ARTERY DISEASE INVOLVING NATIVE CORONARY ARTERY OF NATIVE HEART WITH OTHER FORM OF ANGINA PECTORIS (HCC): Chronic | ICD-10-CM

## 2023-09-11 PROCEDURE — 6360000002 HC RX W HCPCS: Performed by: INTERNAL MEDICINE

## 2023-09-11 PROCEDURE — 93017 CV STRESS TEST TRACING ONLY: CPT

## 2023-09-11 PROCEDURE — 3430000000 HC RX DIAGNOSTIC RADIOPHARMACEUTICAL: Performed by: INTERNAL MEDICINE

## 2023-09-11 PROCEDURE — 78452 HT MUSCLE IMAGE SPECT MULT: CPT

## 2023-09-11 PROCEDURE — A9502 TC99M TETROFOSMIN: HCPCS | Performed by: INTERNAL MEDICINE

## 2023-09-11 RX ORDER — REGADENOSON 0.08 MG/ML
0.4 INJECTION, SOLUTION INTRAVENOUS
Status: COMPLETED | OUTPATIENT
Start: 2023-09-11 | End: 2023-09-11

## 2023-09-11 RX ADMIN — TETROFOSMIN 30 MILLICURIE: 1.38 INJECTION, POWDER, LYOPHILIZED, FOR SOLUTION INTRAVENOUS at 13:28

## 2023-09-11 RX ADMIN — TETROFOSMIN 10 MILLICURIE: 1.38 INJECTION, POWDER, LYOPHILIZED, FOR SOLUTION INTRAVENOUS at 12:50

## 2023-09-11 RX ADMIN — REGADENOSON 0.4 MG: 0.08 INJECTION, SOLUTION INTRAVENOUS at 13:28

## 2023-09-13 RX ORDER — TRAZODONE HYDROCHLORIDE 50 MG/1
TABLET ORAL
Qty: 60 TABLET | Refills: 5 | OUTPATIENT
Start: 2023-09-13

## 2023-09-25 DIAGNOSIS — R79.89 ELEVATED LFTS: ICD-10-CM

## 2023-09-25 DIAGNOSIS — R76.8 ANA POSITIVE: ICD-10-CM

## 2023-09-25 DIAGNOSIS — E03.9 ACQUIRED HYPOTHYROIDISM: Chronic | ICD-10-CM

## 2023-09-26 LAB
ALBUMIN SERPL-MCNC: 4.4 G/DL (ref 3.4–5)
ALBUMIN/GLOB SERPL: 1 {RATIO} (ref 1.1–2.2)
ALP SERPL-CCNC: 67 U/L (ref 40–129)
ALT SERPL-CCNC: 34 U/L (ref 10–40)
ANA SER QL IA: POSITIVE
ANION GAP SERPL CALCULATED.3IONS-SCNC: 13 MMOL/L (ref 3–16)
AST SERPL-CCNC: 33 U/L (ref 15–37)
BILIRUB SERPL-MCNC: 0.7 MG/DL (ref 0–1)
BUN SERPL-MCNC: 13 MG/DL (ref 7–20)
CALCIUM SERPL-MCNC: 9.3 MG/DL (ref 8.3–10.6)
CENTROMERE B IGG SER QL LINE BLOT: <0.2 AI (ref 0–0.9)
CHLORIDE SERPL-SCNC: 104 MMOL/L (ref 99–110)
CHROMATIN AB SERPL-ACNC: 1.1 AI (ref 0–0.9)
CO2 SERPL-SCNC: 22 MMOL/L (ref 21–32)
CREAT SERPL-MCNC: 0.9 MG/DL (ref 0.6–1.2)
CRP SERPL-MCNC: 3.5 MG/L (ref 0–5.1)
DSDNA AB SER-ACNC: 103 IU/ML (ref 0–9)
ENA JO1 AB SER IA-ACNC: <0.2 AI (ref 0–0.9)
ENA RNP AB SER IA-ACNC: <0.2 AI (ref 0–0.9)
ENA SCL70 IGG SER IA-ACNC: <0.2 AI (ref 0–0.9)
ENA SM AB SER IA-ACNC: <0.2 AI (ref 0–0.9)
ENA SM+RNP IGG SER-ACNC: <0.2 AI (ref 0–0.9)
ENA SS-A AB SER IA-ACNC: <0.2 AI (ref 0–0.9)
ENA SS-B AB SER IA-ACNC: <0.2 AI (ref 0–0.9)
ERYTHROCYTE [SEDIMENTATION RATE] IN BLOOD BY WESTERGREN METHOD: 80 MM/HR (ref 0–30)
GFR SERPLBLD CREATININE-BSD FMLA CKD-EPI: >60 ML/MIN/{1.73_M2}
GLUCOSE SERPL-MCNC: 134 MG/DL (ref 70–99)
POTASSIUM SERPL-SCNC: 4.4 MMOL/L (ref 3.5–5.1)
PROT SERPL-MCNC: 8.6 G/DL (ref 6.4–8.2)
RIBOSOMAL P AB SER IA-ACNC: <0.2 AI (ref 0–0.9)
SODIUM SERPL-SCNC: 139 MMOL/L (ref 136–145)
T3 SERPL-MCNC: 0.98 NG/ML (ref 0.8–2)
T4 FREE SERPL-MCNC: 1.6 NG/DL (ref 0.9–1.8)
TSH SERPL DL<=0.005 MIU/L-ACNC: 0.02 UIU/ML (ref 0.27–4.2)

## 2023-09-27 ENCOUNTER — TELEPHONE (OUTPATIENT)
Dept: CARDIOLOGY CLINIC | Age: 73
End: 2023-09-27

## 2023-09-27 NOTE — TELEPHONE ENCOUNTER
Pt will need a [facility] with  either Tramuta or Gosia       Left Heart Catheterization    A left heart catheterization is a procedure that provides your cardiologist with detailed information regarding how your heart functions. A small catheter (long, fine tube) is inserted into an artery (a vessel that carries blood and oxygen) that leads to your heart. While watching with x-ray equipment, small amounts of dye are injected which enables visualization of the heart arteries and chambers. The pictures that your cardiologist receives from the cardiac catheterization enable him or her to decide on the best treatment for you. Date of the procedure:       Time of arrival:      Cardiologist performing the procedure: Instructions for your left heart catheterization:    1. Bring a list of your medications to the hospital.    2.  Please notify us before the procedure if you are allergic to anything; especially x-ray contrast dye, iodine, nickel, or any type of jewelry. This is very important! 3. Do not eat or drink anything at all after midnight (or 8 hours) prior to the procedure. 4.  Take all morning medications EXCEPT any diuretics (water pills) the day of the procedure with a small sip of water. 5.  If you are on Coumadin, Warfarin, or Loise Gabriel, please notify us so that we can make adjustments to your medication. 6.  If you are taking Xarelto, Eliquis, or Pradaxa, please stop staking these medications two days prior to the procedure (including the day of the procedure). 7.  If you are diabetic, check your blood sugar in the morning. If your blood sugar is 120 or less, do not take insulin. If your blood sugar is more than 120, take half the dose of your normal insulin. Do not take Metformin the night before your procedure or morning of the procedure. 8.  You MUST have someone to drive you home--no driving for 24 hours after your procedure.   If an intervention is performed, you might stay

## 2023-09-28 DIAGNOSIS — R07.9 CHEST PAIN, UNSPECIFIED TYPE: ICD-10-CM

## 2023-09-28 DIAGNOSIS — Z01.818 PRE-OP TESTING: ICD-10-CM

## 2023-09-28 DIAGNOSIS — R94.39 ABNORMAL STRESS ECG: Primary | ICD-10-CM

## 2023-09-28 NOTE — TELEPHONE ENCOUNTER
Procedure:  [facility]  Doctor:  Dr. Teena Miranda  Date:  10/3/23  Time:  1:30pm  Arrival:  12pm  Reps:  n/a  Anesthesia:  n/a      Spoke with patient.

## 2023-10-02 PROBLEM — R20.0 NUMBNESS AND TINGLING IN BOTH HANDS: Status: RESOLVED | Noted: 2021-05-07 | Resolved: 2023-10-02

## 2023-10-02 PROBLEM — R20.2 NUMBNESS AND TINGLING IN BOTH HANDS: Status: RESOLVED | Noted: 2021-05-07 | Resolved: 2023-10-02

## 2023-10-02 PROBLEM — M79.604 LEG PAIN, ANTERIOR, RIGHT: Status: RESOLVED | Noted: 2022-09-11 | Resolved: 2023-10-02

## 2023-10-02 RX ORDER — FLUOXETINE 10 MG/1
CAPSULE ORAL
Qty: 90 CAPSULE | Refills: 0 | Status: SHIPPED | OUTPATIENT
Start: 2023-10-02

## 2023-10-02 RX ORDER — LEVOTHYROXINE SODIUM 0.03 MG/1
25 TABLET ORAL DAILY
Qty: 30 TABLET | Refills: 2 | Status: SHIPPED | OUTPATIENT
Start: 2023-10-02

## 2023-10-02 RX ORDER — ROSUVASTATIN CALCIUM 20 MG/1
20 TABLET, COATED ORAL DAILY
Qty: 30 TABLET | Refills: 1 | Status: SHIPPED | OUTPATIENT
Start: 2023-10-02 | End: 2023-11-19

## 2023-10-02 NOTE — PRE-PROCEDURE INSTRUCTIONS
Called patient about procedure. Told to be here at 1200 for procedure at 1330. NPO after midnight, but can take morning medication with sips of water, patient stated they are not blood thinners. To have a responsible adult be with patient take them home and stay with them afterwards, if they do not get admitted to [facility]. And if available bring current list of medications. No other questions or concerns.
beer/liquor/wine

## 2023-10-03 ENCOUNTER — HOSPITAL ENCOUNTER (OUTPATIENT)
Dept: CARDIAC CATH/INVASIVE PROCEDURES | Age: 73
Discharge: HOME | End: 2023-10-05
Payer: COMMERCIAL

## 2023-10-03 VITALS — TEMPERATURE: 97.6 F | BODY MASS INDEX: 36.25 KG/M2 | WEIGHT: 192 LBS | HEIGHT: 61 IN

## 2023-10-03 LAB
ALBUMIN SERPL-MCNC: 4.4 G/DL (ref 3.4–5)
ALBUMIN/GLOB SERPL: 1 {RATIO} (ref 1.1–2.2)
ALP SERPL-CCNC: 60 U/L (ref 40–129)
ALT SERPL-CCNC: 33 U/L (ref 10–40)
ANION GAP SERPL CALCULATED.3IONS-SCNC: 12 MMOL/L (ref 3–16)
AST SERPL-CCNC: 33 U/L (ref 15–37)
BILIRUB SERPL-MCNC: 0.6 MG/DL (ref 0–1)
BUN SERPL-MCNC: 15 MG/DL (ref 7–20)
CALCIUM SERPL-MCNC: 9.5 MG/DL (ref 8.3–10.6)
CHLORIDE SERPL-SCNC: 102 MMOL/L (ref 99–110)
CO2 SERPL-SCNC: 23 MMOL/L (ref 21–32)
CREAT SERPL-MCNC: 1 MG/DL (ref 0.6–1.2)
DEPRECATED RDW RBC AUTO: 21.3 % (ref 12.4–15.4)
EKG ATRIAL RATE: 69 BPM
EKG DIAGNOSIS: NORMAL
EKG P AXIS: -3 DEGREES
EKG P-R INTERVAL: 198 MS
EKG Q-T INTERVAL: 428 MS
EKG QRS DURATION: 96 MS
EKG QTC CALCULATION (BAZETT): 458 MS
EKG R AXIS: 5 DEGREES
EKG T AXIS: 88 DEGREES
EKG VENTRICULAR RATE: 69 BPM
GFR SERPLBLD CREATININE-BSD FMLA CKD-EPI: 60 ML/MIN/{1.73_M2}
GLUCOSE SERPL-MCNC: 153 MG/DL (ref 70–99)
HCT VFR BLD AUTO: 39 % (ref 36–48)
HGB BLD-MCNC: 13 G/DL (ref 12–16)
INR PPP: 1.09 (ref 0.84–1.16)
MCH RBC QN AUTO: 28.5 PG (ref 26–34)
MCHC RBC AUTO-ENTMCNC: 33.2 G/DL (ref 31–36)
MCV RBC AUTO: 85.9 FL (ref 80–100)
PLATELET # BLD AUTO: 207 K/UL (ref 135–450)
PMV BLD AUTO: 8.6 FL (ref 5–10.5)
POTASSIUM SERPL-SCNC: 4.3 MMOL/L (ref 3.5–5.1)
PROT SERPL-MCNC: 8.9 G/DL (ref 6.4–8.2)
PROTHROMBIN TIME: 14.1 SEC (ref 11.5–14.8)
RBC # BLD AUTO: 4.54 M/UL (ref 4–5.2)
SODIUM SERPL-SCNC: 137 MMOL/L (ref 136–145)
WBC # BLD AUTO: 5.7 K/UL (ref 4–11)

## 2023-10-03 PROCEDURE — 99215 OFFICE O/P EST HI 40 MIN: CPT | Performed by: INTERNAL MEDICINE

## 2023-10-03 PROCEDURE — 99153 MOD SED SAME PHYS/QHP EA: CPT

## 2023-10-03 PROCEDURE — 2500000003 HC RX 250 WO HCPCS

## 2023-10-03 PROCEDURE — 6360000004 HC RX CONTRAST MEDICATION: Performed by: INTERNAL MEDICINE

## 2023-10-03 PROCEDURE — 85027 COMPLETE CBC AUTOMATED: CPT

## 2023-10-03 PROCEDURE — 6360000002 HC RX W HCPCS

## 2023-10-03 PROCEDURE — 93459 L HRT ART/GRFT ANGIO: CPT | Performed by: INTERNAL MEDICINE

## 2023-10-03 PROCEDURE — 99152 MOD SED SAME PHYS/QHP 5/>YRS: CPT

## 2023-10-03 PROCEDURE — 85610 PROTHROMBIN TIME: CPT

## 2023-10-03 PROCEDURE — 2709999900 HC NON-CHARGEABLE SUPPLY

## 2023-10-03 PROCEDURE — 80053 COMPREHEN METABOLIC PANEL: CPT

## 2023-10-03 PROCEDURE — 93010 ELECTROCARDIOGRAM REPORT: CPT | Performed by: INTERNAL MEDICINE

## 2023-10-03 PROCEDURE — C1894 INTRO/SHEATH, NON-LASER: HCPCS

## 2023-10-03 PROCEDURE — C1769 GUIDE WIRE: HCPCS

## 2023-10-03 PROCEDURE — 93459 L HRT ART/GRFT ANGIO: CPT

## 2023-10-03 PROCEDURE — 99152 MOD SED SAME PHYS/QHP 5/>YRS: CPT | Performed by: INTERNAL MEDICINE

## 2023-10-03 PROCEDURE — 93005 ELECTROCARDIOGRAM TRACING: CPT | Performed by: INTERNAL MEDICINE

## 2023-10-03 RX ORDER — SODIUM CHLORIDE 9 MG/ML
INJECTION, SOLUTION INTRAVENOUS PRN
Status: DISCONTINUED | OUTPATIENT
Start: 2023-10-03 | End: 2023-10-06 | Stop reason: HOSPADM

## 2023-10-03 RX ORDER — LANOLIN ALCOHOL/MO/W.PET/CERES
325 CREAM (GRAM) TOPICAL
COMMUNITY

## 2023-10-03 RX ORDER — SODIUM CHLORIDE 9 MG/ML
INJECTION, SOLUTION INTRAVENOUS CONTINUOUS
Status: ACTIVE | OUTPATIENT
Start: 2023-10-03 | End: 2023-10-03

## 2023-10-03 RX ORDER — ACETAMINOPHEN 325 MG/1
650 TABLET ORAL EVERY 4 HOURS PRN
Status: DISCONTINUED | OUTPATIENT
Start: 2023-10-03 | End: 2023-10-06 | Stop reason: HOSPADM

## 2023-10-03 RX ORDER — SODIUM CHLORIDE 0.9 % (FLUSH) 0.9 %
5-40 SYRINGE (ML) INJECTION PRN
Status: DISCONTINUED | OUTPATIENT
Start: 2023-10-03 | End: 2023-10-06 | Stop reason: HOSPADM

## 2023-10-03 RX ORDER — PNV NO.95/FERROUS FUM/FOLIC AC 28MG-0.8MG
TABLET ORAL
COMMUNITY

## 2023-10-03 RX ORDER — SODIUM CHLORIDE 0.9 % (FLUSH) 0.9 %
5-40 SYRINGE (ML) INJECTION EVERY 12 HOURS SCHEDULED
Status: DISCONTINUED | OUTPATIENT
Start: 2023-10-03 | End: 2023-10-06 | Stop reason: HOSPADM

## 2023-10-03 RX ADMIN — IOHEXOL 60 ML: 350 INJECTION, SOLUTION INTRAVENOUS at 14:50

## 2023-10-03 NOTE — DISCHARGE INSTRUCTIONS
[facility] [employer]  Cardiovascular Special Procedures   Radial Access Angiogram  Patient Discharge Instructions      Day 1 (Procedure Day):  Rest for the remainder of the day. Avoid excessive use of the affected arm. Do not drive a car. Do not be alone. Leave dressing intact. Day 2:  You may drive a car, unless otherwise directed by physician. Remove dressing. You may bathe/shower, but wash gently around the puncture site and pat dry. Place new band-aid on site daily until skin heals. Things to Avoid:  You may not do any strenuous exercises for one week. (ex: golfing, bowling, tennis, vacuum, snow removal, jogging, and aerobic exercises). No hot tubs, baths, or pools for 3-5 days. Do not lift anything over 3-5 pounds for 3 days, with affected arm. No lotions, powders, or ointments near site for 5 days. Things to watch for:  You may have a small lump or a bruise. This is common and will go away. If bleeding occurs from the site, or a hematoma (lump) begins to increase in size, immediately apply pressure directly over the site and call 911 to return to the hospital.  Report any coolness or numbness in the arm or hand immediately. Report any excessive pain of the arm or hand immediately. If mild discomfort occurs at the puncture site you may place an ice pack on the site at 15 minute intervals. .   Watch for signs and symptoms of an infection at the arm site (fever, local pain, redness, warmth or discharge from the puncture site), call your physician immediately if any develop. Any Questions please call us at [phone] (between 7am- 5pm, Mon-Fri). After hours you should contact your physician. [facility] [employer]  Cardiovascular Special Procedures  General Discharge Instructions    PROCEDURE: ____________________________________________________    _x___ Ash Vyas may be drowsy or lightheaded after receiving sedation.  DO NOT operate a vehicle (automobile, bicycle, motorcycle,

## 2023-10-03 NOTE — PROCEDURES
anesthesia was achieved in the   right wrist with 2% lidocaine. A 5-Syrian hemostasis sheath was placed into   the radial artery. The pre cocktail of heparin, verapamil and nitroglycerin was injected into the sheath     The JR4 catheter was introduced  to engage the right coronary   artery. Radiographic  images were obtained. The catheter was removed and exchanged over an exchange length 0.35 soft guide wire. .      The JR4 catheter was used to access the saphenous vein graft. We were able to inject the graft in various degrees of obliquity and we found that there was a patent graft to the single branch and skip graft over to the twos marginal branch from the circumflex and then and to side graft into the right coronary artery PDA branch. A 5-Syrian JL 3.5 catheter was introduced; used to engage the left main   coronary artery. Radiographic  images were obtained. The catheter was pulled back . The internal mammary artery catheter was placed into the left internal mammary and injected in various degrees of obliquity. We used the internal mammary artery catheter across the aortic valve to the left ventricle. Pressure determinations were made. We did not perform an LV gram.  On pullback there was no gradient across the valve. The hemostasis sheath was removed and hemostasis was achieved using a TR Band      There were no complications. Patient tolerated the procedure well. The   patient was transferred to the holding area in stable condition. Contrast consumed 80 cc  Flouro time 1.6 minutes radiation exposure 519.12 mGy        Results:  Left ventricular pressure 5 mmHg  Aortic pressure 117/46 mmHg     Coronary anatomy:   The left main coronary artery is normal.      Left anterior descending artery proximal stenosis of 50 to 60%. There is a graft to the LAD with good flow distally. There is a graft to the diagonal branch with good flow distally.      Circumflex artery has a mid vessel

## 2023-10-03 NOTE — ANESTHESIA PRE-OP
catheter was removed and exchanged over an exchange length 0.35 soft guide wire. .     The JR4 catheter was used to access the saphenous vein graft. We were able to inject the graft in various degrees of obliquity and we found that there was a patent graft to the single branch and skip graft over to the twos marginal branch from the circumflex and then and to side graft into the right coronary artery PDA branch. A 5-Scottish JL 3.5 catheter was introduced; used to engage the left main   coronary artery. Radiographic  images were obtained. The catheter was pulled back . The internal mammary artery catheter was placed into the left internal mammary and injected in various degrees of obliquity. We used the internal mammary artery catheter across the aortic valve to the left ventricle. Pressure determinations were made. We did not perform an LV gram.  On pullback there was no gradient across the valve. The hemostasis sheath was removed and hemostasis was achieved using a TR Band     There were no complications. Patient tolerated the procedure well. The   patient was transferred to the holding area in stable condition. Contrast consumed 80 cc  Flouro time 1.6 minutes radiation exposure 519.12 mGy      Results:  Left ventricular pressure 5 mmHg  Aortic pressure 117/46 mmHg    Coronary anatomy:   The left main coronary artery is normal.     Left anterior descending artery proximal stenosis of 50 to 60%. There is a graft to the LAD with good flow distally. There is a graft to the diagonal branch with good flow distally. Circumflex artery has a mid vessel stenosis of 75%. There is a saphenous vein graft to the obtuse marginal branch which fills the circumflex completely. The right coronary artery is a dominant vessel and 100% occluded proximally. Left ventriculogram not performed on this occasion. The LV ejection fraction was 70% by nuclear study. .        Impression:  Previous bypass graft
prox.  Lg D1 with 80% prox. Cx with prox 90%. RCA with prox 90%-dom    CATARACT REMOVAL WITH IMPLANT Right 06/26/2019    COLONOSCOPY      CORONARY ARTERY BYPASS GRAFT N/A 9/1/2022    CABG x 4, LIMA to LAD, SVG to D1, OM2 and PDA; EVH R thigh; MARION; TCPB; sternal plate x 1 (Biomet) performed by Lu Capellan MD at [address]  N/A 10/25/2018    ESOPHAGEAL DILATION [facility] performed by Montez Maki MD at [address] [facility], TOTAL ABDOMINAL (CERVIX REMOVED)  1990    fibroid-still with ovaries    INTRACAPSULAR CATARACT EXTRACTION Left 06/12/2019    PHACOEMULSIFICATION OF CATARACT LEFT EYE WITH INTRAOCULAR LENS IMPLANT performed by Perla Whitfield MD at [facility] Right 06/26/2019    PHACOEMULSIFICATION OF CATARACT RIGHT EYE WITH INTRAOCULAR LENS IMPLANT performed by Perla Whitfield MD at [facility] 10/25/2018    EGD BIOPSY performed by Montez Maki MD at [address] [facility]             Pre-Sedation:  Pre-Sedation Documentation and Exam:  I have personally completed a history, physical exam & review of systems for this patient (see notes). Prior History of Anesthesia Complications:   none    Modified Mallampati:  I (soft palate, uvula, fauces, tonsillar pillars visible)    ASA Classification:  Class 2 - A normal healthy patient with mild systemic disease    Evelyn Scale: Activity:  2 - Able to move 4 extremities voluntarily on command  Respiration:  2 - Able to breathe deeply and cough freely  Circulation:  2 - BP+/- 20mmHg of normal  Consciousness:  2 - Fully awake  Oxygen Saturation (color):  2 - Able to maintain oxygen saturation >92% on room air    Sedation/Anesthesia Plan:  Guard the patient's safety and welfare. Minimize physical discomfort and pain.   Minimize negative psychological responses to treatment by providing sedation and analgesia and

## 2023-10-05 LAB
ANA PATTERN: NORMAL
ANA TITER: NORMAL
ANTINUCLEAR AB INTERPRETIVE COMMENT: NORMAL
CYTOPLASMIC AB PATTERN SER IF-IMP: NORMAL
CYTOPLASMIC PATTERN TITER: NORMAL
NUCLEAR IGG SER QL IF: DETECTED

## 2023-10-24 ENCOUNTER — PATIENT MESSAGE (OUTPATIENT)
Dept: INTERNAL MEDICINE CLINIC | Age: 73
End: 2023-10-24

## 2023-10-24 DIAGNOSIS — I25.10 CORONARY ARTERY DISEASE DUE TO LIPID RICH PLAQUE: ICD-10-CM

## 2023-10-24 DIAGNOSIS — R74.01 ELEVATED TRANSAMINASE LEVEL: Primary | ICD-10-CM

## 2023-10-24 DIAGNOSIS — R70.0 ELEVATED SED RATE: ICD-10-CM

## 2023-10-24 DIAGNOSIS — E03.9 ACQUIRED HYPOTHYROIDISM: Chronic | ICD-10-CM

## 2023-10-24 DIAGNOSIS — D50.9 IRON DEFICIENCY ANEMIA, UNSPECIFIED IRON DEFICIENCY ANEMIA TYPE: ICD-10-CM

## 2023-10-24 DIAGNOSIS — I25.83 CORONARY ARTERY DISEASE DUE TO LIPID RICH PLAQUE: ICD-10-CM

## 2023-10-24 DIAGNOSIS — E11.9 TYPE 2 DIABETES MELLITUS WITHOUT COMPLICATION, WITHOUT LONG-TERM CURRENT USE OF INSULIN (HCC): ICD-10-CM

## 2023-10-26 DIAGNOSIS — R74.01 ELEVATED TRANSAMINASE LEVEL: ICD-10-CM

## 2023-10-26 DIAGNOSIS — E03.9 ACQUIRED HYPOTHYROIDISM: Chronic | ICD-10-CM

## 2023-10-26 DIAGNOSIS — I25.10 CORONARY ARTERY DISEASE DUE TO LIPID RICH PLAQUE: ICD-10-CM

## 2023-10-26 DIAGNOSIS — R70.0 ELEVATED SED RATE: ICD-10-CM

## 2023-10-26 DIAGNOSIS — I25.83 CORONARY ARTERY DISEASE DUE TO LIPID RICH PLAQUE: ICD-10-CM

## 2023-10-26 DIAGNOSIS — E11.9 TYPE 2 DIABETES MELLITUS WITHOUT COMPLICATION, WITHOUT LONG-TERM CURRENT USE OF INSULIN (HCC): ICD-10-CM

## 2023-10-26 PROBLEM — D50.9 IRON DEFICIENCY ANEMIA: Status: ACTIVE | Noted: 2023-10-26

## 2023-10-27 ENCOUNTER — OFFICE VISIT (OUTPATIENT)
Dept: INTERNAL MEDICINE CLINIC | Age: 73
End: 2023-10-27

## 2023-10-27 VITALS
DIASTOLIC BLOOD PRESSURE: 72 MMHG | BODY MASS INDEX: 37.15 KG/M2 | SYSTOLIC BLOOD PRESSURE: 142 MMHG | OXYGEN SATURATION: 96 % | WEIGHT: 196.6 LBS | HEART RATE: 70 BPM

## 2023-10-27 DIAGNOSIS — R76.8 DS DNA ANTIBODY POSITIVE: ICD-10-CM

## 2023-10-27 DIAGNOSIS — E78.2 MIXED HYPERLIPIDEMIA: Chronic | ICD-10-CM

## 2023-10-27 DIAGNOSIS — I10 ESSENTIAL HYPERTENSION: ICD-10-CM

## 2023-10-27 DIAGNOSIS — M25.562 ACUTE PAIN OF LEFT KNEE: ICD-10-CM

## 2023-10-27 DIAGNOSIS — E03.9 ACQUIRED HYPOTHYROIDISM: Chronic | ICD-10-CM

## 2023-10-27 DIAGNOSIS — E11.9 TYPE 2 DIABETES MELLITUS WITHOUT COMPLICATION, WITHOUT LONG-TERM CURRENT USE OF INSULIN (HCC): Primary | ICD-10-CM

## 2023-10-27 DIAGNOSIS — R70.0 ELEVATED SED RATE: ICD-10-CM

## 2023-10-27 DIAGNOSIS — E03.9 ACQUIRED HYPOTHYROIDISM: Primary | Chronic | ICD-10-CM

## 2023-10-27 DIAGNOSIS — R74.01 ELEVATED TRANSAMINASE LEVEL: ICD-10-CM

## 2023-10-27 LAB
ALBUMIN SERPL-MCNC: 4.5 G/DL (ref 3.4–5)
ALBUMIN/GLOB SERPL: 1.2 {RATIO} (ref 1.1–2.2)
ALP SERPL-CCNC: 54 U/L (ref 40–129)
ALT SERPL-CCNC: 23 U/L (ref 10–40)
ANION GAP SERPL CALCULATED.3IONS-SCNC: 10 MMOL/L (ref 3–16)
AST SERPL-CCNC: 24 U/L (ref 15–37)
BILIRUB SERPL-MCNC: 0.4 MG/DL (ref 0–1)
BUN SERPL-MCNC: 12 MG/DL (ref 7–20)
CALCIUM SERPL-MCNC: 9.8 MG/DL (ref 8.3–10.6)
CHLORIDE SERPL-SCNC: 103 MMOL/L (ref 99–110)
CHOLEST SERPL-MCNC: 124 MG/DL (ref 0–199)
CO2 SERPL-SCNC: 28 MMOL/L (ref 21–32)
CREAT SERPL-MCNC: 0.9 MG/DL (ref 0.6–1.2)
ERYTHROCYTE [SEDIMENTATION RATE] IN BLOOD BY WESTERGREN METHOD: 35 MM/HR (ref 0–30)
GFR SERPLBLD CREATININE-BSD FMLA CKD-EPI: >60 ML/MIN/{1.73_M2}
GLUCOSE P FAST SERPL-MCNC: 121 MG/DL (ref 70–99)
HDLC SERPL-MCNC: 40 MG/DL (ref 40–60)
LDLC SERPL CALC-MCNC: 62 MG/DL
POTASSIUM SERPL-SCNC: 4.7 MMOL/L (ref 3.5–5.1)
PROT SERPL-MCNC: 8.3 G/DL (ref 6.4–8.2)
SODIUM SERPL-SCNC: 141 MMOL/L (ref 136–145)
TRIGL SERPL-MCNC: 112 MG/DL (ref 0–150)
TSH SERPL DL<=0.005 MIU/L-ACNC: 1.91 UIU/ML (ref 0.27–4.2)
VLDLC SERPL CALC-MCNC: 22 MG/DL

## 2023-10-27 RX ORDER — LOSARTAN POTASSIUM 25 MG/1
50 TABLET ORAL DAILY
Qty: 90 TABLET | Refills: 1 | Status: SHIPPED | OUTPATIENT
Start: 2023-10-27

## 2023-10-27 NOTE — PATIENT INSTRUCTIONS
Ice pack to left knee for 5-10 minutes 3 x daily. Will refer to orthopedist if not continuing to get better.

## 2023-10-27 NOTE — ASSESSMENT & PLAN NOTE
Asymptomatic, TSH back in normal range with reduction of dose to 25 mcg. Discussed with patient that she may not need thyroid medication at all. She will finish what she has, which will likely get her through end of the year, and then have advised its okay to stop.

## 2023-10-27 NOTE — PROGRESS NOTES
omeprazole (PRILOSEC) 40 MG delayed release capsule TAKE 1 CAPSULE IN THE      MORNING AND AT BEDTIME 180 capsule 1    traZODone (DESYREL) 50 MG tablet 3 tabs nightly 90 tablet 5    Fexofenadine HCl (ALLEGRA ALLERGY PO) Take by mouth      buPROPion (WELLBUTRIN XL) 300 MG extended release tablet TAKE 1 TABLET EVERY MORNING 90 tablet 1    ipratropium (ATROVENT) 0.06 % nasal spray 2 sprays by Each Nostril route 4 times daily 30 mL 3    metoprolol tartrate (LOPRESSOR) 25 MG tablet Take 0.5 tablets by mouth 2 times daily 90 tablet 3    aspirin 325 MG EC tablet Take 1 tablet by mouth daily 30 tablet 3    vitamin B-12 (CYANOCOBALAMIN) 1000 MCG tablet Take 1 tablet by mouth daily 30 tablet 3    Cholecalciferol (VITAMIN D3) 1000 units TABS Take 1 tablet by mouth daily 30 tablet 11    bisacodyl (DULCOLAX) 5 MG EC tablet Take 2 tablets by mouth daily as needed for Constipation         OBJECTIVE:  Vitals:    10/27/23 0818   BP: (!) 142/72   Pulse: 70   SpO2: 96%   Weight: 89.2 kg (196 lb 9.6 oz)  Comment: shoes off     Physical Exam  Constitutional:       Appearance: Normal appearance. Cardiovascular:      Rate and Rhythm: Normal rate and regular rhythm. Heart sounds: Murmur (2/6, right sternal border) heard. Pulmonary:      Breath sounds: Normal breath sounds. Musculoskeletal:      Right lower leg: No edema. Left lower leg: No edema. Skin:     Findings: No rash. Psychiatric:         Mood and Affect: Mood normal.         This note was generated completely or in part utilizing Dragon dictation speech recognition software. Occasionally, words are mistranscribed and despite editing, the text may contain inaccuracies due to incorrect word recognition.   If further clarification is needed please contact the office at [phone]  --Beny Greenwood MD    .chief

## 2023-10-27 NOTE — ASSESSMENT & PLAN NOTE
Has not previously carried formal diagnosis, but blood pressure borderline high while on metoprolol and losartan for her heart. Increase losartan to 50 mg.

## 2023-11-12 RX ORDER — BUPROPION HYDROCHLORIDE 300 MG/1
TABLET ORAL
Qty: 90 TABLET | Refills: 1 | Status: SHIPPED | OUTPATIENT
Start: 2023-11-12

## 2023-11-19 RX ORDER — ROSUVASTATIN CALCIUM 20 MG/1
20 TABLET, COATED ORAL DAILY
Qty: 90 TABLET | Refills: 1 | Status: SHIPPED | OUTPATIENT
Start: 2023-11-19

## 2023-11-30 RX ORDER — TRAZODONE HYDROCHLORIDE 50 MG/1
TABLET ORAL
Qty: 60 TABLET | Refills: 5 | Status: SHIPPED | OUTPATIENT
Start: 2023-11-30

## 2023-12-25 RX ORDER — FLUOXETINE 10 MG/1
CAPSULE ORAL
Qty: 90 CAPSULE | Refills: 0 | Status: SHIPPED | OUTPATIENT
Start: 2023-12-25

## 2023-12-26 NOTE — TELEPHONE ENCOUNTER
Requested Prescriptions     Pending Prescriptions Disp Refills    metoprolol tartrate (LOPRESSOR) 25 MG tablet [Pharmacy Med Name: Chelsea Patino TAR TAB 25MG] 90 tablet 3     Sig: TAKE 1/2 TABLET TWICE A DAY            Last Office Visit: 8/25/2023     Next Office Visit: Visit date not found         Last Labs: 10.26.2023

## 2023-12-30 ENCOUNTER — PATIENT MESSAGE (OUTPATIENT)
Dept: DERMATOLOGY | Age: 73
End: 2023-12-30

## 2023-12-30 DIAGNOSIS — M25.562 LEFT KNEE PAIN, UNSPECIFIED CHRONICITY: Primary | ICD-10-CM

## 2024-01-02 NOTE — TELEPHONE ENCOUNTER
From: Nichol Mazariegos  To: Dr. Timothy Menard  Sent: 12/30/2023 8:48 PM EST  Subject: My left knee    During my last visit with you I complained about pain in my left leg. I felt sure it would get better on its own, but it hasn’t. I thought I could beat it until my appointment with you late in January, but it hurts all the time & is affecting my balance & sleep. Tylenol, cold and warm compresses, & elevating it don’t calm the pain. It feels feverish, but it doesn’t swell. Do you think I should have it x-rayed or scanned to rule out some things it could be? I realize it’s the holidays & it might be next week before I hear from you. Thank you. Nichol

## 2024-01-14 RX ORDER — LOSARTAN POTASSIUM 25 MG/1
50 TABLET ORAL DAILY
Qty: 90 TABLET | Refills: 1 | Status: SHIPPED | OUTPATIENT
Start: 2024-01-14

## 2024-01-24 DIAGNOSIS — E03.9 ACQUIRED HYPOTHYROIDISM: Chronic | ICD-10-CM

## 2024-01-24 DIAGNOSIS — R76.8 DS DNA ANTIBODY POSITIVE: ICD-10-CM

## 2024-01-24 DIAGNOSIS — E11.9 TYPE 2 DIABETES MELLITUS WITHOUT COMPLICATION, WITHOUT LONG-TERM CURRENT USE OF INSULIN (HCC): ICD-10-CM

## 2024-01-24 LAB
ALBUMIN SERPL-MCNC: 4.4 G/DL (ref 3.4–5)
ALBUMIN/GLOB SERPL: 1.3 {RATIO} (ref 1.1–2.2)
ALP SERPL-CCNC: 48 U/L (ref 40–129)
ALT SERPL-CCNC: 28 U/L (ref 10–40)
ANION GAP SERPL CALCULATED.3IONS-SCNC: 13 MMOL/L (ref 3–16)
AST SERPL-CCNC: 25 U/L (ref 15–37)
BILIRUB SERPL-MCNC: 0.5 MG/DL (ref 0–1)
BUN SERPL-MCNC: 21 MG/DL (ref 7–20)
CALCIUM SERPL-MCNC: 9.3 MG/DL (ref 8.3–10.6)
CHLORIDE SERPL-SCNC: 103 MMOL/L (ref 99–110)
CO2 SERPL-SCNC: 22 MMOL/L (ref 21–32)
CREAT SERPL-MCNC: 0.9 MG/DL (ref 0.6–1.2)
DEPRECATED RDW RBC AUTO: 14.5 % (ref 12.4–15.4)
GFR SERPLBLD CREATININE-BSD FMLA CKD-EPI: >60 ML/MIN/{1.73_M2}
GLUCOSE SERPL-MCNC: 173 MG/DL (ref 70–99)
HCT VFR BLD AUTO: 40.3 % (ref 36–48)
HGB BLD-MCNC: 13.7 G/DL (ref 12–16)
MCH RBC QN AUTO: 31.6 PG (ref 26–34)
MCHC RBC AUTO-ENTMCNC: 34 G/DL (ref 31–36)
MCV RBC AUTO: 92.9 FL (ref 80–100)
PLATELET # BLD AUTO: 193 K/UL (ref 135–450)
PMV BLD AUTO: 10.3 FL (ref 5–10.5)
POTASSIUM SERPL-SCNC: 4.5 MMOL/L (ref 3.5–5.1)
PROT SERPL-MCNC: 7.7 G/DL (ref 6.4–8.2)
RBC # BLD AUTO: 4.34 M/UL (ref 4–5.2)
SODIUM SERPL-SCNC: 138 MMOL/L (ref 136–145)
TSH SERPL DL<=0.005 MIU/L-ACNC: 2.96 UIU/ML (ref 0.27–4.2)
WBC # BLD AUTO: 7 K/UL (ref 4–11)

## 2024-01-24 SDOH — HEALTH STABILITY: PHYSICAL HEALTH: ON AVERAGE, HOW MANY DAYS PER WEEK DO YOU ENGAGE IN MODERATE TO STRENUOUS EXERCISE (LIKE A BRISK WALK)?: 3 DAYS

## 2024-01-24 SDOH — HEALTH STABILITY: PHYSICAL HEALTH: ON AVERAGE, HOW MANY MINUTES DO YOU ENGAGE IN EXERCISE AT THIS LEVEL?: 20 MIN

## 2024-01-24 NOTE — PROGRESS NOTES
the role of diet modifications and exercise for weight management and the impact of obesity on her knee pain.    Nichol Mazariegos understands and accepts this course of care

## 2024-01-25 LAB
ANA SER QL IA: POSITIVE
CENTROMERE B IGG SER QL LINE BLOT: <0.2 AI (ref 0–0.9)
CHROMATIN AB SERPL-ACNC: 0.4 AI (ref 0–0.9)
DSDNA AB SER-ACNC: 70 IU/ML (ref 0–9)
ENA JO1 AB SER IA-ACNC: <0.2 AI (ref 0–0.9)
ENA RNP AB SER IA-ACNC: <0.2 AI (ref 0–0.9)
ENA SCL70 IGG SER IA-ACNC: <0.2 AI (ref 0–0.9)
ENA SM AB SER IA-ACNC: <0.2 AI (ref 0–0.9)
ENA SM+RNP IGG SER-ACNC: <0.2 AI (ref 0–0.9)
ENA SS-A AB SER IA-ACNC: <0.2 AI (ref 0–0.9)
ENA SS-B AB SER IA-ACNC: <0.2 AI (ref 0–0.9)
EST. AVERAGE GLUCOSE BLD GHB EST-MCNC: 157.1 MG/DL
HBA1C MFR BLD: 7.1 %
RIBOSOMAL P AB SER IA-ACNC: <0.2 AI (ref 0–0.9)

## 2024-01-26 ENCOUNTER — OFFICE VISIT (OUTPATIENT)
Dept: ORTHOPEDIC SURGERY | Age: 74
End: 2024-01-26

## 2024-01-26 VITALS — HEIGHT: 61 IN | WEIGHT: 202 LBS | BODY MASS INDEX: 38.14 KG/M2

## 2024-01-26 DIAGNOSIS — E66.01 SEVERE OBESITY (BMI 35.0-39.9) WITH COMORBIDITY (HCC): ICD-10-CM

## 2024-01-26 DIAGNOSIS — M25.562 LEFT KNEE PAIN, UNSPECIFIED CHRONICITY: ICD-10-CM

## 2024-01-26 DIAGNOSIS — M17.12 ARTHRITIS OF LEFT KNEE: Primary | ICD-10-CM

## 2024-01-26 PROBLEM — Z95.1 S/P CABG X 4: Status: RESOLVED | Noted: 2022-09-14 | Resolved: 2024-01-26

## 2024-01-26 PROBLEM — R05.9 COUGH: Status: ACTIVE | Noted: 2023-03-01

## 2024-01-28 ENCOUNTER — PATIENT MESSAGE (OUTPATIENT)
Dept: INTERNAL MEDICINE CLINIC | Age: 74
End: 2024-01-28

## 2024-01-28 DIAGNOSIS — R30.0 DYSURIA: Primary | ICD-10-CM

## 2024-01-29 ENCOUNTER — TELEPHONE (OUTPATIENT)
Age: 74
End: 2024-01-29

## 2024-01-29 PROBLEM — M31.0 LEUKOCYTOCLASTIC VASCULITIS (HCC): Status: RESOLVED | Noted: 2023-05-17 | Resolved: 2024-01-29

## 2024-01-29 LAB
ANA PATTERN: ABNORMAL
ANA TITER: ABNORMAL
ANTINUCLEAR AB INTERPRETIVE COMMENT: ABNORMAL
CYTOPLASMIC AB PATTERN SER IF-IMP: ABNORMAL
CYTOPLASMIC PATTERN TITER: ABNORMAL
NUCLEAR IGG SER QL IF: DETECTED

## 2024-01-29 NOTE — TELEPHONE ENCOUNTER
Called patient LVM for patient to schedule her injection, patient may schedule to see Dr. Ghanshyam Kruse either this Thursday at the [place] office or Friday at the [place] Office.

## 2024-01-30 ENCOUNTER — OFFICE VISIT (OUTPATIENT)
Dept: INTERNAL MEDICINE CLINIC | Age: 74
End: 2024-01-30
Payer: COMMERCIAL

## 2024-01-30 VITALS
SYSTOLIC BLOOD PRESSURE: 130 MMHG | BODY MASS INDEX: 38.17 KG/M2 | HEART RATE: 76 BPM | HEIGHT: 61 IN | OXYGEN SATURATION: 95 % | DIASTOLIC BLOOD PRESSURE: 70 MMHG

## 2024-01-30 DIAGNOSIS — F33.41 RECURRENT MAJOR DEPRESSIVE DISORDER, IN PARTIAL REMISSION (HCC): ICD-10-CM

## 2024-01-30 DIAGNOSIS — E11.9 TYPE 2 DIABETES MELLITUS WITHOUT COMPLICATION, WITHOUT LONG-TERM CURRENT USE OF INSULIN (HCC): ICD-10-CM

## 2024-01-30 DIAGNOSIS — E03.9 ACQUIRED HYPOTHYROIDISM: Primary | Chronic | ICD-10-CM

## 2024-01-30 DIAGNOSIS — R30.0 DYSURIA: ICD-10-CM

## 2024-01-30 DIAGNOSIS — M79.644 FINGER PAIN, RIGHT: ICD-10-CM

## 2024-01-30 DIAGNOSIS — D50.9 IRON DEFICIENCY ANEMIA, UNSPECIFIED IRON DEFICIENCY ANEMIA TYPE: ICD-10-CM

## 2024-01-30 DIAGNOSIS — R76.8 DS DNA ANTIBODY POSITIVE: ICD-10-CM

## 2024-01-30 PROBLEM — R05.9 COUGH: Status: RESOLVED | Noted: 2023-03-01 | Resolved: 2024-01-30

## 2024-01-30 LAB
APPEARANCE FLUID: NORMAL
BILIRUBIN, POC: NORMAL
BLOOD URINE, POC: NORMAL
CLARITY, POC: NORMAL
COLOR, POC: NORMAL
GLUCOSE URINE, POC: NORMAL
KETONES, POC: NORMAL
LEUKOCYTE EST, POC: NORMAL
NITRITE, POC: NORMAL
PH, POC: 6
PROTEIN, POC: NORMAL
SPECIFIC GRAVITY, POC: 1.02
UROBILINOGEN, POC: 0.2

## 2024-01-30 PROCEDURE — 99214 OFFICE O/P EST MOD 30 MIN: CPT | Performed by: INTERNAL MEDICINE

## 2024-01-30 PROCEDURE — 1123F ACP DISCUSS/DSCN MKR DOCD: CPT | Performed by: INTERNAL MEDICINE

## 2024-01-30 PROCEDURE — 3051F HG A1C>EQUAL 7.0%<8.0%: CPT | Performed by: INTERNAL MEDICINE

## 2024-01-30 PROCEDURE — G2211 COMPLEX E/M VISIT ADD ON: HCPCS | Performed by: INTERNAL MEDICINE

## 2024-01-30 PROCEDURE — 3078F DIAST BP <80 MM HG: CPT | Performed by: INTERNAL MEDICINE

## 2024-01-30 PROCEDURE — 81002 URINALYSIS NONAUTO W/O SCOPE: CPT | Performed by: INTERNAL MEDICINE

## 2024-01-30 PROCEDURE — 3075F SYST BP GE 130 - 139MM HG: CPT | Performed by: INTERNAL MEDICINE

## 2024-01-30 RX ORDER — FLUOXETINE HYDROCHLORIDE 20 MG/1
20 CAPSULE ORAL DAILY
Qty: 90 CAPSULE | Refills: 1 | Status: SHIPPED | OUTPATIENT
Start: 2024-01-30

## 2024-01-30 ASSESSMENT — PATIENT HEALTH QUESTIONNAIRE - PHQ9
SUM OF ALL RESPONSES TO PHQ9 QUESTIONS 1 & 2: 1
7. TROUBLE CONCENTRATING ON THINGS, SUCH AS READING THE NEWSPAPER OR WATCHING TELEVISION: 0
5. POOR APPETITE OR OVEREATING: 0
6. FEELING BAD ABOUT YOURSELF - OR THAT YOU ARE A FAILURE OR HAVE LET YOURSELF OR YOUR FAMILY DOWN: 0
SUM OF ALL RESPONSES TO PHQ QUESTIONS 1-9: 1
4. FEELING TIRED OR HAVING LITTLE ENERGY: 0
9. THOUGHTS THAT YOU WOULD BE BETTER OFF DEAD, OR OF HURTING YOURSELF: 0
1. LITTLE INTEREST OR PLEASURE IN DOING THINGS: 1
2. FEELING DOWN, DEPRESSED OR HOPELESS: 0
SUM OF ALL RESPONSES TO PHQ QUESTIONS 1-9: 1
10. IF YOU CHECKED OFF ANY PROBLEMS, HOW DIFFICULT HAVE THESE PROBLEMS MADE IT FOR YOU TO DO YOUR WORK, TAKE CARE OF THINGS AT HOME, OR GET ALONG WITH OTHER PEOPLE: 0
8. MOVING OR SPEAKING SO SLOWLY THAT OTHER PEOPLE COULD HAVE NOTICED. OR THE OPPOSITE, BEING SO FIGETY OR RESTLESS THAT YOU HAVE BEEN MOVING AROUND A LOT MORE THAN USUAL: 0
3. TROUBLE FALLING OR STAYING ASLEEP: 0

## 2024-01-30 NOTE — PROGRESS NOTES
Nichol Mazariegos   :  1950  2024    ASSESSMENT/PLAN:   Acquired hypothyroidism  Assessment & Plan:  Increased fatigue off low-dose levothyroxine with a slight bump in TSH.  Okay to restart 0.25 mcg  Type 2 diabetes mellitus without complication, without long-term current use of insulin (HCC)  Assessment & Plan:  A1c increased, today at 7.1.  Limited exercise due to knee but should be able to restart that soon.  Interested in potentially trying Ozempic.  She is can check on coverage.  May need to start on metformin first.  Will discuss at her 1 month follow-up.  Recurrent major depressive disorder, in partial remission (HCC)  Assessment & Plan:  Recently symptomatic again, some seasonal component.  Increase fluoxetine to 20 mg, continue Wellbutrin  mg.  Discussed light therapy and encourage patient to try.  Iron deficiency anemia, unspecified iron deficiency anemia type  Assessment & Plan:  Anemia resolved.  Okay to stop iron.  Still needs GI evaluation which had been on hold due to her cardiac surgery.  Referred back to Dr. Banks for esophagogastroduodenoscopy in colon.  Orders:  -     Amb External Referral To Gastroenterology  Ds DNA antibody positive  Assessment & Plan:  Persistent high titer, but asymptomatic at this time.  No treatment indicated.  Finger pain, right  Comments:  Heberden's node versus cystic lesion.  Affecting functioning including piano playing.  xray, consider hand ortho    Orders:  -     XR HAND RIGHT (2 VIEWS); Future  Dysuria  Comments:  Send culture, treat as indicated.  Orders:  -     Culture, Urine; Future  -     POCT Urinalysis no Micro      Return in about 1 month (around 2024).        SUBJECTIVE     73 y.o. female established patient here for: Follow-up    Possible UTI. Discomfort, pressure and frequency over the weekend. Still some symptoms but improved today.    Completed thyroid medication? Discussed stopping her 25 mcg when ran out. Seems colder this  Patient is here for hospital follow up. No lingering effects.  No concerns

## 2024-01-30 NOTE — ASSESSMENT & PLAN NOTE
A1c increased, today at 7.1.  Limited exercise due to knee but should be able to restart that soon.  Interested in potentially trying Ozempic.  She is can check on coverage.  May need to start on metformin first.  Will discuss at her 1 month follow-up.

## 2024-01-30 NOTE — ASSESSMENT & PLAN NOTE
Recently symptomatic again, some seasonal component.  Increase fluoxetine to 20 mg, continue Wellbutrin  mg.  Discussed light therapy and encourage patient to try.

## 2024-01-30 NOTE — PATIENT INSTRUCTIONS
Check with insurance. See ozempic is covered.      Light Therapy for Seasonal Depression  Vermila Happylight FullSize 10,000 is one example.  The lamp is a 71057 lux lamp. Available on [employer] along with others, approx $40-60    Three key elements for effectiveness  Light therapy is most effective when you have the proper combination of light intensity, duration and timing    Intensity. The intensity of the light box is recorded in lux, which is a measure of the amount of light you receive. For SAD, the typical recommendation is to use a 10,000-lux light box at a distance of about 16 to 24 inches (41 to 61 centimeters) from your face.    Duration. With a 10,000-lux light box, light therapy typically involves daily sessions of about 20 to 30 minutes. But a lower-intensity light box, such as 2,500 lux, may require longer sessions. Check the 's guidelines and follow your doctor's instructions. He or she may suggest you start with shorter sessions and gradually increase the time.    Timing. For most people, light therapy is most effective when it's done early in the morning, after you first wake up. Your doctor can help you determine the light therapy schedule that works best.

## 2024-02-01 ENCOUNTER — TELEPHONE (OUTPATIENT)
Dept: INTERNAL MEDICINE CLINIC | Age: 74
End: 2024-02-01

## 2024-02-01 LAB
BACTERIA UR CULT: ABNORMAL
ORGANISM: ABNORMAL

## 2024-02-01 RX ORDER — SULFAMETHOXAZOLE AND TRIMETHOPRIM 800; 160 MG/1; MG/1
1 TABLET ORAL 2 TIMES DAILY
Qty: 6 TABLET | Refills: 0 | Status: SHIPPED | OUTPATIENT
Start: 2024-02-01 | End: 2024-02-04

## 2024-02-01 NOTE — TELEPHONE ENCOUNTER
Please call patient and let her know that her urine test shows she has a UTI.  I am sending in prescription for Bactrim. Would like her to start today.

## 2024-02-02 ENCOUNTER — OFFICE VISIT (OUTPATIENT)
Dept: ORTHOPEDIC SURGERY | Age: 74
End: 2024-02-02

## 2024-02-02 VITALS — WEIGHT: 202 LBS | BODY MASS INDEX: 38.14 KG/M2 | HEIGHT: 61 IN

## 2024-02-02 DIAGNOSIS — M17.12 ARTHRITIS OF LEFT KNEE: Primary | ICD-10-CM

## 2024-02-02 NOTE — PROGRESS NOTES
Subjective:  left knee pain.   She is here for a Monovisc injection for the  left knee(s).     Objective:   Height 1.549 m (5' 1\"), weight 91.6 kg (202 lb).    There is no joint effusion noted of the left knee(s). There is moderate pain with range of motion testing. There is no significant  instability noted.    Neuro exam grossly intact both lower extremities. Intact sensation to light touch. Motor exam 4+ to 5/5 in all major motor groups.  Negative Garcia's sign.    Skin is warm, dry and intact with out erythema or significant increased temperature around the knee joint(s).     Assessment:  Degenerative Joint Disease of the left Knee(s).    Plan:  Discussed Monovisc injection(s) including all  risks and benefits including increased pain, drug reaction, infection, bleeding, lack of improvement and  neurovascular injury.   All the questions were answered.    PROCEDURE NOTE:  PRE-PROCEDURE DIAGNOSIS: M17.12  POST-PROCEDURE DIAGNOSIS:M17.12    With the patient's permission, her left  knee(s) was prepped in standard sterile fashion with Betadine and  Alcohol. The prefilled Monovisc (88 mg) injection was placed into the left  inferolateral joint space compartment(s) without difficulty using careful aspiration and injection technique.  The she tolerated the procedure(s) well without difficulty.  A band-aid was applied.     POST-PROCEDURE INSTRUCTIONS GIVEN TO PATIENT: She was advised to ice the knee for 15-20 minutes to relieve any injection site related pain. Decrease activity for the next 24 to 48 hours. May use prescription or OTC pain relievers as needed      FOLLOW-UP:   As directed or call or return to clinic if these symptoms worsen or fail to improve as anticipated over the course of the next 6 weeks. If at any time you are concerned you may contact the office for further instructions or care.

## 2024-02-05 RX ORDER — OMEPRAZOLE 40 MG/1
CAPSULE, DELAYED RELEASE ORAL
Qty: 180 CAPSULE | Refills: 1 | Status: SHIPPED | OUTPATIENT
Start: 2024-02-05

## 2024-02-14 RX ORDER — LOSARTAN POTASSIUM 50 MG/1
50 TABLET ORAL DAILY
Qty: 90 TABLET | Refills: 1 | Status: SHIPPED | OUTPATIENT
Start: 2024-02-14

## 2024-02-14 RX ORDER — LOSARTAN POTASSIUM 25 MG/1
50 TABLET ORAL DAILY
Qty: 180 TABLET | Refills: 1 | Status: CANCELLED | OUTPATIENT
Start: 2024-02-14

## 2024-02-14 NOTE — TELEPHONE ENCOUNTER
Medication:   Requested Prescriptions     Pending Prescriptions Disp Refills    losartan (COZAAR) 25 MG tablet 180 tablet 1     Sig: Take 2 tablets by mouth daily     Last Filled:  45 with 1 refill on 01/14/24, request is for a 90 day supply    Last appt: 1/30/2024   Next appt: 2/27/2024    Last OARRS:       1/26/2018     8:38 AM   RX Monitoring   Attestation The Prescription Monitoring Report for this patient was reviewed today.   Periodic Controlled Substance Monitoring No signs of potential drug abuse or diversion identified.

## 2024-02-14 NOTE — PROGRESS NOTES
Please call her and let her know that the refill for losartan will now be for a 50 mg table, so only needs to take 1 instead of taking 2 of the 25 mg tablets.

## 2024-02-27 ENCOUNTER — OFFICE VISIT (OUTPATIENT)
Dept: INTERNAL MEDICINE CLINIC | Age: 74
End: 2024-02-27

## 2024-02-27 ENCOUNTER — HOSPITAL ENCOUNTER (OUTPATIENT)
Dept: GENERAL RADIOLOGY | Age: 74
Discharge: HOME | End: 2024-02-27
Payer: COMMERCIAL

## 2024-02-27 VITALS
HEART RATE: 68 BPM | SYSTOLIC BLOOD PRESSURE: 128 MMHG | DIASTOLIC BLOOD PRESSURE: 72 MMHG | OXYGEN SATURATION: 98 % | WEIGHT: 204 LBS | BODY MASS INDEX: 38.55 KG/M2

## 2024-02-27 DIAGNOSIS — M79.644 FINGER PAIN, RIGHT: ICD-10-CM

## 2024-02-27 DIAGNOSIS — E03.9 ACQUIRED HYPOTHYROIDISM: Chronic | ICD-10-CM

## 2024-02-27 DIAGNOSIS — N39.3 STRESS INCONTINENCE IN FEMALE: ICD-10-CM

## 2024-02-27 DIAGNOSIS — E03.9 ACQUIRED HYPOTHYROIDISM: Primary | Chronic | ICD-10-CM

## 2024-02-27 DIAGNOSIS — M72.2 PLANTAR FASCIITIS OF LEFT FOOT: ICD-10-CM

## 2024-02-27 DIAGNOSIS — F33.41 RECURRENT MAJOR DEPRESSIVE DISORDER, IN PARTIAL REMISSION (HCC): ICD-10-CM

## 2024-02-27 DIAGNOSIS — E11.9 TYPE 2 DIABETES MELLITUS WITHOUT COMPLICATION, WITHOUT LONG-TERM CURRENT USE OF INSULIN (HCC): ICD-10-CM

## 2024-02-27 PROCEDURE — 73130 X-RAY EXAM OF HAND: CPT

## 2024-02-27 RX ORDER — FLUTICASONE PROPIONATE 50 MCG
2 SPRAY, SUSPENSION (ML) NASAL DAILY
Qty: 48 G | Refills: 1 | Status: SHIPPED | OUTPATIENT
Start: 2024-02-27

## 2024-02-27 RX ORDER — FLUTICASONE PROPIONATE 50 MCG
2 SPRAY, SUSPENSION (ML) NASAL DAILY
Qty: 16 G | Refills: 11 | Status: SHIPPED | OUTPATIENT
Start: 2024-02-27 | End: 2024-02-27

## 2024-02-27 NOTE — ASSESSMENT & PLAN NOTE
Reasonably well-controlled with fluoxetine and Wellbutrin.  Still feels depressed about weight.  Discussed strategies for improving her health with increasing activity and improving diet.

## 2024-02-27 NOTE — PROGRESS NOTES
Nichol Mazariegos   :  1950  2024    ASSESSMENT/PLAN:   Acquired hypothyroidism  Assessment & Plan:  Fatigue improved since restarted low-dose levothyroxine.  Recheck TSH today.    Orders:  -     TSH with Reflex; Future  Recurrent major depressive disorder, in partial remission (HCC)  Assessment & Plan:  Reasonably well-controlled with fluoxetine and Wellbutrin.  Still feels depressed about weight.  Discussed strategies for improving her health with increasing activity and improving diet.  Plantar fasciitis of left foot  Comments:  Mild.  Continue with insert.  Discussed ice and stretching, provided reference material.  Stress incontinence in female  Assessment & Plan:  Recently treated for UTI.  Dysuria resolved but patient would like a repeat urine culture today to assure infection not still contributing to incontinence.  Orders:  -     Culture, Urine; Future  Type 2 diabetes mellitus without complication, without long-term current use of insulin ([facility])  Assessment & Plan:  Controlled, not monitoring.  Has decided against trying Ozempic.  Discussed lifestyle management.  Due for microalbumin today.    Orders:  -     Microalbumin / Creatinine Urine Ratio; Future      Return in about 4 months (around 2024).        SUBJECTIVE     73 y.o. female established patient here for: Follow-up    Increased fluoxetine at last appointment and also resumed levothyroxine  0.25 mg die to mood and fatigue.    Still depressed about weight but otherwise doing ok.   Energy a little better since resuming low dose thyroid.     Dealing with plantar fasciitis on left for last month. Insoles making a difference, mainly first few steps. Also feels a little in ankle with first few steps.     Also was checking on ozempic coverage but has decided that she doesn't want to take it anyway. Interested in bariatric surgery, but not ready for referral yet.     Thinking about getting finger  xrayed today.   She is aware still needs

## 2024-02-27 NOTE — ASSESSMENT & PLAN NOTE
Controlled, not monitoring.  Has decided against trying Ozempic.  Discussed lifestyle management.  Due for microalbumin today.

## 2024-02-27 NOTE — PATIENT INSTRUCTIONS
[employer] A[employer] has many resources and tips to help with getting exercise  and physical activity as one ages.   https://www.matilde.nih.gov/health/exercise-physical-activity    Sit and Be Fit programs are available on local [facility] ([facility]) at 10:30 am and 3:30 pm      https://www.heart.org/en/healthy-living/fitness provides access to workout videos on youtube or [employer]

## 2024-02-28 LAB
CREAT UR-MCNC: 117.7 MG/DL (ref 28–259)
MICROALBUMIN UR DL<=1MG/L-MCNC: <1.2 MG/DL
MICROALBUMIN/CREAT UR: NORMAL MG/G (ref 0–30)
TSH SERPL DL<=0.005 MIU/L-ACNC: 3.21 UIU/ML (ref 0.27–4.2)

## 2024-02-29 DIAGNOSIS — E03.9 ACQUIRED HYPOTHYROIDISM: Primary | Chronic | ICD-10-CM

## 2024-02-29 DIAGNOSIS — D50.9 IRON DEFICIENCY ANEMIA, UNSPECIFIED IRON DEFICIENCY ANEMIA TYPE: ICD-10-CM

## 2024-02-29 LAB
BACTERIA UR CULT: NORMAL
IGA SERPL-MCNC: 191 MG/DL (ref 70–400)
THYROPEROXIDASE AB SERPL IA-ACNC: 11 IU/ML

## 2024-03-01 LAB — TISSUE TRANSGLUTAMINASE IGA: <0.5 U/ML (ref 0–14)

## 2024-03-31 RX ORDER — FLUOXETINE 10 MG/1
CAPSULE ORAL
Qty: 90 CAPSULE | Refills: 0 | OUTPATIENT
Start: 2024-03-31

## 2024-04-11 RX ORDER — LOSARTAN POTASSIUM 25 MG/1
50 TABLET ORAL DAILY
Qty: 90 TABLET | Refills: 1 | OUTPATIENT
Start: 2024-04-11

## 2024-04-23 ENCOUNTER — TELEPHONE (OUTPATIENT)
Dept: INTERNAL MEDICINE CLINIC | Age: 74
End: 2024-04-23

## 2024-04-23 RX ORDER — TRAZODONE HYDROCHLORIDE 50 MG/1
TABLET ORAL
Qty: 60 TABLET | Refills: 3 | Status: SHIPPED | OUTPATIENT
Start: 2024-04-23

## 2024-04-23 NOTE — TELEPHONE ENCOUNTER
----- Message from Trudy Biggs sent at 4/23/2024  4:01 PM EDT -----  Regarding: [facility] Appointment Request  E[facility] Appointment Request    Patient needs appointment for E[facility] Appointment Type: New to Provider.    Reason for Appointment Request: Requested Provider unavailable AS PER THE PATIENT SHE WANTED TO HAVE A PCP UNDER THE [facility] ,BECAUSE HER PREVIOUS WILL BE LIVING TO PRACTICE. :for wellness check up  --------------------------------------------------------------------------------------------------------------------------    Relationship to Patient: Self     Call Back Information: OK to leave message on voicemail  Preferred Call Back Number: Phone [phone]

## 2024-04-27 NOTE — TELEPHONE ENCOUNTER
Medication:   Requested Prescriptions     Pending Prescriptions Disp Refills    buPROPion (WELLBUTRIN XL) 300 MG extended release tablet [Pharmacy Med Name: BUPROP 24 XL TAB 300MG] 90 tablet 1     Sig: TAKE 1 TABLET EVERY MORNING        Last Filled:  11/12/2023 #90 1rf     Patient Phone Number: [phone] (home) [phone] (work)    Last appt: 2/27/2024   Next appt: Visit date not found    Last OARRS:       1/26/2018     8:38 AM   RX Monitoring   Attestation The Prescription Monitoring Report for this patient was reviewed today.   Periodic Controlled Substance Monitoring No signs of potential drug abuse or diversion identified.

## 2024-04-28 RX ORDER — BUPROPION HYDROCHLORIDE 300 MG/1
TABLET ORAL
Qty: 90 TABLET | Refills: 1 | Status: SHIPPED | OUTPATIENT
Start: 2024-04-28

## 2024-05-06 RX ORDER — LEVOTHYROXINE SODIUM 0.03 MG/1
25 TABLET ORAL DAILY
Qty: 30 TABLET | Refills: 0 | Status: SHIPPED | OUTPATIENT
Start: 2024-05-06

## 2024-05-06 RX ORDER — ROSUVASTATIN CALCIUM 20 MG/1
20 TABLET, COATED ORAL DAILY
Qty: 90 TABLET | Refills: 0 | Status: SHIPPED | OUTPATIENT
Start: 2024-05-06

## 2024-06-01 SDOH — HEALTH STABILITY: PHYSICAL HEALTH: ON AVERAGE, HOW MANY DAYS PER WEEK DO YOU ENGAGE IN MODERATE TO STRENUOUS EXERCISE (LIKE A BRISK WALK)?: 3 DAYS

## 2024-06-01 SDOH — HEALTH STABILITY: PHYSICAL HEALTH: ON AVERAGE, HOW MANY MINUTES DO YOU ENGAGE IN EXERCISE AT THIS LEVEL?: 20 MIN

## 2024-06-03 ENCOUNTER — OFFICE VISIT (OUTPATIENT)
Dept: INTERNAL MEDICINE CLINIC | Age: 74
End: 2024-06-03
Payer: COMMERCIAL

## 2024-06-03 VITALS
WEIGHT: 202 LBS | HEIGHT: 61 IN | TEMPERATURE: 98.3 F | DIASTOLIC BLOOD PRESSURE: 66 MMHG | OXYGEN SATURATION: 98 % | BODY MASS INDEX: 38.14 KG/M2 | HEART RATE: 65 BPM | SYSTOLIC BLOOD PRESSURE: 134 MMHG

## 2024-06-03 DIAGNOSIS — K21.00 GASTROESOPHAGEAL REFLUX DISEASE WITH ESOPHAGITIS, UNSPECIFIED WHETHER HEMORRHAGE: Chronic | ICD-10-CM

## 2024-06-03 DIAGNOSIS — E78.2 MIXED HYPERLIPIDEMIA: Chronic | ICD-10-CM

## 2024-06-03 DIAGNOSIS — E11.9 TYPE 2 DIABETES MELLITUS WITHOUT COMPLICATION, WITHOUT LONG-TERM CURRENT USE OF INSULIN (HCC): Primary | ICD-10-CM

## 2024-06-03 DIAGNOSIS — G47.33 OBSTRUCTIVE SLEEP APNEA: Chronic | ICD-10-CM

## 2024-06-03 DIAGNOSIS — N30.00 ACUTE CYSTITIS WITHOUT HEMATURIA: ICD-10-CM

## 2024-06-03 DIAGNOSIS — K59.01 SLOW TRANSIT CONSTIPATION: ICD-10-CM

## 2024-06-03 LAB
BILIRUBIN, POC: 0
BLOOD URINE, POC: NORMAL
CLARITY, POC: NORMAL
COLOR, POC: YELLOW
GLUCOSE URINE, POC: 0
KETONES, POC: 0
LEUKOCYTE EST, POC: NORMAL
NITRITE, POC: 0
PH, POC: 5
PROTEIN, POC: NORMAL
SPECIFIC GRAVITY, POC: 1
UROBILINOGEN, POC: 0.2

## 2024-06-03 PROCEDURE — 1123F ACP DISCUSS/DSCN MKR DOCD: CPT | Performed by: STUDENT IN AN ORGANIZED HEALTH CARE EDUCATION/TRAINING PROGRAM

## 2024-06-03 PROCEDURE — 3051F HG A1C>EQUAL 7.0%<8.0%: CPT | Performed by: STUDENT IN AN ORGANIZED HEALTH CARE EDUCATION/TRAINING PROGRAM

## 2024-06-03 PROCEDURE — 3078F DIAST BP <80 MM HG: CPT | Performed by: STUDENT IN AN ORGANIZED HEALTH CARE EDUCATION/TRAINING PROGRAM

## 2024-06-03 PROCEDURE — 3075F SYST BP GE 130 - 139MM HG: CPT | Performed by: STUDENT IN AN ORGANIZED HEALTH CARE EDUCATION/TRAINING PROGRAM

## 2024-06-03 PROCEDURE — 81002 URINALYSIS NONAUTO W/O SCOPE: CPT | Performed by: STUDENT IN AN ORGANIZED HEALTH CARE EDUCATION/TRAINING PROGRAM

## 2024-06-03 PROCEDURE — 99214 OFFICE O/P EST MOD 30 MIN: CPT | Performed by: STUDENT IN AN ORGANIZED HEALTH CARE EDUCATION/TRAINING PROGRAM

## 2024-06-03 RX ORDER — LEVOTHYROXINE SODIUM 0.05 MG/1
50 TABLET ORAL DAILY
Qty: 30 TABLET | Refills: 2
Start: 2024-06-03 | End: 2024-09-01

## 2024-06-03 RX ORDER — FLUOXETINE HYDROCHLORIDE 40 MG/1
40 CAPSULE ORAL DAILY
Qty: 30 CAPSULE | Refills: 1
Start: 2024-06-03 | End: 2024-07-03

## 2024-06-03 RX ORDER — NITROFURANTOIN 25; 75 MG/1; MG/1
100 CAPSULE ORAL 2 TIMES DAILY
Qty: 14 CAPSULE | Refills: 0 | Status: SHIPPED | OUTPATIENT
Start: 2024-06-03 | End: 2024-06-10

## 2024-06-03 SDOH — ECONOMIC STABILITY: FOOD INSECURITY: WITHIN THE PAST 12 MONTHS, THE FOOD YOU BOUGHT JUST DIDN'T LAST AND YOU DIDN'T HAVE MONEY TO GET MORE.: NEVER TRUE

## 2024-06-03 SDOH — ECONOMIC STABILITY: FOOD INSECURITY: WITHIN THE PAST 12 MONTHS, YOU WORRIED THAT YOUR FOOD WOULD RUN OUT BEFORE YOU GOT MONEY TO BUY MORE.: NEVER TRUE

## 2024-06-03 SDOH — ECONOMIC STABILITY: INCOME INSECURITY: HOW HARD IS IT FOR YOU TO PAY FOR THE VERY BASICS LIKE FOOD, HOUSING, MEDICAL CARE, AND HEATING?: NOT HARD AT ALL

## 2024-06-03 NOTE — ASSESSMENT & PLAN NOTE
Has EGD 10/2018, esophagitis and stricture dilated.  Continues to endorse GERD symptoms, sleep apnea at nighttime.  Pending repeat scope with GI (EGD and colonoscopy)-Dr. Son

## 2024-06-03 NOTE — ASSESSMENT & PLAN NOTE
Increase fiber intake, recommended supplement daily.  Next regular exercise  Can continue with GlycoLax, add Senokot as needed

## 2024-06-03 NOTE — ASSESSMENT & PLAN NOTE
PSG-severe, Dr. Medina. 5/2022; BiPap    Procedure ended  Cardiac cath completed without incident  Right wrist radial site with vascband intact  12 ml of air instilled into band to achieve hemostasis  No bleeding or hematoma noted  Fingers pink and warm; brisk capillary refill  Patient denies numbness/tingling   Patient transferred to PACU in stable condition

## 2024-06-03 NOTE — ASSESSMENT & PLAN NOTE
CAD status post CABG.  LFTs normalized.  Restart atorvastatin at 20 mg daily.    Recheck hepatic profile in 3 months.

## 2024-06-03 NOTE — ASSESSMENT & PLAN NOTE
Controlled with diet. Not on meds  -Does not monitor at home.  -No hypoglycemia episodes  -Last A1C:   Hemoglobin A1C   Date Value Ref Range Status   01/24/2024 7.1 See comment % Final     Comment:     Comment:  Diagnosis of Diabetes: > or = 6.5%  Increased risk of diabetes (Prediabetes): 5.7-6.4%  Glycemic Control: Nonpregnant Adults: <7.0%                    Pregnant: <6.0%          -Hyperlipidemia, LDL goal is <70 mg/dl, on Statin  -Microalbumin: due, check next visit.   -Hypertension: goal < 130/80  -Foot exam: Due   -Eye exam: up to date    The patient was advised to monitor blood sugar at home.   Continue low carb diet.  Diabetes Care: recommend yearly eye exam, foot exam and urine microalbumin to creatinine ratio.

## 2024-06-03 NOTE — ASSESSMENT & PLAN NOTE
Does endorses urinary incontinence. Symptomatic, UA not truly UTI but give sx will treat with Nitrofurantoin.   Pending urine culture

## 2024-06-27 RX ORDER — LEVOTHYROXINE SODIUM 0.05 MG/1
50 TABLET ORAL DAILY
Qty: 30 TABLET | Refills: 2 | Status: SHIPPED | OUTPATIENT
Start: 2024-06-27 | End: 2024-09-25

## 2024-07-11 DIAGNOSIS — N30.00 ACUTE CYSTITIS WITHOUT HEMATURIA: Primary | ICD-10-CM

## 2024-07-11 RX ORDER — NITROFURANTOIN 25; 75 MG/1; MG/1
100 CAPSULE ORAL 2 TIMES DAILY
Qty: 20 CAPSULE | Refills: 0 | Status: SHIPPED | OUTPATIENT
Start: 2024-07-11 | End: 2024-07-21

## 2024-08-14 RX ORDER — LOSARTAN POTASSIUM 50 MG/1
50 TABLET ORAL DAILY
Qty: 90 TABLET | Refills: 1 | Status: SHIPPED | OUTPATIENT
Start: 2024-08-14

## 2024-08-14 NOTE — TELEPHONE ENCOUNTER
Zoie from [facility] called to request a refill for the pt.    losartan (COZAAR) 50 MG tablet     [facility] - SHANIQUA Silvestre - [address] - P [phone] - F [phone]

## 2024-08-15 RX ORDER — LEVOTHYROXINE SODIUM 0.05 MG/1
50 TABLET ORAL DAILY
Qty: 90 TABLET | Refills: 1 | Status: SHIPPED | OUTPATIENT
Start: 2024-08-15 | End: 2024-11-13

## 2024-08-30 ENCOUNTER — HOSPITAL ENCOUNTER (OUTPATIENT)
Age: 74
Setting detail: OUTPATIENT SURGERY
Discharge: HOME | End: 2024-08-30
Attending: INTERNAL MEDICINE | Admitting: INTERNAL MEDICINE
Payer: COMMERCIAL

## 2024-08-30 ENCOUNTER — ANESTHESIA EVENT (OUTPATIENT)
Dept: ENDOSCOPY | Age: 74
End: 2024-08-30
Payer: COMMERCIAL

## 2024-08-30 ENCOUNTER — ANESTHESIA (OUTPATIENT)
Dept: ENDOSCOPY | Age: 74
End: 2024-08-30
Payer: COMMERCIAL

## 2024-08-30 VITALS
TEMPERATURE: 97.2 F | SYSTOLIC BLOOD PRESSURE: 163 MMHG | WEIGHT: 196 LBS | RESPIRATION RATE: 18 BRPM | DIASTOLIC BLOOD PRESSURE: 69 MMHG | OXYGEN SATURATION: 99 % | HEART RATE: 66 BPM | HEIGHT: 61 IN | BODY MASS INDEX: 37 KG/M2

## 2024-08-30 DIAGNOSIS — K21.00 GASTROESOPHAGEAL REFLUX DISEASE WITH ESOPHAGITIS, UNSPECIFIED WHETHER HEMORRHAGE: ICD-10-CM

## 2024-08-30 DIAGNOSIS — R13.10 DYSPHAGIA, UNSPECIFIED TYPE: ICD-10-CM

## 2024-08-30 DIAGNOSIS — Z12.11 COLON CANCER SCREENING: ICD-10-CM

## 2024-08-30 LAB
GLUCOSE BLD-MCNC: 124 MG/DL (ref 70–99)
PERFORMED ON: ABNORMAL

## 2024-08-30 PROCEDURE — 7100000011 HC PHASE II RECOVERY - ADDTL 15 MIN: Performed by: INTERNAL MEDICINE

## 2024-08-30 PROCEDURE — 88305 TISSUE EXAM BY PATHOLOGIST: CPT

## 2024-08-30 PROCEDURE — 3609010600 HC COLONOSCOPY POLYPECTOMY SNARE/COLD BIOPSY: Performed by: INTERNAL MEDICINE

## 2024-08-30 PROCEDURE — 7100000010 HC PHASE II RECOVERY - FIRST 15 MIN: Performed by: INTERNAL MEDICINE

## 2024-08-30 PROCEDURE — 3700000001 HC ADD 15 MINUTES (ANESTHESIA): Performed by: INTERNAL MEDICINE

## 2024-08-30 PROCEDURE — 2580000003 HC RX 258: Performed by: ANESTHESIOLOGY

## 2024-08-30 PROCEDURE — 2500000003 HC RX 250 WO HCPCS: Performed by: NURSE ANESTHETIST, CERTIFIED REGISTERED

## 2024-08-30 PROCEDURE — 3609017700 HC EGD DILATION GASTRIC/DUODENAL STRICTURE: Performed by: INTERNAL MEDICINE

## 2024-08-30 PROCEDURE — 2709999900 HC NON-CHARGEABLE SUPPLY: Performed by: INTERNAL MEDICINE

## 2024-08-30 PROCEDURE — 3700000000 HC ANESTHESIA ATTENDED CARE: Performed by: INTERNAL MEDICINE

## 2024-08-30 PROCEDURE — 6360000002 HC RX W HCPCS: Performed by: NURSE ANESTHETIST, CERTIFIED REGISTERED

## 2024-08-30 RX ORDER — SODIUM CHLORIDE, SODIUM LACTATE, POTASSIUM CHLORIDE, CALCIUM CHLORIDE 600; 310; 30; 20 MG/100ML; MG/100ML; MG/100ML; MG/100ML
INJECTION, SOLUTION INTRAVENOUS CONTINUOUS
Status: DISCONTINUED | OUTPATIENT
Start: 2024-08-30 | End: 2024-08-30 | Stop reason: HOSPADM

## 2024-08-30 RX ORDER — LIDOCAINE HYDROCHLORIDE 20 MG/ML
INJECTION, SOLUTION INFILTRATION; PERINEURAL PRN
Status: DISCONTINUED | OUTPATIENT
Start: 2024-08-30 | End: 2024-08-30 | Stop reason: SDUPTHER

## 2024-08-30 RX ORDER — PROPOFOL 10 MG/ML
INJECTION, EMULSION INTRAVENOUS CONTINUOUS PRN
Status: DISCONTINUED | OUTPATIENT
Start: 2024-08-30 | End: 2024-08-30 | Stop reason: SDUPTHER

## 2024-08-30 RX ADMIN — LIDOCAINE HYDROCHLORIDE 50 MG: 20 INJECTION, SOLUTION INFILTRATION; PERINEURAL at 12:16

## 2024-08-30 RX ADMIN — PROPOFOL 150 MCG/KG/MIN: 10 INJECTION, EMULSION INTRAVENOUS at 12:16

## 2024-08-30 RX ADMIN — SODIUM CHLORIDE, POTASSIUM CHLORIDE, SODIUM LACTATE AND CALCIUM CHLORIDE: 600; 310; 30; 20 INJECTION, SOLUTION INTRAVENOUS at 11:36

## 2024-08-30 ASSESSMENT — PAIN SCALES - GENERAL: PAINLEVEL_OUTOF10: 0

## 2024-08-30 NOTE — PROCEDURES
[facility]  Endoscopy Note    Patient: Nichol Mazariegos  : 1950  Acct#:     Procedure: Esophagogastroduodenoscopy with esophageal dilation    Date:  2024     Surgeon:  SCOT MEYER MD      Anesthesia:    IV propofol, per anesthesia       EBL: <50 mL    Indications: Esophageal dysphagia, GERD    Description of Procedure:    Informed consent was obtained from the patient after explanation of indications, benefits and possible risks and complications of the procedure.      The patient was then taken to the endoscopy suite, placed in the left lateral decubitus position and the above IV sedation was administrered.    The Olympus videoendoscope (GIF-H190) was placed in the patient's mouth and under direct visualization passed into the esophagus.  The scope was then advanced into the stomach and to the second portion of the duodenum.  A retroflexed exam of the gastric cardia and fundus was performed. The scope was then withdrawn back into the stomach, it was decompressed, and the scope was completely withdrawn.    Findings:  Normal first and second portion of the duodenum.  Small hiatal hernia.  A trivial Schatzki's ring at the gastroesophageal junction dilated with a 54 [place] Og dilator.  The esophagus was otherwise normal.  No esophagitis or Goodman's       The patient tolerated the procedure well and was taken to the post anesthesia care unit in good condition.    Biopsies: no     Impression:    Normal first and second portion of the duodenum.  Small hiatal hernia.  A trivial Schatzki's ring at the gastroesophageal junction dilated with a 54 [place] Og dilator.  The esophagus was otherwise normal.  No esophagitis or Goodman's    Recommendations:   PPI daily      Scot Meyer MD  [employer]

## 2024-08-30 NOTE — ANESTHESIA PRE PROCEDURE
Department of Anesthesiology  Preprocedure Note       Name:  Nichol Mazariegos   Age:  73 y.o.  :  1950                                          MRN:  [id number]         Date:  2024      Surgeon: Surgeon(s):  Scot Meyer MD    Procedure: Procedure(s):  COLONOSCOPY  ESOPHAGOGASTRODUODENOSCOPY    Medications prior to admission:   Prior to Admission medications    Medication Sig Start Date End Date Taking? Authorizing Provider   levothyroxine (SYNTHROID) 50 MCG tablet Take 1 tablet by mouth Daily 8/15/24 11/13/24  Patria Joy MD   losartan (COZAAR) 50 MG tablet Take 1 tablet by mouth daily 24   Patria Joy MD   FLUoxetine (PROZAC) 40 MG capsule Take 1 capsule by mouth daily 6/3/24 7/3/24  Patria Joy MD   rosuvastatin (CRESTOR) 20 MG tablet TAKE 1 TABLET DAILY 24   Brittany Combs MD   buPROPion (WELLBUTRIN XL) 300 MG extended release tablet TAKE 1 TABLET EVERY MORNING 24   Brittany Combs MD   traZODone (DESYREL) 50 MG tablet TAKE 1 TO 2 TABLETS NIGHTLYAS NEEDED FOR SLEEP 24   Brittany Combs MD   fluticasone (FLONASE) 50 MCG/ACT nasal spray SHAKE LIQUID AND USE 2 SPRAYS IN EACH NOSTRIL DAILY 24   Brittany Combs MD   omeprazole (PRILOSEC) 40 MG delayed release capsule TAKE 1 CAPSULE IN THE      MORNING AND AT BEDTIME 24   Brittany Combs MD   metoprolol tartrate (LOPRESSOR) 25 MG tablet TAKE 1/2 TABLET TWICE A DAY 23   Scot Nye MD   Omega-3 Fatty Acids (FISH OIL OMEGA-3) 1000 MG CAPS Take by mouth    Flor May MD   Fexofenadine HCl (ALLEGRA ALLERGY PO) Take by mouth    Flor May MD   ipratropium (ATROVENT) 0.06 % nasal spray 2 sprays by Each Nostril route 4 times daily 3/1/23   Brittany Combs MD   aspirin 325 MG EC tablet Take 1 tablet by mouth daily 22   Scot Nye MD   vitamin B-12 (CYANOCOBALAMIN) 1000 MCG tablet Take 1 tablet by mouth daily 21   Brittany Combs MD   Cholecalciferol (VITAMIN D3) 1000 units TABS

## 2024-08-30 NOTE — PROGRESS NOTES
Ambulatory Surgery/Procedure Discharge Note    Vitals:    08/30/24 1305   BP: (!) 163/69   Pulse: 66   Resp: 18   Temp:    SpO2: 99%       No intake/output data recorded.    Restroom use offered before discharge.  Yes    Pain assessment:  none  Pain Level: 0    BP within 20% pre-op betty score.     Patient has been seen by doctor. Discharge order obtained, and discharge instructions reviewed. Patient educated, using the teach back method, about follow up instructions and discharge instructions. A completed copy of the AVS instructions given to patient and all questions answered. IV catheter removed without complaints, catheter intact, site WNL.     Patient discharged to home/self care. Patient discharged via wheel chair by transporter to waiting family/S.O.       8/30/2024 1:19 PM

## 2024-08-30 NOTE — PROCEDURES
[facility]  Colonoscopy Note    Patient: Nichol Mazariegos  : 1950  Acct#:     Procedure: Colonoscopy with polypectomy (cold snare)    Date:  2024    Surgeon:  SCOT MEYER MD        Anesthesia: IV propofol, per anesthesia    EBL: <50 mL    Indications: Screening for colon cancer      Procedure:     An informed consent was obtained from the patient after explanation of indications, benefits, possible risks and complications of the procedure.  The patient was then taken to the endoscopy suite, placed in the left lateral decubitus position, and the above IV anesthesia was administered.    A digital rectal examination was performed and revealed negative without mass, lesions or tenderness.      The Olympus PCFQ-H190 video colonoscope was placed in the patient's rectum under digital direction and advanced to the cecum. The cecum was identified by characteristic anatomy and ballottment.  The prep was adequate.      Findings:  Diverticulosis of the entire colon  4 sessile polyps measuring 2 to 3 mm in the ascending and transverse colon removed via cold snare polypectomy      The scope was then withdrawn into the rectum and retroflexed.  The retroflexed view of the anal verge and rectum demonstrates smal hemorrhoids.     The scope was straightened, the colon was decompressed and the scope was withdrawn from the patient.      The patient tolerated the procedure well and was taken to the PACU in good condition.    Biopsies: yes      Impression:   Diverticulosis of the entire colon  4 sessile polyps measuring 2 to 3 mm in the ascending and transverse colon removed via cold snare polypectomy    Recommendations:  Await pathology results    Scot Meyer MD  [facility]

## 2024-08-30 NOTE — H&P
Gastroenterology Note                 Pre-operative History and Physical    Patient: Nichol Mazariegos  : 1950  CSN:     History Obtained From:   Patient or guardian.      HISTORY OF PRESENT ILLNESS:    The patient is a 73 y.o. female here for EGD and colonoscopy for GERD, dysphagia, screening    Past Medical History:    Past Medical History:   Diagnosis Date    Abnormal Pap smear of cervix     Acid reflux     BAMBI positive 2023    dsDNA positive as well,  but no symptoms so not being treated (Dr. Reynaga)    Bacterial vaginosis     CAD (coronary artery disease)     Diabetes mellitus (HCC)     Dysmenorrhea     Environmental allergies     Fibrocystic breast     Fibroid     Herpes simplex type 2 infection     History of blood transfusion     Hyperlipidemia     Hypothyroid     Dx about 15 years ago, but htne was off med for  long time    Insomnia     Menopause     approx age 41    Menopause ovarian failure     Menorrhagia     Obstructive sleep apnea 2021    HST-severe, Dr. Medina. 2022    S/P CABG x 4 2022    Sleep apnea 2022    just diagnosed in April, waiting for CPAP    Urinary incontinence     Urogenital trichomoniasis      Past Surgical History:    Past Surgical History:   Procedure Laterality Date    BREAST BIOPSY      CAPSULOTOMY Bilateral 2020    Yag    CARDIAC CATHETERIZATION  08/10/2022    LAD with 80-90% prox.  Lg D1 with 80% prox.   Cx with prox 90%.   RCA with prox 90%-dom    CATARACT REMOVAL WITH IMPLANT Right 2019    COLONOSCOPY      CORONARY ARTERY BYPASS GRAFT N/A 2022    CABG x 4, LIMA to LAD, SVG to D1, OM2 and PDA; EVH R thigh; MARION; TCPB; sternal plate x 1 (Biomet) performed by Jack Rodriguez MD at [facility] OR    DILATION AND CURETTAGE OF UTERUS      ESOPHAGEAL DILATATION N/A 10/25/2018    ESOPHAGEAL DILATION MARTA performed by Scot Banks MD at [facility] END[facility]COPY    HYSTERECTOMY, TOTAL ABDOMINAL (CERVIX REMOVED)      fibroid-still

## 2024-08-30 NOTE — DISCHARGE INSTRUCTIONS
COLONOSCOPY DISCHARGE INSTRUCTIONS:    Call the physician that did your procedure for any questions or concern:    [facility]: [phone]  DR. MARIA R BEDOYA      ACTIVITY:    There are potential side effects to the medications used for sedation and anesthesia during your procedure.  These include:  Dizziness or light-headedness, confusion or memory loss, delayed reaction times, loss of coordination, nausea and vomiting.  Because of your increased risk for injury, we ask that you observe the following precautions:  For the next 24 hours,  DO NOT operate an automobile, bicycle, motorcycle, , power tools or large equipment of any kind.  Do not drink alcohol, sign any legal documents or make any legal decisions for 24 hours.  Do not bend your head over lower than your heart.  DO sit on the side of bed/couch awhile before getting up.  Plan on bedrest or quiet relaxation today.  You may resume normal activities in 24 hours.    DIET:    Your first meal today should be light, avoiding spicy and fatty foods.  If you tolerate this first meal, then you may advance to your regular diet unless otherwise advised by your physician.    NORMAL SYMPTOMS:  -Mild sore throat if you’ve had an EGD   -Gaseous discomfort    NOTIFY YOUR PHYSICIAN IF THESE SYMPTOMS OCCUR:  1. Fever (greater than 100)  5. Increased abdominal bloating  2. Severe pain    6. Excessive bleeding  3. Nausea and vomiting  7. Chest pain                                                                    4. Chills    8. Shortness of breath    ADDITIONAL INSTRUCTIONS:    Biopsy results: Call [facility] for biopsy results in 1 week    Educational Information:          Please review these discharge instructions this evening or tomorrow for  information you may have forgotten.            We want to thank you for choosing the [facility] as your health care provider. We always strive to provide you with excellent care while you are here. You may

## 2024-08-30 NOTE — ANESTHESIA POSTPROCEDURE EVALUATION
Department of Anesthesiology  Postprocedure Note    Patient: Nichol Mazariegos  MRN: [id number]  YOB: 1950  Date of evaluation: 8/30/2024    Procedure Summary       Date: 08/30/24 Room / Location: [place] [address] / [facility]    Anesthesia Start: 1210 Anesthesia Stop: 1246    Procedures:       COLONOSCOPY POLYPECTOMY SNARE/BIOPSY      ESOPHAGOGASTRODUODENOSCOPY DILATATION Diagnosis:       Dysphagia, unspecified type      Gastroesophageal reflux disease with esophagitis, unspecified whether hemorrhage      Colon cancer screening      (Dysphagia, unspecified type [R13.10])      (Gastroesophageal reflux disease with esophagitis, unspecified whether hemorrhage [K21.00])      (Colon cancer screening [Z12.11])    Surgeons: Scot Meyer MD Responsible Provider: López Camp MD    Anesthesia Type: MAC ASA Status: 3            Anesthesia Type: No value filed.    Evelyn Phase I: Evelyn Score: 10    Evelyn Phase II: Evelyn Score: 10    Anesthesia Post Evaluation    Patient location during evaluation: PACU  Patient participation: complete - patient participated  Level of consciousness: awake  Airway patency: patent  Nausea & Vomiting: no nausea and no vomiting  Cardiovascular status: blood pressure returned to baseline and hemodynamically stable  Respiratory status: acceptable  Hydration status: euvolemic  Multimodal analgesia pain management approach  Pain management: adequate    No notable events documented.

## 2024-09-06 NOTE — PROGRESS NOTES
Looks great. Comfortable. Walked this AM with need for a walker. I.S. up to 750 now. SOB sensation now gone. Will continue with routine rehab. If she has a good day today will likely let her go home tomorrow. Patient allergies and NPO status verified, home medication reconciliation completed and belongings secured. Surgical site verified with patient. Patient verbalizes understanding of pain scale, expected course of stay and plan of care; patient and family state verbal understanding at this time. IV access established. Sequentials/teds in place as ordered.

## 2024-09-09 DIAGNOSIS — G47.33 OBSTRUCTIVE SLEEP APNEA: Chronic | ICD-10-CM

## 2024-09-09 DIAGNOSIS — E11.9 TYPE 2 DIABETES MELLITUS WITHOUT COMPLICATION, WITHOUT LONG-TERM CURRENT USE OF INSULIN (HCC): ICD-10-CM

## 2024-09-09 DIAGNOSIS — K59.01 SLOW TRANSIT CONSTIPATION: ICD-10-CM

## 2024-09-09 DIAGNOSIS — K21.00 GASTROESOPHAGEAL REFLUX DISEASE WITH ESOPHAGITIS, UNSPECIFIED WHETHER HEMORRHAGE: Chronic | ICD-10-CM

## 2024-09-09 DIAGNOSIS — E78.2 MIXED HYPERLIPIDEMIA: Chronic | ICD-10-CM

## 2024-09-09 LAB
ALBUMIN SERPL-MCNC: 4.4 G/DL (ref 3.4–5)
ALBUMIN/GLOB SERPL: 1.6 {RATIO} (ref 1.1–2.2)
ALP SERPL-CCNC: 44 U/L (ref 40–129)
ALT SERPL-CCNC: 41 U/L (ref 10–40)
ANION GAP SERPL CALCULATED.3IONS-SCNC: 12 MMOL/L (ref 3–16)
AST SERPL-CCNC: 42 U/L (ref 15–37)
BASOPHILS # BLD: 0.1 K/UL (ref 0–0.2)
BASOPHILS NFR BLD: 1 %
BILIRUB SERPL-MCNC: 0.5 MG/DL (ref 0–1)
BUN SERPL-MCNC: 14 MG/DL (ref 7–20)
CALCIUM SERPL-MCNC: 9.1 MG/DL (ref 8.3–10.6)
CHLORIDE SERPL-SCNC: 102 MMOL/L (ref 99–110)
CHOLEST SERPL-MCNC: 136 MG/DL (ref 0–199)
CO2 SERPL-SCNC: 25 MMOL/L (ref 21–32)
CREAT SERPL-MCNC: 0.8 MG/DL (ref 0.6–1.2)
CREAT UR-MCNC: 116 MG/DL (ref 28–259)
DEPRECATED RDW RBC AUTO: 13.7 % (ref 12.4–15.4)
EOSINOPHIL # BLD: 0.2 K/UL (ref 0–0.6)
EOSINOPHIL NFR BLD: 3.7 %
GFR SERPLBLD CREATININE-BSD FMLA CKD-EPI: 78 ML/MIN/{1.73_M2}
GLUCOSE SERPL-MCNC: 150 MG/DL (ref 70–99)
HCT VFR BLD AUTO: 39.6 % (ref 36–48)
HDLC SERPL-MCNC: 32 MG/DL (ref 40–60)
HGB BLD-MCNC: 13.4 G/DL (ref 12–16)
LDL CHOLESTEROL: 67 MG/DL
LYMPHOCYTES # BLD: 2.2 K/UL (ref 1–5.1)
LYMPHOCYTES NFR BLD: 34.6 %
MCH RBC QN AUTO: 31.3 PG (ref 26–34)
MCHC RBC AUTO-ENTMCNC: 33.7 G/DL (ref 31–36)
MCV RBC AUTO: 92.7 FL (ref 80–100)
MICROALBUMIN UR DL<=1MG/L-MCNC: <1.2 MG/DL
MICROALBUMIN/CREAT UR: NORMAL MG/G (ref 0–30)
MONOCYTES # BLD: 0.3 K/UL (ref 0–1.3)
MONOCYTES NFR BLD: 4.8 %
NEUTROPHILS # BLD: 3.5 K/UL (ref 1.7–7.7)
NEUTROPHILS NFR BLD: 55.9 %
PLATELET # BLD AUTO: 192 K/UL (ref 135–450)
PMV BLD AUTO: 9.7 FL (ref 5–10.5)
POTASSIUM SERPL-SCNC: 4.3 MMOL/L (ref 3.5–5.1)
PROT SERPL-MCNC: 7.1 G/DL (ref 6.4–8.2)
RBC # BLD AUTO: 4.27 M/UL (ref 4–5.2)
SODIUM SERPL-SCNC: 139 MMOL/L (ref 136–145)
TRIGL SERPL-MCNC: 187 MG/DL (ref 0–150)
TSH SERPL DL<=0.005 MIU/L-ACNC: 1.14 UIU/ML (ref 0.27–4.2)
VLDLC SERPL CALC-MCNC: 37 MG/DL
WBC # BLD AUTO: 6.3 K/UL (ref 4–11)

## 2024-09-09 SDOH — HEALTH STABILITY: PHYSICAL HEALTH: ON AVERAGE, HOW MANY DAYS PER WEEK DO YOU ENGAGE IN MODERATE TO STRENUOUS EXERCISE (LIKE A BRISK WALK)?: 3 DAYS

## 2024-09-09 SDOH — HEALTH STABILITY: PHYSICAL HEALTH: ON AVERAGE, HOW MANY MINUTES DO YOU ENGAGE IN EXERCISE AT THIS LEVEL?: 20 MIN

## 2024-09-09 ASSESSMENT — PATIENT HEALTH QUESTIONNAIRE - PHQ9
SUM OF ALL RESPONSES TO PHQ QUESTIONS 1-9: 1
4. FEELING TIRED OR HAVING LITTLE ENERGY: NOT AT ALL
1. LITTLE INTEREST OR PLEASURE IN DOING THINGS: SEVERAL DAYS
5. POOR APPETITE OR OVEREATING: NOT AT ALL
SUM OF ALL RESPONSES TO PHQ QUESTIONS 1-9: 1
SUM OF ALL RESPONSES TO PHQ9 QUESTIONS 1 & 2: 1
2. FEELING DOWN, DEPRESSED OR HOPELESS: NOT AT ALL
9. THOUGHTS THAT YOU WOULD BE BETTER OFF DEAD, OR OF HURTING YOURSELF: NOT AT ALL
SUM OF ALL RESPONSES TO PHQ QUESTIONS 1-9: 1
7. TROUBLE CONCENTRATING ON THINGS, SUCH AS READING THE NEWSPAPER OR WATCHING TELEVISION: NOT AT ALL
SUM OF ALL RESPONSES TO PHQ QUESTIONS 1-9: 1
8. MOVING OR SPEAKING SO SLOWLY THAT OTHER PEOPLE COULD HAVE NOTICED. OR THE OPPOSITE, BEING SO FIGETY OR RESTLESS THAT YOU HAVE BEEN MOVING AROUND A LOT MORE THAN USUAL: NOT AT ALL
3. TROUBLE FALLING OR STAYING ASLEEP: NOT AT ALL
6. FEELING BAD ABOUT YOURSELF - OR THAT YOU ARE A FAILURE OR HAVE LET YOURSELF OR YOUR FAMILY DOWN: NOT AT ALL

## 2024-09-09 ASSESSMENT — LIFESTYLE VARIABLES
HOW MANY STANDARD DRINKS CONTAINING ALCOHOL DO YOU HAVE ON A TYPICAL DAY: 0
HOW OFTEN DO YOU HAVE SIX OR MORE DRINKS ON ONE OCCASION: 1
HOW OFTEN DO YOU HAVE A DRINK CONTAINING ALCOHOL: NEVER
HOW OFTEN DO YOU HAVE A DRINK CONTAINING ALCOHOL: 1
HOW MANY STANDARD DRINKS CONTAINING ALCOHOL DO YOU HAVE ON A TYPICAL DAY: PATIENT DOES NOT DRINK

## 2024-09-10 LAB
EST. AVERAGE GLUCOSE BLD GHB EST-MCNC: 157.1 MG/DL
HBA1C MFR BLD: 7.1 %

## 2024-09-12 ENCOUNTER — TELEPHONE (OUTPATIENT)
Dept: INTERNAL MEDICINE CLINIC | Age: 74
End: 2024-09-12

## 2024-09-12 ENCOUNTER — OFFICE VISIT (OUTPATIENT)
Dept: INTERNAL MEDICINE CLINIC | Age: 74
End: 2024-09-12

## 2024-09-12 VITALS
DIASTOLIC BLOOD PRESSURE: 70 MMHG | WEIGHT: 203 LBS | HEIGHT: 61 IN | OXYGEN SATURATION: 96 % | TEMPERATURE: 97.3 F | BODY MASS INDEX: 38.33 KG/M2 | HEART RATE: 67 BPM | SYSTOLIC BLOOD PRESSURE: 138 MMHG

## 2024-09-12 DIAGNOSIS — Z00.00 MEDICARE ANNUAL WELLNESS VISIT, SUBSEQUENT: Primary | ICD-10-CM

## 2024-09-12 DIAGNOSIS — E66.01 CLASS 2 SEVERE OBESITY WITH SERIOUS COMORBIDITY AND BODY MASS INDEX (BMI) OF 38.0 TO 38.9 IN ADULT, UNSPECIFIED OBESITY TYPE (HCC): ICD-10-CM

## 2024-09-12 DIAGNOSIS — Z12.31 ENCOUNTER FOR SCREENING MAMMOGRAM FOR MALIGNANT NEOPLASM OF BREAST: ICD-10-CM

## 2024-09-12 DIAGNOSIS — R74.01 ELEVATED TRANSAMINASE LEVEL: ICD-10-CM

## 2024-09-12 DIAGNOSIS — E11.9 TYPE 2 DIABETES MELLITUS WITHOUT COMPLICATION, WITHOUT LONG-TERM CURRENT USE OF INSULIN (HCC): ICD-10-CM

## 2024-09-12 RX ORDER — CHOLECALCIFEROL (VITAMIN D3) 25 MCG
1 TABLET ORAL DAILY
Qty: 30 TABLET | Refills: 11 | Status: SHIPPED | OUTPATIENT
Start: 2024-09-12

## 2024-09-12 RX ORDER — SEMAGLUTIDE 1.34 MG/ML
0.25 INJECTION, SOLUTION SUBCUTANEOUS WEEKLY
Qty: 3 ML | Refills: 1 | Status: SHIPPED | OUTPATIENT
Start: 2024-09-12 | End: 2024-10-12

## 2024-09-12 RX ORDER — TRAZODONE HYDROCHLORIDE 50 MG/1
TABLET, FILM COATED ORAL
Qty: 60 TABLET | Refills: 3 | Status: SHIPPED | OUTPATIENT
Start: 2024-09-12

## 2024-09-12 RX ORDER — BUPROPION HYDROCHLORIDE 300 MG/1
300 TABLET ORAL EVERY MORNING
Qty: 90 TABLET | Refills: 1 | Status: SHIPPED | OUTPATIENT
Start: 2024-09-12

## 2024-09-12 RX ORDER — OMEPRAZOLE 40 MG/1
40 CAPSULE, DELAYED RELEASE ORAL DAILY
Qty: 180 CAPSULE | Refills: 1 | Status: SHIPPED | OUTPATIENT
Start: 2024-09-12

## 2024-09-12 RX ORDER — ROSUVASTATIN CALCIUM 20 MG/1
20 TABLET, COATED ORAL DAILY
Qty: 90 TABLET | Refills: 0 | Status: SHIPPED | OUTPATIENT
Start: 2024-09-12

## 2024-09-12 NOTE — TELEPHONE ENCOUNTER
Pt called in to call center to let  Know she is taking Gemtesa 75 mg    [phone]  Pls call and advise

## 2024-09-13 RX ORDER — VIBEGRON 75 MG/1
TABLET, FILM COATED ORAL DAILY
COMMUNITY
Start: 2024-09-13

## 2024-09-24 ENCOUNTER — TELEPHONE (OUTPATIENT)
Dept: INTERNAL MEDICINE CLINIC | Age: 74
End: 2024-09-24

## 2024-10-12 PROBLEM — Z00.00 MEDICARE ANNUAL WELLNESS VISIT, SUBSEQUENT: Status: RESOLVED | Noted: 2024-09-12 | Resolved: 2024-10-12

## 2024-10-25 RX ORDER — TRAZODONE HYDROCHLORIDE 50 MG/1
TABLET, FILM COATED ORAL
Qty: 90 TABLET | Refills: 1 | Status: SHIPPED | OUTPATIENT
Start: 2024-10-25

## 2024-10-30 NOTE — TELEPHONE ENCOUNTER
Pt is requesting a refill on medications below:    Ozempic       FLUoxetine (PROZAC) 40 MG capsule [[id number]]  ENDED     [facility] - SHANIQUA Silvestre - [address] - P [phone] - F [phone]

## 2024-11-01 RX ORDER — SEMAGLUTIDE 1.34 MG/ML
0.25 INJECTION, SOLUTION SUBCUTANEOUS WEEKLY
Qty: 3 ML | Refills: 1 | Status: SHIPPED | OUTPATIENT
Start: 2024-11-01 | End: 2024-12-01

## 2024-11-01 RX ORDER — FLUOXETINE 40 MG/1
40 CAPSULE ORAL DAILY
Qty: 90 CAPSULE | Refills: 1 | Status: SHIPPED | OUTPATIENT
Start: 2024-11-01

## 2024-11-06 ENCOUNTER — TELEPHONE (OUTPATIENT)
Dept: INTERNAL MEDICINE CLINIC | Age: 74
End: 2024-11-06

## 2024-11-06 NOTE — TELEPHONE ENCOUNTER
Patients pharmacy said she needs to get a PA on:    Semaglutide,0.25 or 0.5MG/DOS, (OZEMPIC, 0.25 OR 0.5 MG/DOSE,) 2 MG/1.5ML SOPN     Please call the patient when authorization results are in.    NOTE: her insurance company changed to Appear on 10/01/24.      [facility] - SHANIQUA Silvestre - [address] - P [phone] - F [phone]

## 2024-11-11 NOTE — TELEPHONE ENCOUNTER
Submitted PA for Ozempic (0.25 or 0.5 MG/DOSE) 2MG/3ML pen-injectors  Via CMM Key: [id number] STATUS: PENDING.    Follow up done daily; if no decision with in three days we will refax.  If another three days goes by with no decision will call the insurance for status.

## 2024-11-12 NOTE — TELEPHONE ENCOUNTER
APPROVAL for Ozempic (0.25 or 0.5 MG/DOSE) 2MG/3ML pen-injectors 10/01/24-11/11/25; letter attached.    If this requires a response please respond to the pool ( P MHCX PSC MEDICATION PRE-AUTH).      Thank you please advise patient.

## 2024-12-02 RX ORDER — ROSUVASTATIN CALCIUM 20 MG/1
20 TABLET, COATED ORAL DAILY
Qty: 90 TABLET | Refills: 1 | Status: SHIPPED | OUTPATIENT
Start: 2024-12-02

## 2024-12-09 RX ORDER — LEVOTHYROXINE SODIUM 50 UG/1
50 TABLET ORAL DAILY
Qty: 90 TABLET | Refills: 1 | Status: SHIPPED | OUTPATIENT
Start: 2024-12-09

## 2024-12-19 ENCOUNTER — OFFICE VISIT (OUTPATIENT)
Dept: INTERNAL MEDICINE CLINIC | Age: 74
End: 2024-12-19

## 2024-12-19 VITALS
WEIGHT: 189 LBS | BODY MASS INDEX: 35.71 KG/M2 | DIASTOLIC BLOOD PRESSURE: 70 MMHG | TEMPERATURE: 97.8 F | SYSTOLIC BLOOD PRESSURE: 110 MMHG | OXYGEN SATURATION: 93 % | HEART RATE: 71 BPM

## 2024-12-19 DIAGNOSIS — E03.9 ACQUIRED HYPOTHYROIDISM: Chronic | ICD-10-CM

## 2024-12-19 DIAGNOSIS — I10 ESSENTIAL HYPERTENSION: ICD-10-CM

## 2024-12-19 DIAGNOSIS — E78.2 MIXED HYPERLIPIDEMIA: Chronic | ICD-10-CM

## 2024-12-19 DIAGNOSIS — E11.9 TYPE 2 DIABETES MELLITUS WITHOUT COMPLICATION, WITHOUT LONG-TERM CURRENT USE OF INSULIN (HCC): Primary | ICD-10-CM

## 2024-12-19 NOTE — PROGRESS NOTES
Nichol Mazariegos (:  1950) is a 74 y.o. female here for evaluation of the following chief complaint(s):  Medication Adjustment      Assessment & Plan   ASSESSMENT/PLAN:  1. Type 2 diabetes mellitus without complication, without long-term current use of insulin (HCC)  Assessment & Plan:     Has been much better now since started on Ozempic.Also some weight loss about 13 pounds.  Continue Ozempic 0.5 mg    -Does not monitor at home.  -No hypoglycemia episodes  -Last A1C:   7.1% --> 6.0% today     -Hyperlipidemia, LDL goal is <70 mg/dl, on Statin  -Microalbumin: due, check next visit.   -Hypertension: goal < 130/80  -Foot exam: Due   -Eye exam: up to date     The patient was advised to monitor blood sugar at home.   Continue low carb diet.  Diabetes Care: recommend yearly eye exam, foot exam and urine microalbumin to creatinine ratio.      Orders:  -     Hemoglobin A1C; Future  -     Lipid, Fasting; Future  -     TSH reflex to FT4; Future  -     Comprehensive Metabolic Panel; Future  -     CBC with Auto Differential; Future  -     MICROALBUMIN / CREATININE URINE RATIO; Future  2. Mixed hyperlipidemia  -     Lipid, Fasting; Future  3. Acquired hypothyroidism  -     TSH reflex to FT4; Future  4. Essential hypertension  -     Hemoglobin A1C; Future  -     Lipid, Fasting; Future  -     TSH reflex to FT4; Future  -     Comprehensive Metabolic Panel; Future  -     CBC with Auto Differential; Future      Return in about 3 months (around 3/19/2025) for Routine follow-up.         Subjective   SUBJECTIVE/OBJECTIVE:  DONATO Gandara is here for a follow up on chronic condition.   She is doing well since started on Ozempic.  She was able to lost about 13 pounds.  Before that she is tolerating it very well.  No issues with bowel movement nausea or vomiting.  Her appetite is fair but she eats regular meals.    Review of Systems:   Constitutional:  Denies fever or chills   Eyes:  Denies change in visual acuity   HENT:

## 2024-12-19 NOTE — ASSESSMENT & PLAN NOTE
Has been much better now since started on Ozempic.Also some weight loss about 13 pounds.  Continue Ozempic 0.5 mg    -Does not monitor at home.  -No hypoglycemia episodes  -Last A1C:   7.1% --> 6.0% today     -Hyperlipidemia, LDL goal is <70 mg/dl, on Statin  -Microalbumin: due, check next visit.   -Hypertension: goal < 130/80  -Foot exam: Due   -Eye exam: up to date     The patient was advised to monitor blood sugar at home.   Continue low carb diet.  Diabetes Care: recommend yearly eye exam, foot exam and urine microalbumin to creatinine ratio.

## 2024-12-30 RX ORDER — SEMAGLUTIDE 0.68 MG/ML
INJECTION, SOLUTION SUBCUTANEOUS
Qty: 9 ML | Refills: 1 | Status: SHIPPED | OUTPATIENT
Start: 2024-12-30

## 2024-12-30 RX ORDER — TRAZODONE HYDROCHLORIDE 50 MG/1
TABLET, FILM COATED ORAL
Qty: 180 TABLET | Refills: 1 | Status: SHIPPED | OUTPATIENT
Start: 2024-12-30

## 2024-12-30 NOTE — TELEPHONE ENCOUNTER
Last appointment: 12/19/2024  Next appointment: 3/19/2025  Last refill:   Last ordered: 2 months ago (10/25/2024) by Patria Joy MD           Requested Prescriptions     Pending Prescriptions Disp Refills    traZODone (DESYREL) 50 MG tablet [Pharmacy Med Name: TRAZODONE TAB 50MG] 60 tablet 3     Sig: TAKE 1 TO 2 TABLETS NIGHTLYAS NEEDED FOR SLEEP    OZEMPIC, 0.25 OR 0.5 MG/DOSE, 2 MG/3ML SOPN [Pharmacy Med Name: OZEMP .25/.5 INJ 2MG/3ML] 3 mL 1     Sig: INJECT 0.25MG              SUBCUTANEOUSLY ONCE A WEEK FOR 4 WEEKS, THEN INCREASE TO 0.5MG ONCE A WEEK

## 2025-01-20 RX ORDER — LOSARTAN POTASSIUM 50 MG/1
50 TABLET ORAL DAILY
Qty: 90 TABLET | Refills: 1 | Status: SHIPPED | OUTPATIENT
Start: 2025-01-20

## 2025-02-06 RX ORDER — SEMAGLUTIDE 0.68 MG/ML
INJECTION, SOLUTION SUBCUTANEOUS
Qty: 3 ML | Refills: 1 | OUTPATIENT
Start: 2025-02-06

## 2025-02-10 RX ORDER — SEMAGLUTIDE 0.68 MG/ML
0.25 INJECTION, SOLUTION SUBCUTANEOUS WEEKLY
Qty: 9 ML | Refills: 1 | Status: SHIPPED | OUTPATIENT
Start: 2025-02-10

## 2025-02-18 DIAGNOSIS — I10 ESSENTIAL HYPERTENSION: Primary | ICD-10-CM

## 2025-02-18 DIAGNOSIS — F33.41 RECURRENT MAJOR DEPRESSIVE DISORDER, IN PARTIAL REMISSION: ICD-10-CM

## 2025-02-18 DIAGNOSIS — E78.2 MIXED HYPERLIPIDEMIA: Chronic | ICD-10-CM

## 2025-02-18 DIAGNOSIS — K21.00 GASTROESOPHAGEAL REFLUX DISEASE WITH ESOPHAGITIS, UNSPECIFIED WHETHER HEMORRHAGE: Chronic | ICD-10-CM

## 2025-02-19 RX ORDER — SEMAGLUTIDE 0.68 MG/ML
INJECTION, SOLUTION SUBCUTANEOUS
Qty: 3 ML | Refills: 10 | OUTPATIENT
Start: 2025-02-19

## 2025-02-19 RX ORDER — VIBEGRON 75 MG/1
TABLET, FILM COATED ORAL
Qty: 90 TABLET | Refills: 1 | Status: SHIPPED | OUTPATIENT
Start: 2025-02-19

## 2025-02-19 RX ORDER — METOPROLOL TARTRATE 25 MG/1
TABLET, FILM COATED ORAL
Qty: 90 TABLET | Refills: 1 | Status: SHIPPED | OUTPATIENT
Start: 2025-02-19

## 2025-02-19 RX ORDER — ROSUVASTATIN CALCIUM 20 MG/1
TABLET, COATED ORAL
Qty: 90 TABLET | Refills: 1 | Status: SHIPPED | OUTPATIENT
Start: 2025-02-19

## 2025-02-19 RX ORDER — TRAZODONE HYDROCHLORIDE 50 MG/1
TABLET ORAL
Qty: 60 TABLET | Refills: 3 | Status: SHIPPED | OUTPATIENT
Start: 2025-02-19

## 2025-02-19 RX ORDER — OMEPRAZOLE 40 MG/1
CAPSULE, DELAYED RELEASE ORAL
Qty: 90 CAPSULE | Refills: 1 | Status: SHIPPED | OUTPATIENT
Start: 2025-02-19

## 2025-02-19 RX ORDER — FLUOXETINE HYDROCHLORIDE 40 MG/1
CAPSULE ORAL
Qty: 90 CAPSULE | Refills: 1 | Status: SHIPPED | OUTPATIENT
Start: 2025-02-19

## 2025-02-19 RX ORDER — LOSARTAN POTASSIUM 50 MG/1
TABLET ORAL
Qty: 90 TABLET | Refills: 1 | Status: SHIPPED | OUTPATIENT
Start: 2025-02-19

## 2025-02-19 NOTE — TELEPHONE ENCOUNTER
Last appointment: 12/19/2024  Next appointment: 3/19/2025        Last refill:   Gemtesa  9/13/24  Prozac  11/1/24  Prilosec 9/12/24  Metoprolol   1 year ago (12/27/2023) by Scot Nye MD     Losartan 1/20/25  Trazodone 12/30/24  Rosuvastatin 12/2/24

## 2025-02-19 NOTE — TELEPHONE ENCOUNTER
Signed 1 week ago (2/10/2025):   Semaglutide,0.25 or 0.5MG/DOS, (OZEMPIC, 0.25 OR 0.5 MG/DOSE,) 2 MG/3ML SOPN   Sig: Inject 0.25 mLs into the skin Once a week at 5 PM   Disp: 9 mL    Refills: 1   Signed by: Patria Joy MD   Encounter Details

## 2025-02-21 RX ORDER — SEMAGLUTIDE 0.68 MG/ML
0.25 INJECTION, SOLUTION SUBCUTANEOUS WEEKLY
Qty: 9 ML | Refills: 1 | Status: SHIPPED | OUTPATIENT
Start: 2025-02-21

## 2025-02-21 RX ORDER — LEVOTHYROXINE SODIUM 50 UG/1
50 TABLET ORAL DAILY
Qty: 90 TABLET | Refills: 1 | Status: SHIPPED | OUTPATIENT
Start: 2025-02-21

## 2025-03-07 ENCOUNTER — TELEPHONE (OUTPATIENT)
Dept: INTERNAL MEDICINE CLINIC | Age: 75
End: 2025-03-07

## 2025-03-07 NOTE — TELEPHONE ENCOUNTER
SHANIQUA Ashley for Kavitha     [facility]   [phone]    Will send to PA Dept.

## 2025-03-11 NOTE — TELEPHONE ENCOUNTER
Submitted PA for Gemtesa 75MG tablets  Via [facility]: [id number] STATUS: PENDING.    Follow up done daily; if no decision with in three days we will refax.  If another three days goes by with no decision will call the insurance for status.

## 2025-03-11 NOTE — TELEPHONE ENCOUNTER
Per [employer]: This medication or product is on your plan's list of covered drugs. Prior authorization is not required at this time. If your pharmacy has questions regarding the processing of your prescription, please have them call the [employer] pharmacy help desk at [phone].  Letter attached.    [employer] coverage is under the Rx Benefits tab.  When I tried doing the PA using [employer]) I got a message from [facility] that patient was not found.

## 2025-03-17 DIAGNOSIS — E03.9 ACQUIRED HYPOTHYROIDISM: Chronic | ICD-10-CM

## 2025-03-17 DIAGNOSIS — E78.2 MIXED HYPERLIPIDEMIA: Chronic | ICD-10-CM

## 2025-03-17 DIAGNOSIS — I10 ESSENTIAL HYPERTENSION: ICD-10-CM

## 2025-03-17 DIAGNOSIS — E11.9 TYPE 2 DIABETES MELLITUS WITHOUT COMPLICATION, WITHOUT LONG-TERM CURRENT USE OF INSULIN: ICD-10-CM

## 2025-03-17 LAB
ALBUMIN SERPL-MCNC: 4.5 G/DL (ref 3.4–5)
ALBUMIN/GLOB SERPL: 1.6 {RATIO} (ref 1.1–2.2)
ALP SERPL-CCNC: 44 U/L (ref 40–129)
ALT SERPL-CCNC: 30 U/L (ref 10–40)
ANION GAP SERPL CALCULATED.3IONS-SCNC: 8 MMOL/L (ref 3–16)
AST SERPL-CCNC: 23 U/L (ref 15–37)
BASOPHILS # BLD: 0 K/UL (ref 0–0.2)
BASOPHILS NFR BLD: 0.8 %
BILIRUB SERPL-MCNC: 0.3 MG/DL (ref 0–1)
BUN SERPL-MCNC: 12 MG/DL (ref 7–20)
CALCIUM SERPL-MCNC: 9.4 MG/DL (ref 8.3–10.6)
CHLORIDE SERPL-SCNC: 103 MMOL/L (ref 99–110)
CHOLEST SERPL-MCNC: 128 MG/DL (ref 0–199)
CO2 SERPL-SCNC: 27 MMOL/L (ref 21–32)
CREAT SERPL-MCNC: 1 MG/DL (ref 0.6–1.2)
CREAT UR-MCNC: 171 MG/DL (ref 28–259)
DEPRECATED RDW RBC AUTO: 15 % (ref 12.4–15.4)
EOSINOPHIL # BLD: 0.4 K/UL (ref 0–0.6)
EOSINOPHIL NFR BLD: 7.7 %
GFR SERPLBLD CREATININE-BSD FMLA CKD-EPI: 59 ML/MIN/{1.73_M2}
GLUCOSE SERPL-MCNC: 82 MG/DL (ref 70–99)
HCT VFR BLD AUTO: 41.2 % (ref 36–48)
HDLC SERPL-MCNC: 41 MG/DL (ref 40–60)
HGB BLD-MCNC: 13.8 G/DL (ref 12–16)
LDL CHOLESTEROL: 68 MG/DL
LYMPHOCYTES # BLD: 2.4 K/UL (ref 1–5.1)
LYMPHOCYTES NFR BLD: 42.7 %
MCH RBC QN AUTO: 31.3 PG (ref 26–34)
MCHC RBC AUTO-ENTMCNC: 33.6 G/DL (ref 31–36)
MCV RBC AUTO: 93.4 FL (ref 80–100)
MICROALBUMIN UR DL<=1MG/L-MCNC: 2.33 MG/DL
MICROALBUMIN/CREAT UR: 13.6 MG/G (ref 0–30)
MONOCYTES # BLD: 0.3 K/UL (ref 0–1.3)
MONOCYTES NFR BLD: 5.4 %
NEUTROPHILS # BLD: 2.5 K/UL (ref 1.7–7.7)
NEUTROPHILS NFR BLD: 43.4 %
PLATELET # BLD AUTO: 192 K/UL (ref 135–450)
PMV BLD AUTO: 9.3 FL (ref 5–10.5)
POTASSIUM SERPL-SCNC: 4.2 MMOL/L (ref 3.5–5.1)
PROT SERPL-MCNC: 7.4 G/DL (ref 6.4–8.2)
RBC # BLD AUTO: 4.42 M/UL (ref 4–5.2)
SODIUM SERPL-SCNC: 138 MMOL/L (ref 136–145)
TRIGL SERPL-MCNC: 97 MG/DL (ref 0–150)
TSH SERPL DL<=0.005 MIU/L-ACNC: 2 UIU/ML (ref 0.27–4.2)
VLDLC SERPL CALC-MCNC: 19 MG/DL
WBC # BLD AUTO: 5.7 K/UL (ref 4–11)

## 2025-03-17 SDOH — HEALTH STABILITY: PHYSICAL HEALTH: ON AVERAGE, HOW MANY DAYS PER WEEK DO YOU ENGAGE IN MODERATE TO STRENUOUS EXERCISE (LIKE A BRISK WALK)?: 3 DAYS

## 2025-03-17 SDOH — ECONOMIC STABILITY: FOOD INSECURITY: WITHIN THE PAST 12 MONTHS, THE FOOD YOU BOUGHT JUST DIDN'T LAST AND YOU DIDN'T HAVE MONEY TO GET MORE.: NEVER TRUE

## 2025-03-17 SDOH — HEALTH STABILITY: PHYSICAL HEALTH: ON AVERAGE, HOW MANY MINUTES DO YOU ENGAGE IN EXERCISE AT THIS LEVEL?: 30 MIN

## 2025-03-17 SDOH — ECONOMIC STABILITY: INCOME INSECURITY: IN THE LAST 12 MONTHS, WAS THERE A TIME WHEN YOU WERE NOT ABLE TO PAY THE MORTGAGE OR RENT ON TIME?: NO

## 2025-03-17 SDOH — ECONOMIC STABILITY: TRANSPORTATION INSECURITY
IN THE PAST 12 MONTHS, HAS THE LACK OF TRANSPORTATION KEPT YOU FROM MEDICAL APPOINTMENTS OR FROM GETTING MEDICATIONS?: NO

## 2025-03-17 SDOH — ECONOMIC STABILITY: FOOD INSECURITY: WITHIN THE PAST 12 MONTHS, YOU WORRIED THAT YOUR FOOD WOULD RUN OUT BEFORE YOU GOT MONEY TO BUY MORE.: NEVER TRUE

## 2025-03-17 ASSESSMENT — PATIENT HEALTH QUESTIONNAIRE - PHQ9
10. IF YOU CHECKED OFF ANY PROBLEMS, HOW DIFFICULT HAVE THESE PROBLEMS MADE IT FOR YOU TO DO YOUR WORK, TAKE CARE OF THINGS AT HOME, OR GET ALONG WITH OTHER PEOPLE: NOT DIFFICULT AT ALL
8. MOVING OR SPEAKING SO SLOWLY THAT OTHER PEOPLE COULD HAVE NOTICED. OR THE OPPOSITE, BEING SO FIGETY OR RESTLESS THAT YOU HAVE BEEN MOVING AROUND A LOT MORE THAN USUAL: NOT AT ALL
5. POOR APPETITE OR OVEREATING: NOT AT ALL
2. FEELING DOWN, DEPRESSED OR HOPELESS: NOT AT ALL
6. FEELING BAD ABOUT YOURSELF - OR THAT YOU ARE A FAILURE OR HAVE LET YOURSELF OR YOUR FAMILY DOWN: NOT AT ALL
4. FEELING TIRED OR HAVING LITTLE ENERGY: NOT AT ALL
SUM OF ALL RESPONSES TO PHQ QUESTIONS 1-9: 0
SUM OF ALL RESPONSES TO PHQ QUESTIONS 1-9: 0
7. TROUBLE CONCENTRATING ON THINGS, SUCH AS READING THE NEWSPAPER OR WATCHING TELEVISION: NOT AT ALL
1. LITTLE INTEREST OR PLEASURE IN DOING THINGS: NOT AT ALL
9. THOUGHTS THAT YOU WOULD BE BETTER OFF DEAD, OR OF HURTING YOURSELF: NOT AT ALL
SUM OF ALL RESPONSES TO PHQ QUESTIONS 1-9: 0
3. TROUBLE FALLING OR STAYING ASLEEP: NOT AT ALL
SUM OF ALL RESPONSES TO PHQ QUESTIONS 1-9: 0

## 2025-03-17 ASSESSMENT — LIFESTYLE VARIABLES
HOW MANY STANDARD DRINKS CONTAINING ALCOHOL DO YOU HAVE ON A TYPICAL DAY: PATIENT DOES NOT DRINK
HOW OFTEN DO YOU HAVE SIX OR MORE DRINKS ON ONE OCCASION: 1
HOW OFTEN DO YOU HAVE A DRINK CONTAINING ALCOHOL: 1
HOW MANY STANDARD DRINKS CONTAINING ALCOHOL DO YOU HAVE ON A TYPICAL DAY: 0
HOW OFTEN DO YOU HAVE A DRINK CONTAINING ALCOHOL: NEVER

## 2025-03-18 ENCOUNTER — RESULTS FOLLOW-UP (OUTPATIENT)
Dept: INTERNAL MEDICINE CLINIC | Age: 75
End: 2025-03-18

## 2025-03-18 LAB
EST. AVERAGE GLUCOSE BLD GHB EST-MCNC: 102.5 MG/DL
HBA1C MFR BLD: 5.2 %

## 2025-03-19 ENCOUNTER — OFFICE VISIT (OUTPATIENT)
Dept: INTERNAL MEDICINE CLINIC | Age: 75
End: 2025-03-19
Payer: MEDICARE

## 2025-03-19 VITALS
BODY MASS INDEX: 35.6 KG/M2 | HEART RATE: 57 BPM | SYSTOLIC BLOOD PRESSURE: 120 MMHG | DIASTOLIC BLOOD PRESSURE: 72 MMHG | TEMPERATURE: 98.1 F | OXYGEN SATURATION: 98 % | HEIGHT: 59 IN | WEIGHT: 176.6 LBS

## 2025-03-19 DIAGNOSIS — I10 ESSENTIAL HYPERTENSION: ICD-10-CM

## 2025-03-19 DIAGNOSIS — E11.9 TYPE 2 DIABETES MELLITUS WITHOUT COMPLICATION, WITHOUT LONG-TERM CURRENT USE OF INSULIN: ICD-10-CM

## 2025-03-19 DIAGNOSIS — N95.9 POST MENOPAUSAL PROBLEMS: ICD-10-CM

## 2025-03-19 DIAGNOSIS — D50.9 IRON DEFICIENCY ANEMIA, UNSPECIFIED IRON DEFICIENCY ANEMIA TYPE: ICD-10-CM

## 2025-03-19 DIAGNOSIS — Z12.31 ENCOUNTER FOR SCREENING MAMMOGRAM FOR BREAST CANCER: ICD-10-CM

## 2025-03-19 DIAGNOSIS — Z00.00 MEDICARE ANNUAL WELLNESS VISIT, SUBSEQUENT: Primary | ICD-10-CM

## 2025-03-19 DIAGNOSIS — E03.9 ACQUIRED HYPOTHYROIDISM: Chronic | ICD-10-CM

## 2025-03-19 DIAGNOSIS — E78.2 MIXED HYPERLIPIDEMIA: Chronic | ICD-10-CM

## 2025-03-19 PROCEDURE — 3017F COLORECTAL CA SCREEN DOC REV: CPT | Performed by: STUDENT IN AN ORGANIZED HEALTH CARE EDUCATION/TRAINING PROGRAM

## 2025-03-19 PROCEDURE — 1123F ACP DISCUSS/DSCN MKR DOCD: CPT | Performed by: STUDENT IN AN ORGANIZED HEALTH CARE EDUCATION/TRAINING PROGRAM

## 2025-03-19 PROCEDURE — 3074F SYST BP LT 130 MM HG: CPT | Performed by: STUDENT IN AN ORGANIZED HEALTH CARE EDUCATION/TRAINING PROGRAM

## 2025-03-19 PROCEDURE — G0439 PPPS, SUBSEQ VISIT: HCPCS | Performed by: STUDENT IN AN ORGANIZED HEALTH CARE EDUCATION/TRAINING PROGRAM

## 2025-03-19 PROCEDURE — 3078F DIAST BP <80 MM HG: CPT | Performed by: STUDENT IN AN ORGANIZED HEALTH CARE EDUCATION/TRAINING PROGRAM

## 2025-03-19 PROCEDURE — 1159F MED LIST DOCD IN RCRD: CPT | Performed by: STUDENT IN AN ORGANIZED HEALTH CARE EDUCATION/TRAINING PROGRAM

## 2025-03-19 PROCEDURE — 3044F HG A1C LEVEL LT 7.0%: CPT | Performed by: STUDENT IN AN ORGANIZED HEALTH CARE EDUCATION/TRAINING PROGRAM

## 2025-03-19 NOTE — ASSESSMENT & PLAN NOTE
Within normal limits for age- retired, no ADL issues,immunizations up to date (recommended Prevnar 20, flu and COVID-vaccine), no depression ,no cognitive impairment  Colonoscopy up to date last done 8/2024  Mammogram annually, last done in 2023, discussed last year but did not yet get it done. Ordered today   DEXA scan last done in 2017 was normal - repeat now to monitor   Eye exam up to date  Exercises as tolerated    No living will, ACP information provided   Findings and recommendations discussed with Pt  Labs discussed with patient

## 2025-03-19 NOTE — PROGRESS NOTES
Semaglutide,0.25 or 0.5MG/DOS, (OZEMPIC, 0.25 OR 0.5 MG/DOSE,) 2 MG/3ML SOPN Inject 0.25 mLs into the skin Once a week at 5 PM Yes Patria Joy MD   GEMTESA 75 MG TABS tablet TAKE 1 TABLET BY MOUTH EVERY DAY *NEW PRESCRIPTION REQUEST* Yes Patria Joy MD   FLUoxetine (PROZAC) 40 MG capsule TAKE ONE (1) CAPSULE BY MOUTH ONCE DAILY *NEW PRESCRIPTION REQUEST* Yes Patria Joy MD   omeprazole (PRILOSEC) 40 MG delayed release capsule TAKE ONE (1) CAPSULE BY MOUTH ONCE DAILY *NEW PRESCRIPTION REQUEST* Yes Patria Joy MD   metoprolol tartrate (LOPRESSOR) 25 MG tablet TAKE 1/2 TABLET(S) BY MOUTH 2 TIMES PER DAY *NEW PRESCRIPTION REQUEST* Yes Patria Joy MD   losartan (COZAAR) 50 MG tablet TAKE 1 TABLET BY MOUTH EVERY DAY *NEW PRESCRIPTION REQUEST* Yes Patria Joy MD   traZODone (DESYREL) 50 MG tablet TAKE 2 TABLET(S) BY MOUTH 1 TIMES PER DAY AS NEEDED FOR SLEEP *NEW PRESCRIPTION REQUEST* Yes Patria Joy MD   rosuvastatin (CRESTOR) 20 MG tablet TAKE 1 TABLET BY MOUTH EVERY DAY *NEW PRESCRIPTION REQUEST* Yes Patria Joy MD   ASPIRIN 81 PO Take by mouth Yes Flor May MD   vitamin D3 (CHOLECALCIFEROL) 25 MCG (1000 UT) TABS tablet Take 1 tablet by mouth daily Yes Patria Joy MD   buPROPion (WELLBUTRIN XL) 300 MG extended release tablet Take 1 tablet by mouth every morning Yes Patria Joy MD   fluticasone (FLONASE) 50 MCG/ACT nasal spray SHAKE LIQUID AND USE 2 SPRAYS IN EACH NOSTRIL DAILY Yes Brittany Combs MD   Fexofenadine HCl (ALLEGRA ALLERGY PO) Take by mouth Yes Flor May MD   vitamin B-12 (CYANOCOBALAMIN) 1000 MCG tablet Take 1 tablet by mouth daily Yes Brittany Combs MD   bisacodyl (DULCOLAX) 5 MG EC tablet Take 2 tablets by mouth daily as needed for Constipation Yes Flor May MD   mometasone (NASONEX) 50 MCG/ACT nasal spray USE 2 SPRAYS BY NASAL ROUTE DAILY  Brittany Combs MD       [facility] (Including outside providers/suppliers regularly

## 2025-03-24 DIAGNOSIS — K21.00 GASTROESOPHAGEAL REFLUX DISEASE WITH ESOPHAGITIS, UNSPECIFIED WHETHER HEMORRHAGE: Chronic | ICD-10-CM

## 2025-03-25 RX ORDER — OMEPRAZOLE 40 MG/1
40 CAPSULE, DELAYED RELEASE ORAL DAILY
Qty: 90 CAPSULE | Refills: 1 | Status: SHIPPED | OUTPATIENT
Start: 2025-03-25

## 2025-03-25 NOTE — TELEPHONE ENCOUNTER
I sent for 1 tablet daily only. Would avoid to increase it to 2 tablets as chronic PPI does have significant complication. If needed breakthrough can take Pepcid over the counter. If not control then would advise to follow up

## 2025-03-25 NOTE — TELEPHONE ENCOUNTER
Patient comment: Last refill was for 30 capsules.  There are occasions when I need to take (2), which causes me to run out early. Please prescribe for (2) a day, but most days I will only need (1).  Thank you. Mail order from [facility] please.     I set up script as patient requested.   Qty 180 with one refill   Sig: May take up to two caps daily.    Please review to make sure is appropriate  thanks !       Last appointment: 3/19/2025  Next appointment: 9/19/2025        Last refill: 2/19/25

## 2025-04-02 ENCOUNTER — HOSPITAL ENCOUNTER (OUTPATIENT)
Dept: WOMENS IMAGING | Age: 75
Discharge: HOME | End: 2025-04-02
Payer: MEDICARE

## 2025-04-02 ENCOUNTER — RESULTS FOLLOW-UP (OUTPATIENT)
Dept: INTERNAL MEDICINE CLINIC | Age: 75
End: 2025-04-02

## 2025-04-02 VITALS — WEIGHT: 178.8 LBS | HEIGHT: 59 IN | BODY MASS INDEX: 36.04 KG/M2

## 2025-04-02 DIAGNOSIS — Z12.31 ENCOUNTER FOR SCREENING MAMMOGRAM FOR BREAST CANCER: ICD-10-CM

## 2025-04-02 DIAGNOSIS — N95.9 POST MENOPAUSAL PROBLEMS: ICD-10-CM

## 2025-04-02 PROCEDURE — 77067 SCR MAMMO BI INCL CAD: CPT

## 2025-04-02 PROCEDURE — 77080 DXA BONE DENSITY AXIAL: CPT

## 2025-04-03 ENCOUNTER — TELEPHONE (OUTPATIENT)
Dept: WOMENS IMAGING | Age: 75
End: 2025-04-03

## 2025-04-09 ENCOUNTER — RESULTS FOLLOW-UP (OUTPATIENT)
Dept: INTERNAL MEDICINE CLINIC | Age: 75
End: 2025-04-09

## 2025-04-09 DIAGNOSIS — R92.8 ABNORMAL MAMMOGRAM: Primary | ICD-10-CM

## 2025-04-10 ENCOUNTER — HOSPITAL ENCOUNTER (OUTPATIENT)
Dept: WOMENS IMAGING | Age: 75
Discharge: HOME | End: 2025-04-10
Payer: MEDICARE

## 2025-04-10 ENCOUNTER — TELEPHONE (OUTPATIENT)
Dept: WOMENS IMAGING | Age: 75
End: 2025-04-10

## 2025-04-10 ENCOUNTER — RESULTS FOLLOW-UP (OUTPATIENT)
Dept: INTERNAL MEDICINE CLINIC | Age: 75
End: 2025-04-10

## 2025-04-10 DIAGNOSIS — R92.8 ABNORMAL MAMMOGRAM: ICD-10-CM

## 2025-04-10 PROCEDURE — 76642 ULTRASOUND BREAST LIMITED: CPT

## 2025-04-10 PROCEDURE — G0279 TOMOSYNTHESIS, MAMMO: HCPCS

## 2025-04-11 ENCOUNTER — TELEPHONE (OUTPATIENT)
Dept: WOMENS IMAGING | Age: 75
End: 2025-04-11

## 2025-04-11 ENCOUNTER — TELEPHONE (OUTPATIENT)
Dept: INTERNAL MEDICINE CLINIC | Age: 75
End: 2025-04-11

## 2025-04-11 DIAGNOSIS — R92.8 ABNORMAL MAMMOGRAM OF RIGHT BREAST: Primary | ICD-10-CM

## 2025-04-11 NOTE — TELEPHONE ENCOUNTER
[facility] calling to request orders for pt to have a Right breast ultrasound biopsy   [phone] call back   [phone] Fax

## 2025-04-11 NOTE — TELEPHONE ENCOUNTER
[facility]  The [facility] Center   [address], [address]  [place], [place] [address]   Phone: [phone]         ULTRASOUND BIOPSY EDUCATION    NAME:  Nichol Mazariegos   YOB: 1950   MEDICAL RECORD NUMBER:  [id number]   TODAY'S DATE:  4/11/2025    Referring Physician: Dr. Patria Joy    Procedure: Ultrasound-guided Core Needle Breast Biopsy    Right Breast    Date of biopsy: 4/17/25    Patient taking blood thinners: yes - ASA 81mg, holding    Medicine allergies: yes - see chart    Special Instructions: n/a    Biopsy order form faxed to referring MD.          What is an Ultrasound Guided Breast Biopsy?  Ultrasound guided breast biopsy is a test that uses ultrasound to find an area of your breast where a tissue sample will be taken. The sample is then looked at under a microscope to check for signs of breast cancer.     Why is it done?   An Ultrasound biopsy is usually done to check for cancer in a lump or cyst found during a mammogram or ultrasound.   Preparing for the test?     * Take your medications as prescribed    You may eat and drink fluids before the test    Take a shower the evening or morning before the biopsy.  What happens before the test?  Images are taken to find the exact site to be biopsied.    Your skin is washed with an alcohol prep.      You will be given an injection of medication to numb your breast.   What happens during the test?     Once your breast is numb, a small cut (incision) is made.     Using the imaging, the doctor will guide the needle into the biopsy area.     A sample of breast tissue is taken through the needle.     A small \"Clip\" or Marker is inserted into your breast to lakeisha the biopsy site.      The needle is removed and pressure put on the needle site to stop any bleeding.     A bandage will be placed over the site.     A post mammogram picture will be taken to document the clip placement.  How long does the test take?

## 2025-04-16 RX ORDER — BUPROPION HYDROCHLORIDE 300 MG/1
300 TABLET ORAL EVERY MORNING
Qty: 90 TABLET | Refills: 1 | Status: SHIPPED | OUTPATIENT
Start: 2025-04-16

## 2025-04-16 NOTE — TELEPHONE ENCOUNTER
Patient comment: [facility] pharmacy needs a prescription order.  It is their 1st time filling it.  Mail order, please.  Thank you           Last appointment: 3/19/2025  Next appointment: 9/19/2025        Last refill: 9/12/24

## 2025-04-17 ENCOUNTER — HOSPITAL ENCOUNTER (OUTPATIENT)
Dept: WOMENS IMAGING | Age: 75
Discharge: HOME | End: 2025-04-17
Payer: MEDICARE

## 2025-04-17 DIAGNOSIS — R92.8 ABNORMAL MAMMOGRAM: ICD-10-CM

## 2025-04-17 DIAGNOSIS — R92.8 ABNORMAL MAMMOGRAM OF RIGHT BREAST: ICD-10-CM

## 2025-04-17 PROCEDURE — 88360 TUMOR IMMUNOHISTOCHEM/MANUAL: CPT

## 2025-04-17 PROCEDURE — 77065 DX MAMMO INCL CAD UNI: CPT

## 2025-04-17 PROCEDURE — 88305 TISSUE EXAM BY PATHOLOGIST: CPT

## 2025-04-17 PROCEDURE — 88341 IMHCHEM/IMCYTCHM EA ADD ANTB: CPT

## 2025-04-17 PROCEDURE — 2709999900 US BREAST BIOPSY W LOC DEVICE 1ST LESION RIGHT

## 2025-04-17 PROCEDURE — 88342 IMHCHEM/IMCYTCHM 1ST ANTB: CPT

## 2025-04-17 NOTE — PROGRESS NOTES
Nurse Shania Carey with pt during procedure.  Patient in the Breast Center for a breast biopsy. Radiologist reviewed procedure with patient, consent signed. Patient tolerated procedure well. Compression held. Site cleansed with chloraprep. Liquid Band-Aid and a dry dressing applied. Ice packs provided. Reviewed discharge instructions with patient and instructions given to patient.  Patient verbalized understanding and agreed to contact the Breast Navigator with any questions. Patient was A&Ox3 and steady on feet and discharged to waiting area.      The Breast Center   Discharge Instructions  [facility]  [address]  [address]  Telephone: [phone]   FAX [phone]    NAME:  Nicohl Mazariegos  YOB: 1950  GENDER: female  MEDICAL RECORD NUMBER:  [id number]  TODAY'S DATE:  4/17/2025    Discharge Instructions Breast Center:    Post Breast Biopsy Instructions     [x] You may remove your outer dressing in 24 hours and you may shower. The surgical glue will fall off after 7 days. Do not pick, scratch, or rub the film.     [x] Place a cold pack inside your bra on top of the dressing for at least 3 hours, removing it every 15 minutes for 15 minutes after your biopsy.     [x] Wear a firm fitting bra for at least 24 hours after your biopsy, including while you sleep.    [x] Place an ice pack inside your bra on top of the dressing for at least 3 hours, removing it every 15 minutes for 15 minutes after your biopsy.    [x] You may resume your held medications tomorrow, unless otherwise directed by your physician.    [x] Your physician has instructed you to take Tylenol (Acetaminophen) the day of your biopsy for any discomfort.    [x] Watch for excessive bleeding. If bleeding occurs apply pressure to site. Watch for signs of infection, increased pain, redness, swelling and heat.  If this occurs call your physician.     [x] Do not participate in any strenuous exercise for 48

## 2025-04-18 PROBLEM — Z00.00 MEDICARE ANNUAL WELLNESS VISIT, SUBSEQUENT: Status: RESOLVED | Noted: 2024-09-12 | Resolved: 2025-04-18

## 2025-04-18 PROBLEM — Z12.31 ENCOUNTER FOR SCREENING MAMMOGRAM FOR BREAST CANCER: Status: RESOLVED | Noted: 2025-03-19 | Resolved: 2025-04-18

## 2025-04-21 ENCOUNTER — TELEPHONE (OUTPATIENT)
Dept: WOMENS IMAGING | Age: 75
End: 2025-04-21

## 2025-04-22 ENCOUNTER — TELEPHONE (OUTPATIENT)
Dept: WOMENS IMAGING | Age: 75
End: 2025-04-22

## 2025-04-22 ENCOUNTER — RESULTS FOLLOW-UP (OUTPATIENT)
Dept: INTERNAL MEDICINE CLINIC | Age: 75
End: 2025-04-22

## 2025-04-22 NOTE — TELEPHONE ENCOUNTER
Pathology for breast biopsy complete and the radiologist has confirmed concordance.     Reviewed breast biopsy results with patient and answered all questions. The radiologist is recommending that the patient see a breast surgeon regarding biopsy results.  Per patient request, referral made to [facility].  Path report faxed to Patria Joy MD.  Breast Navigator will remain available for any questions. Pt verbalized understanding.      Elisa Ye RN

## 2025-05-06 ENCOUNTER — TELEPHONE (OUTPATIENT)
Dept: CARDIOLOGY CLINIC | Age: 75
End: 2025-05-06

## 2025-05-06 ENCOUNTER — OFFICE VISIT (OUTPATIENT)
Dept: INTERNAL MEDICINE CLINIC | Age: 75
End: 2025-05-06

## 2025-05-06 ENCOUNTER — OFFICE VISIT (OUTPATIENT)
Dept: CARDIOLOGY CLINIC | Age: 75
End: 2025-05-06
Payer: MEDICARE

## 2025-05-06 VITALS
HEIGHT: 59 IN | OXYGEN SATURATION: 93 % | BODY MASS INDEX: 35.88 KG/M2 | WEIGHT: 178 LBS | DIASTOLIC BLOOD PRESSURE: 66 MMHG | SYSTOLIC BLOOD PRESSURE: 126 MMHG | HEART RATE: 73 BPM

## 2025-05-06 VITALS
SYSTOLIC BLOOD PRESSURE: 110 MMHG | DIASTOLIC BLOOD PRESSURE: 60 MMHG | BODY MASS INDEX: 35.91 KG/M2 | HEART RATE: 76 BPM | WEIGHT: 177.8 LBS

## 2025-05-06 DIAGNOSIS — I25.83 CORONARY ARTERY DISEASE DUE TO LIPID RICH PLAQUE: ICD-10-CM

## 2025-05-06 DIAGNOSIS — I10 ESSENTIAL HYPERTENSION: ICD-10-CM

## 2025-05-06 DIAGNOSIS — E11.9 TYPE 2 DIABETES MELLITUS WITHOUT COMPLICATION, WITHOUT LONG-TERM CURRENT USE OF INSULIN (HCC): ICD-10-CM

## 2025-05-06 DIAGNOSIS — E78.2 MIXED HYPERLIPIDEMIA: Chronic | ICD-10-CM

## 2025-05-06 DIAGNOSIS — R79.1 ABNORMAL COAGULATION PROFILE: ICD-10-CM

## 2025-05-06 DIAGNOSIS — C50.911 INFILTRATING DUCTAL CARCINOMA OF RIGHT BREAST (HCC): ICD-10-CM

## 2025-05-06 DIAGNOSIS — Z01.810 PREOP CARDIOVASCULAR EXAM: ICD-10-CM

## 2025-05-06 DIAGNOSIS — I10 ESSENTIAL HYPERTENSION: Primary | ICD-10-CM

## 2025-05-06 DIAGNOSIS — G47.33 OBSTRUCTIVE SLEEP APNEA: Chronic | ICD-10-CM

## 2025-05-06 DIAGNOSIS — I25.10 CORONARY ARTERY DISEASE DUE TO LIPID RICH PLAQUE: ICD-10-CM

## 2025-05-06 DIAGNOSIS — Z01.818 PRE-OP EXAM: ICD-10-CM

## 2025-05-06 DIAGNOSIS — Z01.818 PRE-OP EXAM: Primary | ICD-10-CM

## 2025-05-06 LAB
ANION GAP SERPL CALCULATED.3IONS-SCNC: 12 MMOL/L (ref 3–16)
APTT BLD: 35.1 SEC (ref 22.1–36.4)
BUN SERPL-MCNC: 14 MG/DL (ref 7–20)
CALCIUM SERPL-MCNC: 10 MG/DL (ref 8.3–10.6)
CHLORIDE SERPL-SCNC: 100 MMOL/L (ref 99–110)
CO2 SERPL-SCNC: 26 MMOL/L (ref 21–32)
CREAT SERPL-MCNC: 1 MG/DL (ref 0.6–1.2)
DEPRECATED RDW RBC AUTO: 14.5 % (ref 12.4–15.4)
GFR SERPLBLD CREATININE-BSD FMLA CKD-EPI: 59 ML/MIN/{1.73_M2}
GLUCOSE SERPL-MCNC: 95 MG/DL (ref 70–99)
HCT VFR BLD AUTO: 43.6 % (ref 36–48)
HGB BLD-MCNC: 14.6 G/DL (ref 12–16)
INR PPP: 0.99 (ref 0.85–1.15)
MCH RBC QN AUTO: 31.3 PG (ref 26–34)
MCHC RBC AUTO-ENTMCNC: 33.6 G/DL (ref 31–36)
MCV RBC AUTO: 93.2 FL (ref 80–100)
PLATELET # BLD AUTO: 225 K/UL (ref 135–450)
PLATELET BLD QL SMEAR: ADEQUATE
PMV BLD AUTO: 9.7 FL (ref 5–10.5)
POTASSIUM SERPL-SCNC: 4.6 MMOL/L (ref 3.5–5.1)
PROTHROMBIN TIME: 13.3 SEC (ref 11.9–14.9)
RBC # BLD AUTO: 4.67 M/UL (ref 4–5.2)
SLIDE REVIEW: NORMAL
SODIUM SERPL-SCNC: 138 MMOL/L (ref 136–145)
WBC # BLD AUTO: 9 K/UL (ref 4–11)

## 2025-05-06 PROCEDURE — 1159F MED LIST DOCD IN RCRD: CPT | Performed by: INTERNAL MEDICINE

## 2025-05-06 PROCEDURE — 3078F DIAST BP <80 MM HG: CPT | Performed by: INTERNAL MEDICINE

## 2025-05-06 PROCEDURE — 1123F ACP DISCUSS/DSCN MKR DOCD: CPT | Performed by: INTERNAL MEDICINE

## 2025-05-06 PROCEDURE — G8399 PT W/DXA RESULTS DOCUMENT: HCPCS | Performed by: INTERNAL MEDICINE

## 2025-05-06 PROCEDURE — 3017F COLORECTAL CA SCREEN DOC REV: CPT | Performed by: INTERNAL MEDICINE

## 2025-05-06 PROCEDURE — 1036F TOBACCO NON-USER: CPT | Performed by: INTERNAL MEDICINE

## 2025-05-06 PROCEDURE — 99214 OFFICE O/P EST MOD 30 MIN: CPT | Performed by: INTERNAL MEDICINE

## 2025-05-06 PROCEDURE — 3074F SYST BP LT 130 MM HG: CPT | Performed by: INTERNAL MEDICINE

## 2025-05-06 PROCEDURE — 1090F PRES/ABSN URINE INCON ASSESS: CPT | Performed by: INTERNAL MEDICINE

## 2025-05-06 PROCEDURE — G8427 DOCREV CUR MEDS BY ELIG CLIN: HCPCS | Performed by: INTERNAL MEDICINE

## 2025-05-06 PROCEDURE — G8417 CALC BMI ABV UP PARAM F/U: HCPCS | Performed by: INTERNAL MEDICINE

## 2025-05-06 SDOH — ECONOMIC STABILITY: FOOD INSECURITY: WITHIN THE PAST 12 MONTHS, YOU WORRIED THAT YOUR FOOD WOULD RUN OUT BEFORE YOU GOT MONEY TO BUY MORE.: NEVER TRUE

## 2025-05-06 SDOH — ECONOMIC STABILITY: FOOD INSECURITY: WITHIN THE PAST 12 MONTHS, THE FOOD YOU BOUGHT JUST DIDN'T LAST AND YOU DIDN'T HAVE MONEY TO GET MORE.: NEVER TRUE

## 2025-05-06 ASSESSMENT — PATIENT HEALTH QUESTIONNAIRE - PHQ9
5. POOR APPETITE OR OVEREATING: NOT AT ALL
3. TROUBLE FALLING OR STAYING ASLEEP: NOT AT ALL
SUM OF ALL RESPONSES TO PHQ QUESTIONS 1-9: 0
9. THOUGHTS THAT YOU WOULD BE BETTER OFF DEAD, OR OF HURTING YOURSELF: NOT AT ALL
4. FEELING TIRED OR HAVING LITTLE ENERGY: NOT AT ALL
SUM OF ALL RESPONSES TO PHQ QUESTIONS 1-9: 0
SUM OF ALL RESPONSES TO PHQ QUESTIONS 1-9: 0
2. FEELING DOWN, DEPRESSED OR HOPELESS: NOT AT ALL
6. FEELING BAD ABOUT YOURSELF - OR THAT YOU ARE A FAILURE OR HAVE LET YOURSELF OR YOUR FAMILY DOWN: NOT AT ALL
SUM OF ALL RESPONSES TO PHQ QUESTIONS 1-9: 0
1. LITTLE INTEREST OR PLEASURE IN DOING THINGS: NOT AT ALL
8. MOVING OR SPEAKING SO SLOWLY THAT OTHER PEOPLE COULD HAVE NOTICED. OR THE OPPOSITE, BEING SO FIGETY OR RESTLESS THAT YOU HAVE BEEN MOVING AROUND A LOT MORE THAN USUAL: NOT AT ALL
10. IF YOU CHECKED OFF ANY PROBLEMS, HOW DIFFICULT HAVE THESE PROBLEMS MADE IT FOR YOU TO DO YOUR WORK, TAKE CARE OF THINGS AT HOME, OR GET ALONG WITH OTHER PEOPLE: NOT DIFFICULT AT ALL
7. TROUBLE CONCENTRATING ON THINGS, SUCH AS READING THE NEWSPAPER OR WATCHING TELEVISION: NOT AT ALL

## 2025-05-06 ASSESSMENT — ENCOUNTER SYMPTOMS
ABDOMINAL PAIN: 0
ABDOMINAL DISTENTION: 0
SHORTNESS OF BREATH: 0
COUGH: 0
BACK PAIN: 0
EYE DISCHARGE: 0
BLOOD IN STOOL: 0
NAUSEA: 0
SORE THROAT: 0
WHEEZING: 0
SHORTNESS OF BREATH: 0
VOMITING: 0
FACIAL SWELLING: 0
CHEST TIGHTNESS: 0
COLOR CHANGE: 0

## 2025-05-06 NOTE — PROGRESS NOTES
SpO2: 93%   Weight: 80.7 kg (178 lb)   Height: 1.499 m (4' 11\")     Estimated body mass index is 35.95 kg/m² as calculated from the following:    Height as of this encounter: 1.499 m (4' 11\").    Weight as of this encounter: 80.7 kg (178 lb).    Physical Exam  Constitutional:       Appearance: Normal appearance. She is normal weight.   HENT:      Head: Normocephalic.      Mouth/Throat:      Mouth: Mucous membranes are moist.   Eyes:      General: No scleral icterus.     Extraocular Movements: Extraocular movements intact.      Conjunctiva/sclera: Conjunctivae normal.   Cardiovascular:      Rate and Rhythm: Normal rate and regular rhythm.      Pulses: Normal pulses.      Heart sounds: Normal heart sounds.   Pulmonary:      Effort: Pulmonary effort is normal.      Breath sounds: Normal breath sounds.   Abdominal:      General: There is no distension.      Palpations: Abdomen is soft.      Tenderness: There is no abdominal tenderness.   Musculoskeletal:         General: No swelling or tenderness.      Cervical back: Neck supple. No tenderness.   Lymphadenopathy:      Cervical: No cervical adenopathy.   Skin:     General: Skin is warm and dry.   Neurological:      Mental Status: She is oriented to person, place, and time. Mental status is at baseline.      Motor: No weakness.   Psychiatric:         Mood and Affect: Mood normal.           Studies: Labs-pending /EKG - normal rate, regular rhythm, no ST changes     Assessment of Surgical Risk:  Risk of Planned Surgical Procedure: Low   Active High Risk Cardiac Conditions: CAD s/p CABG, DM, LISSA  Risk Calculation: RCRI Score (1 out of 6) -6% risk of major cardiac event  Cardiac Risk Assessment: Elevated   OVERALL risk for surgery: Intermediate     This patient is medically optimized at this point in time for this procedure pending normal labs and cardiology clearance    ======================  PRE-OP MEDICATION INSTRUCTIONS:    Ozempic - Hold for 1 week prior to

## 2025-05-06 NOTE — PROGRESS NOTES
VENTRICULOGRAM:  Left ventriculogram demonstrates uniform wall  motion.  Estimated ejection fraction is 60%.     LEFT MAIN:  Left main was a short vessel, free of significant  obstructive disease.     LEFT ANTERIOR DESCENDING:  The LAD courses to and wraps around the apex.  It gave off a moderate to large first diagonal branch followed by a  moderate size second diagonal branch.  There was a 90% eccentric  stenosis at the level of the first diagonal branch.  First diagonal  branch had proximal disease approaching 75% to 89%.     LEFT CIRCUMFLEX:  Circumflex consists of a large marginal branch and a  large posterolateral branch.  There is a 90% eccentric stenosis in the  proximal circumflex.     RIGHT CORONARY ARTERY:  Right coronary is a dominant vessel.  It gives  off a small PDA and two posterolateral branches.  There is a 90%  stenosis after the proximal bend.  Again, a 60% mid vessel stenosis.   The distal vessel is free of significant obstructive disease.     IMPRESSION:  1.  Preserved LV function with estimated ejection fraction of 60%.  2.  Severe multivessel coronary artery disease as described above.  3.  Recommend surgical consultation.    Last TTE/MARION(if available):    Last CMR  (if available):    Last Coronary Artery Calcium Score:       Assessment / Plan:     Essential hypertension  Well-controlled, continue losartan and metoprolol    Coronary artery disease due to lipid rich plaque  Compensated, no angina.  Continue aspirin, Crestor.    Mixed hyperlipidemia  LDL at goal, continue current dose of Crestor.  No myalgias    Preop cardiovascular exam  Okay to proceed with planned surgery.  No need for further testing prior to surgery    Follow up in 6 months.    I had the opportunity to review the clinical symptoms and presentation of Nichol POOJA Mazariegos.     Patient's allergies and medications were reviewed and updated. Patient's past medical, surgical, social and family history were reviewed and updated.

## 2025-05-06 NOTE — ASSESSMENT & PLAN NOTE
Recent diagnosis.  Grade 2 invasive ductal carcinoma. ER/HI +, HER2 -.   - Has appointment scheduled with radiation oncology  - Has upcoming partial mastectomy

## 2025-05-06 NOTE — TELEPHONE ENCOUNTER
CARDIAC CLEARANCE     What type of procedure are you having? Partial mascetomy    Which physician is performing your procedure? Dr. Keke Ocampo    When is your procedure scheduled for? 5/15/25    Where are you having this procedure? [facility]    Are you taking Blood Thinners? Yes    If so what? ASPIRIN 81 PO (Name/dose/frequesncy)    Does the surgeon want you to stop your blood thinner? yes If so for how long? 3-4 days    Phone Number and Contact Name for Physicians office:  Dr. Keke Ocampo  [phone]    Fax number to send information:  EPIC      Last seen Parris 2/2024

## 2025-05-06 NOTE — ASSESSMENT & PLAN NOTE
Asymptomatic. Underwent CABG in 2022. No longer following with cardiology.  - referred to cardiology  - continue losartan  - continue metoprolol  - continue crestor  - continue aspirin

## 2025-05-06 NOTE — H&P (VIEW-ONLY)
Bilateral 02/2020    Yag    CARDIAC CATHETERIZATION  08/10/2022    LAD with 80-90% prox.  Lg D1 with 80% prox.   Cx with prox 90%.   RCA with prox 90%-dom    CATARACT REMOVAL WITH IMPLANT Right 06/26/2019    COLONOSCOPY      COLONOSCOPY N/A 8/30/2024    COLONOSCOPY POLYPECTOMY SNARE/BIOPSY performed by Scot Meyer MD at [facility] ENDOSCOPY    CORONARY ARTERY BYPASS GRAFT N/A 9/1/2022    CABG x 4, LIMA to LAD, SVG to D1, OM2 and PDA; EVH R thigh; MARION; TCPB; sternal plate x 1 (Biomet) performed by Jack Rodriguez MD at [facility] OR    DILATION AND CURETTAGE OF UTERUS      ESOPHAGEAL DILATATION N/A 10/25/2018    ESOPHAGEAL DILATION LOZANO performed by Scot Banks MD at [facility] ENDOSCOPY    HYSTERECTOMY, TOTAL ABDOMINAL (CERVIX REMOVED)  1990    fibroid-still with ovaries    INTRACAPSULAR CATARACT EXTRACTION Left 06/12/2019    PHACOEMULSIFICATION OF CATARACT LEFT EYE WITH INTRAOCULAR LENS IMPLANT performed by Scot Jackson MD at [facility] OR    INTRACAPSULAR CATARACT EXTRACTION Right 06/26/2019    PHACOEMULSIFICATION OF CATARACT RIGHT EYE WITH INTRAOCULAR LENS IMPLANT performed by Scot Jackson MD at [facility] OR    UPPER GASTROINTESTINAL ENDOSCOPY N/A 10/25/2018    EGD BIOPSY performed by Scot Banks MD at [facility] ENDOSCOPY    UPPER GASTROINTESTINAL ENDOSCOPY N/A 8/30/2024    ESOPHAGOGASTRODUODENOSCOPY DILATATION performed by Scot Meyer MD at [facility] ENDOSCOPY    US BREAST BIOPSY W LOC DEVICE 1ST LESION RIGHT Right 4/17/2025    US BREAST BIOPSY W LOC DEVICE 1ST LESION RIGHT 4/17/2025 [facility]       Prior to Visit Medications    Medication Sig Taking? Authorizing Provider   buPROPion (WELLBUTRIN XL) 300 MG extended release tablet Take 1 tablet by mouth every morning Yes Patria Joy MD   omeprazole (PRILOSEC) 40 MG delayed release capsule Take 1 capsule by mouth daily May take up to two caps daily. Yes Patria Joy MD   levothyroxine (SYNTHROID) 50 MCG tablet Take 1 tablet by mouth Daily Yes  procedure.  Rosuvastatin - Continue taking up to and including day of procedure  Omeprazole - Continue taking up to and including day of procedure  Vitamins/supplements - Hold for 1 week prior to procedure.  Metoprolol - Continue taking up to and including day of procedure  Losartan - Do not take on morning of (or night before) procedure, may resume after procedure.   Levothyroxine - Continue taking up to and including day of procedure  Gemtesa - Continue taking up to and including day of procedure  Fluoxetine - Continue taking up to and including day of procedure  Bupropion - Continue taking up to and including day of procedure  Allegra- Continue taking up to and including day of procedure  Aspirin - Hold for 1 week prior to procedure.  ==============================  Coronary artery disease due to lipid rich plaque   Asymptomatic. Underwent CABG in 2022. No longer following with cardiology.  - referred to cardiology  - continue losartan  - continue metoprolol  - continue crestor  - continue aspirin    Essential hypertension   Controlled.  - continue metoprolol  - control losartan      Obstructive sleep apnea   Sleeps sitting up in a recliner and doesn't snore. She prefers that over wearing the CPAP.   - patient declines seeing sleep med, feels her current arrangement is working for her    Type 2 diabetes mellitus (HCC)   Well-controlled on current regimen.  - Continue Ozempic 0.5 mg      Infiltrating ductal carcinoma of right breast (HCC)   Recent diagnosis.  Grade 2 invasive ductal carcinoma. ER/NE +, HER2 -.   - Has appointment scheduled with radiation oncology  - Has upcoming partial mastectomy

## 2025-05-06 NOTE — PATIENT INSTRUCTIONS
PRE-OP MEDICATION INSTRUCTIONS:    Ozempic - Hold for 1 week prior to procedure.  Rosuvastatin - Continue taking up to and including day of procedure  Omeprazole - Continue taking up to and including day of procedure  Vitamins/supplements - Hold for 1 week prior to procedure.  Metoprolol - Continue taking up to and including day of procedure  Losartan - Do not take on morning of (or night before) procedure, may resume after procedure.   Levothyroxine - Continue taking up to and including day of procedure  Gemtesa - Continue taking up to and including day of procedure  Fluoxetine - Continue taking up to and including day of procedure  Bupropion - Continue taking up to and including day of procedure  Allegra- Continue taking up to and including day of procedure  Aspirin - Hold for 1 week prior to procedure.

## 2025-05-06 NOTE — ASSESSMENT & PLAN NOTE
Well-controlled on current regimen.  - Continue Ozempic 0.5 mg     No change from previous assessment.

## 2025-05-06 NOTE — ASSESSMENT & PLAN NOTE
Sleeps sitting up in a recliner and doesn't snore. She prefers that over wearing the CPAP.   - patient declines seeing sleep med, feels her current arrangement is working for her

## 2025-05-07 ENCOUNTER — RESULTS FOLLOW-UP (OUTPATIENT)
Dept: INTERNAL MEDICINE CLINIC | Age: 75
End: 2025-05-07

## 2025-05-08 ENCOUNTER — HOSPITAL ENCOUNTER (OUTPATIENT)
Dept: ULTRASOUND IMAGING | Age: 75
Discharge: HOME | End: 2025-05-08
Payer: MEDICARE

## 2025-05-08 ENCOUNTER — HOSPITAL ENCOUNTER (OUTPATIENT)
Dept: MAMMOGRAPHY | Age: 75
Discharge: HOME | End: 2025-05-08
Payer: MEDICARE

## 2025-05-08 DIAGNOSIS — R93.89 ABNORMAL ULTRASOUND: ICD-10-CM

## 2025-05-08 DIAGNOSIS — R92.8 ABNORMAL MAMMOGRAM: ICD-10-CM

## 2025-05-08 PROCEDURE — A4648 IMPLANTABLE TISSUE MARKER: HCPCS

## 2025-05-08 PROCEDURE — 77065 DX MAMMO INCL CAD UNI: CPT

## 2025-05-13 NOTE — PROGRESS NOTES
PRE-OP INSTRUCTIONS FOR SURGICAL PATIENTS          Our Pre-admission Testing Nurses tried and were unable to reach you today.  Please read the attached instructions if you did not listen to your voicemail.     Follow all instructions provided to you from your surgeon's office, including your ARRIVAL TIME.   Arrange for someone to drive you home and be with you for the first 24 hours after discharge.     NOTE: at this time ONLY 2 ADULTS may accompany you   One person encouraged to stay at hospital entire time if outpatient surgery    Enter the MAIN entrance located on [address] and report to the surgical desk on the LEFT side of the lobby. Please park in the parking garage or there is free  Parking available after 7am for your use.    Bring your insurance card & photo ID with you to register.  Bring your medication list with you with dose and frequency listed (including over the counter medications)  Contact your ordering physician/surgeon for medication instructions as soon as possible, especially if taking blood thinners, aspirin, heart, or diabetic medication.  Bariatric surgical patients need to call your surgeon if on diabetic medications (as some may need to be stopped 1-week preop)  A Pre-Surgical History and Physical MUST be completed WITHIN 30 DAYS OR LESS prior to your procedure by your Physician or an Urgent Care.  DO NOT EAT ANYTHING 8 hours prior to arrival for surgery.  You may have sips of WATER ONLY (up to 8 ounces) 4 hours prior to your arrival for surgery. Then nothing further 4 hours prior to arriving at hospital.   NOTE: ALL Gastric, Bariatric & Bowel surgery patients - you MUST follow your surgeon's instructions regarding eating/drinking as you will have very specific instructions to follow.  If you did not receive these, call your surgeon's office immediately.   No gum, candy, mints, or ice chips day of procedure.   Please refrain from drinking alcohol the day before or day of your

## 2025-05-15 ENCOUNTER — HOSPITAL ENCOUNTER (OUTPATIENT)
Dept: MAMMOGRAPHY | Age: 75
Discharge: HOME | End: 2025-05-15
Payer: MEDICARE

## 2025-05-15 ENCOUNTER — ANESTHESIA (OUTPATIENT)
Dept: OPERATING ROOM | Age: 75
End: 2025-05-15
Payer: MEDICARE

## 2025-05-15 ENCOUNTER — HOSPITAL ENCOUNTER (OUTPATIENT)
Age: 75
Setting detail: OUTPATIENT SURGERY
Discharge: HOME | End: 2025-05-15
Attending: SURGERY | Admitting: SURGERY
Payer: MEDICARE

## 2025-05-15 ENCOUNTER — ANESTHESIA EVENT (OUTPATIENT)
Dept: OPERATING ROOM | Age: 75
End: 2025-05-15
Payer: MEDICARE

## 2025-05-15 VITALS
OXYGEN SATURATION: 97 % | HEIGHT: 60 IN | WEIGHT: 174.8 LBS | HEART RATE: 61 BPM | BODY MASS INDEX: 34.32 KG/M2 | RESPIRATION RATE: 14 BRPM | SYSTOLIC BLOOD PRESSURE: 136 MMHG | TEMPERATURE: 97.9 F | DIASTOLIC BLOOD PRESSURE: 64 MMHG

## 2025-05-15 DIAGNOSIS — C50.411 MALIGNANT NEOPLASM OF UPPER-OUTER QUADRANT OF RIGHT FEMALE BREAST, UNSPECIFIED ESTROGEN RECEPTOR STATUS (HCC): ICD-10-CM

## 2025-05-15 DIAGNOSIS — R92.8 ABNORMAL MAMMOGRAM: ICD-10-CM

## 2025-05-15 DIAGNOSIS — R93.89 ABNORMAL FINDING ON IMAGING: ICD-10-CM

## 2025-05-15 LAB
GLUCOSE BLD-MCNC: 84 MG/DL (ref 70–99)
GLUCOSE BLD-MCNC: 95 MG/DL (ref 70–99)
PERFORMED ON: NORMAL
PERFORMED ON: NORMAL

## 2025-05-15 PROCEDURE — 2580000003 HC RX 258: Performed by: NURSE ANESTHETIST, CERTIFIED REGISTERED

## 2025-05-15 PROCEDURE — 88307 TISSUE EXAM BY PATHOLOGIST: CPT

## 2025-05-15 PROCEDURE — 7100000011 HC PHASE II RECOVERY - ADDTL 15 MIN: Performed by: SURGERY

## 2025-05-15 PROCEDURE — 6360000002 HC RX W HCPCS: Performed by: NURSE ANESTHETIST, CERTIFIED REGISTERED

## 2025-05-15 PROCEDURE — 3600000014 HC SURGERY LEVEL 4 ADDTL 15MIN: Performed by: SURGERY

## 2025-05-15 PROCEDURE — 2709999900 HC NON-CHARGEABLE SUPPLY: Performed by: SURGERY

## 2025-05-15 PROCEDURE — 2720000010 HC SURG SUPPLY STERILE: Performed by: SURGERY

## 2025-05-15 PROCEDURE — 6360000002 HC RX W HCPCS: Performed by: SURGERY

## 2025-05-15 PROCEDURE — 7100000000 HC PACU RECOVERY - FIRST 15 MIN: Performed by: SURGERY

## 2025-05-15 PROCEDURE — 3700000000 HC ANESTHESIA ATTENDED CARE: Performed by: SURGERY

## 2025-05-15 PROCEDURE — 76098 X-RAY EXAM SURGICAL SPECIMEN: CPT

## 2025-05-15 PROCEDURE — 7100000010 HC PHASE II RECOVERY - FIRST 15 MIN: Performed by: SURGERY

## 2025-05-15 PROCEDURE — 7100000001 HC PACU RECOVERY - ADDTL 15 MIN: Performed by: SURGERY

## 2025-05-15 PROCEDURE — 3700000001 HC ADD 15 MINUTES (ANESTHESIA): Performed by: SURGERY

## 2025-05-15 PROCEDURE — 2500000003 HC RX 250 WO HCPCS: Performed by: SURGERY

## 2025-05-15 PROCEDURE — 2500000003 HC RX 250 WO HCPCS: Performed by: NURSE ANESTHETIST, CERTIFIED REGISTERED

## 2025-05-15 PROCEDURE — 3600000004 HC SURGERY LEVEL 4 BASE: Performed by: SURGERY

## 2025-05-15 PROCEDURE — 2580000003 HC RX 258: Performed by: ANESTHESIOLOGY

## 2025-05-15 RX ORDER — IPRATROPIUM BROMIDE AND ALBUTEROL SULFATE 2.5; .5 MG/3ML; MG/3ML
1 SOLUTION RESPIRATORY (INHALATION)
Status: CANCELLED | OUTPATIENT
Start: 2025-05-15

## 2025-05-15 RX ORDER — SODIUM CHLORIDE 0.9 % (FLUSH) 0.9 %
5-40 SYRINGE (ML) INJECTION PRN
Status: CANCELLED | OUTPATIENT
Start: 2025-05-15

## 2025-05-15 RX ORDER — BUPIVACAINE HYDROCHLORIDE 5 MG/ML
INJECTION, SOLUTION EPIDURAL; INTRACAUDAL; PERINEURAL PRN
Status: DISCONTINUED | OUTPATIENT
Start: 2025-05-15 | End: 2025-05-15 | Stop reason: ALTCHOICE

## 2025-05-15 RX ORDER — LABETALOL HYDROCHLORIDE 5 MG/ML
10 INJECTION, SOLUTION INTRAVENOUS
Status: CANCELLED | OUTPATIENT
Start: 2025-05-15

## 2025-05-15 RX ORDER — SODIUM CHLORIDE, SODIUM LACTATE, POTASSIUM CHLORIDE, CALCIUM CHLORIDE 600; 310; 30; 20 MG/100ML; MG/100ML; MG/100ML; MG/100ML
INJECTION, SOLUTION INTRAVENOUS CONTINUOUS
Status: DISCONTINUED | OUTPATIENT
Start: 2025-05-15 | End: 2025-05-15 | Stop reason: HOSPADM

## 2025-05-15 RX ORDER — PROCHLORPERAZINE EDISYLATE 5 MG/ML
5 INJECTION INTRAMUSCULAR; INTRAVENOUS
Status: CANCELLED | OUTPATIENT
Start: 2025-05-15

## 2025-05-15 RX ORDER — EPHEDRINE SULFATE 50 MG/ML
INJECTION INTRAVENOUS
Status: DISCONTINUED | OUTPATIENT
Start: 2025-05-15 | End: 2025-05-15 | Stop reason: SDUPTHER

## 2025-05-15 RX ORDER — NALOXONE HYDROCHLORIDE 0.4 MG/ML
INJECTION, SOLUTION INTRAMUSCULAR; INTRAVENOUS; SUBCUTANEOUS PRN
Status: CANCELLED | OUTPATIENT
Start: 2025-05-15

## 2025-05-15 RX ORDER — ROCURONIUM BROMIDE 10 MG/ML
INJECTION, SOLUTION INTRAVENOUS
Status: DISCONTINUED | OUTPATIENT
Start: 2025-05-15 | End: 2025-05-15 | Stop reason: SDUPTHER

## 2025-05-15 RX ORDER — MAGNESIUM HYDROXIDE 1200 MG/15ML
LIQUID ORAL CONTINUOUS PRN
Status: DISCONTINUED | OUTPATIENT
Start: 2025-05-15 | End: 2025-05-15 | Stop reason: HOSPADM

## 2025-05-15 RX ORDER — CLINDAMYCIN PHOSPHATE 900 MG/50ML
900 INJECTION, SOLUTION INTRAVENOUS ONCE
Status: COMPLETED | OUTPATIENT
Start: 2025-05-15 | End: 2025-05-15

## 2025-05-15 RX ORDER — DIPHENHYDRAMINE HYDROCHLORIDE 50 MG/ML
12.5 INJECTION, SOLUTION INTRAMUSCULAR; INTRAVENOUS
Status: CANCELLED | OUTPATIENT
Start: 2025-05-15

## 2025-05-15 RX ORDER — LORAZEPAM 2 MG/ML
0.5 INJECTION INTRAMUSCULAR
Status: CANCELLED | OUTPATIENT
Start: 2025-05-15

## 2025-05-15 RX ORDER — OXYCODONE HYDROCHLORIDE 5 MG/1
5 TABLET ORAL
Refills: 0 | Status: CANCELLED | OUTPATIENT
Start: 2025-05-15

## 2025-05-15 RX ORDER — ONDANSETRON 2 MG/ML
4 INJECTION INTRAMUSCULAR; INTRAVENOUS
Status: CANCELLED | OUTPATIENT
Start: 2025-05-15

## 2025-05-15 RX ORDER — ONDANSETRON 2 MG/ML
INJECTION INTRAMUSCULAR; INTRAVENOUS
Status: DISCONTINUED | OUTPATIENT
Start: 2025-05-15 | End: 2025-05-15 | Stop reason: SDUPTHER

## 2025-05-15 RX ORDER — FENTANYL CITRATE 50 UG/ML
INJECTION, SOLUTION INTRAMUSCULAR; INTRAVENOUS
Status: DISCONTINUED | OUTPATIENT
Start: 2025-05-15 | End: 2025-05-15 | Stop reason: SDUPTHER

## 2025-05-15 RX ORDER — LIDOCAINE HYDROCHLORIDE 20 MG/ML
INJECTION, SOLUTION INTRAVENOUS
Status: DISCONTINUED | OUTPATIENT
Start: 2025-05-15 | End: 2025-05-15 | Stop reason: SDUPTHER

## 2025-05-15 RX ORDER — PROPOFOL 10 MG/ML
INJECTION, EMULSION INTRAVENOUS
Status: DISCONTINUED | OUTPATIENT
Start: 2025-05-15 | End: 2025-05-15 | Stop reason: SDUPTHER

## 2025-05-15 RX ORDER — HYDROMORPHONE HYDROCHLORIDE 1 MG/ML
0.5 INJECTION, SOLUTION INTRAMUSCULAR; INTRAVENOUS; SUBCUTANEOUS EVERY 5 MIN PRN
Refills: 0 | Status: CANCELLED | OUTPATIENT
Start: 2025-05-15

## 2025-05-15 RX ORDER — SUCCINYLCHOLINE/SOD CL,ISO/PF 200MG/10ML
SYRINGE (ML) INTRAVENOUS
Status: DISCONTINUED | OUTPATIENT
Start: 2025-05-15 | End: 2025-05-15 | Stop reason: SDUPTHER

## 2025-05-15 RX ORDER — FENTANYL CITRATE 50 UG/ML
25 INJECTION, SOLUTION INTRAMUSCULAR; INTRAVENOUS EVERY 5 MIN PRN
Refills: 0 | Status: CANCELLED | OUTPATIENT
Start: 2025-05-15

## 2025-05-15 RX ORDER — SODIUM CHLORIDE 9 MG/ML
INJECTION, SOLUTION INTRAVENOUS PRN
Status: CANCELLED | OUTPATIENT
Start: 2025-05-15

## 2025-05-15 RX ORDER — METHOCARBAMOL 100 MG/ML
INJECTION, SOLUTION INTRAMUSCULAR; INTRAVENOUS
Status: DISCONTINUED | OUTPATIENT
Start: 2025-05-15 | End: 2025-05-15 | Stop reason: SDUPTHER

## 2025-05-15 RX ORDER — SODIUM CHLORIDE 0.9 % (FLUSH) 0.9 %
5-40 SYRINGE (ML) INJECTION EVERY 12 HOURS SCHEDULED
Status: CANCELLED | OUTPATIENT
Start: 2025-05-15

## 2025-05-15 RX ADMIN — Medication 100 MG: at 11:07

## 2025-05-15 RX ADMIN — EPHEDRINE SULFATE 10 MG: 50 INJECTION INTRAVENOUS at 11:41

## 2025-05-15 RX ADMIN — METHOCARBAMOL 300 MG: 100 INJECTION, SOLUTION INTRAMUSCULAR; INTRAVENOUS at 11:47

## 2025-05-15 RX ADMIN — EPHEDRINE SULFATE 10 MG: 50 INJECTION INTRAVENOUS at 12:04

## 2025-05-15 RX ADMIN — FENTANYL CITRATE 50 MCG: 50 INJECTION, SOLUTION INTRAMUSCULAR; INTRAVENOUS at 10:57

## 2025-05-15 RX ADMIN — SODIUM CHLORIDE, SODIUM LACTATE, POTASSIUM CHLORIDE, AND CALCIUM CHLORIDE: .6; .31; .03; .02 INJECTION, SOLUTION INTRAVENOUS at 08:44

## 2025-05-15 RX ADMIN — LIDOCAINE HYDROCHLORIDE 50 MG: 20 INJECTION, SOLUTION INTRAVENOUS at 11:06

## 2025-05-15 RX ADMIN — EPHEDRINE SULFATE 10 MG: 50 INJECTION INTRAVENOUS at 11:37

## 2025-05-15 RX ADMIN — ROCURONIUM BROMIDE 40 MG: 10 INJECTION, SOLUTION INTRAVENOUS at 11:14

## 2025-05-15 RX ADMIN — FENTANYL CITRATE 50 MCG: 50 INJECTION, SOLUTION INTRAMUSCULAR; INTRAVENOUS at 11:20

## 2025-05-15 RX ADMIN — DEXMEDETOMIDINE HYDROCHLORIDE 6 MCG: 100 INJECTION, SOLUTION INTRAVENOUS at 11:41

## 2025-05-15 RX ADMIN — PROPOFOL 100 MG: 10 INJECTION, EMULSION INTRAVENOUS at 11:06

## 2025-05-15 RX ADMIN — EPHEDRINE SULFATE 10 MG: 50 INJECTION INTRAVENOUS at 11:55

## 2025-05-15 RX ADMIN — EPHEDRINE SULFATE 10 MG: 50 INJECTION INTRAVENOUS at 11:31

## 2025-05-15 RX ADMIN — ONDANSETRON 4 MG: 2 INJECTION INTRAMUSCULAR; INTRAVENOUS at 11:42

## 2025-05-15 RX ADMIN — SUGAMMADEX 200 MG: 100 INJECTION, SOLUTION INTRAVENOUS at 12:04

## 2025-05-15 RX ADMIN — ROCURONIUM BROMIDE 10 MG: 10 INJECTION, SOLUTION INTRAVENOUS at 11:06

## 2025-05-15 RX ADMIN — SODIUM CHLORIDE, SODIUM LACTATE, POTASSIUM CHLORIDE, AND CALCIUM CHLORIDE: .6; .31; .03; .02 INJECTION, SOLUTION INTRAVENOUS at 12:12

## 2025-05-15 RX ADMIN — CLINDAMYCIN PHOSPHATE 900 MG: 900 INJECTION, SOLUTION INTRAVENOUS at 11:08

## 2025-05-15 RX ADMIN — METHOCARBAMOL 700 MG: 100 INJECTION, SOLUTION INTRAMUSCULAR; INTRAVENOUS at 12:01

## 2025-05-15 ASSESSMENT — PAIN SCALES - GENERAL
PAINLEVEL_OUTOF10: 0
PAINLEVEL_OUTOF10: 0

## 2025-05-15 ASSESSMENT — PAIN - FUNCTIONAL ASSESSMENT: PAIN_FUNCTIONAL_ASSESSMENT: ADULT NONVERBAL PAIN SCALE (NPVS)

## 2025-05-15 NOTE — OP NOTE
image guided localization procedure preoperatively in the radiology department.  She was then brought to the operating room and placed supine on the operating room table with her arms extended on boards. She was appropriately positioned and padded. Bilateral sequential compression devices were applied to both lower extremities and a single dose of antibiotics was administered intravenously within 60 minutes prior to the incision time.  Breast imaging was available in the room.  After induction of anesthesia, a timeout procedure was performed. The patient was prepped and draped in the standard surgical fashion.  We directed our attention to the right breast.  A curvilinear incision was planned based on the location of the greatest radiofrequency identification tag signal.  This was anterior to the signal.  The incision was made and carried down through the dermis with electrocautery.  No skin was removed.  The radiofrequency identification tag signal was then used to create flaps and excise tissue anterior, superior, inferior, medial, and lateral to the signal.  The tissue was then transected from the posterior attachments.  Dissection was performed in the mastectomy plane anteriorly and was not carried to the chest wall. The specimen was then marked with a short stitch on the superior margin and a long stitch on the lateral margin and a specimen radiograph was obtained.  Specimen radiograph demonstrated that the lesion and clip and tag were located within the specimen with adequate radiographic margins.  Additional margins were obtained, oriented, and sent to pathology.  Attention was then turned to the wound.  Aggressive hemostasis was obtained with electrocautery.  The wound was irrigated with copious amounts of sterile saline.  0.5% bupivacaine was infiltrated into the soft tissues surrounding the cavity and hemostasis was again confirmed.  Clips were placed in the lumpectomy cavity in the area where the tumor  had been located. Hemostasis was again confirmed. The deep dermal layer was then reapproximated with interrupted 3-0 Vicryl stitches.  The skin was reapproximated with a running subcuticular using 4-0 Monocryl.  Surgical glue dressing was applied.  The patient tolerated this procedure well, was awakened and transferred to the recovery room. The instrument, sponge, and needle counts were correct.  Her family was notified of intraoperative findings.    Infection Present At Time Of Surgery (PATOS) (choose all levels that have infection present):  No infection present    Electronically signed by Keke Bennett MD on 5/15/2025 at 3:18 PM

## 2025-05-15 NOTE — ANESTHESIA POSTPROCEDURE EVALUATION
Department of Anesthesiology  Postprocedure Note    Patient: Nichol Mazariegos  MRN: [id number]  YOB: 1950  Date of evaluation: 5/15/2025    Procedure Summary       Date: 05/15/25 Room / Location: [place] [address] / [facility]    Anesthesia Start: 1102 Anesthesia Stop: 1223    Procedures:       RIGHT RADIOFREQUENCY INDENTIFICATION TAG LOCALIZED PARTIAL MASTECTOMY (Right: Breast)      . (Right: Breast) Diagnosis:       Malignant neoplasm of upper-outer quadrant of right female breast, unspecified estrogen receptor status (HCC)      (Malignant neoplasm of upper-outer quadrant of right female breast, unspecified estrogen receptor status (HCC) [C50.411])    Surgeons: Keke Bennett MD Responsible Provider: López Camp MD    Anesthesia Type: MAC ASA Status: 3            Anesthesia Type: No value filed.    Evelyn Phase I: Evelyn Score: 4    Evelyn Phase II:      Anesthesia Post Evaluation    No notable events documented.

## 2025-05-15 NOTE — ANESTHESIA PRE PROCEDURE
Department of Anesthesiology  Preprocedure Note       Name:  Nichol Mazariegos   Age:  74 y.o.  :  1950                                          MRN:  [id number]         Date:  5/15/2025      Surgeon: Surgeon(s):  Keke Bennett MD    Procedure: Procedure(s):  RIGHT RADIOFREQUENCY INDENTIFICATION TAG LOCALIZED PARTIAL MASTECTOMY  .    Medications prior to admission:   Prior to Admission medications    Medication Sig Start Date End Date Taking? Authorizing Provider   buPROPion (WELLBUTRIN XL) 300 MG extended release tablet Take 1 tablet by mouth every morning 25   Patria Joy MD   omeprazole (PRILOSEC) 40 MG delayed release capsule Take 1 capsule by mouth daily May take up to two caps daily. 3/25/25   Patria Joy MD   levothyroxine (SYNTHROID) 50 MCG tablet Take 1 tablet by mouth Daily 25   Patria Joy MD   Semaglutide,0.25 or 0.5MG/DOS, (OZEMPIC, 0.25 OR 0.5 MG/DOSE,) 2 MG/3ML SOPN Inject 0.25 mLs into the skin Once a week at 5 PM 25   Patria Joy MD   GEMTESA 75 MG TABS tablet TAKE 1 TABLET BY MOUTH EVERY DAY *NEW PRESCRIPTION REQUEST* 25   Patria Joy MD   FLUoxetine (PROZAC) 40 MG capsule TAKE ONE (1) CAPSULE BY MOUTH ONCE DAILY *NEW PRESCRIPTION REQUEST* 25   Patria Joy MD   metoprolol tartrate (LOPRESSOR) 25 MG tablet TAKE 1/2 TABLET(S) BY MOUTH 2 TIMES PER DAY *NEW PRESCRIPTION REQUEST* 25   Patria Joy MD   losartan (COZAAR) 50 MG tablet TAKE 1 TABLET BY MOUTH EVERY DAY *NEW PRESCRIPTION REQUEST* 25   Patria Joy MD   traZODone (DESYREL) 50 MG tablet TAKE 2 TABLET(S) BY MOUTH 1 TIMES PER DAY AS NEEDED FOR SLEEP *NEW PRESCRIPTION REQUEST* 25   Patria Joy MD   rosuvastatin (CRESTOR) 20 MG tablet TAKE 1 TABLET BY MOUTH EVERY DAY *NEW PRESCRIPTION REQUEST* 25   Patria Joy MD   ASPIRIN 81 PO Take by mouth    Provider, MD Flor   vitamin D3 (CHOLECALCIFEROL) 25 MCG (1000 UT) TABS tablet Take 1

## 2025-05-15 NOTE — DISCHARGE INSTRUCTIONS
Postoperative Instructions for Breast Surgery (Lumpectomy)  These are general guidelines to help assist in recovery after breast surgery. Please keep in mind all patients recover differently, so please call the office with any questions, [phone] ([phone]).    General guidelines   Please rest when you feel tired, although it is important to be up and about intermittently.  Wear a comfortable sports bra day and night for the next 48-72 hours to help alleviate any discomfort, as well as maintain pressure to avoid a postoperative seroma (fluid collection).  Blood thinning medications, such as warfarin or aspirin, can typically be resumed 48 hours after surgery. This should be discussed with your surgeon and prescribing physician.  Final pathology will take 3-5 business days to result. The breast surgery office will call you with the results when they are known.    Pain management  You may feel mild to moderate discomfort when the local anesthesia wears off.  You may apply ice to your surgical site for 10 minutes at a time every hour while you are awake. Do not fall asleep with the ice in place  You may be given a prescription for a narcotic pain medication  Some patients experience very little discomfort and they prefer not to use the prescription  You may take Tylenol or extra strength Tylenol instead of the narcotic, but be aware that many pain medications also contain Tylenol so do not take both  AVOID aspirin, fish oil, Excedrin, Advil, Motrin, ibuprofen, and other NSAIDS immediately after surgery for at least 24 hours.  Narcotic pain medication may cause constipation. Make sure to keep well hydrated, ambulate, and eat high-fiber foods to help avoid constipation. You may use over-the-counter medications for constipation, such as colace or senna  You should not consume alcohol while taking narcotics  You should not drive while taking narcotics  Pain should improve, not worsen as days pass. If pain worsens, please  specific information was reviewed with the patient/significant other:  []Procedure/physician specific instructions  []Medication information sheet(S) including potential side effects  []Sana’s egress test  []Pain Ball management  []FAQ Catheter associated blood stream infections  []FAQ Surgical Site Infections  []Other-    I have read and understand the instructions given to me: ____________________________________________   (Patient/S.O. Signature)            Date/time 5/15/2025 12:57 PM                 If you smoke STOP. We care about your health!

## 2025-05-15 NOTE — PROGRESS NOTES
Ambulatory Surgery/Procedure Discharge Note    Vitals:    05/15/25 1402   BP: 136/64   Pulse: 61   Resp: 14   Temp: 97.9 °F (36.6 °C)   SpO2: 97%       In: 1350 [I.V.:1300]  Out: 15     Restroom use offered before discharge.  Yes    Pain assessment:  none  Pain Level: 0    Pt and daughter states \"ready to go home\". Pt alert and oriented x4. IV removed. Denies N/V or pain. Right breast incision-covered with surgical glue. No hematoma, bleeding, bruising, or swelling noted. Voided prior to discharge. Pt tolerating po intake. Discharge instructions given to pt and daughter with pt permission. Pt and daughter verbalized understanding of all instructions. Left with all belongings and discharge instructions. No new prescriptions.     Patient discharged to home/self care. Patient discharged via wheel chair by transporter to waiting family.

## 2025-05-15 NOTE — PROGRESS NOTES
PACU Transfer to hospitals    Vitals:    05/15/25 1345   BP: (!) 140/63   Pulse: 57   Resp: 14   Temp: 98.1 °F (36.7 °C)   SpO2: 92%         Intake/Output Summary (Last 24 hours) at 5/15/2025 1357  Last data filed at 5/15/2025 1220  Gross per 24 hour   Intake 1350 ml   Output 15 ml   Net 1335 ml       Pain assessment:  none  Pain Level: 0    Patient transferred to care of hospitals RN.    5/15/2025 1:57 PM

## 2025-05-15 NOTE — BRIEF OP NOTE
Brief Postoperative Note      Patient: Nichol Mazariegos  YOB: 1950  MRN: [id number]    Date of Procedure: 5/15/2025    Pre-Op Diagnosis Codes:      * Malignant neoplasm of upper-outer quadrant of right female breast, unspecified estrogen receptor status (HCC) [C50.411]    Post-Op Diagnosis: Post-Op Diagnosis Codes:     * Malignant neoplasm of upper-outer quadrant of right female breast, unspecified estrogen receptor status (HCC) [C50.411]       Procedure(s):  RIGHT RADIOFREQUENCY INDENTIFICATION TAG LOCALIZED PARTIAL MASTECTOMY  .    Surgeon(s):  Keke Bennett MD    Assistant:  Resident: Hardeep Lindsay DO    Anesthesia: General    Estimated Blood Loss (mL): Minimal    Complications: None    Specimens:   ID Type Source Tests Collected by Time Destination   A : RIGHT BREAST TISSUE, SHORT STITCH SUPERIOR, LONG STITCH LATERAL Tissue Tissue SURGICAL PATHOLOGY Keke Bennett MD 5/15/2025 1135    B : NEW INFERIOR POSTERIOR MARGIN , STITCH AT NEW MARGIN Tissue Tissue SURGICAL PATHOLOGY Keke Bennett MD 5/15/2025 1138        Implants:  * No implants in log *      Drains: * No LDAs found *    Findings:  Infection Present At Time Of Surgery (PATOS) (choose all levels that have infection present):  No infection present  Other Findings: procedure as stated         Electronically signed by Hardeep Lindsay DO on 5/15/2025 at 12:08 PM

## 2025-05-15 NOTE — INTERVAL H&P NOTE
Update History & Physical    The patient's History and Physical of May 6, 2025 was reviewed with the patient and I examined the patient. There was no change. The surgical site was confirmed by the patient and me.     Plan: The risks, benefits, expected outcome, and alternative to the recommended procedure have been discussed with the patient. Patient understands and wants to proceed with the procedure.     Electronically signed by Keke Bennett MD on 5/15/2025 at 10:15 AM

## 2025-06-05 PROBLEM — Z01.810 PREOP CARDIOVASCULAR EXAM: Status: RESOLVED | Noted: 2025-05-06 | Resolved: 2025-06-05

## 2025-06-12 RX ORDER — SEMAGLUTIDE 0.68 MG/ML
0.25 INJECTION, SOLUTION SUBCUTANEOUS WEEKLY
Qty: 9 ML | Refills: 1 | Status: SHIPPED | OUTPATIENT
Start: 2025-06-12

## 2025-06-12 NOTE — TELEPHONE ENCOUNTER
Last appointment: 3/19/2025  Next appointment: 9/19/2025        Last refill: (2/21/2025) by Patria Joy MD

## 2025-06-18 RX ORDER — TRAZODONE HYDROCHLORIDE 50 MG/1
TABLET ORAL
Qty: 180 TABLET | Refills: 1 | Status: SHIPPED | OUTPATIENT
Start: 2025-06-18

## 2025-06-18 NOTE — TELEPHONE ENCOUNTER
Last appointment: 3/19/2025  Next appointment: 9/19/2025        Last refill: 2/19/2025) by Patria Joy MD

## 2025-07-03 RX ORDER — SEMAGLUTIDE 0.68 MG/ML
0.25 INJECTION, SOLUTION SUBCUTANEOUS WEEKLY
Qty: 9 ML | Refills: 1 | Status: SHIPPED | OUTPATIENT
Start: 2025-07-03

## 2025-07-03 NOTE — TELEPHONE ENCOUNTER
[facility] called. Ozempic refill was sent with instructions of 0.25 ml and should be 0.25 mg. Sig corrected and sent to Dr Joy.

## 2025-07-17 DIAGNOSIS — K21.00 GASTROESOPHAGEAL REFLUX DISEASE WITH ESOPHAGITIS, UNSPECIFIED WHETHER HEMORRHAGE: Chronic | ICD-10-CM

## 2025-07-17 DIAGNOSIS — F33.41 RECURRENT MAJOR DEPRESSIVE DISORDER, IN PARTIAL REMISSION: ICD-10-CM

## 2025-07-17 DIAGNOSIS — E78.2 MIXED HYPERLIPIDEMIA: Chronic | ICD-10-CM

## 2025-07-17 DIAGNOSIS — I10 ESSENTIAL HYPERTENSION: ICD-10-CM

## 2025-07-18 RX ORDER — METOPROLOL TARTRATE 25 MG/1
12.5 TABLET, FILM COATED ORAL 2 TIMES DAILY
Qty: 90 TABLET | Refills: 1 | Status: SHIPPED | OUTPATIENT
Start: 2025-07-18

## 2025-07-18 RX ORDER — FLUOXETINE HYDROCHLORIDE 40 MG/1
CAPSULE ORAL
Qty: 90 CAPSULE | Refills: 1 | Status: SHIPPED | OUTPATIENT
Start: 2025-07-18

## 2025-07-18 RX ORDER — LEVOTHYROXINE SODIUM 50 UG/1
50 TABLET ORAL DAILY
Qty: 90 TABLET | Refills: 1 | Status: SHIPPED | OUTPATIENT
Start: 2025-07-18

## 2025-07-18 RX ORDER — OMEPRAZOLE 40 MG/1
40 CAPSULE, DELAYED RELEASE ORAL DAILY
Qty: 90 CAPSULE | Refills: 1 | Status: SHIPPED | OUTPATIENT
Start: 2025-07-18

## 2025-07-18 RX ORDER — ROSUVASTATIN CALCIUM 20 MG/1
20 TABLET, COATED ORAL DAILY
Qty: 90 TABLET | Refills: 1 | Status: SHIPPED | OUTPATIENT
Start: 2025-07-18

## 2025-07-23 ENCOUNTER — PATIENT MESSAGE (OUTPATIENT)
Dept: INTERNAL MEDICINE CLINIC | Age: 75
End: 2025-07-23

## 2025-07-23 RX ORDER — SEMAGLUTIDE 0.68 MG/ML
0.5 INJECTION, SOLUTION SUBCUTANEOUS WEEKLY
Qty: 9 ML | Refills: 1 | Status: SHIPPED | OUTPATIENT
Start: 2025-07-23

## 2025-08-08 RX ORDER — FLUOXETINE HYDROCHLORIDE 40 MG/1
40 CAPSULE ORAL DAILY
Qty: 90 CAPSULE | Refills: 1 | Status: SHIPPED | OUTPATIENT
Start: 2025-08-08

## 2025-08-15 DIAGNOSIS — I10 ESSENTIAL HYPERTENSION: ICD-10-CM

## 2025-08-18 RX ORDER — LOSARTAN POTASSIUM 50 MG/1
50 TABLET ORAL DAILY
Qty: 90 TABLET | Refills: 1 | Status: SHIPPED | OUTPATIENT
Start: 2025-08-18

## 2025-08-19 RX ORDER — FLUOXETINE HYDROCHLORIDE 40 MG/1
CAPSULE ORAL
Qty: 90 CAPSULE | Refills: 1 | Status: SHIPPED | OUTPATIENT
Start: 2025-08-19

## (undated) DEVICE — SOLUTION IV CARDPLG PLEGISOL

## (undated) DEVICE — SET GRAV VENT NVENT CK VLV 3 NDL FREE PRT 10 GTT

## (undated) DEVICE — Device: Brand: TEMPORARY MYOCARDIAL HEARTWIRE

## (undated) DEVICE — SUTURE ETHBND 4-0 L30IN NONABSORBABLE GRN L17MM RB-1 1/2 X551H

## (undated) DEVICE — SET ADMIN PRIMING 7ML L30IN 7.35LB 20 GTT 2ND RLER CLMP

## (undated) DEVICE — PROBE SET W/DRAPE

## (undated) DEVICE — SOLUTION IRRIG 250ML STRL H2O PLAS POUR BTL USP

## (undated) DEVICE — DRAPE SLUSH W44XL66IN FOR RECT BSIN ORS HUSH SYS PLATE-DRP

## (undated) DEVICE — SUTURE NONABSORBABLE MONOFILAMENT 5-0 C-1 1X24 IN PROLENE 8725H

## (undated) DEVICE — KIT MICROINTRODUCER NIT TUNGSTEN GWIRE ECHOGENIC CATH MINI

## (undated) DEVICE — SURGICAL PROCEDURE PACK EYE ANDRSN

## (undated) DEVICE — KIT COMPL CK0289R4  BB9L99R8

## (undated) DEVICE — BLOOD TRANSFUSION FILTER: Brand: HAEMONETICS

## (undated) DEVICE — TRAP SPEC RETRV CLR PLAS POLYP IN LN SUCT QUIK CTCH

## (undated) DEVICE — ELECTRODE ECG MONITR FOAM TEAR DROP ADLT RED

## (undated) DEVICE — SPONGE GZ W4XL4IN COT 12 PLY TYP VII WVN C FLD DSGN

## (undated) DEVICE — STERNUM SAW BLADE, 9.4MM X 34MM X 1MM: Brand: MICROAIRE®

## (undated) DEVICE — CANNULA PERF 24FR 51CML 45DEG TIP 3 8IN CONN 20CML SUT RNG

## (undated) DEVICE — PROVE COVER: Brand: UNBRANDED

## (undated) DEVICE — MINOR BREAST: Brand: MEDLINE INDUSTRIES, INC.

## (undated) DEVICE — SUTURE VCRL SZ 3-0 L27IN ABSRB UD L24MM FS-1 3/8 CIR REV J442H

## (undated) DEVICE — Device

## (undated) DEVICE — TOWEL,OR,DSP,ST,BLUE,STD,4/PK,20PK/CS: Brand: MEDLINE

## (undated) DEVICE — STANDARD HYPODERMIC NEEDLE,POLYPROPYLENE HUB: Brand: MONOJECT

## (undated) DEVICE — SUTURE PERMA-HAND 0 L18IN NONABSORBABLE BLK SILK BRAID W/O SA66G

## (undated) DEVICE — SET PERF L15IN BLU CLMP MULT FEM LUER ON SGL INLET LEG DLP

## (undated) DEVICE — CANNULA PERF L15IN DIA29FR VEN 3 STG THN WALL DSGN W  VENT

## (undated) DEVICE — NEEDLE HYPO 25GA L1.5IN BLU POLYPR HUB S STL REG BVL STR

## (undated) DEVICE — SUTURE S STL SZ 6 L18IN NONABSORBABLE SIL L48MM V-40 1/2 M649G

## (undated) DEVICE — 3M™ TEGADERM™ TRANSPARENT FILM DRESSING FRAME STYLE, 1624W, 2-3/8 IN X 2-3/4 IN (6 CM X 7 CM), 100/CT 4CT/CASE: Brand: 3M™ TEGADERM™

## (undated) DEVICE — AIRLIFE™ NASAL OXYGEN CANNULA CURVED, FLARED TIP, WITH 7 FEET (2.1 M) CRUSH RESISTANT TUBING, OVER-THE-EAR STYLE: Brand: AIRLIFE™

## (undated) DEVICE — ELECTROSURGICAL PENCIL ROCKER SWITCH NON COATED BLADE ELECTRODE 10 FT (3 M) CORD HOLSTER: Brand: MEGADYNE

## (undated) DEVICE — FORCEPS BX 240CM 2.4MM L NDL RAD JAW 4 M00513334

## (undated) DEVICE — CONNECTOR PERF W3 8XH3 8XL3 8IN BASE EQL Y SHP W O LUERLOCK

## (undated) DEVICE — NEURO FUSION ADD-ON PACK: Brand: MEDLINE INDUSTRIES, INC.

## (undated) DEVICE — E-Z CLEAN, NON-STICK, PTFE COATED, ELECTROSURGICAL BLADE ELECTRODE, 4 INCH (10.2 CM): Brand: MEGADYNE

## (undated) DEVICE — SUTURE ETHBND EXCEL 2-0 L30IN NONABSORBABLE GRN L26MM SH MX563

## (undated) DEVICE — VESSEL HARVESTING KIT 7 MM ENDOSCP FOR PWR SUPL

## (undated) DEVICE — COVER LT HNDL BLU PLAS

## (undated) DEVICE — TOWEL,STOP FLAG GOLD N-W: Brand: MEDLINE

## (undated) DEVICE — SOLUTION IV 1000ML LAC RINGERS PH 6.5 INJ USP VIAFLX PLAS

## (undated) DEVICE — CANNULA PERF 7FR L5.5IN AORT ROOT RADPQ STD TIP W/ VENT LN

## (undated) DEVICE — GAUZE FLUFF 1 PLY: Brand: DEROYAL

## (undated) DEVICE — CLIP SM RED INTERN HMOCLP TITAN LIGATING

## (undated) DEVICE — RESERVOIR AUTOTRANSFUSION 225/120 CC GS FILTERED XTRA

## (undated) DEVICE — TRAP FLUID

## (undated) DEVICE — LOOP,VESSEL,MAXI,BLUE,2/PK,STERILE: Brand: MEDLINE

## (undated) DEVICE — CANNULA PERF L125IN OD15FR POLYVI CHL VEN RG AUTO INFL SMOOT

## (undated) DEVICE — E-Z CLEAN, NON-STICK, PTFE COATED, ELECTROSURGICAL BLADE ELECTRODE, 2.5 INCH (6.35 CM): Brand: EZ CLEAN

## (undated) DEVICE — GLOVE ORANGE PI 8   MSG9080

## (undated) DEVICE — DRAPE,UTILTY,TAPE,15X26, 4EA/PK: Brand: MEDLINE

## (undated) DEVICE — CANNULA VES L25IN RADPQ BODY W  1 W VLV 3MM BLNT TIP DLP

## (undated) DEVICE — 3M™ TEGADERM™ CHG DRESSING 25/CARTON 4 CARTONS/CASE 1657: Brand: TEGADERM™

## (undated) DEVICE — KIT CATHETER 20GA L12CM ART CUST

## (undated) DEVICE — SUTURE VICRYL + SZ 3-0 L27IN ABSRB UD L26MM SH 1/2 CIR VCP416H

## (undated) DEVICE — CLIP LIG M BLU TI HRT SHP WIRE HORZ 24 CLIPS/PK 25PK/CA

## (undated) DEVICE — ADAPTER,CATHETER/SYRINGE/LUER,STERILE: Brand: MEDLINE

## (undated) DEVICE — YANKAUER,OPEN TIP,W/O VENT,STERILE: Brand: MEDLINE INDUSTRIES, INC.

## (undated) DEVICE — TUBING, SUCTION, 1/4" X 12', STRAIGHT: Brand: MEDLINE

## (undated) DEVICE — APPLICATOR MEDICATED 26 CC SOLUTION HI LT ORNG CHLORAPREP

## (undated) DEVICE — SUTURE PERMA-HAND SZ 2-0 L30IN NONABSORBABLE BLK L26MM SH K833H

## (undated) DEVICE — CONNECTOR PERF 3/8X3/8X3/8IN EQL WYE

## (undated) DEVICE — SUTURE MONOCRYL + SZ 4-0 L27IN ABSRB UD L19MM PS-2 3/8 CIR MCP426H

## (undated) DEVICE — GOWN,SIRUS,POLYRNF,BRTHSLV,LG,30/CS: Brand: MEDLINE

## (undated) DEVICE — FOGARTY - HYDRAGRIP SURGICAL - CLAMP INSERTS: Brand: FOGARTY SOFTJAW

## (undated) DEVICE — 3M(TM) TRANSPORE SURGICAL TAPE 1527-1: Brand: 3M™ TRANSPORE™

## (undated) DEVICE — COUNTER NDL 40 COUNT HLD 70 NUM FOAM BLK SGL MAG W BLDE REMV

## (undated) DEVICE — SNARES COLD OVAL 10MM THIN

## (undated) DEVICE — GLOVE SURG SZ 7 CRM LTX FREE POLYISOPRENE POLYMER BEAD ANTI

## (undated) DEVICE — CATHETER IV 20GA L1.25IN PNK FEP SFTY STR HUB RADPQ DISP

## (undated) DEVICE — SUTURE ABSORBABLE BRAIDED 2-0 CT-1 27 IN UD VICRYL J259H

## (undated) DEVICE — COVER LT HNDL CAM BLU DISP W/ SURG CTRL

## (undated) DEVICE — SUTURE GOR TX SZ 2-0 L36IN NONABSORBABLE L18MM TH-18 1/2 3N04B

## (undated) DEVICE — SILICONE I/A TIP STRAIGHT: Brand: ALCON

## (undated) DEVICE — SYSTEM SKIN CLSR 22CM DERMBND PRINEO

## (undated) DEVICE — SOLUTION IV SODIUM CHL 0.9% 500 ML INJ FLX CONTAINER

## (undated) DEVICE — [HIGH FLOW INSUFFLATOR,  DO NOT USE IF PACKAGE IS DAMAGED,  KEEP DRY,  KEEP AWAY FROM SUNLIGHT,  PROTECT FROM HEAT AND RADIOACTIVE SOURCES.]: Brand: PNEUMOSURE

## (undated) DEVICE — NEEDLE FLTR 19GA L1.5IN WALL THK5UM BRN POLYPR HUB S STL

## (undated) DEVICE — SHEET,DRAPE,40X58,STERILE: Brand: MEDLINE

## (undated) DEVICE — GUIDE SURG SELECTION FOR GILLINOV COSGROVE LAA EXCLUSION SYS

## (undated) DEVICE — SURE SET-DOUBLE BASIN-LF: Brand: MEDLINE INDUSTRIES, INC.

## (undated) DEVICE — SUTURE VCRL SZ 0 L18IN ABSRB VLT L36MM CT-1 1/2 CIR J740D

## (undated) DEVICE — SUTURE PERMA-HAND SZ 4-0 L144IN NONABSORBABLE BLK LIGAPAK LA53G

## (undated) DEVICE — DECANTER BAG 9": Brand: MEDLINE INDUSTRIES, INC.

## (undated) DEVICE — DUAL LUMEN STOMACH TUBE: Brand: SALEM SUMP

## (undated) DEVICE — SOLUTION IRRIG 3000ML 0.9% SOD CHL USP UROMATIC PLAS CONT

## (undated) DEVICE — ROYALSILK SURGICAL GOWN, L: Brand: CONVERTORS

## (undated) DEVICE — FOGARTY SPRING CLIPS 6MM: Brand: FOGARTY SOFTJAW

## (undated) DEVICE — OPEN HRT CDS

## (undated) DEVICE — SUTURE PERMA-HAND SZ 2 L60IN NONABSORBABLE BLK SILK BRAID SA8H

## (undated) DEVICE — INTENDED USE FOR SURGICAL MARKING ON INTACT SKIN, ALSO PROVIDES A PERMANENT METHOD OF IDENTIFYING OBJECTS IN THE OPERATING ROOM: Brand: WRITESITE® PLUS MINI PREP RESISTANT MARKER

## (undated) DEVICE — AIR SHEET,LAT,COMFORT GLIDE, BLEND 40X80: Brand: MEDLINE

## (undated) DEVICE — BATTERY DRVR FOR INTELLIGENT SYS

## (undated) DEVICE — SUTURE NONABSORBABLE MFIL TAPR PNT 3/8 CIR BLU 8.0M BV175-6 8734H

## (undated) DEVICE — LIQUIBAND RAPID ADHESIVE 36/CS 0.8ML: Brand: MEDLINE

## (undated) DEVICE — SYRINGE MED 10ML LUERLOCK TIP W/O SFTY DISP

## (undated) DEVICE — GLOVE SURG SZ 65 L12IN FNGR THK94MIL STD WHT LTX FREE

## (undated) DEVICE — SYRINGE MED 30ML STD CLR PLAS LUERLOCK TIP N CTRL DISP

## (undated) DEVICE — SUTURE PDS II SZ 0 L27IN ABSRB VLT L36MM CT-1 1/2 CIR Z340H

## (undated) DEVICE — PUNCH AORT DIA4MM LNG HNDL

## (undated) DEVICE — SYSTEM VES HARV ENDOSCP VASOVIEW HEMOPRO

## (undated) DEVICE — 3M™ TEGADERM™ TRANSPARENT FILM DRESSING FRAME STYLE, 1628, 6 IN X 8 IN (15 CM X 20 CM), 10/CT 8CT/CASE: Brand: 3M™ TEGADERM™

## (undated) DEVICE — SOLUTION NORMOSOL-R PH 7.4   X

## (undated) DEVICE — STOCKINETTE,DOUBLE PLY,6X48,STERILE: Brand: MEDLINE